# Patient Record
Sex: FEMALE | Race: BLACK OR AFRICAN AMERICAN | Employment: OTHER | URBAN - METROPOLITAN AREA
[De-identification: names, ages, dates, MRNs, and addresses within clinical notes are randomized per-mention and may not be internally consistent; named-entity substitution may affect disease eponyms.]

---

## 2018-09-28 ENCOUNTER — HOSPITAL ENCOUNTER (EMERGENCY)
Age: 63
Discharge: HOME | End: 2018-09-28
Payer: MEDICAID

## 2018-09-28 VITALS
WEIGHT: 130 LBS | RESPIRATION RATE: 17 BRPM | HEIGHT: 62 IN | DIASTOLIC BLOOD PRESSURE: 95 MMHG | SYSTOLIC BLOOD PRESSURE: 138 MMHG | HEART RATE: 85 BPM | OXYGEN SATURATION: 99 % | TEMPERATURE: 98.9 F | BODY MASS INDEX: 23.92 KG/M2

## 2018-09-28 DIAGNOSIS — Z76.0 ENCOUNTER FOR MEDICATION REFILL: ICD-10-CM

## 2018-09-28 DIAGNOSIS — G89.29 ACUTE EXACERBATION OF CHRONIC LOW BACK PAIN: Primary | ICD-10-CM

## 2018-09-28 DIAGNOSIS — M54.50 ACUTE EXACERBATION OF CHRONIC LOW BACK PAIN: Primary | ICD-10-CM

## 2018-09-28 PROCEDURE — 6360000002 HC RX W HCPCS: Performed by: NURSE PRACTITIONER

## 2018-09-28 PROCEDURE — 6370000000 HC RX 637 (ALT 250 FOR IP): Performed by: NURSE PRACTITIONER

## 2018-09-28 PROCEDURE — 99282 EMERGENCY DEPT VISIT SF MDM: CPT

## 2018-09-28 PROCEDURE — 96372 THER/PROPH/DIAG INJ SC/IM: CPT

## 2018-09-28 RX ORDER — CYCLOBENZAPRINE HCL 10 MG
10 TABLET ORAL ONCE
Status: COMPLETED | OUTPATIENT
Start: 2018-09-28 | End: 2018-09-28

## 2018-09-28 RX ORDER — KETOROLAC TROMETHAMINE 30 MG/ML
60 INJECTION, SOLUTION INTRAMUSCULAR; INTRAVENOUS ONCE
Status: COMPLETED | OUTPATIENT
Start: 2018-09-28 | End: 2018-09-28

## 2018-09-28 RX ORDER — PREDNISONE 20 MG/1
60 TABLET ORAL DAILY
Qty: 15 TABLET | Refills: 0 | Status: SHIPPED | OUTPATIENT
Start: 2018-09-28 | End: 2018-10-03

## 2018-09-28 RX ORDER — GABAPENTIN 100 MG/1
100 CAPSULE ORAL 3 TIMES DAILY
Qty: 62 CAPSULE | Refills: 0 | Status: SHIPPED | OUTPATIENT
Start: 2018-09-28 | End: 2018-10-03 | Stop reason: DRUGHIGH

## 2018-09-28 RX ORDER — OXYCODONE HYDROCHLORIDE AND ACETAMINOPHEN 5; 325 MG/1; MG/1
1 TABLET ORAL ONCE
Status: COMPLETED | OUTPATIENT
Start: 2018-09-28 | End: 2018-09-28

## 2018-09-28 RX ADMIN — KETOROLAC TROMETHAMINE 60 MG: 30 INJECTION, SOLUTION INTRAMUSCULAR at 09:53

## 2018-09-28 RX ADMIN — CYCLOBENZAPRINE HYDROCHLORIDE 10 MG: 10 TABLET, FILM COATED ORAL at 09:53

## 2018-09-28 RX ADMIN — OXYCODONE HYDROCHLORIDE AND ACETAMINOPHEN 1 TABLET: 5; 325 TABLET ORAL at 09:53

## 2018-09-28 ASSESSMENT — PAIN SCALES - GENERAL: PAINLEVEL_OUTOF10: 10

## 2018-10-03 ENCOUNTER — OFFICE VISIT (OUTPATIENT)
Dept: INTERNAL MEDICINE | Age: 63
End: 2018-10-03
Payer: MEDICAID

## 2018-10-03 VITALS
HEIGHT: 62 IN | SYSTOLIC BLOOD PRESSURE: 129 MMHG | BODY MASS INDEX: 23.48 KG/M2 | WEIGHT: 127.6 LBS | DIASTOLIC BLOOD PRESSURE: 87 MMHG | HEART RATE: 56 BPM | TEMPERATURE: 97.2 F | RESPIRATION RATE: 16 BRPM

## 2018-10-03 DIAGNOSIS — I10 HYPERTENSION, UNSPECIFIED TYPE: Primary | ICD-10-CM

## 2018-10-03 DIAGNOSIS — M06.9 RHEUMATOID ARTHRITIS INVOLVING MULTIPLE SITES, UNSPECIFIED RHEUMATOID FACTOR PRESENCE: ICD-10-CM

## 2018-10-03 DIAGNOSIS — M54.12 CERVICAL RADICULOPATHY: ICD-10-CM

## 2018-10-03 DIAGNOSIS — F32.A DEPRESSION, UNSPECIFIED DEPRESSION TYPE: ICD-10-CM

## 2018-10-03 PROCEDURE — 99204 OFFICE O/P NEW MOD 45 MIN: CPT | Performed by: INTERNAL MEDICINE

## 2018-10-03 PROCEDURE — G0444 DEPRESSION SCREEN ANNUAL: HCPCS | Performed by: INTERNAL MEDICINE

## 2018-10-03 PROCEDURE — 99213 OFFICE O/P EST LOW 20 MIN: CPT | Performed by: INTERNAL MEDICINE

## 2018-10-03 RX ORDER — AMLODIPINE BESYLATE 5 MG/1
5 TABLET ORAL DAILY
Qty: 30 TABLET | Refills: 2 | Status: SHIPPED | OUTPATIENT
Start: 2018-10-03 | End: 2019-01-24 | Stop reason: SDUPTHER

## 2018-10-03 RX ORDER — AMITRIPTYLINE HYDROCHLORIDE 50 MG/1
50 TABLET, FILM COATED ORAL NIGHTLY
Qty: 30 TABLET | Refills: 3 | Status: SHIPPED | OUTPATIENT
Start: 2018-10-03 | End: 2019-01-24 | Stop reason: SDUPTHER

## 2018-10-03 RX ORDER — GABAPENTIN 300 MG/1
300 CAPSULE ORAL 3 TIMES DAILY
Qty: 90 CAPSULE | Refills: 0 | Status: SHIPPED | OUTPATIENT
Start: 2018-10-03 | End: 2019-02-11 | Stop reason: SDUPTHER

## 2018-10-03 RX ORDER — AMITRIPTYLINE HYDROCHLORIDE 25 MG/1
25 TABLET, FILM COATED ORAL NIGHTLY
Qty: 30 TABLET | Refills: 2 | Status: SHIPPED | OUTPATIENT
Start: 2018-10-03 | End: 2018-10-03 | Stop reason: DRUGHIGH

## 2018-10-03 ASSESSMENT — ENCOUNTER SYMPTOMS
BLOOD IN STOOL: 0
BLURRED VISION: 0
ABDOMINAL PAIN: 0
NAUSEA: 0
CONSTIPATION: 0
DIARRHEA: 0
VOMITING: 0
HEARTBURN: 1
BACK PAIN: 1
SORE THROAT: 0
COUGH: 0
SHORTNESS OF BREATH: 0

## 2018-10-03 ASSESSMENT — PATIENT HEALTH QUESTIONNAIRE - PHQ9
6. FEELING BAD ABOUT YOURSELF - OR THAT YOU ARE A FAILURE OR HAVE LET YOURSELF OR YOUR FAMILY DOWN: 3
2. FEELING DOWN, DEPRESSED OR HOPELESS: 2
5. POOR APPETITE OR OVEREATING: 3
3. TROUBLE FALLING OR STAYING ASLEEP: 3
7. TROUBLE CONCENTRATING ON THINGS, SUCH AS READING THE NEWSPAPER OR WATCHING TELEVISION: 1
9. THOUGHTS THAT YOU WOULD BE BETTER OFF DEAD, OR OF HURTING YOURSELF: 1
SUM OF ALL RESPONSES TO PHQ QUESTIONS 1-9: 18
10. IF YOU CHECKED OFF ANY PROBLEMS, HOW DIFFICULT HAVE THESE PROBLEMS MADE IT FOR YOU TO DO YOUR WORK, TAKE CARE OF THINGS AT HOME, OR GET ALONG WITH OTHER PEOPLE: 3
SUM OF ALL RESPONSES TO PHQ9 QUESTIONS 1 & 2: 4
1. LITTLE INTEREST OR PLEASURE IN DOING THINGS: 2
4. FEELING TIRED OR HAVING LITTLE ENERGY: 2
SUM OF ALL RESPONSES TO PHQ QUESTIONS 1-9: 18
8. MOVING OR SPEAKING SO SLOWLY THAT OTHER PEOPLE COULD HAVE NOTICED. OR THE OPPOSITE, BEING SO FIGETY OR RESTLESS THAT YOU HAVE BEEN MOVING AROUND A LOT MORE THAN USUAL: 1

## 2018-10-03 NOTE — PATIENT INSTRUCTIONS
with depression may not get better unless they get treatment. Many people feel embarrassed or ashamed about having depression. But it isn't a sign of personal weakness. It's not a character flaw. A person who is depressed is not \"crazy. \" Depression is caused by changes in the brain. A serious symptom of depression is thinking about death or suicide. If you or someone you care about talks about this or about feeling hopeless, get help right away. It's important to know that depression can be treated. The first step toward feeling better is often just seeing that the problem exists. Where can you learn more? Go to https://[employer]. org and sign in to your [employer] account. Enter T185 in the Coast[employer] box to learn more about \"Learning About Depression Screening. \"     If you do not have an account, please click on the \"Sign Up Now\" link. Current as of: December 7, 2017  Content Version: 11.7  © 5693-6937 [employer]. Care instructions adapted under license by [facility] ([facility]). If you have questions about a medical condition or this instruction, always ask your healthcare professional. [place] [address] any warranty or liability for your use of this information. Patient Education        Preventing Falls: Care Instructions  Your Care Instructions    Getting around your home safely can be a challenge if you have injuries or health problems that make it easy for you to fall. Loose rugs and furniture in walkways are among the dangers for many older people who have problems walking or who have poor eyesight. People who have conditions such as arthritis, osteoporosis, or dementia also have to be careful not to fall. You can make your home safer with a few simple measures. Follow-up care is a key part of your treatment and safety. Be sure to make and go to all appointments, and call your doctor if you are having problems.  It's also a good idea to know your test Healthwise, Incorporated. Care instructions adapted under license by [facility] ([facility]). If you have questions about a medical condition or this instruction, always ask your healthcare professional. [address] any warranty or liability for your use of this information. Continue medications as prescribed  You have been referred to see the physical medicine and rehabilitation doctors: please follow up  Get lab work done before your next visit.

## 2018-10-30 ENCOUNTER — TELEPHONE (OUTPATIENT)
Dept: ADMINISTRATIVE | Age: 63
End: 2018-10-30

## 2018-12-12 ENCOUNTER — OFFICE VISIT (OUTPATIENT)
Dept: PHYSICAL MEDICINE AND REHAB | Age: 63
End: 2018-12-12
Payer: MEDICAID

## 2018-12-12 VITALS
WEIGHT: 136 LBS | HEIGHT: 62 IN | SYSTOLIC BLOOD PRESSURE: 152 MMHG | DIASTOLIC BLOOD PRESSURE: 91 MMHG | HEART RATE: 77 BPM | BODY MASS INDEX: 25.03 KG/M2

## 2018-12-12 DIAGNOSIS — M79.18 MYOFASCIAL PAIN: ICD-10-CM

## 2018-12-12 DIAGNOSIS — M79.10 TRIGGER POINT: ICD-10-CM

## 2018-12-12 DIAGNOSIS — M54.2 NECK PAIN: Primary | ICD-10-CM

## 2018-12-12 PROCEDURE — 99204 OFFICE O/P NEW MOD 45 MIN: CPT | Performed by: PHYSICAL MEDICINE & REHABILITATION

## 2018-12-12 RX ORDER — PANTOPRAZOLE SODIUM 20 MG/1
20 TABLET, DELAYED RELEASE ORAL DAILY
COMMUNITY
End: 2019-01-24 | Stop reason: SDUPTHER

## 2018-12-12 RX ORDER — ACYCLOVIR 400 MG/1
400 TABLET ORAL PRN
COMMUNITY
End: 2020-06-04

## 2018-12-12 RX ORDER — CYCLOBENZAPRINE HCL 5 MG
5-10 TABLET ORAL 3 TIMES DAILY PRN
Qty: 90 TABLET | Refills: 2 | Status: SHIPPED | OUTPATIENT
Start: 2018-12-12 | End: 2018-12-12 | Stop reason: SDUPTHER

## 2018-12-12 RX ORDER — CYCLOBENZAPRINE HCL 5 MG
5-10 TABLET ORAL 3 TIMES DAILY PRN
Qty: 90 TABLET | Refills: 1 | Status: SHIPPED | OUTPATIENT
Start: 2018-12-12 | End: 2019-01-22 | Stop reason: SDUPTHER

## 2018-12-12 RX ORDER — HYDROCHLOROTHIAZIDE 25 MG/1
25 TABLET ORAL DAILY
COMMUNITY
End: 2019-01-22 | Stop reason: SDUPTHER

## 2018-12-12 NOTE — PROGRESS NOTES
Mary Lou Mclean, [address] [facility] Physical [facility] Rehabilitation  [address] [facility] [place]  Phone: [phone]  Fax: [phone]    PCP: No primary care provider on file. Date of visit: 12/12/18    Chief Complaint   Patient presents with   [facility] Patient       Dear Dr. Merry Mercedes,     Thank you for referring your patient to be seen. As you know,  Frank Wang is a 59 y.o. female with past medical history as below who presents with neck pain for 17 years. There was a gradual onset of pain after no known injury. Now, the pain is constant and occurs daily. The pain is rated Pain Score:   7, is described as stiff, and is located in the neck with radiation to the arms. The symptoms have been worse since onset. The pain is better with MBB, elavil. The pain is worse with neck extension. There is no associated numbness/tingling in hands. There is weakness. There is no bowel/bladder changes. MBB cervical in April in [place]. The prior workup has included: Xray, MRI, EMG. The prior treatment has included:  PT: completed this year in May  Chiropractic: none    Modalities: none   OTC Tylenol: none   NSAIDS: upsets stomach  Opioids: oxycodone with relief   Membrane stabilizers: neurontin, elavil with relief    Muscle relaxers: flexeril with relief   Previous injections: MBB with relief.    Previous surgery at this site: none      Allergies   Allergen Reactions    Motrin [Ibuprofen]      aggravates my IBS    Penicillins        Current Outpatient Prescriptions   Medication Sig Dispense Refill    hydrochlorothiazide (HYDRODIURIL) 25 MG tablet Take 25 mg by mouth daily      pantoprazole (PROTONIX) 20 MG tablet Take 20 mg by mouth daily      acyclovir (ZOVIRAX) 400 MG tablet Take 400 mg by mouth as needed      Psyllium (METAMUCIL FIBER PO) Take by mouth      Multiple Vitamin (MULTIVITAMINS PO) Take by mouth      Tens Unit MISC by Does not apply route 4-lead 1 each 0    cyclobenzaprine (FLEXERIL) 5 MG tablet Take 1-2 tablets by mouth 3 times daily as needed for Muscle spasms 90 tablet 1    amLODIPine (NORVASC) 5 MG tablet Take 1 tablet by mouth daily 30 tablet 2    amitriptyline (ELAVIL) 50 MG tablet Take 1 tablet by mouth nightly 30 tablet 3    gabapentin (NEURONTIN) 300 MG capsule Take 1 capsule by mouth 3 times daily for 30 days. . (Patient taking differently: Take 300 mg by mouth 4 times daily. Ying Bar ) 90 capsule 0     No current facility-administered medications for this visit. Past Medical History:   Diagnosis Date    GERD (gastroesophageal reflux disease)     Hypertension        History reviewed. No pertinent surgical history. History reviewed. No pertinent family history. Social History     Social History    Marital status: Single     Spouse name: N/A    Number of children: N/A    Years of education: N/A     Occupational History    Not on file. Social History Main Topics    Smoking status: Never Smoker    Smokeless tobacco: Never Used    Alcohol use Not on file    Drug use: Unknown    Sexual activity: Not on file     Other Topics Concern    Not on file     Social History Narrative    No narrative on file       Functional Status: The patient is able to ambulate and perform activities of daily living without the use of an assistive device. Occupation: The patient is currently not working. ROS: For more complete ROS answered by the patient, please see .     Constitutional: Denies fevers, chills, night sweats, unintentional weight loss     Skin: Denies rash or skin changes     Eyes: Denies vision changes    Ears/Nose/Throat: Denies nasal congestion or sore throat     Respiratory: Denies SOB or cough     Cardiovascular: Denies CP, palpitations, edema      Gastrointestinal: Denies abdominal pain,  N/V, constipation, or diarrhea    Genitourinary: Denies urinary symptoms    Neurologic: See HPI.     MSK: See

## 2019-01-22 ENCOUNTER — TELEPHONE (OUTPATIENT)
Dept: PHYSICAL MEDICINE AND REHAB | Age: 64
End: 2019-01-22

## 2019-01-22 ENCOUNTER — HOSPITAL ENCOUNTER (EMERGENCY)
Age: 64
Discharge: HOME | End: 2019-01-22
Payer: MEDICAID

## 2019-01-22 ENCOUNTER — APPOINTMENT (OUTPATIENT)
Dept: GENERAL RADIOLOGY | Age: 64
End: 2019-01-22
Payer: MEDICAID

## 2019-01-22 VITALS
DIASTOLIC BLOOD PRESSURE: 80 MMHG | HEIGHT: 62 IN | RESPIRATION RATE: 16 BRPM | BODY MASS INDEX: 25.03 KG/M2 | OXYGEN SATURATION: 100 % | TEMPERATURE: 97.3 F | HEART RATE: 63 BPM | SYSTOLIC BLOOD PRESSURE: 130 MMHG | WEIGHT: 136 LBS

## 2019-01-22 DIAGNOSIS — R73.9 HYPERGLYCEMIA: ICD-10-CM

## 2019-01-22 DIAGNOSIS — I10 HYPERTENSION, UNSPECIFIED TYPE: Primary | ICD-10-CM

## 2019-01-22 LAB
ALBUMIN SERPL-MCNC: 4.7 G/DL (ref 3.5–5.2)
ALP BLD-CCNC: 82 U/L (ref 35–104)
ALT SERPL-CCNC: 11 U/L (ref 0–32)
ANION GAP SERPL CALCULATED.3IONS-SCNC: 16 MMOL/L (ref 7–16)
AST SERPL-CCNC: 15 U/L (ref 0–31)
BASOPHILS ABSOLUTE: 0.03 E9/L (ref 0–0.2)
BASOPHILS RELATIVE PERCENT: 0.5 % (ref 0–2)
BILIRUB SERPL-MCNC: 0.5 MG/DL (ref 0–1.2)
BILIRUBIN URINE: NEGATIVE
BLOOD, URINE: NEGATIVE
BUN BLDV-MCNC: 10 MG/DL (ref 8–23)
CALCIUM SERPL-MCNC: 9.8 MG/DL (ref 8.6–10.2)
CHLORIDE BLD-SCNC: 104 MMOL/L (ref 98–107)
CLARITY: CLEAR
CO2: 21 MMOL/L (ref 22–29)
COLOR: YELLOW
CREAT SERPL-MCNC: 0.7 MG/DL (ref 0.5–1)
EKG ATRIAL RATE: 62 BPM
EKG P AXIS: 59 DEGREES
EKG P-R INTERVAL: 164 MS
EKG Q-T INTERVAL: 444 MS
EKG QRS DURATION: 82 MS
EKG QTC CALCULATION (BAZETT): 450 MS
EKG R AXIS: 51 DEGREES
EKG T AXIS: 74 DEGREES
EKG VENTRICULAR RATE: 62 BPM
EOSINOPHILS ABSOLUTE: 0.14 E9/L (ref 0.05–0.5)
EOSINOPHILS RELATIVE PERCENT: 2.3 % (ref 0–6)
GFR AFRICAN AMERICAN: >60
GFR NON-AFRICAN AMERICAN: >60 ML/MIN/1.73
GLUCOSE BLD-MCNC: 189 MG/DL (ref 74–99)
GLUCOSE URINE: NEGATIVE MG/DL
HCT VFR BLD CALC: 43 % (ref 34–48)
HEMOGLOBIN: 14.6 G/DL (ref 11.5–15.5)
IMMATURE GRANULOCYTES #: 0.02 E9/L
IMMATURE GRANULOCYTES %: 0.3 % (ref 0–5)
KETONES, URINE: NEGATIVE MG/DL
LEUKOCYTE ESTERASE, URINE: NEGATIVE
LYMPHOCYTES ABSOLUTE: 2.67 E9/L (ref 1.5–4)
LYMPHOCYTES RELATIVE PERCENT: 43.8 % (ref 20–42)
MCH RBC QN AUTO: 30 PG (ref 26–35)
MCHC RBC AUTO-ENTMCNC: 34 % (ref 32–34.5)
MCV RBC AUTO: 88.3 FL (ref 80–99.9)
MONOCYTES ABSOLUTE: 0.43 E9/L (ref 0.1–0.95)
MONOCYTES RELATIVE PERCENT: 7 % (ref 2–12)
NEUTROPHILS ABSOLUTE: 2.81 E9/L (ref 1.8–7.3)
NEUTROPHILS RELATIVE PERCENT: 46.1 % (ref 43–80)
NITRITE, URINE: NEGATIVE
PDW BLD-RTO: 13.7 FL (ref 11.5–15)
PH UA: 5.5 (ref 5–9)
PLATELET # BLD: 239 E9/L (ref 130–450)
PMV BLD AUTO: 10.7 FL (ref 7–12)
POTASSIUM SERPL-SCNC: 3.9 MMOL/L (ref 3.5–5)
PROTEIN UA: NEGATIVE MG/DL
RBC # BLD: 4.87 E12/L (ref 3.5–5.5)
SODIUM BLD-SCNC: 141 MMOL/L (ref 132–146)
SPECIFIC GRAVITY UA: <=1.005 (ref 1–1.03)
TOTAL PROTEIN: 8.1 G/DL (ref 6.4–8.3)
TROPONIN: <0.01 NG/ML (ref 0–0.03)
UROBILINOGEN, URINE: 0.2 E.U./DL
WBC # BLD: 6.1 E9/L (ref 4.5–11.5)

## 2019-01-22 PROCEDURE — 6370000000 HC RX 637 (ALT 250 FOR IP): Performed by: PHYSICIAN ASSISTANT

## 2019-01-22 PROCEDURE — 84484 ASSAY OF TROPONIN QUANT: CPT

## 2019-01-22 PROCEDURE — 71046 X-RAY EXAM CHEST 2 VIEWS: CPT

## 2019-01-22 PROCEDURE — 99284 EMERGENCY DEPT VISIT MOD MDM: CPT

## 2019-01-22 PROCEDURE — 81003 URINALYSIS AUTO W/O SCOPE: CPT

## 2019-01-22 PROCEDURE — 85025 COMPLETE CBC W/AUTO DIFF WBC: CPT

## 2019-01-22 PROCEDURE — 36415 COLL VENOUS BLD VENIPUNCTURE: CPT

## 2019-01-22 PROCEDURE — 80053 COMPREHEN METABOLIC PANEL: CPT

## 2019-01-22 RX ORDER — HYDROCHLOROTHIAZIDE 25 MG/1
25 TABLET ORAL DAILY
Qty: 30 TABLET | Refills: 0 | Status: SHIPPED | OUTPATIENT
Start: 2019-01-22 | End: 2019-02-11 | Stop reason: SDUPTHER

## 2019-01-22 RX ORDER — CLONIDINE HYDROCHLORIDE 0.1 MG/1
0.1 TABLET ORAL ONCE
Status: COMPLETED | OUTPATIENT
Start: 2019-01-22 | End: 2019-01-22

## 2019-01-22 RX ORDER — CYCLOBENZAPRINE HCL 5 MG
5-10 TABLET ORAL 3 TIMES DAILY PRN
Qty: 90 TABLET | Refills: 1 | Status: SHIPPED | OUTPATIENT
Start: 2019-01-22 | End: 2019-02-01

## 2019-01-22 RX ADMIN — CLONIDINE HYDROCHLORIDE 0.1 MG: 0.1 TABLET ORAL at 21:11

## 2019-01-24 ENCOUNTER — OFFICE VISIT (OUTPATIENT)
Dept: INTERNAL MEDICINE | Age: 64
End: 2019-01-24
Payer: MEDICAID

## 2019-01-24 ENCOUNTER — HOSPITAL ENCOUNTER (OUTPATIENT)
Age: 64
Discharge: HOME | End: 2019-01-24
Payer: MEDICAID

## 2019-01-24 ENCOUNTER — HOSPITAL ENCOUNTER (OUTPATIENT)
Dept: GENERAL RADIOLOGY | Age: 64
Discharge: HOME | End: 2019-01-26
Payer: MEDICAID

## 2019-01-24 VITALS
TEMPERATURE: 97.2 F | WEIGHT: 127 LBS | BODY MASS INDEX: 23.37 KG/M2 | HEART RATE: 61 BPM | DIASTOLIC BLOOD PRESSURE: 85 MMHG | RESPIRATION RATE: 16 BRPM | HEIGHT: 62 IN | SYSTOLIC BLOOD PRESSURE: 136 MMHG

## 2019-01-24 DIAGNOSIS — M54.12 CERVICAL RADICULOPATHY: Primary | ICD-10-CM

## 2019-01-24 DIAGNOSIS — I10 ESSENTIAL HYPERTENSION: ICD-10-CM

## 2019-01-24 DIAGNOSIS — I10 HYPERTENSION, UNSPECIFIED TYPE: ICD-10-CM

## 2019-01-24 DIAGNOSIS — K21.9 GASTROESOPHAGEAL REFLUX DISEASE, ESOPHAGITIS PRESENCE NOT SPECIFIED: ICD-10-CM

## 2019-01-24 DIAGNOSIS — M06.9 RHEUMATOID ARTHRITIS INVOLVING MULTIPLE SITES, UNSPECIFIED RHEUMATOID FACTOR PRESENCE: ICD-10-CM

## 2019-01-24 DIAGNOSIS — F32.A DEPRESSION, UNSPECIFIED DEPRESSION TYPE: ICD-10-CM

## 2019-01-24 LAB
ALBUMIN SERPL-MCNC: 5.3 G/DL (ref 3.5–5.2)
ALP BLD-CCNC: 84 U/L (ref 35–104)
ALT SERPL-CCNC: 11 U/L (ref 0–32)
ANION GAP SERPL CALCULATED.3IONS-SCNC: 18 MMOL/L (ref 7–16)
AST SERPL-CCNC: 16 U/L (ref 0–31)
BASOPHILS ABSOLUTE: 0.04 E9/L (ref 0–0.2)
BASOPHILS RELATIVE PERCENT: 0.6 % (ref 0–2)
BILIRUB SERPL-MCNC: 0.6 MG/DL (ref 0–1.2)
BUN BLDV-MCNC: 12 MG/DL (ref 8–23)
CALCIUM SERPL-MCNC: 10.4 MG/DL (ref 8.6–10.2)
CHLORIDE BLD-SCNC: 97 MMOL/L (ref 98–107)
CHOLESTEROL, FASTING: 228 MG/DL (ref 0–199)
CO2: 25 MMOL/L (ref 22–29)
CREAT SERPL-MCNC: 0.8 MG/DL (ref 0.5–1)
EOSINOPHILS ABSOLUTE: 0.14 E9/L (ref 0.05–0.5)
EOSINOPHILS RELATIVE PERCENT: 2.2 % (ref 0–6)
GFR AFRICAN AMERICAN: >60
GFR NON-AFRICAN AMERICAN: >60 ML/MIN/1.73
GLUCOSE BLD-MCNC: 102 MG/DL (ref 74–99)
HBA1C MFR BLD: 5.9 % (ref 4–5.6)
HCT VFR BLD CALC: 46.7 % (ref 34–48)
HDLC SERPL-MCNC: 95 MG/DL
HEMOGLOBIN: 15.9 G/DL (ref 11.5–15.5)
IMMATURE GRANULOCYTES #: 0 E9/L
IMMATURE GRANULOCYTES %: 0 % (ref 0–5)
LDL CHOLESTEROL CALCULATED: 107 MG/DL (ref 0–99)
LYMPHOCYTES ABSOLUTE: 3.28 E9/L (ref 1.5–4)
LYMPHOCYTES RELATIVE PERCENT: 50.9 % (ref 20–42)
MCH RBC QN AUTO: 29.9 PG (ref 26–35)
MCHC RBC AUTO-ENTMCNC: 34 % (ref 32–34.5)
MCV RBC AUTO: 87.8 FL (ref 80–99.9)
MONOCYTES ABSOLUTE: 0.47 E9/L (ref 0.1–0.95)
MONOCYTES RELATIVE PERCENT: 7.3 % (ref 2–12)
NEUTROPHILS ABSOLUTE: 2.51 E9/L (ref 1.8–7.3)
NEUTROPHILS RELATIVE PERCENT: 39 % (ref 43–80)
PDW BLD-RTO: 13.5 FL (ref 11.5–15)
PLATELET # BLD: 265 E9/L (ref 130–450)
PMV BLD AUTO: 10.8 FL (ref 7–12)
POTASSIUM SERPL-SCNC: 4 MMOL/L (ref 3.5–5)
RBC # BLD: 5.32 E12/L (ref 3.5–5.5)
SODIUM BLD-SCNC: 140 MMOL/L (ref 132–146)
T4 FREE: 1.47 NG/DL (ref 0.93–1.7)
TOTAL PROTEIN: 8.5 G/DL (ref 6.4–8.3)
TRIGLYCERIDE, FASTING: 129 MG/DL (ref 0–149)
TSH SERPL DL<=0.05 MIU/L-ACNC: 3.54 UIU/ML (ref 0.27–4.2)
VLDLC SERPL CALC-MCNC: 26 MG/DL
WBC # BLD: 6.4 E9/L (ref 4.5–11.5)

## 2019-01-24 PROCEDURE — 80061 LIPID PANEL: CPT

## 2019-01-24 PROCEDURE — 85025 COMPLETE CBC W/AUTO DIFF WBC: CPT

## 2019-01-24 PROCEDURE — 73130 X-RAY EXAM OF HAND: CPT

## 2019-01-24 PROCEDURE — 99213 OFFICE O/P EST LOW 20 MIN: CPT | Performed by: INTERNAL MEDICINE

## 2019-01-24 PROCEDURE — 80053 COMPREHEN METABOLIC PANEL: CPT

## 2019-01-24 PROCEDURE — 86200 CCP ANTIBODY: CPT

## 2019-01-24 PROCEDURE — 90686 IIV4 VACC NO PRSV 0.5 ML IM: CPT

## 2019-01-24 PROCEDURE — 36415 COLL VENOUS BLD VENIPUNCTURE: CPT

## 2019-01-24 PROCEDURE — 83036 HEMOGLOBIN GLYCOSYLATED A1C: CPT

## 2019-01-24 PROCEDURE — 73120 X-RAY EXAM OF HAND: CPT

## 2019-01-24 PROCEDURE — G0008 ADMIN INFLUENZA VIRUS VAC: HCPCS

## 2019-01-24 PROCEDURE — 6360000002 HC RX W HCPCS

## 2019-01-24 PROCEDURE — 84443 ASSAY THYROID STIM HORMONE: CPT

## 2019-01-24 PROCEDURE — 84439 ASSAY OF FREE THYROXINE: CPT

## 2019-01-24 RX ORDER — PANTOPRAZOLE SODIUM 20 MG/1
20 TABLET, DELAYED RELEASE ORAL DAILY
Qty: 30 TABLET | Refills: 2 | Status: SHIPPED | OUTPATIENT
Start: 2019-01-24 | End: 2019-03-20 | Stop reason: SDUPTHER

## 2019-01-24 RX ORDER — AMITRIPTYLINE HYDROCHLORIDE 50 MG/1
50 TABLET, FILM COATED ORAL NIGHTLY
Qty: 30 TABLET | Refills: 2 | Status: SHIPPED | OUTPATIENT
Start: 2019-01-24 | End: 2019-03-20 | Stop reason: SDUPTHER

## 2019-01-24 RX ORDER — HYDROCHLOROTHIAZIDE 25 MG/1
25 TABLET ORAL DAILY
Qty: 30 TABLET | Status: CANCELLED | OUTPATIENT
Start: 2019-01-24

## 2019-01-24 RX ORDER — AMLODIPINE BESYLATE 5 MG/1
5 TABLET ORAL DAILY
Qty: 30 TABLET | Refills: 3 | Status: SHIPPED | OUTPATIENT
Start: 2019-01-24 | End: 2019-02-11 | Stop reason: SDUPTHER

## 2019-01-24 ASSESSMENT — ENCOUNTER SYMPTOMS
GASTROINTESTINAL NEGATIVE: 1
EYES NEGATIVE: 1
BACK PAIN: 1
RESPIRATORY NEGATIVE: 1

## 2019-01-27 LAB — CCP IGG ANTIBODIES: 14 UNITS (ref 0–19)

## 2019-01-28 ENCOUNTER — TELEPHONE (OUTPATIENT)
Dept: PHYSICAL MEDICINE AND REHAB | Age: 64
End: 2019-01-28

## 2019-01-29 ENCOUNTER — OFFICE VISIT (OUTPATIENT)
Dept: PHYSICAL MEDICINE AND REHAB | Age: 64
End: 2019-01-29
Payer: MEDICAID

## 2019-01-29 VITALS
DIASTOLIC BLOOD PRESSURE: 90 MMHG | HEART RATE: 70 BPM | BODY MASS INDEX: 24.11 KG/M2 | WEIGHT: 131 LBS | SYSTOLIC BLOOD PRESSURE: 116 MMHG | HEIGHT: 62 IN

## 2019-01-29 DIAGNOSIS — M54.12 CERVICAL RADICULOPATHY: ICD-10-CM

## 2019-01-29 DIAGNOSIS — G89.4 CHRONIC PAIN SYNDROME: Primary | ICD-10-CM

## 2019-01-29 DIAGNOSIS — M50.30 DDD (DEGENERATIVE DISC DISEASE), CERVICAL: ICD-10-CM

## 2019-01-29 PROCEDURE — 99212 OFFICE O/P EST SF 10 MIN: CPT | Performed by: PHYSICAL MEDICINE & REHABILITATION

## 2019-02-08 ENCOUNTER — TELEPHONE (OUTPATIENT)
Dept: ADMINISTRATIVE | Age: 64
End: 2019-02-08

## 2019-02-08 NOTE — TELEPHONE ENCOUNTER
Lower back pain leg and foot pain , blister on toe. foot black. Pt fefused  Walk in care, urgent care, emergency care. Scheduled per patient request pcp not available. Spoke with office juana.

## 2019-02-11 ENCOUNTER — OFFICE VISIT (OUTPATIENT)
Dept: INTERNAL MEDICINE | Age: 64
End: 2019-02-11
Payer: MEDICAID

## 2019-02-11 VITALS
BODY MASS INDEX: 25.56 KG/M2 | HEIGHT: 62 IN | TEMPERATURE: 64.4 F | SYSTOLIC BLOOD PRESSURE: 123 MMHG | RESPIRATION RATE: 18 BRPM | HEART RATE: 75 BPM | DIASTOLIC BLOOD PRESSURE: 84 MMHG | WEIGHT: 138.9 LBS | OXYGEN SATURATION: 99 %

## 2019-02-11 DIAGNOSIS — M54.12 CERVICAL RADICULOPATHY: Primary | ICD-10-CM

## 2019-02-11 DIAGNOSIS — G62.9 NEUROPATHY: ICD-10-CM

## 2019-02-11 DIAGNOSIS — I73.00 RAYNAUD'S PHENOMENON WITHOUT GANGRENE: ICD-10-CM

## 2019-02-11 DIAGNOSIS — J45.20 MILD INTERMITTENT ASTHMA WITHOUT COMPLICATION: ICD-10-CM

## 2019-02-11 DIAGNOSIS — G89.4 CHRONIC PAIN SYNDROME: ICD-10-CM

## 2019-02-11 DIAGNOSIS — E78.2 MIXED HYPERLIPIDEMIA: ICD-10-CM

## 2019-02-11 DIAGNOSIS — I10 ESSENTIAL HYPERTENSION: ICD-10-CM

## 2019-02-11 PROCEDURE — 99213 OFFICE O/P EST LOW 20 MIN: CPT | Performed by: INTERNAL MEDICINE

## 2019-02-11 RX ORDER — GABAPENTIN 300 MG/1
300 CAPSULE ORAL 3 TIMES DAILY
Qty: 90 CAPSULE | Refills: 0 | Status: SHIPPED | OUTPATIENT
Start: 2019-02-11 | End: 2019-03-20 | Stop reason: ALTCHOICE

## 2019-02-11 RX ORDER — ATORVASTATIN CALCIUM 40 MG/1
40 TABLET, FILM COATED ORAL DAILY
Qty: 30 TABLET | Refills: 3 | Status: SHIPPED | OUTPATIENT
Start: 2019-02-11 | End: 2019-03-20 | Stop reason: SDUPTHER

## 2019-02-11 RX ORDER — IPRATROPIUM BROMIDE AND ALBUTEROL SULFATE 2.5; .5 MG/3ML; MG/3ML
1 SOLUTION RESPIRATORY (INHALATION) EVERY 4 HOURS
Qty: 360 ML | Refills: 3 | Status: SHIPPED | OUTPATIENT
Start: 2019-02-11 | End: 2019-05-30 | Stop reason: ALTCHOICE

## 2019-02-11 RX ORDER — HYDROCHLOROTHIAZIDE 25 MG/1
12.5 TABLET ORAL DAILY
Qty: 30 TABLET | Refills: 0 | Status: SHIPPED | OUTPATIENT
Start: 2019-02-11 | End: 2019-02-27

## 2019-02-11 RX ORDER — AMLODIPINE BESYLATE 5 MG/1
10 TABLET ORAL DAILY
Qty: 30 TABLET | Refills: 3 | Status: SHIPPED | OUTPATIENT
Start: 2019-02-11 | End: 2019-03-20 | Stop reason: SDUPTHER

## 2019-02-20 ENCOUNTER — HOSPITAL ENCOUNTER (OUTPATIENT)
Dept: PHYSICAL THERAPY | Age: 64
Setting detail: THERAPIES SERIES
Discharge: HOME | End: 2019-02-20
Payer: MEDICAID

## 2019-02-20 PROCEDURE — 97162 PT EVAL MOD COMPLEX 30 MIN: CPT

## 2019-02-27 ENCOUNTER — OFFICE VISIT (OUTPATIENT)
Dept: PAIN MANAGEMENT | Age: 64
End: 2019-02-27
Payer: MEDICAID

## 2019-02-27 ENCOUNTER — PREP FOR PROCEDURE (OUTPATIENT)
Dept: PAIN MANAGEMENT | Age: 64
End: 2019-02-27

## 2019-02-27 ENCOUNTER — HOSPITAL ENCOUNTER (OUTPATIENT)
Dept: PHYSICAL THERAPY | Age: 64
Setting detail: THERAPIES SERIES
End: 2019-02-27
Payer: MEDICAID

## 2019-02-27 VITALS
BODY MASS INDEX: 24.84 KG/M2 | RESPIRATION RATE: 16 BRPM | HEART RATE: 88 BPM | DIASTOLIC BLOOD PRESSURE: 64 MMHG | HEIGHT: 62 IN | WEIGHT: 135 LBS | OXYGEN SATURATION: 98 % | SYSTOLIC BLOOD PRESSURE: 122 MMHG | TEMPERATURE: 98.7 F

## 2019-02-27 DIAGNOSIS — M54.16 LUMBAR RADICULOPATHY: ICD-10-CM

## 2019-02-27 DIAGNOSIS — G89.4 CHRONIC PAIN SYNDROME: Primary | ICD-10-CM

## 2019-02-27 DIAGNOSIS — M51.9 LUMBAR DISC DISORDER: ICD-10-CM

## 2019-02-27 DIAGNOSIS — G89.29 CHRONIC BILATERAL LOW BACK PAIN WITH BILATERAL SCIATICA: ICD-10-CM

## 2019-02-27 DIAGNOSIS — M54.41 CHRONIC BILATERAL LOW BACK PAIN WITH BILATERAL SCIATICA: ICD-10-CM

## 2019-02-27 DIAGNOSIS — M54.42 CHRONIC BILATERAL LOW BACK PAIN WITH BILATERAL SCIATICA: ICD-10-CM

## 2019-02-27 DIAGNOSIS — M54.16 LUMBAR RADICULOPATHY: Primary | ICD-10-CM

## 2019-02-27 PROCEDURE — 99204 OFFICE O/P NEW MOD 45 MIN: CPT | Performed by: PAIN MEDICINE

## 2019-02-27 PROCEDURE — 99213 OFFICE O/P EST LOW 20 MIN: CPT

## 2019-02-27 RX ORDER — BIOTIN 1000 MCG
TABLET,CHEWABLE ORAL DAILY
COMMUNITY
End: 2019-10-25 | Stop reason: ALTCHOICE

## 2019-02-27 RX ORDER — MULTIVIT WITH MINERALS/LUTEIN
1000 TABLET ORAL DAILY
COMMUNITY
End: 2019-03-20

## 2019-03-01 ENCOUNTER — APPOINTMENT (OUTPATIENT)
Dept: GENERAL RADIOLOGY | Age: 64
End: 2019-03-01
Payer: MEDICAID

## 2019-03-01 ENCOUNTER — HOSPITAL ENCOUNTER (EMERGENCY)
Age: 64
Discharge: HOME | End: 2019-03-01
Payer: MEDICAID

## 2019-03-01 VITALS
TEMPERATURE: 98.6 F | OXYGEN SATURATION: 96 % | RESPIRATION RATE: 18 BRPM | WEIGHT: 135 LBS | DIASTOLIC BLOOD PRESSURE: 95 MMHG | BODY MASS INDEX: 24.84 KG/M2 | SYSTOLIC BLOOD PRESSURE: 155 MMHG | HEART RATE: 98 BPM | HEIGHT: 62 IN

## 2019-03-01 DIAGNOSIS — J10.1 INFLUENZA A: Primary | ICD-10-CM

## 2019-03-01 LAB
ANION GAP SERPL CALCULATED.3IONS-SCNC: 11 MMOL/L (ref 7–16)
BASOPHILS ABSOLUTE: 0 E9/L (ref 0–0.2)
BASOPHILS RELATIVE PERCENT: 0.3 % (ref 0–2)
BUN BLDV-MCNC: 8 MG/DL (ref 8–23)
CALCIUM SERPL-MCNC: 8.9 MG/DL (ref 8.6–10.2)
CHLORIDE BLD-SCNC: 102 MMOL/L (ref 98–107)
CO2: 28 MMOL/L (ref 22–29)
CREAT SERPL-MCNC: 0.7 MG/DL (ref 0.5–1)
EKG ATRIAL RATE: 97 BPM
EKG P AXIS: 81 DEGREES
EKG P-R INTERVAL: 162 MS
EKG Q-T INTERVAL: 366 MS
EKG QRS DURATION: 86 MS
EKG QTC CALCULATION (BAZETT): 464 MS
EKG R AXIS: 58 DEGREES
EKG T AXIS: 75 DEGREES
EKG VENTRICULAR RATE: 97 BPM
EOSINOPHILS ABSOLUTE: 0 E9/L (ref 0.05–0.5)
EOSINOPHILS RELATIVE PERCENT: 0.6 % (ref 0–6)
GFR AFRICAN AMERICAN: >60
GFR NON-AFRICAN AMERICAN: >60 ML/MIN/1.73
GLUCOSE BLD-MCNC: 139 MG/DL (ref 74–99)
HCT VFR BLD CALC: 37.1 % (ref 34–48)
HEMOGLOBIN: 12.5 G/DL (ref 11.5–15.5)
INFLUENZA A BY PCR: DETECTED
INFLUENZA B BY PCR: NOT DETECTED
LYMPHOCYTES ABSOLUTE: 2.07 E9/L (ref 1.5–4)
LYMPHOCYTES RELATIVE PERCENT: 29.6 % (ref 20–42)
MCH RBC QN AUTO: 30 PG (ref 26–35)
MCHC RBC AUTO-ENTMCNC: 33.7 % (ref 32–34.5)
MCV RBC AUTO: 89 FL (ref 80–99.9)
MONOCYTES ABSOLUTE: 0.21 E9/L (ref 0.1–0.95)
MONOCYTES RELATIVE PERCENT: 2.6 % (ref 2–12)
NEUTROPHILS ABSOLUTE: 4.69 E9/L (ref 1.8–7.3)
NEUTROPHILS RELATIVE PERCENT: 67.8 % (ref 43–80)
PDW BLD-RTO: 13.2 FL (ref 11.5–15)
PLATELET # BLD: 153 E9/L (ref 130–450)
PMV BLD AUTO: 10.3 FL (ref 7–12)
POTASSIUM SERPL-SCNC: 3.8 MMOL/L (ref 3.5–5)
RBC # BLD: 4.17 E12/L (ref 3.5–5.5)
RBC # BLD: NORMAL 10*6/UL
SODIUM BLD-SCNC: 141 MMOL/L (ref 132–146)
WBC # BLD: 6.9 E9/L (ref 4.5–11.5)

## 2019-03-01 PROCEDURE — 36415 COLL VENOUS BLD VENIPUNCTURE: CPT

## 2019-03-01 PROCEDURE — 85025 COMPLETE CBC W/AUTO DIFF WBC: CPT

## 2019-03-01 PROCEDURE — 71046 X-RAY EXAM CHEST 2 VIEWS: CPT

## 2019-03-01 PROCEDURE — 93005 ELECTROCARDIOGRAM TRACING: CPT | Performed by: NURSE PRACTITIONER

## 2019-03-01 PROCEDURE — 80048 BASIC METABOLIC PNL TOTAL CA: CPT

## 2019-03-01 PROCEDURE — 99283 EMERGENCY DEPT VISIT LOW MDM: CPT

## 2019-03-01 PROCEDURE — 87502 INFLUENZA DNA AMP PROBE: CPT

## 2019-03-01 RX ORDER — LORATADINE AND PSEUDOEPHEDRINE 10; 240 MG/1; MG/1
1 TABLET, EXTENDED RELEASE ORAL DAILY
Qty: 12 TABLET | Refills: 0 | Status: SHIPPED | OUTPATIENT
Start: 2019-03-01 | End: 2019-03-20

## 2019-03-01 RX ORDER — ACETAMINOPHEN 500 MG
500 TABLET ORAL EVERY 6 HOURS PRN
Qty: 120 TABLET | Refills: 0 | Status: SHIPPED | OUTPATIENT
Start: 2019-03-01 | End: 2019-04-25 | Stop reason: SDUPTHER

## 2019-03-01 RX ORDER — DEXTROMETHORPHAN HYDROBROMIDE AND PROMETHAZINE HYDROCHLORIDE 15; 6.25 MG/5ML; MG/5ML
5 SYRUP ORAL 4 TIMES DAILY PRN
Qty: 240 ML | Refills: 1 | Status: SHIPPED | OUTPATIENT
Start: 2019-03-01 | End: 2019-03-08

## 2019-03-01 RX ORDER — METHYLPREDNISOLONE 4 MG/1
TABLET ORAL
Qty: 1 KIT | Refills: 0 | Status: SHIPPED | OUTPATIENT
Start: 2019-03-01 | End: 2019-03-07

## 2019-03-01 ASSESSMENT — PAIN SCALES - GENERAL: PAINLEVEL_OUTOF10: 5

## 2019-03-01 ASSESSMENT — PAIN DESCRIPTION - PROGRESSION: CLINICAL_PROGRESSION: GRADUALLY WORSENING

## 2019-03-01 ASSESSMENT — PAIN DESCRIPTION - ONSET: ONSET: PROGRESSIVE

## 2019-03-01 ASSESSMENT — PAIN DESCRIPTION - DESCRIPTORS: DESCRIPTORS: ACHING;SORE

## 2019-03-01 ASSESSMENT — PAIN DESCRIPTION - PAIN TYPE: TYPE: ACUTE PAIN

## 2019-03-01 ASSESSMENT — PAIN DESCRIPTION - FREQUENCY: FREQUENCY: INTERMITTENT

## 2019-03-01 ASSESSMENT — PAIN DESCRIPTION - LOCATION: LOCATION: CHEST;GENERALIZED

## 2019-03-01 NOTE — ED PROVIDER NOTES
NAD  Eyes: EOMI, non-injected conjunctiva   Ears:  External Ears: Bilateral normal.              TM's & External Canals: mild redness bilaterally, no effusions   Throat: moderate erythema, uvula midline, Airway patent     Neck/Lymphatic: Supple. There is Bilateral cervical node tenderness. No meningeal signs   Respiratory:  Clear to auscultation and breath sounds equal, good air flow. No respiratory distress  CV: Regular rate and rhythm  Integument:  No rashes or erythema present. Neurological:  Motor functions intact.     Lab / Imaging Results   (All laboratory and radiology results have been personally reviewed by myself)  Labs:  Results for orders placed or performed during the hospital encounter of 03/01/19   Rapid influenza A/B antigens   Result Value Ref Range    Influenza A by PCR DETECTED (A) Not Detected    Influenza B by PCR Not Detected Not Detected   CBC auto differential   Result Value Ref Range    WBC 6.9 4.5 - 11.5 E9/L    RBC 4.17 3.50 - 5.50 E12/L    Hemoglobin 12.5 11.5 - 15.5 g/dL    Hematocrit 37.1 34.0 - 48.0 %    MCV 89.0 80.0 - 99.9 fL    MCH 30.0 26.0 - 35.0 pg    MCHC 33.7 32.0 - 34.5 %    RDW 13.2 11.5 - 15.0 fL    Platelets 442 330 - 273 E9/L    MPV 10.3 7.0 - 12.0 fL    Neutrophils % 67.8 43.0 - 80.0 %    Lymphocytes % 29.6 20.0 - 42.0 %    Monocytes % 2.6 2.0 - 12.0 %    Eosinophils % 0.6 0.0 - 6.0 %    Basophils % 0.3 0.0 - 2.0 %    Neutrophils # 4.69 1.80 - 7.30 E9/L    Lymphocytes # 2.07 1.50 - 4.00 E9/L    Monocytes # 0.21 0.10 - 0.95 E9/L    Eosinophils # 0.00 (L) 0.05 - 0.50 E9/L    Basophils # 0.00 0.00 - 0.20 E9/L    RBC Morphology Normal    Basic Metabolic Panel   Result Value Ref Range    Sodium 141 132 - 146 mmol/L    Potassium 3.8 3.5 - 5.0 mmol/L    Chloride 102 98 - 107 mmol/L    CO2 28 22 - 29 mmol/L    Anion Gap 11 7 - 16 mmol/L    Glucose 139 (H) 74 - 99 mg/dL    BUN 8 8 - 23 mg/dL    CREATININE 0.7 0.5 - 1.0 mg/dL    GFR Non-African American >60 >=60 mL/min/1.73    GFR African American >60     Calcium 8.9 8.6 - 10.2 mg/dL     Imaging: All Radiology results interpreted by Radiologist unless otherwise noted. XR CHEST STANDARD (2 VW)   Final Result   No radiographic evidence of acute cardiopulmonary disease. ED Course / Medical Decision Making   ED Medications:   Medications - No data to display    Consults:  None    Procedures:  none    Medical Decision Making:   Patient is well appearing, non toxic and appropriate for outpatient management. Plan is for symptom management and PCP follow up. Counseling: The emergency provider has spoken with the patient and/or caregiver and discussed todays results, in addition to providing specific details for the plan of care and counseling regarding the diagnosis and prognosis. Questions are answered at this time and they are agreeable with the plan. All results reviewed with pt and all questions answered. Patient understands that they must follow-up with PCP. Patient was advised to return to ED if symptoms worsen or new symptoms, such as chest pain, SOB, N/V, fever, or chills develop. Pt remained nontoxic, non-hypoxic, and A&O x4 during this ED visit. They agreed with plan of care, discharge, and importance of follow-up. Pt was in no distress at discharge. Vitals stable. Patient educated on newly prescribed medication. Assessment     1.  Influenza A      Plan   Discharge to home  Patient condition is good    New Medications     Discharge Medication List as of 3/1/2019 11:24 AM      START taking these medications    Details   methylPREDNISolone (MEDROL, NEW,) 4 MG tablet Take by mouth., Disp-1 kit, R-0Print      promethazine-dextromethorphan (PROMETHAZINE-DM) 6.25-15 MG/5ML syrup Take 5 mLs by mouth 4 times daily as needed for Cough, Disp-240 mL, R-1Print      acetaminophen (APAP EXTRA STRENGTH) 500 MG tablet Take 1 tablet by mouth every 6 hours as needed for Pain, Disp-120 tablet, R-0Print      loratadine-pseudoephedrine

## 2019-03-04 ENCOUNTER — TELEPHONE (OUTPATIENT)
Dept: PAIN MANAGEMENT | Age: 64
End: 2019-03-04

## 2019-03-05 ENCOUNTER — HOSPITAL ENCOUNTER (OUTPATIENT)
Dept: PHYSICAL THERAPY | Age: 64
Setting detail: THERAPIES SERIES
End: 2019-03-05
Payer: MEDICAID

## 2019-03-08 ENCOUNTER — HOSPITAL ENCOUNTER (OUTPATIENT)
Dept: PHYSICAL THERAPY | Age: 64
Setting detail: THERAPIES SERIES
Discharge: HOME | End: 2019-03-08
Payer: MEDICAID

## 2019-03-08 PROCEDURE — 97530 THERAPEUTIC ACTIVITIES: CPT

## 2019-03-15 ENCOUNTER — HOSPITAL ENCOUNTER (OUTPATIENT)
Age: 64
Discharge: HOME | End: 2019-03-17
Payer: MEDICAID

## 2019-03-15 ENCOUNTER — OFFICE VISIT (OUTPATIENT)
Dept: OBGYN | Age: 64
End: 2019-03-15
Payer: MEDICAID

## 2019-03-15 VITALS
WEIGHT: 136 LBS | SYSTOLIC BLOOD PRESSURE: 111 MMHG | DIASTOLIC BLOOD PRESSURE: 62 MMHG | BODY MASS INDEX: 25.03 KG/M2 | HEIGHT: 62 IN | HEART RATE: 99 BPM | RESPIRATION RATE: 18 BRPM

## 2019-03-15 DIAGNOSIS — Z01.419 WOMEN'S ANNUAL ROUTINE GYNECOLOGICAL EXAMINATION: Primary | ICD-10-CM

## 2019-03-15 DIAGNOSIS — L68.0 HIRSUTISM: ICD-10-CM

## 2019-03-15 LAB
BASOPHILS ABSOLUTE: 0.03 E9/L (ref 0–0.2)
BASOPHILS RELATIVE PERCENT: 0.5 % (ref 0–2)
EOSINOPHILS ABSOLUTE: 0.1 E9/L (ref 0.05–0.5)
EOSINOPHILS RELATIVE PERCENT: 1.7 % (ref 0–6)
FOLLICLE STIMULATING HORMONE: 71.3 MIU/ML
HCT VFR BLD CALC: 38.7 % (ref 34–48)
HEMOGLOBIN: 13.1 G/DL (ref 11.5–15.5)
IMMATURE GRANULOCYTES #: 0.01 E9/L
IMMATURE GRANULOCYTES %: 0.2 % (ref 0–5)
LYMPHOCYTES ABSOLUTE: 2.47 E9/L (ref 1.5–4)
LYMPHOCYTES RELATIVE PERCENT: 43.2 % (ref 20–42)
MCH RBC QN AUTO: 30.2 PG (ref 26–35)
MCHC RBC AUTO-ENTMCNC: 33.9 % (ref 32–34.5)
MCV RBC AUTO: 89.2 FL (ref 80–99.9)
MONOCYTES ABSOLUTE: 0.4 E9/L (ref 0.1–0.95)
MONOCYTES RELATIVE PERCENT: 7 % (ref 2–12)
NEUTROPHILS ABSOLUTE: 2.71 E9/L (ref 1.8–7.3)
NEUTROPHILS RELATIVE PERCENT: 47.4 % (ref 43–80)
PDW BLD-RTO: 13.5 FL (ref 11.5–15)
PLATELET # BLD: 226 E9/L (ref 130–450)
PMV BLD AUTO: 11.2 FL (ref 7–12)
PROLACTIN: 3.83 NG/ML
RBC # BLD: 4.34 E12/L (ref 3.5–5.5)
WBC # BLD: 5.7 E9/L (ref 4.5–11.5)

## 2019-03-15 PROCEDURE — 99386 PREV VISIT NEW AGE 40-64: CPT | Performed by: OBSTETRICS & GYNECOLOGY

## 2019-03-15 PROCEDURE — 84270 ASSAY OF SEX HORMONE GLOBUL: CPT

## 2019-03-15 PROCEDURE — 84146 ASSAY OF PROLACTIN: CPT

## 2019-03-15 PROCEDURE — 36415 COLL VENOUS BLD VENIPUNCTURE: CPT

## 2019-03-15 PROCEDURE — 36415 COLL VENOUS BLD VENIPUNCTURE: CPT | Performed by: OBSTETRICS & GYNECOLOGY

## 2019-03-15 PROCEDURE — 85025 COMPLETE CBC W/AUTO DIFF WBC: CPT

## 2019-03-15 PROCEDURE — 84403 ASSAY OF TOTAL TESTOSTERONE: CPT

## 2019-03-15 PROCEDURE — G0123 SCREEN CERV/VAG THIN LAYER: HCPCS

## 2019-03-15 PROCEDURE — 83498 ASY HYDROXYPROGESTERONE 17-D: CPT

## 2019-03-15 PROCEDURE — 83001 ASSAY OF GONADOTROPIN (FSH): CPT

## 2019-03-15 PROCEDURE — 82627 DEHYDROEPIANDROSTERONE: CPT

## 2019-03-15 PROCEDURE — 99203 OFFICE O/P NEW LOW 30 MIN: CPT | Performed by: OBSTETRICS & GYNECOLOGY

## 2019-03-15 RX ORDER — CYCLOBENZAPRINE HCL 10 MG
5 TABLET ORAL 3 TIMES DAILY
COMMUNITY
End: 2020-01-16 | Stop reason: SDUPTHER

## 2019-03-15 ASSESSMENT — ENCOUNTER SYMPTOMS
VOMITING: 0
NAUSEA: 0
SHORTNESS OF BREATH: 0
CONSTIPATION: 0
DIARRHEA: 0
ABDOMINAL PAIN: 0

## 2019-03-17 LAB — DHEAS (DHEA SULFATE): 5 UG/DL (ref 13–130)

## 2019-03-18 LAB — 17-OH PROGESTERONE LCMS: 12.81 NG/DL

## 2019-03-19 ENCOUNTER — PREP FOR PROCEDURE (OUTPATIENT)
Dept: PAIN MANAGEMENT | Age: 64
End: 2019-03-19

## 2019-03-19 LAB
SEX HORMONE BINDING GLOBULIN: 59 NMOL/L (ref 30–135)
TESTOSTERONE FREE-NONMALE: ABNORMAL PG/ML (ref 0.6–3.8)
TESTOSTERONE TOTAL: <3 NG/DL (ref 20–70)

## 2019-03-20 ENCOUNTER — OFFICE VISIT (OUTPATIENT)
Dept: INTERNAL MEDICINE | Age: 64
End: 2019-03-20
Payer: MEDICAID

## 2019-03-20 VITALS
HEIGHT: 62 IN | DIASTOLIC BLOOD PRESSURE: 72 MMHG | HEART RATE: 81 BPM | WEIGHT: 138 LBS | RESPIRATION RATE: 16 BRPM | SYSTOLIC BLOOD PRESSURE: 110 MMHG | BODY MASS INDEX: 25.4 KG/M2 | TEMPERATURE: 97.7 F

## 2019-03-20 DIAGNOSIS — F41.1 GAD (GENERALIZED ANXIETY DISORDER): ICD-10-CM

## 2019-03-20 DIAGNOSIS — J31.0 CHRONIC RHINITIS: Primary | ICD-10-CM

## 2019-03-20 DIAGNOSIS — G62.9 NEUROPATHY: ICD-10-CM

## 2019-03-20 DIAGNOSIS — Z72.0 TOBACCO ABUSE: ICD-10-CM

## 2019-03-20 DIAGNOSIS — K21.9 GASTROESOPHAGEAL REFLUX DISEASE WITHOUT ESOPHAGITIS: ICD-10-CM

## 2019-03-20 DIAGNOSIS — I10 ESSENTIAL HYPERTENSION: ICD-10-CM

## 2019-03-20 DIAGNOSIS — E78.2 MIXED HYPERLIPIDEMIA: ICD-10-CM

## 2019-03-20 PROCEDURE — 99214 OFFICE O/P EST MOD 30 MIN: CPT | Performed by: INTERNAL MEDICINE

## 2019-03-20 RX ORDER — ECHINACEA PURPUREA EXTRACT 125 MG
1 TABLET ORAL PRN
Qty: 1 BOTTLE | Refills: 3 | Status: SHIPPED
Start: 2019-03-20 | End: 2022-05-17

## 2019-03-20 RX ORDER — PANTOPRAZOLE SODIUM 20 MG/1
20 TABLET, DELAYED RELEASE ORAL DAILY
Qty: 30 TABLET | Refills: 2 | Status: SHIPPED | OUTPATIENT
Start: 2019-03-20 | End: 2019-04-25 | Stop reason: SDUPTHER

## 2019-03-20 RX ORDER — AMLODIPINE BESYLATE 5 MG/1
10 TABLET ORAL DAILY
Qty: 30 TABLET | Refills: 2 | Status: SHIPPED | OUTPATIENT
Start: 2019-03-20 | End: 2019-04-25

## 2019-03-20 RX ORDER — AMITRIPTYLINE HYDROCHLORIDE 50 MG/1
50 TABLET, FILM COATED ORAL NIGHTLY
Qty: 30 TABLET | Refills: 2 | Status: SHIPPED | OUTPATIENT
Start: 2019-03-20 | End: 2019-04-25 | Stop reason: SDUPTHER

## 2019-03-20 RX ORDER — BUPROPION HYDROCHLORIDE 75 MG/1
75 TABLET ORAL 2 TIMES DAILY
Qty: 60 TABLET | Refills: 2 | Status: SHIPPED | OUTPATIENT
Start: 2019-03-20 | End: 2019-05-10

## 2019-03-20 RX ORDER — ATORVASTATIN CALCIUM 40 MG/1
40 TABLET, FILM COATED ORAL DAILY
Qty: 30 TABLET | Refills: 2 | Status: SHIPPED | OUTPATIENT
Start: 2019-03-20 | End: 2019-04-25 | Stop reason: SDUPTHER

## 2019-03-20 RX ORDER — FLUTICASONE PROPIONATE 50 MCG
2 SPRAY, SUSPENSION (ML) NASAL DAILY
Qty: 3 BOTTLE | Refills: 1 | Status: SHIPPED | OUTPATIENT
Start: 2019-03-20 | End: 2019-04-25 | Stop reason: SDUPTHER

## 2019-03-20 RX ORDER — GABAPENTIN 300 MG/1
300 CAPSULE ORAL 3 TIMES DAILY
COMMUNITY
End: 2019-11-11 | Stop reason: SDUPTHER

## 2019-03-20 ASSESSMENT — ENCOUNTER SYMPTOMS
ABDOMINAL PAIN: 0
WHEEZING: 0
COUGH: 0
NAUSEA: 0
VOMITING: 0
EYES NEGATIVE: 1
CONSTIPATION: 0
DIARRHEA: 0
BACK PAIN: 0

## 2019-03-25 ENCOUNTER — ANESTHESIA EVENT (OUTPATIENT)
Dept: OPERATING ROOM | Age: 64
End: 2019-03-25
Payer: MEDICAID

## 2019-03-26 ENCOUNTER — HOSPITAL ENCOUNTER (OUTPATIENT)
Age: 64
Setting detail: OUTPATIENT SURGERY
Discharge: HOME | End: 2019-03-26
Attending: PAIN MEDICINE | Admitting: PAIN MEDICINE
Payer: MEDICAID

## 2019-03-26 ENCOUNTER — HOSPITAL ENCOUNTER (OUTPATIENT)
Dept: OPERATING ROOM | Age: 64
Discharge: HOME | End: 2019-03-26
Payer: MEDICAID

## 2019-03-26 ENCOUNTER — ANESTHESIA (OUTPATIENT)
Dept: OPERATING ROOM | Age: 64
End: 2019-03-26
Payer: MEDICAID

## 2019-03-26 VITALS
RESPIRATION RATE: 12 BRPM | SYSTOLIC BLOOD PRESSURE: 103 MMHG | OXYGEN SATURATION: 100 % | TEMPERATURE: 98.6 F | DIASTOLIC BLOOD PRESSURE: 77 MMHG

## 2019-03-26 VITALS
WEIGHT: 135 LBS | SYSTOLIC BLOOD PRESSURE: 119 MMHG | DIASTOLIC BLOOD PRESSURE: 84 MMHG | BODY MASS INDEX: 24.84 KG/M2 | OXYGEN SATURATION: 98 % | RESPIRATION RATE: 16 BRPM | HEART RATE: 72 BPM | HEIGHT: 62 IN

## 2019-03-26 DIAGNOSIS — M54.16 LUMBAR RADICULOPATHY: ICD-10-CM

## 2019-03-26 PROCEDURE — 6360000002 HC RX W HCPCS: Performed by: PAIN MEDICINE

## 2019-03-26 PROCEDURE — 7100000010 HC PHASE II RECOVERY - FIRST 15 MIN: Performed by: PAIN MEDICINE

## 2019-03-26 PROCEDURE — 3700000000 HC ANESTHESIA ATTENDED CARE: Performed by: PAIN MEDICINE

## 2019-03-26 PROCEDURE — 2500000003 HC RX 250 WO HCPCS: Performed by: PAIN MEDICINE

## 2019-03-26 PROCEDURE — 6360000002 HC RX W HCPCS: Performed by: NURSE ANESTHETIST, CERTIFIED REGISTERED

## 2019-03-26 PROCEDURE — 2500000003 HC RX 250 WO HCPCS: Performed by: NURSE ANESTHETIST, CERTIFIED REGISTERED

## 2019-03-26 PROCEDURE — 3600000005 HC SURGERY LEVEL 5 BASE: Performed by: PAIN MEDICINE

## 2019-03-26 PROCEDURE — 2709999900 HC NON-CHARGEABLE SUPPLY: Performed by: PAIN MEDICINE

## 2019-03-26 PROCEDURE — 2580000003 HC RX 258: Performed by: ANESTHESIOLOGY

## 2019-03-26 PROCEDURE — 3209999900 FLUORO FOR SURGICAL PROCEDURES

## 2019-03-26 PROCEDURE — 6360000004 HC RX CONTRAST MEDICATION: Performed by: PAIN MEDICINE

## 2019-03-26 PROCEDURE — 7100000011 HC PHASE II RECOVERY - ADDTL 15 MIN: Performed by: PAIN MEDICINE

## 2019-03-26 PROCEDURE — 62323 NJX INTERLAMINAR LMBR/SAC: CPT | Performed by: PAIN MEDICINE

## 2019-03-26 RX ORDER — MIDAZOLAM HYDROCHLORIDE 1 MG/ML
INJECTION INTRAMUSCULAR; INTRAVENOUS PRN
Status: DISCONTINUED | OUTPATIENT
Start: 2019-03-26 | End: 2019-03-26 | Stop reason: SDUPTHER

## 2019-03-26 RX ORDER — LIDOCAINE HYDROCHLORIDE 5 MG/ML
INJECTION, SOLUTION INFILTRATION; INTRAVENOUS PRN
Status: DISCONTINUED | OUTPATIENT
Start: 2019-03-26 | End: 2019-03-26 | Stop reason: ALTCHOICE

## 2019-03-26 RX ORDER — LIDOCAINE HYDROCHLORIDE 20 MG/ML
INJECTION, SOLUTION INFILTRATION; PERINEURAL PRN
Status: DISCONTINUED | OUTPATIENT
Start: 2019-03-26 | End: 2019-03-26 | Stop reason: SDUPTHER

## 2019-03-26 RX ORDER — SODIUM CHLORIDE, SODIUM LACTATE, POTASSIUM CHLORIDE, CALCIUM CHLORIDE 600; 310; 30; 20 MG/100ML; MG/100ML; MG/100ML; MG/100ML
INJECTION, SOLUTION INTRAVENOUS CONTINUOUS
Status: DISCONTINUED | OUTPATIENT
Start: 2019-03-26 | End: 2019-03-26 | Stop reason: HOSPADM

## 2019-03-26 RX ORDER — PROPOFOL 10 MG/ML
INJECTION, EMULSION INTRAVENOUS PRN
Status: DISCONTINUED | OUTPATIENT
Start: 2019-03-26 | End: 2019-03-26 | Stop reason: SDUPTHER

## 2019-03-26 RX ORDER — ALFENTANIL HYDROCHLORIDE 500 UG/ML
INJECTION INTRAVENOUS PRN
Status: DISCONTINUED | OUTPATIENT
Start: 2019-03-26 | End: 2019-03-26 | Stop reason: SDUPTHER

## 2019-03-26 RX ADMIN — MIDAZOLAM 2 MG: 1 INJECTION INTRAMUSCULAR; INTRAVENOUS at 13:25

## 2019-03-26 RX ADMIN — ALFENTANIL HYDROCHLORIDE 250 MCG: 500 INJECTION INTRAVENOUS at 13:27

## 2019-03-26 RX ADMIN — ALFENTANIL HYDROCHLORIDE 250 MCG: 500 INJECTION INTRAVENOUS at 13:31

## 2019-03-26 RX ADMIN — PROPOFOL 30 MG: 10 INJECTION, EMULSION INTRAVENOUS at 13:27

## 2019-03-26 RX ADMIN — ALFENTANIL HYDROCHLORIDE 250 MCG: 500 INJECTION INTRAVENOUS at 13:26

## 2019-03-26 RX ADMIN — SODIUM CHLORIDE, POTASSIUM CHLORIDE, SODIUM LACTATE AND CALCIUM CHLORIDE: 600; 310; 30; 20 INJECTION, SOLUTION INTRAVENOUS at 12:51

## 2019-03-26 RX ADMIN — ALFENTANIL HYDROCHLORIDE 250 MCG: 500 INJECTION INTRAVENOUS at 13:30

## 2019-03-26 RX ADMIN — LIDOCAINE HYDROCHLORIDE 10 MG: 20 INJECTION, SOLUTION INFILTRATION; PERINEURAL at 13:27

## 2019-03-26 ASSESSMENT — PAIN SCALES - GENERAL
PAINLEVEL_OUTOF10: 0

## 2019-03-26 ASSESSMENT — PULMONARY FUNCTION TESTS
PIF_VALUE: 0

## 2019-03-26 ASSESSMENT — PAIN - FUNCTIONAL ASSESSMENT: PAIN_FUNCTIONAL_ASSESSMENT: 0-10

## 2019-03-26 ASSESSMENT — LIFESTYLE VARIABLES: SMOKING_STATUS: 1

## 2019-03-26 ASSESSMENT — PAIN DESCRIPTION - DESCRIPTORS: DESCRIPTORS: DISCOMFORT

## 2019-04-04 NOTE — PROGRESS NOTES
(ELAVIL) 50 MG tablet Take 1 tablet by mouth nightly 3/20/19  Yes Aquiles Hart MD   atorvastatin (LIPITOR) 40 MG tablet Take 1 tablet by mouth daily 3/20/19  Yes Aquiles Hart MD   fluticasone (FLONASE) 50 MCG/ACT nasal spray 2 sprays by Each Nare route daily 3/20/19  Yes Aquiles Hart MD   sodium chloride (ALTAMIST SPRAY) 0.65 % nasal spray 1 spray by Nasal route as needed for Congestion 3/20/19  Yes Aquiles Hart MD   buPROPion (WELLBUTRIN) 75 MG tablet Take 1 tablet by mouth 2 times daily 3/20/19  Yes Aquiles Hart MD   gabapentin (NEURONTIN) 300 MG capsule Take 300 mg by mouth 3 times daily.    Yes Historical Provider, MD   cyclobenzaprine (FLEXERIL) 10 MG tablet Take 5 mg by mouth 3 times daily    Yes Historical Provider, MD   acetaminophen (APAP EXTRA STRENGTH) 500 MG tablet Take 1 tablet by mouth every 6 hours as needed for Pain 3/1/19  Yes Lupriya Medicine, PA-C   Biotin 1000 MCG CHEW Take by mouth daily   Yes Historical Provider, MD   ipratropium-albuterol (DUONEB) 0.5-2.5 (3) MG/3ML SOLN nebulizer solution Inhale 3 mLs into the lungs every 4 hours 2/11/19  Yes Don Bain MD   acyclovir (ZOVIRAX) 400 MG tablet Take 400 mg by mouth as needed   Yes Historical Provider, MD   Psyllium (METAMUCIL FIBER PO) Take by mouth   Yes Historical Provider, MD   Tens Unit [address] [facility] by Does not apply route 4-lead 12/12/18  Yes Edward Gonzalez, DO       Allergies   Allergen Reactions    Adhesive Tape     Motrin [Ibuprofen]      aggravates my IBS    Seasonal     Penicillins Rash       Social History     Socioeconomic History    Marital status: Single     Spouse name: Not on file    Number of children: Not on file    Years of education: Not on file    Highest education level: Not on file   Occupational History    Not on file   Social Needs    Financial resource strain: Not on file    Food insecurity:     Worry: Not on file     Inability: Not on file    Transportation needs:     Medical: Not on file     Non-medical: Not on file   Tobacco Use    Smoking status: Current Every Day Smoker     Packs/day: 0.25     Years: 45.00     Pack years: 11.25     Types: Cigarettes    Smokeless tobacco: Never Used   Substance and Sexual Activity    Alcohol use: Yes     Comment: socially    Drug use: No     Comment: tried cannabis did not work for her     Sexual activity: Not on file   Lifestyle    Physical activity:     Days per week: Not on file     Minutes per session: Not on file    Stress: Not on file   Relationships    Social connections:     Talks on phone: Not on file     Gets together: Not on file     Attends [employer] service: Not on file     Active member of club or organization: Not on file     Attends meetings of clubs or organizations: Not on file     Relationship status: Not on file    Intimate partner violence:     Fear of current or ex partner: Not on file     Emotionally abused: Not on file     Physically abused: Not on file     Forced sexual activity: Not on file   Other Topics Concern    Not on file   Social History Narrative    Not on file       Family History   Problem Relation Age of Onset    Diabetes Mother     Cancer Mother     Alcohol Abuse Father        REVIEW OF SYSTEMS:     Ange Signs denies fever/chills, chest pain, shortness of breath, new bowel or bladder complaints. All other review of systems was negative. PHYSICAL EXAMINATION:      /80   Pulse 64   Temp 97.6 °F (36.4 °C)   Resp 16     General:      General appearance:pleasant and well-hydrated, in no distress and A & O x3  Build:Normal Weight  Function:Rises from a seated position easily.     HEENT:    Head:normocephalic, atraumatic  Pupils:regular, round, equal  Sclera: icterus absent    Lungs:    Breathing:normal breathing pattern    Abdomen:    Shape:non-distended and normal  Tenderness:none  Guarding:none    Lumbar spine:    Spine inspection:normal   CVA tenderness:No   Palpation:tenderness paravertebral muscles, left, right and positive - improved. Range of motion:abnormal mildly in lateral bending, flexion, extension rotation bilateral and is painful. Musculoskeletal:    Trigger points in Paraveteral:absent bilaterally  SI joint tenderness:negative right, negative left              CARLA test:negative right, negative             left  Piriformis tenderness:negative right, negative left  Trochanteric bursa tenderness:negative right, negative left  SLR:negative right, negative left, sitting     Extremities:    Tremors:None bilaterally upper and lower  Intact:Yes, + tinel's b/l wrists  Varicose veins:absent   Pulses:present Lt radial  Cyanosis:none  Edema:none x all 4 extremities    Neurological:    Sensory:normal to light touch BLE    Motor:                Right Quadriceps5/5          Left Quadriceps5/5           Right Gastrocnemius5/5    Left Gastrocnemius5/5  Right Ant Tibialis5/5  Left Ant Tibialis5/5    Reflexes:    Right Quadriceps reflex2+  Left Quadriceps reflex2+  Right Achilles reflex2+  Left Achilles reflex2+    Gait:normal    Assessment/Plan:     LBP and R>LLE pain  2016 Lumbar MRI shows mild degenerative changes with moderate L4-5 right foraminal stenosis and mild-moderate foraminal stenosis on the left    Pt requests referral to ortho UE specialist for further treatment of her BUE symptoms, pt states she's had prior cervical MRI and BUE EMG which demonstrated problems with ulnar and median nerves ([place]). She does not have the records w/ her today. I will refer her to Dr. Latha Mcgarry.     L4-5 RICARDO #1 with 75% relief, repeat requested by patient  She continues PT on [facility]   Patient encouraged to stay active  Treatment plan discussed with the patient including potential procedure side effects     Cc:  Referring physician    KALYANI Porter.

## 2019-04-09 ENCOUNTER — PREP FOR PROCEDURE (OUTPATIENT)
Dept: PAIN MANAGEMENT | Age: 64
End: 2019-04-09

## 2019-04-09 ENCOUNTER — OFFICE VISIT (OUTPATIENT)
Dept: PAIN MANAGEMENT | Age: 64
End: 2019-04-09
Payer: MEDICAID

## 2019-04-09 VITALS
DIASTOLIC BLOOD PRESSURE: 80 MMHG | HEART RATE: 64 BPM | RESPIRATION RATE: 16 BRPM | TEMPERATURE: 97.6 F | SYSTOLIC BLOOD PRESSURE: 114 MMHG

## 2019-04-09 DIAGNOSIS — G56.03 BILATERAL CARPAL TUNNEL SYNDROME: ICD-10-CM

## 2019-04-09 DIAGNOSIS — M51.9 LUMBAR DISC DISORDER: ICD-10-CM

## 2019-04-09 DIAGNOSIS — G89.4 CHRONIC PAIN SYNDROME: Primary | ICD-10-CM

## 2019-04-09 DIAGNOSIS — G89.29 CHRONIC BILATERAL LOW BACK PAIN WITH BILATERAL SCIATICA: ICD-10-CM

## 2019-04-09 DIAGNOSIS — M54.16 LUMBAR RADICULOPATHY: ICD-10-CM

## 2019-04-09 DIAGNOSIS — M54.16 LUMBAR RADICULOPATHY: Primary | ICD-10-CM

## 2019-04-09 DIAGNOSIS — M54.42 CHRONIC BILATERAL LOW BACK PAIN WITH BILATERAL SCIATICA: ICD-10-CM

## 2019-04-09 DIAGNOSIS — M54.41 CHRONIC BILATERAL LOW BACK PAIN WITH BILATERAL SCIATICA: ICD-10-CM

## 2019-04-09 PROCEDURE — 99213 OFFICE O/P EST LOW 20 MIN: CPT | Performed by: PAIN MEDICINE

## 2019-04-09 NOTE — PROGRESS NOTES
Tia Lucero presents to the [facility] on 4/9/2019. Jennifer Charles is complaining of pain upper, thoracic, and low back pain that radiates down right leg to right toes. The pain is constant. The pain is described as aching and throbbing. Pain is rated on her best day at a 4, on her worst day at a 9, and on average at a 7 on the VAS scale. She took her last dose of last dose of oxycodone was in June of 2018. .        Any procedures since your last visit: Yes, with 50 % relief. She has not been on anticoagulation medications to include none . Pacemaker or defibrilator: No .  /80   Pulse 64   Temp 97.6 °F (36.4 °C)   Resp 16

## 2019-04-10 ENCOUNTER — TELEPHONE (OUTPATIENT)
Dept: INTERNAL MEDICINE | Age: 64
End: 2019-04-10

## 2019-04-10 NOTE — TELEPHONE ENCOUNTER
Jc Westbrook, nurse  from [facility] called into [place] 's  [facility], states that Pt is in need of script to be sent to a DME for a shower chair. Informed Jc Westbrook that message will be forwarded to clinical staff. Jc Westbrook asked for a call back or call to pt to let them know which DME script will be sent to. Jc Westbrook - [phone]. Wyatt Hernandez Electronically signed by Daisy Yung on 4/10/19 at 12:06 PM

## 2019-04-11 ENCOUNTER — TELEPHONE (OUTPATIENT)
Dept: PAIN MANAGEMENT | Age: 64
End: 2019-04-11

## 2019-04-11 NOTE — TELEPHONE ENCOUNTER
Called patient to put on the procedure schedule unable to reach left message for patient to call us back

## 2019-04-11 NOTE — PROGRESS NOTES
Received message from aamir at [facility][ [phone]] that patient needs shower chair    Shower chair approved per 
- - -

## 2019-04-12 ENCOUNTER — TELEPHONE (OUTPATIENT)
Dept: INTERNAL MEDICINE | Age: 64
End: 2019-04-12

## 2019-04-12 ENCOUNTER — TELEPHONE (OUTPATIENT)
Dept: PAIN MANAGEMENT | Age: 64
End: 2019-04-12

## 2019-04-12 DIAGNOSIS — G89.29 CHRONIC BILATERAL LOW BACK PAIN WITH BILATERAL SCIATICA: ICD-10-CM

## 2019-04-12 DIAGNOSIS — M51.9 LUMBAR DISC DISORDER: ICD-10-CM

## 2019-04-12 DIAGNOSIS — G89.4 CHRONIC PAIN SYNDROME: ICD-10-CM

## 2019-04-12 DIAGNOSIS — M54.41 CHRONIC BILATERAL LOW BACK PAIN WITH BILATERAL SCIATICA: ICD-10-CM

## 2019-04-12 DIAGNOSIS — M54.16 LUMBAR RADICULOPATHY: Primary | ICD-10-CM

## 2019-04-12 DIAGNOSIS — M54.42 CHRONIC BILATERAL LOW BACK PAIN WITH BILATERAL SCIATICA: ICD-10-CM

## 2019-04-12 NOTE — TELEPHONE ENCOUNTER
Patient called regarding her procedure she is schedule for May 16. ...  She would like to get some physical therapy before if you can put in a rx for deisy   Thank you

## 2019-04-15 ENCOUNTER — HOSPITAL ENCOUNTER (OUTPATIENT)
Dept: GENERAL RADIOLOGY | Age: 64
Discharge: HOME | End: 2019-04-17
Payer: MEDICAID

## 2019-04-15 DIAGNOSIS — Z01.419 WOMEN'S ANNUAL ROUTINE GYNECOLOGICAL EXAMINATION: ICD-10-CM

## 2019-04-15 PROCEDURE — 77063 BREAST TOMOSYNTHESIS BI: CPT

## 2019-04-16 ENCOUNTER — OFFICE VISIT (OUTPATIENT)
Dept: PHYSICAL MEDICINE AND REHAB | Age: 64
End: 2019-04-16
Payer: MEDICAID

## 2019-04-16 VITALS
HEART RATE: 84 BPM | BODY MASS INDEX: 25.03 KG/M2 | WEIGHT: 136 LBS | SYSTOLIC BLOOD PRESSURE: 116 MMHG | HEIGHT: 62 IN | DIASTOLIC BLOOD PRESSURE: 62 MMHG

## 2019-04-16 DIAGNOSIS — M79.18 MYOFASCIAL PAIN: ICD-10-CM

## 2019-04-16 DIAGNOSIS — M54.2 NECK PAIN: ICD-10-CM

## 2019-04-16 DIAGNOSIS — M54.12 CERVICAL RADICULOPATHY: ICD-10-CM

## 2019-04-16 DIAGNOSIS — G89.4 CHRONIC PAIN SYNDROME: Primary | ICD-10-CM

## 2019-04-16 DIAGNOSIS — M50.30 DDD (DEGENERATIVE DISC DISEASE), CERVICAL: ICD-10-CM

## 2019-04-16 PROCEDURE — 99212 OFFICE O/P EST SF 10 MIN: CPT | Performed by: PHYSICAL MEDICINE & REHABILITATION

## 2019-04-16 NOTE — PROGRESS NOTES
Eliz Santos, [address] [facility] Physical Medicine and Rehabilitation  [address] [facility] [place]  Phone: [phone]  Fax: [phone]    PCP: Jordan Cruz MD  Date of visit: 4/16/19    Chief Complaint   Patient presents with    Neck Pain     Follow up, TENS unit beneficial    Back Pain       Interval:   Patient presents today for TENS unit documentation. She received the TENS unit and it is helping with her pain. She wears it average 3-4 days per week. She uses it on multiple areas of her body. Pain level prior to use 8/10, during and after 4-5/10. She is following with the pain clinic for pain issues. Allergies   Allergen Reactions    Adhesive Tape     Motrin [Ibuprofen]      aggravates my IBS    Seasonal     Penicillins Rash       Current Outpatient Medications   Medication Sig Dispense Refill    Misc. Devices [address] [facility] Shower chair  diagnosis G62.9 G89.4 M50.30 M54.12 M54.16 1 Device 0    amLODIPine (NORVASC) 5 MG tablet Take 2 tablets by mouth daily 30 tablet 2    pantoprazole (PROTONIX) 20 MG tablet Take 1 tablet by mouth daily 30 tablet 2    amitriptyline (ELAVIL) 50 MG tablet Take 1 tablet by mouth nightly 30 tablet 2    atorvastatin (LIPITOR) 40 MG tablet Take 1 tablet by mouth daily 30 tablet 2    fluticasone (FLONASE) 50 MCG/ACT nasal spray 2 sprays by Each Nare route daily 3 Bottle 1    sodium chloride (ALTAMIST SPRAY) 0.65 % nasal spray 1 spray by Nasal route as needed for Congestion 1 Bottle 3    buPROPion (WELLBUTRIN) 75 MG tablet Take 1 tablet by mouth 2 times daily 60 tablet 2    gabapentin (NEURONTIN) 300 MG capsule Take 300 mg by mouth 3 times daily.       cyclobenzaprine (FLEXERIL) 10 MG tablet Take 5 mg by mouth 3 times daily       acetaminophen (APAP EXTRA STRENGTH) 500 MG tablet Take 1 tablet by mouth every 6 hours as needed for Pain 120 tablet 0    Biotin 1000 MCG CHEW Take by mouth daily      ipratropium-albuterol (DUONEB) 0.5-2.5 (3) abdominal pain,  N/V, constipation, or diarrhea    Genitourinary: Denies urinary symptoms    Neurologic: See HPI.     MSK: See HPI. Psychiatric: Denies sleep disturbance, anxiety, depression    Hematologic/Lymphatic/Immunologic: Denies bruising       Physical Exam:   Blood pressure 116/62, pulse 84, height 5' 2\" (1.575 m), weight 136 lb (61.7 kg). General: well developed and well nourished in no acute distress. Body habitus is thin  HEENT: No rhinorrhea, sneezing, yawning, or lacrimation. No scleral icterus or conjunctival injection. Resp: symmetrical chest expansion, unlabored breathing, respirations unlabored. CV: Heart rate is regular. Peripheral pulses are palpable  Lymph: No visible regional lymphadenopathy. Skin: No rashes or ecchymosis. Normal turgor. Psych: Mood is calm. Affect is normal.   Ext: No edema noted   MSK:   Cervical Exam:   Inspection: Shoulder girdles were asymmetric. The cervical lordotic curvature was decreased. Palpatory exam: revealed no tenderness along bilateral upper, middle and lower cervical paraspinals , no tenderness along upper, middle and lower spinous processes, tenderness of bilateral upper and middle trapezius muscle. There was spasm of the upper trap. There were trigger points. Impression:   Sj Devi is a 59 y.o. female     1. Chronic pain syndrome    2. DDD (degenerative disc disease), cervical    3. Cervical radiculopathy    4. Neck pain    5.  Myofascial pain        Plan:   Continue TENS which is working   Follow up with pain clinic       Carmen Wakefield DO [facility]   Board Certified Physical Medicine and Rehabilitation

## 2019-04-17 ENCOUNTER — TELEPHONE (OUTPATIENT)
Dept: PAIN MANAGEMENT | Age: 64
End: 2019-04-17

## 2019-04-17 DIAGNOSIS — G56.03 BILATERAL CARPAL TUNNEL SYNDROME: ICD-10-CM

## 2019-04-17 DIAGNOSIS — G62.9 NEUROPATHY: Primary | ICD-10-CM

## 2019-04-17 NOTE — TELEPHONE ENCOUNTER
Luis Aviles called inquiring if she could pursue the possibility of medical marijuana. I advised her our policy was not to prescribe any pain medications if on medical marijuana. She stated she was okay with that and just wanted treatment such as injections from our office.

## 2019-04-24 ENCOUNTER — HOSPITAL ENCOUNTER (OUTPATIENT)
Dept: PHYSICAL THERAPY | Age: 64
Setting detail: THERAPIES SERIES
Discharge: HOME | End: 2019-04-24
Payer: MEDICAID

## 2019-04-24 PROCEDURE — 97110 THERAPEUTIC EXERCISES: CPT

## 2019-04-24 PROCEDURE — 97530 THERAPEUTIC ACTIVITIES: CPT

## 2019-04-24 NOTE — PROGRESS NOTES
[facility]                Phone: [phone]   Fax: [phone]    Physical Therapy Daily Treatment Note  Date:  2019    Patient Name:  Sj Devi    :  1955  MRN: [id number]    Referring Physician:  Armando Gallardo MD   [place]       Evaluation date:  19   Diagnosis:  Cervical radiculopathy, chronic pain syndrome   Evaluating Physical Therapist:  Hernan Frankel DPT  Plan of care signed (Y/N):    Visit# / total visits: 3 /4-8       Subjective:   Pt comes in today reporting that her pain has returned in her low back, RLE, and cervical spine. She reports 5/10 low back and BLE pain with RLE worse than L. She states that she has pain that radiates down into both feet at times. Pt also states that she is having 5/10 pain in the R side of her cervical spine and scapula today. She reports that this pain radiates down into her hands at times as well. She states that she is scheduled for an epidural injection in her low back 19 and is also awaiting an EMG to be scheduled. Exercises: NA   Exercise/Equipment Reps  During/ after  Other comments    UBE  8 min  Level #1 Alternate direction every 2 min     Ball roll outs Forward 3x10  Side ways 3x10 ea  Orange ball  Orange ball   Seated   Seated             Posture corrections  3x10      Chin tucks  3x10                               Home Exercise Program:       Comments:    Low back  Pt reports no change in low back pain with repeated standing lumbar flexion, side bending. She does report slight increase in low back pain and BLE \"burning\" with standing lumbar extensions.     Lumbar Spine AROM is WFL in all directions       Cervical Spine  Cervical AROM is WFL in all directions except min movement loss in retraction and mod loss with extension   BUE strength is 5/5 throughout and AROM BUE is WFL with no restrictions     Pt reports no pain with both above lumbar and cervical exercises today. Worked on posture correction and strengthening today. Pt denies and increase in radicular symptoms both on low back and neck.         Treatment/Activity Tolerance:  [x] Patient tolerated treatment well [] Patient limited by fatique  [] Patient limited by pain  [] Patient limited by other medical complications  [] Other:     Prognosis: [] Good [x] Fair  [] Poor    Patient Requires Follow-up: [x] Yes  [] No    Plan:   [x] Continue per plan of care [] Alter current plan (see comments)  [] Plan of care initiated [] Hold (see above) [] Discharge    Plan for Next Session:        See Weekly Progress Note: []  Yes  [x]  No  Next due:        Time In: 1300  Time Out: 6682    Electronically signed by:    Kimberly Wakefield DPT  [id number]

## 2019-04-25 ENCOUNTER — HOSPITAL ENCOUNTER (OUTPATIENT)
Age: 64
Discharge: HOME | End: 2019-04-25
Payer: MEDICAID

## 2019-04-25 ENCOUNTER — OFFICE VISIT (OUTPATIENT)
Dept: INTERNAL MEDICINE | Age: 64
End: 2019-04-25
Payer: MEDICAID

## 2019-04-25 VITALS
TEMPERATURE: 98.6 F | DIASTOLIC BLOOD PRESSURE: 79 MMHG | WEIGHT: 133 LBS | HEIGHT: 62 IN | SYSTOLIC BLOOD PRESSURE: 134 MMHG | BODY MASS INDEX: 24.48 KG/M2 | HEART RATE: 83 BPM | RESPIRATION RATE: 16 BRPM

## 2019-04-25 DIAGNOSIS — Z00.00 HEALTHCARE MAINTENANCE: ICD-10-CM

## 2019-04-25 DIAGNOSIS — K02.9 DENTAL CARIES: Primary | ICD-10-CM

## 2019-04-25 DIAGNOSIS — Z12.11 SCREEN FOR COLON CANCER: ICD-10-CM

## 2019-04-25 DIAGNOSIS — I10 ESSENTIAL HYPERTENSION: ICD-10-CM

## 2019-04-25 DIAGNOSIS — J30.81 ALLERGIC RHINITIS DUE TO ANIMAL HAIR AND DANDER: ICD-10-CM

## 2019-04-25 DIAGNOSIS — K21.9 GASTROESOPHAGEAL REFLUX DISEASE WITHOUT ESOPHAGITIS: ICD-10-CM

## 2019-04-25 DIAGNOSIS — Z23 ENCOUNTER FOR VACCINATION: ICD-10-CM

## 2019-04-25 DIAGNOSIS — G62.9 NEUROPATHY: ICD-10-CM

## 2019-04-25 DIAGNOSIS — J31.0 CHRONIC RHINITIS: ICD-10-CM

## 2019-04-25 DIAGNOSIS — T78.40XD ALLERGIC STATE, SUBSEQUENT ENCOUNTER: ICD-10-CM

## 2019-04-25 DIAGNOSIS — E78.2 MIXED HYPERLIPIDEMIA: ICD-10-CM

## 2019-04-25 PROBLEM — T78.40XA ALLERGY: Status: ACTIVE | Noted: 2019-04-25

## 2019-04-25 PROCEDURE — 86803 HEPATITIS C AB TEST: CPT

## 2019-04-25 PROCEDURE — 90471 IMMUNIZATION ADMIN: CPT

## 2019-04-25 PROCEDURE — 99213 OFFICE O/P EST LOW 20 MIN: CPT | Performed by: INTERNAL MEDICINE

## 2019-04-25 PROCEDURE — 90732 PPSV23 VACC 2 YRS+ SUBQ/IM: CPT

## 2019-04-25 PROCEDURE — 86703 HIV-1/HIV-2 1 RESULT ANTBDY: CPT

## 2019-04-25 PROCEDURE — 36415 COLL VENOUS BLD VENIPUNCTURE: CPT

## 2019-04-25 PROCEDURE — 6360000002 HC RX W HCPCS

## 2019-04-25 PROCEDURE — G0009 ADMIN PNEUMOCOCCAL VACCINE: HCPCS

## 2019-04-25 PROCEDURE — 90715 TDAP VACCINE 7 YRS/> IM: CPT

## 2019-04-25 RX ORDER — AMLODIPINE BESYLATE 10 MG/1
10 TABLET ORAL DAILY
Qty: 30 TABLET | Refills: 2 | Status: SHIPPED | OUTPATIENT
Start: 2019-04-25 | End: 2019-05-24 | Stop reason: SDUPTHER

## 2019-04-25 RX ORDER — ACETAMINOPHEN 500 MG
500 TABLET ORAL EVERY 8 HOURS PRN
Qty: 90 TABLET | Refills: 0 | Status: SHIPPED | OUTPATIENT
Start: 2019-04-25 | End: 2019-05-30

## 2019-04-25 RX ORDER — AMLODIPINE BESYLATE 10 MG/1
10 TABLET ORAL DAILY
COMMUNITY
End: 2019-04-25 | Stop reason: SDUPTHER

## 2019-04-25 RX ORDER — ATORVASTATIN CALCIUM 40 MG/1
40 TABLET, FILM COATED ORAL DAILY
Qty: 30 TABLET | Refills: 2 | Status: SHIPPED | OUTPATIENT
Start: 2019-04-25 | End: 2019-12-18 | Stop reason: SDUPTHER

## 2019-04-25 RX ORDER — FLUTICASONE PROPIONATE 50 MCG
2 SPRAY, SUSPENSION (ML) NASAL DAILY
Qty: 3 BOTTLE | Refills: 2 | Status: SHIPPED | OUTPATIENT
Start: 2019-04-25 | End: 2019-05-10

## 2019-04-25 RX ORDER — EPINEPHRINE 0.3 MG/.3ML
INJECTION SUBCUTANEOUS
Qty: 2 EACH | Refills: 0 | Status: SHIPPED | OUTPATIENT
Start: 2019-04-25

## 2019-04-25 RX ORDER — FEXOFENADINE HCL 180 MG/1
180 TABLET ORAL DAILY
Qty: 30 TABLET | Refills: 0 | Status: SHIPPED | OUTPATIENT
Start: 2019-04-25 | End: 2019-05-10

## 2019-04-25 RX ORDER — PANTOPRAZOLE SODIUM 20 MG/1
20 TABLET, DELAYED RELEASE ORAL DAILY
Qty: 30 TABLET | Refills: 2 | Status: SHIPPED | OUTPATIENT
Start: 2019-04-25 | End: 2019-05-26 | Stop reason: SDUPTHER

## 2019-04-25 RX ORDER — AMITRIPTYLINE HYDROCHLORIDE 50 MG/1
50 TABLET, FILM COATED ORAL NIGHTLY
Qty: 30 TABLET | Refills: 2 | Status: SHIPPED
Start: 2019-04-25 | End: 2020-02-17 | Stop reason: SDUPTHER

## 2019-04-25 ASSESSMENT — ENCOUNTER SYMPTOMS
EYES NEGATIVE: 1
VOMITING: 0
NAUSEA: 0
COUGH: 1
CONSTIPATION: 0
DIARRHEA: 0
RHINORRHEA: 1
ABDOMINAL PAIN: 0
WHEEZING: 0
BACK PAIN: 1

## 2019-04-25 NOTE — PATIENT INSTRUCTIONS
the past.  When should you call for help? Give an epinephrine shot if:    · You think you are having a severe allergic reaction.    After giving an epinephrine shot call 911, even if you feel better.   Call 911 if:    · You have symptoms of a severe allergic reaction. These may include:  ? Sudden raised, red areas (hives) all over your body. ? Swelling of the throat, mouth, lips, or tongue. ? Trouble breathing. ? Passing out (losing consciousness). Or you may feel very lightheaded or suddenly feel weak, confused, or restless.     · You have been given an epinephrine shot, even if you feel better.    Call your doctor now or seek immediate medical care if:    · You have symptoms of an allergic reaction, such as:  ? A rash or hives (raised, red areas on the skin). ? Itching. ? Swelling. ? Belly pain, nausea, or vomiting.    Watch closely for changes in your health, and be sure to contact your doctor if:    · You do not get better as expected. Where can you learn more? Go to https://Presto Services.Dashi Intelligence. org and sign in to your IntelliGeneScan account. Enter E186 in the LeadCloud box to learn more about \"Anaphylactic Reaction: Care Instructions. \"     If you do not have an account, please click on the \"Sign Up Now\" link. Current as of: June 27, 2018  Content Version: 11.9  © 9490-1691 [employer]. Care instructions adapted under license by [facility] [address] If you have questions about a medical condition or this instruction, always ask your healthcare professional. [place] [address] any warranty or liability for your use of this information. DASH Diet: Care Instructions  Your Care Instructions    The DASH diet is an eating plan that can help lower your blood pressure. DASH stands for Dietary Approaches to Stop Hypertension. Hypertension is high blood pressure. The DASH diet focuses on eating foods that are high in calcium, potassium, and magnesium.  These nutrients can lower blood pressure. The foods that are highest in these nutrients are fruits, vegetables, low-fat dairy products, nuts, seeds, and legumes. But taking calcium, potassium, and magnesium supplements instead of eating foods that are high in those nutrients does not have the same effect. The DASH diet also includes whole grains, fish, and poultry. The DASH diet is one of several lifestyle changes your doctor may recommend to lower your high blood pressure. Your doctor may also want you to decrease the amount of sodium in your diet. Lowering sodium while following the DASH diet can lower blood pressure even further than just the DASH diet alone. Follow-up care is a key part of your treatment and safety. Be sure to make and go to all appointments, and call your doctor if you are having problems. It's also a good idea to know your test results and keep a list of the medicines you take. How can you care for yourself at home? Following the DASH diet  · Eat 4 to 5 servings of fruit each day. A serving is 1 medium-sized piece of fruit, ½ cup chopped or canned fruit, 1/4 cup dried fruit, or 4 ounces (½ cup) of fruit juice. Choose fruit more often than fruit juice. · Eat 4 to 5 servings of vegetables each day. A serving is 1 cup of lettuce or raw leafy vegetables, ½ cup of chopped or cooked vegetables, or 4 ounces (½ cup) of vegetable juice. Choose vegetables more often than vegetable juice. · Get 2 to 3 servings of low-fat and fat-free dairy each day. A serving is 8 ounces of milk, 1 cup of yogurt, or 1 ½ ounces of cheese. · Eat 6 to 8 servings of grains each day. A serving is 1 slice of bread, 1 ounce of dry cereal, or ½ cup of cooked rice, pasta, or cooked cereal. Try to choose whole-grain products as much as possible. · Limit lean meat, poultry, and fish to 2 servings each day. A serving is 3 ounces, about the size of a deck of cards.   · Eat 4 to 5 servings of nuts, seeds, and legumes (cooked dried beans, lentils, and split peas) each week. A serving is 1/3 cup of nuts, 2 tablespoons of seeds, or ½ cup of cooked beans or peas. · Limit fats and oils to 2 to 3 servings each day. A serving is 1 teaspoon of vegetable oil or 2 tablespoons of salad dressing. · Limit sweets and added sugars to 5 servings or less a week. A serving is 1 tablespoon jelly or jam, ½ cup sorbet, or 1 cup of lemonade. · Eat less than 2,300 milligrams (mg) of sodium a day. If you limit your sodium to 1,500 mg a day, you can lower your blood pressure even more. Tips for success  · Start small. Do not try to make dramatic changes to your diet all at once. You might feel that you are missing out on your favorite foods and then be more likely to not follow the plan. Make small changes, and stick with them. Once those changes become habit, add a few more changes. · Try some of the following:  ? Make it a goal to eat a fruit or vegetable at every meal and at snacks. This will make it easy to get the recommended amount of fruits and vegetables each day. ? Try yogurt topped with fruit and nuts for a snack or healthy dessert. ? Add lettuce, tomato, cucumber, and onion to sandwiches. ? Combine a ready-made pizza crust with low-fat mozzarella cheese and lots of vegetable toppings. Try using tomatoes, squash, spinach, broccoli, carrots, cauliflower, and onions. ? Have a variety of cut-up vegetables with a low-fat dip as an appetizer instead of chips and dip. ? Sprinkle sunflower seeds or chopped almonds over salads. Or try adding chopped walnuts or almonds to cooked vegetables. ? Try some vegetarian meals using beans and peas. Add garbanzo or kidney beans to salads. Make burritos and tacos with mashed interiano beans or black beans. Where can you learn more? Go to https://carlos.healthMazree. org and sign in to your Zentact account. Enter S044 in the [place] box to learn more about \"DASH Diet: Care Instructions. \"     If you do not have an account, please click on the \"Sign Up Now\" link. Current as of: July 22, 2018  Content Version: 11.9  © 2006-2018 [employer]. Care instructions adapted under license by [facility] ([facility]). If you have questions about a medical condition or this instruction, always ask your healthcare professional. [address] any warranty or liability for your use of this information. Patient Education        epinephrine injection  Pronunciation:  [place]ST[place] lewis  Brand:  Adrenalin, Auvi-Q, EpiPen Auto-Injector, EpiPen JR 2-Robb, EPIsnap  What is the most important information I should know about epinephrine injection? Seek emergency medical attention after any use of epinephrine to treat a severe allergic reaction. After the injection you will need to receive further treatment and observation. What is epinephrine injection? Epinephrine is a chemical that narrows blood vessels and opens airways in the lungs. These effects can reverse severe low blood pressure, wheezing, severe skin itching, hives, and other symptoms of an allergic reaction. Epinephrine injection is used to treat severe allergic reactions (anaphylaxis) to insect stings or bites, foods, drugs, and other allergens. Epinephrine is also used to treat exercise-induced anaphylaxis. Epinephrine auto-injectors may be kept on hand for self-injection by a person with a history of an severe allergic reaction. This medicine is for use in adults and children who weigh at least 16.5 pounds (7.5 kilograms). Epinephrine injection may also be used for purposes not listed in this medication guide. What should I discuss with my healthcare provider before using epinephrine injection? Before using epinephrine, tell your doctor if any past use of this medicine caused an allergic reaction to get worse.   Tell your doctor if you have ever had:  · heart disease or high blood pressure;  · asthma;  · Parkinson's disease;  · depression or mental illness;  · a thyroid disorder; or  · diabetes. Having an allergic reaction while pregnant or nursing could harm both mother and baby. You may need to use epinephrine during pregnancy or while you are breast-feeding. Seek emergency medical attention right away after using the injection. In an emergency, you may not be able to tell caregivers if you are pregnant or breast feeding. Make sure any doctor caring for your pregnancy or your baby knows you received this medicine. How should I use epinephrine injection? The auto-injector device is a disposable single-use system. Follow all directions on your prescription label and read all medication guides or instruction sheets. Use the medicine exactly as directed. Do not give this medicine to a child without medical advice. Epinephrine is injected into the skin or muscle of your outer thigh. In an emergency, this injection can be given through your clothing. Read and carefully follow any Instructions for Use provided with your medicine. Ask your doctor or pharmacist if you do not understand these instructions. Do not remove the safety cap until you are ready to use the auto-injector. Never put your fingers over the injector tip after the safety cap has been removed. To use an epinephrine auto-injector:  · Form a fist around the auto-injector with the tip pointing down. Pull off the safety cap. · Place the tip against the fleshy portion of the outer thigh. You may give the injection directly through clothing. Hold the leg firmly when giving this injection to a child or infant. · Push the auto-injector firmly against the thigh to release the needle that injects the dose of epinephrine. Hold the auto-injector in place for 10 seconds after activation. · Remove the auto-injector from the thigh and massage the area gently. Carefully re-insert the used device needle-first into the carrying tube.  Re-cap the tube and take it with you to the emergency room so that anyone who treats you will know how much epinephrine you have received. · Use an auto-injector only one time. Do not try to reinsert an auto-injector a second time if the needle has come out of your skin before the full 10 seconds. If the needle is bent from the first use, it may cause serious injury to your skin. Seek emergency medical attention after any use of epinephrine. The effects of epinephrine may wear off after 10 or 20 minutes. You will need to receive further treatment and observation. Do not use epinephrine injection if it has changed colors or has particles in it, or if the expiration date on the label has passed. Call your pharmacist for a new prescription. Your medicine may also come with a \" pen. \" The  pen contains no medicine and no needle. It is only for non-emergency use to practice giving yourself an epinephrine injection. Store at room temperature away from moisture, heat, and light. Do not refrigerate or freeze this medication, and do not store it in a car. What happens if I miss a dose? Since epinephrine is used when needed, it does not have a daily dosing schedule. What happens if I overdose? Seek emergency medical attention or call the Poison Help line at 1-664.906.3221. Symptoms of an epinephrine overdose may include numbness or weakness, severe headache, blurred vision, pounding in your neck or ears, sweating, chills, chest pain, fast or slow heartbeats, severe shortness of breath, or cough with foamy mucus. What should I avoid while using epinephrine injection? Do not inject epinephrine into a vein or into the muscles of your buttocks, or it may not work as well. Inject it only into the fleshy outer portion of the thigh. Accidentally injecting epinephrine into your hands or feet may result in a loss of blood flow to those areas, and resulting numbness. What are the possible side effects of epinephrine injection?   Before using to serve consumers viewing this service as a supplement to, and not a substitute for, the expertise, skill, knowledge and judgment of healthcare practitioners. The absence of a warning for a given drug or drug combination in no way should be construed to indicate that the drug or drug combination is safe, effective or appropriate for any given patient. [facility] does not assume any responsibility for any aspect of healthcare administered with the aid of information [facility] provides. The information contained herein is not intended to cover all possible uses, directions, precautions, warnings, drug interactions, allergic reactions, or adverse effects. If you have questions about the drugs you are taking, check with your doctor, nurse or pharmacist.  Copyright 4050-8237 [address]. Version: 11.01. Revision date: 8/30/2018. Care instructions adapted under license by [facility] ([facility]). If you have questions about a medical condition or this instruction, always ask your healthcare professional. [place] [address] any warranty or liability for your use of this information.

## 2019-04-25 NOTE — PROGRESS NOTES
Instructions given by Dr Aaron Dubose. Referrals pending. Tolerated Tdap and pneumovax 23 well.  Instructed to stop at the  to schedule follow up and receive AVS.

## 2019-04-25 NOTE — PROGRESS NOTES
SUBJECTIVE:    Chief Complaint   Patient presents with    Nasal Congestion     x 1 1/2 months    Chest Congestion     x 1 1/2 months    Medication Refill    Referral - General     requesting referral to see an allergist       HPI:Maru Ferrer presented to the [facility] for regular check-up. Past Medical History:   Diagnosis Date    Abnormal Pap smear of cervix     Asthma     Depression     Fibromyalgia     GERD (gastroesophageal reflux disease)     Hepatitis C     treated in [place] 2016    Hyperlipidemia     Hypertension     Menopausal symptoms     Overactive bladder      Came in for 1 month follow up regarding URI symptoms. Doing better with flonase and saline rinse. However, symptoms still persists. Says that she has extensive allergic history and has a long-term history of following a immunologist in [place]. She used to have been given an epi pen. Had a list of allergy things, but forgot to bring. Allergic to pollens, danders, hairs. Hershell Broach Ashtyn Carmona Wants to be referred to Immunologist here. Be compliant with meds. Denies severe symptoms of severe bronchospasm, wheezing, SOB, CP, angina. Doing well with PT, following Dr. Eleno Langley for left cervical radiculopaty and Dr. Ilene Faust for lumbar spine OA with steroid injection. Saying it helps. Review Of Systems:  Review of Systems   Constitutional: Negative for appetite change, chills, fatigue and fever. HENT: Positive for rhinorrhea and sneezing. Eyes: Negative. Respiratory: Positive for cough. Negative for wheezing. Cardiovascular: Negative for chest pain, palpitations and leg swelling. Gastrointestinal: Negative for abdominal pain, constipation, diarrhea, nausea and vomiting. Endocrine: Negative for polyuria. Genitourinary: Negative for difficulty urinating and dysuria. Musculoskeletal: Positive for arthralgias and back pain. Skin: Negative. Neurological: Negative for syncope, weakness, light-headedness and headaches. Psychiatric/Behavioral: Negative. Current Outpatient Medications:     amLODIPine (NORVASC) 10 MG tablet, Take 1 tablet by mouth daily, Disp: 30 tablet, Rfl: 2    pantoprazole (PROTONIX) 20 MG tablet, Take 1 tablet by mouth daily, Disp: 30 tablet, Rfl: 2    amitriptyline (ELAVIL) 50 MG tablet, Take 1 tablet by mouth nightly, Disp: 30 tablet, Rfl: 2    atorvastatin (LIPITOR) 40 MG tablet, Take 1 tablet by mouth daily, Disp: 30 tablet, Rfl: 2    fluticasone (FLONASE) 50 MCG/ACT nasal spray, 2 sprays by Each Nare route daily, Disp: 3 Bottle, Rfl: 2    acetaminophen (APAP EXTRA STRENGTH) 500 MG tablet, Take 1 tablet by mouth every 8 hours as needed for Pain, Disp: 90 tablet, Rfl: 0    fexofenadine (ALLEGRA) 180 MG tablet, Take 1 tablet by mouth daily, Disp: 30 tablet, Rfl: 0    EPINEPHrine (EPIPEN) 0.3 MG/0.3ML SOAJ injection, Use as directed for allergic reaction, Disp: 2 each, Rfl: 0    zoster recombinant adjuvanted vaccine (SHINGRIX) 50 MCG/0.5ML SUSR injection, Inject 0.5 mLs into the muscle See Admin Instructions 1 dose now and repeat in 2-6 months, Disp: 0.5 mL, Rfl: 1    sodium chloride (ALTAMIST SPRAY) 0.65 % nasal spray, 1 spray by Nasal route as needed for Congestion, Disp: 1 Bottle, Rfl: 3    cyclobenzaprine (FLEXERIL) 10 MG tablet, Take 5 mg by mouth 3 times daily , Disp: , Rfl:     Biotin 1000 MCG CHEW, Take by mouth daily, Disp: , Rfl:     ipratropium-albuterol (DUONEB) 0.5-2.5 (3) MG/3ML SOLN nebulizer solution, Inhale 3 mLs into the lungs every 4 hours, Disp: 360 mL, Rfl: 3    acyclovir (ZOVIRAX) 400 MG tablet, Take 400 mg by mouth as needed, Disp: , Rfl:     Tens Unit MISC, by Does not apply route 4-lead, Disp: 1 each, Rfl: 0    Misc.  Devices MISC, Shower chair  diagnosis G62.9 G89.4 M50.30 M54.12 M54.16, Disp: 1 Device, Rfl: 0    buPROPion (WELLBUTRIN) 75 MG tablet, Take 1 tablet by mouth 2 times daily, Disp: 60 tablet, Rfl: 2    gabapentin (NEURONTIN) 300 MG capsule, Take 300 mg by mouth 3 times daily. , Disp: , Rfl:     Psyllium (METAMUCIL FIBER PO), Take by mouth, Disp: , Rfl:     OBJECTIVE:    VS: /79 (Site: Right Upper Arm, Position: Sitting, Cuff Size: Large Adult)   Pulse 83   Temp 98.6 °F (37 °C) (Oral)   Resp 16   Ht 5' 2\" (1.575 m)   Wt 133 lb (60.3 kg)   BMI 24.33 kg/m²   Physical Exam   Constitutional: She is oriented to person, place, and time. No distress. HENT:   Head: Normocephalic and atraumatic. Eyes: Pupils are equal, round, and reactive to light. Conjunctivae and EOM are normal.   Neck: Normal range of motion. Neck supple. No JVD present. No thyromegaly present. Cardiovascular: Normal rate, regular rhythm and normal heart sounds. Exam reveals no gallop and no friction rub. No murmur heard. Pulmonary/Chest: Effort normal and breath sounds normal. No respiratory distress. She has no wheezes. She has no rales. Abdominal: Soft. Bowel sounds are normal. She exhibits no distension and no mass. There is no tenderness. Musculoskeletal: Normal range of motion. She exhibits no edema or deformity. Neurological: She is alert and oriented to person, place, and time. She has normal reflexes. No cranial nerve deficit. ASSESSMENT/PLAN:    Patient Active Problem List    Diagnosis Date Noted    Allergy 04/25/2019    Allergic rhinitis due to animal hair and dander 04/25/2019    Dental caries 04/25/2019    PRATIK (generalized anxiety disorder) 03/20/2019    Lumbar radiculopathy 02/27/2019    Lumbar disc disorder 02/27/2019    Chronic bilateral low back pain with bilateral sciatica 02/27/2019    Essential hypertension 02/11/2019    Mixed hyperlipidemia 02/11/2019    Neuropathy 02/11/2019    DDD (degenerative disc disease), cervical 01/29/2019    Cervical radiculopathy 01/29/2019    Chronic pain syndrome 01/29/2019     PLAN:     1. Will cont flonase and saline rinse  2.  Will order epi pen concerning for anaphylactic reactions with extensive allergy history  3. Will refer to Dr. Yvonne Quevedo  4. Cont BP meds  5. Agrees with current [facility] plan for lumbar spine and cervical radiculopathy  6. Will refer to dentistry for dental caries  7.  HM with all vaccinations / colonoscopy / Hep C and HIV     RTC: see in 3 months     I have reviewed my findings and recommendations with Ashley Cunningham MD PGY-2    4/25/2019 1:51 PM

## 2019-04-25 NOTE — PROGRESS NOTES
[facility]    Attending Physician Statement:    I have discussed the case, including pertinent history and exam findings with the resident. I agree with the assessment, plan and orders as documented by the resident. Patient here for routine follow up of medical problems. Allergic rhinitis - allegra and Flonase have helped sx - pt reports wanting an EPI pen and had one in the past but no hx of anaphylaxis - safety and education given on epi pen    Chronic back pain - improved with injections. Remainder of medical problems as per resident note.   Laurel Ibrahim D.O.  4/25/2019 2:05 PM

## 2019-04-26 ENCOUNTER — TELEPHONE (OUTPATIENT)
Dept: ORTHOPEDIC SURGERY | Age: 64
End: 2019-04-26

## 2019-04-26 DIAGNOSIS — M25.532 BILATERAL WRIST PAIN: Primary | ICD-10-CM

## 2019-04-26 DIAGNOSIS — M25.531 BILATERAL WRIST PAIN: Primary | ICD-10-CM

## 2019-04-26 LAB
HEPATITIS C ANTIBODY INTERPRETATION: REACTIVE
HIV-1 AND HIV-2 ANTIBODIES: NORMAL

## 2019-04-29 ENCOUNTER — OFFICE VISIT (OUTPATIENT)
Dept: ORTHOPEDIC SURGERY | Age: 64
End: 2019-04-29
Payer: MEDICAID

## 2019-04-29 VITALS
DIASTOLIC BLOOD PRESSURE: 92 MMHG | HEIGHT: 62 IN | WEIGHT: 134 LBS | BODY MASS INDEX: 24.66 KG/M2 | SYSTOLIC BLOOD PRESSURE: 128 MMHG | RESPIRATION RATE: 18 BRPM | HEART RATE: 80 BPM

## 2019-04-29 DIAGNOSIS — G56.22 ULNAR NEUROPATHY AT ELBOW OF LEFT UPPER EXTREMITY: Primary | ICD-10-CM

## 2019-04-29 DIAGNOSIS — G56.03 BILATERAL CARPAL TUNNEL SYNDROME: ICD-10-CM

## 2019-04-29 DIAGNOSIS — M05.741 RHEUMATOID ARTHRITIS INVOLVING BOTH HANDS WITH POSITIVE RHEUMATOID FACTOR (HCC): ICD-10-CM

## 2019-04-29 DIAGNOSIS — M05.742 RHEUMATOID ARTHRITIS INVOLVING BOTH HANDS WITH POSITIVE RHEUMATOID FACTOR (HCC): ICD-10-CM

## 2019-04-29 DIAGNOSIS — G56.21 ULNAR NEUROPATHY AT ELBOW OF RIGHT UPPER EXTREMITY: ICD-10-CM

## 2019-04-29 PROCEDURE — 99204 OFFICE O/P NEW MOD 45 MIN: CPT | Performed by: ORTHOPAEDIC SURGERY

## 2019-04-29 NOTE — PROGRESS NOTES
Department of Orthopedic Surgery  History and Physical      CHIEF COMPLAINT:  Bilateral arm and hand pain    HISTORY OF PRESENT ILLNESS:                The patient is a RHD 59 y.o. female who presents with radiating bilateral arm and hand pain. Patient states she has had bilateral arm and hand pain for the last 7 years. She states her fingers cramp up. She moved from [place] and was set up for an ulnar nerve decompression and carpal tunnel release prior to moving. Since she has moved here she has been in pain management for her neck. She has not seen anyone for her arm pain yet. She did bring blood work with her from 2016. This showed a RF factor of 30.3 along with a CRP of 37.2 and a positive a ZOYA. She is not currently seeing a rheumatologist. She also reports pain to her right thumb. Patient reports braining her right thumb when she was a child. No recent injury. She had an EMG and nerve conduction study test on October 5, 2016. Left ulnar nerve motor velocity was 34 and right was 45. Left median nerve sensory latency was 4.8 and right was 4.7. On the EMG portion of the exam there are changes to the left abductor pollicis brevis along with left 1st dorsal interosseous and right abductor pollicis brevis and right 1st dorsal interosseous. She also had changes to her right pronator teres and triceps. There was increased insertional activity to bilateral cervical paraspinal muscles. Impression is consistent with a right C7 cervical radiculopathy and bilateral carpal tunnel syndrome and bilateral ulnar neuropathy.     Past Medical History:        Diagnosis Date    Abnormal Pap smear of cervix     Asthma     Depression     Fibromyalgia     GERD (gastroesophageal reflux disease)     Hepatitis C     treated in [place] 2016    Hyperlipidemia     Hypertension     Menopausal symptoms     Overactive bladder      Past Surgical History:        Procedure Laterality Date    ANESTHESIA NERVE BLOCK N/A 3/26/2019 L4-5 EPIDURAL STEROID INJECTION performed by Kristian Duenas DO at [address]  03/26/2019    with sedation    OVARY REMOVAL      1981    SHOULDER ARTHROSCOPY Right      Current Medications:   No current facility-administered medications for this visit. Allergies:  Adhesive tape; Motrin [ibuprofen]; Seasonal; and Penicillins    Social History:   TOBACCO:   reports that she has been smoking cigarettes. She has a 11.25 pack-year smoking history. She has never used smokeless tobacco.  ETOH:   reports that she drinks alcohol. DRUGS:   reports that she does not use drugs. ACTIVITIES OF DAILY LIVING:    OCCUPATION:    Family History:       Problem Relation Age of Onset    Diabetes Mother     Cancer Mother     Alcohol Abuse Father        REVIEW OF SYSTEMS:  CONSTITUTIONAL:  negative  EYES:  negative  HEENT:  negative  RESPIRATORY:  negative  CARDIOVASCULAR:  HTN, hyperlipidemia  GASTROINTESTINAL:  negative  GENITOURINARY:  negative  INTEGUMENT/BREAST:  negative  HEMATOLOGIC/LYMPHATIC: hepatitis C  ALLERGIC/IMMUNOLOGIC:  negative  ENDOCRINE:  negative  MUSCULOSKELETAL:  pain  NEUROLOGICAL:  N/T  BEHAVIOR/PSYCH:  negative    PHYSICAL EXAM:    VITALS:  BP (!) 128/92 (Site: Left Upper Arm, Position: Sitting) Comment: has not had bp meds today  Pulse 80   Resp 18   Ht 5' 2\" (1.575 m)   Wt 134 lb (60.8 kg)   BMI 24.51 kg/m²   CONSTITUTIONAL:  awake, alert, cooperative, no apparent distress, and appears stated age  EYES:  Lids and lashes normal, pupils equal, round and reactive to light, extra ocular muscles intact, sclera clear, conjunctiva normal  ENT:  Normocephalic, without obvious abnormality, atraumatic, sinuses nontender on palpation, external ears without lesions, oral pharynx with moist mucus membranes, tonsils without erythema or exudates, gums normal and good dentition.   NECK:  Supple, symmetrical, trachea midline, no adenopathy, thyroid symmetric, not enlarged and no tenderness, skin normal  LUNGS:  CTA  CARDIOVASCULAR:  2+ radial pulses, extremities warm and well perfused  ABDOMEN:  NTTP  CHEST:  Atraumatic   GENITAL/URINARY:  deferred  NEUROLOGIC:  Awake, alert, oriented to name, place and time. Cranial nerves II-XII are grossly intact. Motor is 5 out of 5 bilaterally. Sensory is intact.  gait is normal.  MUSCULOSKELETAL:    Right upper extremity: Nontender shoulder with good range of motion. Positive Tinel's of the cubital tunnel, negative tenderness over the medial and lateral condyle. Positive Tinel's of the are pulled tunnel, positive Kaykay's, positive instability to the thumb MCP joint with pain. Negative Finkelstein's, negative CMC grind, negative tenderness over the A1 pulleys with no active triggering. Full flexion extension of the fingers. Negative Wartenberg's and cross finger testing. APB strength 5 out of 5. Negative intrinsic weakness. Median, ulnar, radial nerves intact light touch. Brisk capillary refill. Gross motor 5/5. Left upper extremity: Nontender shoulder with good range of motion. Positive Tinel's of the cubital tunnel, negative tenderness over the medial and lateral condyle. Positive Tinel's of the are pulled tunnel, positive Kaykay's. Negative Finkelstein's, negative CMC grind, negative tenderness over the A1 pulleys with no active triggering. Full flexion extension of the fingers. Negative Wartenberg's and cross finger testing. APB strength 5 out of 5. Negative intrinsic weakness. Median, ulnar, radial nerves intact light touch. Brisk capillary refill. Gross motor 5/5.     DATA:    CBC:   Lab Results   Component Value Date    WBC 5.7 03/15/2019    RBC 4.34 03/15/2019    HGB 13.1 03/15/2019    HCT 38.7 03/15/2019    MCV 89.2 03/15/2019    MCH 30.2 03/15/2019    MCHC 33.9 03/15/2019    RDW 13.5 03/15/2019     03/15/2019    MPV 11.2 03/15/2019     PT/INR:  No results found for: PROTIME, INR    Radiology Review: X-rays of bilateral wrists were obtained today in the office and reviewed with patient. 3 views: AP, lateral, oblique demonstrate no acute fractures or dislocation. Right thumb MCP joint with arthritic changes present. Impression: Right thumb MCP joint arthritis    X-rays of bilateral elbows were obtained today in the afternoon. Patient. 2 views: AP, lateral demonstrate no acute fractures or dislocations of bilateral elbows. 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 In regards to her rheumatoid arthritis and a referral was placed for a rheumatologist. Reviewed the patient EMG and nerve conduction study test with the patient. Discussed surgical management with the patient. Recommended a right ulnar nerve decompression at the elbow with possible transposition and right endoscopic carpal tunnel release. If she does well on the right she may consider surgery on her left at a later date. Her right thumb is not bothersome enough to proceed with surgery at this time. Did discussed on the right she could consider a MCP joint fusion of the right thumb. Patient is uninterested in this. Postop course explained to the patient. Patient like to proceed. All questions answered. I explained the risks, benefits, alternatives and complications of surgery with the patient including but not limited to the risks of infection, possible damage to nerves, vessels, or tendons, stiffness, loss of range of motion, scar sensitivity, wound healing complications, worsening symptoms, possible need for therapy, as well as the possible need further surgery and unanticipated complications. The patient voiced understanding and all questions were answered.  The patient elected to     I have seen and evaluated the patient and agree with the above assessment and plan on today's visit. I have performed the key components of the history and physical examination with significant findings of bilateral ulnar neuropathy at elbow was and bilateral carpal tunnel syndrome. Plan for right endoscopic carpal tunnel release with ulnar nerve decompression at elbow with possible transposition. If she does well the right plan percent of surgery on the left. She does have gross instability about the right thumb MCP joint with underlying arthritis from childhood injury. . I concur with the findings and plan as documented.     Joie Licona MD  4/29/2019

## 2019-04-30 ENCOUNTER — PREP FOR PROCEDURE (OUTPATIENT)
Dept: ORTHOPEDIC SURGERY | Age: 64
End: 2019-04-30

## 2019-04-30 ENCOUNTER — HOSPITAL ENCOUNTER (OUTPATIENT)
Dept: PHYSICAL THERAPY | Age: 64
Setting detail: THERAPIES SERIES
Discharge: HOME | End: 2019-04-30
Payer: MEDICAID

## 2019-04-30 PROCEDURE — 97110 THERAPEUTIC EXERCISES: CPT

## 2019-04-30 RX ORDER — SODIUM CHLORIDE 0.9 % (FLUSH) 0.9 %
10 SYRINGE (ML) INJECTION PRN
Status: CANCELLED | OUTPATIENT
Start: 2019-04-30

## 2019-04-30 RX ORDER — SODIUM CHLORIDE 0.9 % (FLUSH) 0.9 %
10 SYRINGE (ML) INJECTION EVERY 12 HOURS SCHEDULED
Status: CANCELLED | OUTPATIENT
Start: 2019-04-30

## 2019-04-30 RX ORDER — SODIUM CHLORIDE 9 MG/ML
INJECTION, SOLUTION INTRAVENOUS CONTINUOUS
Status: CANCELLED | OUTPATIENT
Start: 2019-04-30

## 2019-04-30 NOTE — PROGRESS NOTES
[facility]                Phone: [phone]   Fax: [phone]    Physical Therapy Daily Treatment Note  Date:  2019    Patient Name:  Olivier Garg    :  1955  MRN: [id number]    Referring Physician:  Satish Garces MD   Insurance Information:  [facility] [address]       Evaluation date:  19   Diagnosis:  Cervical radiculopathy, chronic pain syndrome   Evaluating Physical Therapist:  Surekha Johnston DPT  Plan of care signed (Y/N):    Visit# / total visits: -       Subjective:   Pt reports that she is hurting all over today. She states that she had a consult with Dr. Ruben Marmolejo in regards to her BUE symptoms and she is scheduled for surgery on 19. Pt reports that her neck, BUE, and low back are all hurting today. She reports generalized pain as 6/10 prior to session. Exercises: NA   Exercise/Equipment Reps  During/ after  Other comments    UBE  8 min  Level #1 Alternate direction every 2 min, using BUE and BLE today     Ball roll outs Forward 3x12  Side ways 3x12 ea  Orange ball  Orange ball   Seated   Seated             Posture corrections  3x15      Chin tucks  3x15   Increased tingling to LUE today                             Home Exercise Program:       Comments:  Despite reports of 6/10 pain prior to session today, pt does not appear to be in physical distress. Is able to hold conversation with no grimacing, moaning, facial expressions that would signify significant pain. She is smiling throughout conversation and is very pleasant. She does report that her pain is slightly better since refilling her medication prescription last week. Good lumbar AROM with ball roll outs today and no c/o increased low back pain. Pt performed posture correction exercise with good technique and no increase in cervical or BUE pain.       Pt reports slight increase in LUE tingling with cervical retractions that returned to baseline following exercise. Pt reports that her pain decreased to 4/10 following therapy today and states that she \"feels better\". Treatment/Activity Tolerance:  [x] Patient tolerated treatment well [] Patient limited by fatique  [] Patient limited by pain  [] Patient limited by other medical complications  [] Other:     Prognosis: [] Good [x] Fair  [] Poor    Patient Requires Follow-up: [x] Yes  [] No    Plan:   [x] Continue per plan of care [] Alter current plan (see comments)  [] Plan of care initiated [] Hold (see above) [] Discharge    Plan for Next Session:    Continue pain free postural correction and strengthening exercises as tolerated. Transition to HEP soon.     See Weekly Progress Note: []  Yes  [x]  No  Next due:        Time In: 6481  Time Out: [id number] 192    Electronically signed by:    Suzanne Dang DPT  [id number]

## 2019-05-02 NOTE — PROGRESS NOTES
[address]  [address] [facility], [address] [place] [address] [place]  [phone]    Follow up Note      Toshia Page     Date of Visit:  05/10/19     CC:  Patient presents for follow up   Chief Complaint   Patient presents with    Follow-up     post procedure     HPI:    Pain is unchanged. Change in quality of symptoms:no. Medication side effects:not applicable . Recent diagnostic testing:none. Recent interventional procedures:none    She has not been on anticoagulation medications to include ASA, NSAIDS, Plavix, heparin, LMW heparin and warfarin and has not been on herbal supplements. She is not diabetic.     Imaging:   Lumbar MRI 9/2016 -   Mild degenerative changes with moderate foraminal stenosis at L4-5 on the right and mild to moderate foraminal stenosis on the left       Potential Aberrant Drug-Related Behavior: no      Urine Drug Screening:    OARRS report:    Past Medical History:   Diagnosis Date    Abnormal Pap smear of cervix     Asthma     Depression     Fibromyalgia     GERD (gastroesophageal reflux disease)     Hepatitis C 2016    treated     Hyperlipidemia     Hypertension     Overactive bladder        Past Surgical History:   Procedure Laterality Date    ANESTHESIA NERVE BLOCK N/A 3/26/2019    L4-5 EPIDURAL STEROID INJECTION performed by Spenser Rodríguez DO at [facility]  03/26/2019    with sedation    OVARY REMOVAL      1981    SHOULDER ARTHROSCOPY Right        Prior to Admission medications    Medication Sig Start Date End Date Taking?  Authorizing Provider   Magic Mouthwash (MIRACLE MOUTHWASH) Swish and spit 5 mLs 4 times daily as needed for Irritation 5/9/19  Yes Maru Arguello MD   amLODIPine (NORVASC) 10 MG tablet Take 1 tablet by mouth daily 4/25/19  Yes Milena Ivey MD   pantoprazole (PROTONIX) 20 MG tablet Take 1 tablet by mouth daily 4/25/19  Yes Milena Ivey MD   amitriptyline (ELAVIL) 50 MG tablet Take 1 tablet by mouth nightly 4/25/19  Yes Charo Dias MD   atorvastatin (LIPITOR) 40 MG tablet Take 1 tablet by mouth daily 4/25/19  Yes Charo Dias MD   acetaminophen (APAP EXTRA STRENGTH) 500 MG tablet Take 1 tablet by mouth every 8 hours as needed for Pain 4/25/19  Yes Charo Dias MD   EPINEPHrine (EPIPEN) 0.3 MG/0.3ML SOAJ injection Use as directed for allergic reaction 4/25/19  Yes Charo Dias MD   sodium chloride (ALTAMIST SPRAY) 0.65 % nasal spray 1 spray by Nasal route as needed for Congestion 3/20/19  Yes Cahro Dias MD   gabapentin (NEURONTIN) 300 MG capsule Take 300 mg by mouth 3 times daily.    Yes Historical Provider, MD   cyclobenzaprine (FLEXERIL) 10 MG tablet Take 5 mg by mouth 3 times daily    Yes Historical Provider, MD   Biotin 1000 MCG CHEW Take by mouth daily   Yes Historical Provider, MD   ipratropium-albuterol (DUONEB) 0.5-2.5 (3) MG/3ML SOLN nebulizer solution Inhale 3 mLs into the lungs every 4 hours 2/11/19  Yes Concepcion Sagastume MD   acyclovir (ZOVIRAX) 400 MG tablet Take 400 mg by mouth as needed   Yes Historical Provider, MD       Allergies   Allergen Reactions    Adhesive Tape Rash     bruising    Nsaids      Irritates IBS    Penicillins Hives       Social History     Socioeconomic History    Marital status: Single     Spouse name: Not on file    Number of children: Not on file    Years of education: Not on file    Highest education level: Not on file   Occupational History    Not on file   Social Needs    Financial resource strain: Not on file    Food insecurity:     Worry: Not on file     Inability: Not on file    Transportation needs:     Medical: Not on file     Non-medical: Not on file   Tobacco Use    Smoking status: Current Every Day Smoker     Packs/day: 0.25     Years: 45.00     Pack years: 11.25     Types: Cigarettes    Smokeless tobacco: Never Used   Substance and Sexual Activity    Alcohol use: Yes     Comment: socially    Drug use: No    Sexual activity: Not on file   Lifestyle    Physical activity:     Days per week: Not on file     Minutes per session: Not on file    Stress: Not on file   Relationships    Social connections:     Talks on phone: Not on file     Gets together: Not on file     Attends [employer] service: Not on file     Active member of club or organization: Not on file     Attends meetings of clubs or organizations: Not on file     Relationship status: Not on file    Intimate partner violence:     Fear of current or ex partner: Not on file     Emotionally abused: Not on file     Physically abused: Not on file     Forced sexual activity: Not on file   Other Topics Concern    Not on file   Social History Narrative    Not on file       Family History   Problem Relation Age of Onset    Diabetes Mother     Cancer Mother     Alcohol Abuse Father        REVIEW OF SYSTEMS:     Ky Cotto denies fever/chills, chest pain, shortness of breath, new bowel or bladder complaints. All other review of systems was negative. PHYSICAL EXAMINATION:      /82   Pulse 102   Resp 16   Ht 5' 2\" (1.575 m)   Wt 134 lb (60.8 kg)   SpO2 98%   BMI 24.51 kg/m²     General:      General appearance:pleasant and well-hydrated, in no distress and A & O x3  Build:Normal Weight  Function:Rises from a seated position easily. HEENT:    Head:normocephalic, atraumatic  Pupils:regular, round, equal  Sclera: icterus absent    Lungs:    Breathing:normal breathing pattern    Abdomen:    Shape:non-distended and normal  Tenderness:none  Guarding:none    Lumbar spine:    Spine inspection:normal   CVA tenderness:No   Palpation:tenderness paravertebral muscles, left, right and positive. Range of motion:abnormal mildly in lateral bending, flexion, extension rotation bilateral and is painful.     Musculoskeletal:    Trigger points in Paraveteral:absent bilaterally  SI joint tenderness:negative right, negative left              CARLA test:negative right, negative left  Piriformis tenderness:negative right, negative left  Trochanteric bursa tenderness:negative right, negative left  SLR:negative right, negative left, sitting     Extremities:    Tremors:None bilaterally upper and lower  Intact:Yes  Varicose veins:absent   Pulses:present Lt radial  Cyanosis:none  Edema:none x all 4 extremities    Neurological:    Sensory:normal to light touch BLE    Motor:                Right Quadriceps5/5          Left Quadriceps5/5           Right Gastrocnemius5/5    Left Gastrocnemius5/5  Right Ant Tibialis5/5  Left Ant Tibialis5/5    Reflexes:    Right Quadriceps reflex2+  Left Quadriceps reflex2+  Right Achilles reflex2+  Left Achilles reflex2+    Gait:normal    Assessment/Plan:     LBP and R>LLE pain  2016 Lumbar MRI shows mild degenerative changes with moderate L4-5 right foraminal stenosis and mild-moderate foraminal stenosis on the left    Saw Dr. Becca Hairston and is having surgery in the RUE with him      L4-5 RICARDO #1 with 75% relief, 50% relief has continued, scheduled for repeat this month  She continues PT on [facility], states she will be considered complete soon  Patient encouraged to stay active  Treatment plan discussed with the patient including potential procedure side effects     Cc:  Referring physician    KALYANI Steen.

## 2019-05-09 ENCOUNTER — OFFICE VISIT (OUTPATIENT)
Dept: INTERNAL MEDICINE | Age: 64
End: 2019-05-09
Payer: MEDICAID

## 2019-05-09 ENCOUNTER — HOSPITAL ENCOUNTER (OUTPATIENT)
Dept: PHYSICAL THERAPY | Age: 64
Setting detail: THERAPIES SERIES
Discharge: HOME | End: 2019-05-09
Payer: MEDICAID

## 2019-05-09 VITALS
HEART RATE: 85 BPM | SYSTOLIC BLOOD PRESSURE: 110 MMHG | RESPIRATION RATE: 16 BRPM | WEIGHT: 132.9 LBS | TEMPERATURE: 97.8 F | HEIGHT: 62 IN | DIASTOLIC BLOOD PRESSURE: 78 MMHG | BODY MASS INDEX: 24.46 KG/M2

## 2019-05-09 DIAGNOSIS — R30.0 DYSURIA: ICD-10-CM

## 2019-05-09 DIAGNOSIS — K14.6 PAINFUL TONGUE: Primary | ICD-10-CM

## 2019-05-09 DIAGNOSIS — B19.20 HEPATITIS C VIRUS INFECTION WITHOUT HEPATIC COMA, UNSPECIFIED CHRONICITY: ICD-10-CM

## 2019-05-09 PROCEDURE — 99213 OFFICE O/P EST LOW 20 MIN: CPT | Performed by: INTERNAL MEDICINE

## 2019-05-09 PROCEDURE — 99212 OFFICE O/P EST SF 10 MIN: CPT | Performed by: INTERNAL MEDICINE

## 2019-05-09 PROCEDURE — 97530 THERAPEUTIC ACTIVITIES: CPT

## 2019-05-09 PROCEDURE — 97110 THERAPEUTIC EXERCISES: CPT

## 2019-05-09 ASSESSMENT — ENCOUNTER SYMPTOMS
SORE THROAT: 0
VOICE CHANGE: 0
EYE DISCHARGE: 0
EYE ITCHING: 0
TROUBLE SWALLOWING: 0
EYE REDNESS: 0
SINUS PRESSURE: 0
FACIAL SWELLING: 0
SINUS PAIN: 0

## 2019-05-09 NOTE — PROGRESS NOTES
Instructions given by Dr Hari Treadwell. Instructed to stop at the  to schedule follow up and receive Akhil Nassar Dr # [phone] Dr Dave Norwood office regarding colonoscopy report.  stated that she would be faxing op report.
Patient picked up F&V packet. Denied questions.
[facility]    Attending Physician Statement:  Zeke Tracy. Felisa Faye M.D., F.A.C.P. I have discussed the case, including pertinent history and exam findings with the resident. I have seen and examined the patient and the key elements of the encounter have been performed by me. I agree with the assessment, plan and orders as documented by the resident. Patient here for follow-up    Complaining of recent vaginal pruritus and urinary frequency as well as a sore tongue    States noted white areas on tongue and tongue was swollen   Also start monistat 7- currently on day 4   States feeling improved with both tongue and vaginal symptoms   No noted vaginal discharge   No significant dysuria, CVA tenderness or additional findings    ON EXAM:  Vitals:    05/09/19 1014   BP: 110/78   Site: Right Upper Arm   Position: Sitting   Cuff Size: Medium Adult   Pulse: 85   Resp: 16   Temp: 97.8 °F (36.6 °C)   TempSrc: Oral   Weight: 132 lb 14.4 oz (60.3 kg)   Height: 5' 2\" (1.575 m)    HEENT: No noted tongue swelling on exam; no noted oral lesions; uvula midline; no obvious thrush- patient stated she is brushing it off; full ROM of tongue; no noted lesions on buccal mucosa     ? Thrush- no obvious findings   No noted dysphagia   Diet stable   Will trial short course of magic wash- only rinse and spit as discussed   Prior HIV testing negative   A1c less than 6%   Hep C ab positive- check viral load   Follow in 1 week    ? Candida vaginitis   Improving with monistat 7   No noted vaginal discharge, bleeding, abdominal pain   Check U/A    Complete monistat course        Remainder of medical problems as per resident note.
swelling. No posterior oropharyngeal edema or posterior oropharyngeal erythema. Eyes: Pupils are equal, round, and reactive to light. Conjunctivae and EOM are normal.   Neck: Normal range of motion. Neck supple. Cardiovascular: Normal rate and regular rhythm. Pulmonary/Chest: Effort normal and breath sounds normal.   Abdominal: Soft. Bowel sounds are normal. She exhibits no distension. There is no tenderness. Genitourinary:   Genitourinary Comments: declined   Musculoskeletal: Normal range of motion. Neurological: She is alert and oriented to person, place, and time. Skin: Skin is warm. Capillary refill takes less than 2 seconds. Psychiatric: She has a normal mood and affect. ASSESSMENT/PLAN:  Yue Boss was seen today for mouth lesions, vaginitis, medication refill, hypertension, other and gastroesophageal reflux. Hepatitis C virus infection without hepatic coma, unspecified chronicity  -     HEP C VIRAL LOAD; Future    Dysuria/Vaginitis  -Urinalysis with Microscopic;  Future  -Complete course of miconazole  -If urinalysis is positive: will treat with antibiotics  -If urinalysis is negative and symptoms persist, consider treating for vaginitis: ? Oral fluconazole versus treatment for BV     Oral thrush/ irritation  -magic mouthwash today  -If no improvement in symptoms: can try nystatin swish     RTC: Follow up in 2 weeks   I have reviewed my findings and recommendations with Ana Anderson and Dr Ursula Fenton MD PGY-2  5/9/2019 1:15 PM

## 2019-05-09 NOTE — PATIENT INSTRUCTIONS
Patient Education        Preventing Falls: Care Instructions  Your Care Instructions    Getting around your home safely can be a challenge if you have injuries or health problems that make it easy for you to fall. Loose rugs and furniture in walkways are among the dangers for many older people who have problems walking or who have poor eyesight. People who have conditions such as arthritis, osteoporosis, or dementia also have to be careful not to fall. You can make your home safer with a few simple measures. Follow-up care is a key part of your treatment and safety. Be sure to make and go to all appointments, and call your doctor if you are having problems. It's also a good idea to know your test results and keep a list of the medicines you take. How can you care for yourself at home? Taking care of yourself  · You may get dizzy if you do not drink enough water. To prevent dehydration, drink plenty of fluids, enough so that your urine is light yellow or clear like water. Choose water and other caffeine-free clear liquids. If you have kidney, heart, or liver disease and have to limit fluids, talk with your doctor before you increase the amount of fluids you drink. · Exercise regularly to improve your strength, muscle tone, and balance. Walk if you can. Swimming may be a good choice if you cannot walk easily. · Have your vision and hearing checked each year or any time you notice a change. If you have trouble seeing and hearing, you might not be able to avoid objects and could lose your balance. · Know the side effects of the medicines you take. Ask your doctor or pharmacist whether the medicines you take can affect your balance. Sleeping pills or sedatives can affect your balance. · Limit the amount of alcohol you drink. Alcohol can impair your balance and other senses. · Ask your doctor whether calluses or corns on your feet need to be removed.  If you wear loose-fitting shoes because of calluses or corns, you can lose your balance and fall. · Talk to your doctor if you have numbness in your feet. Preventing falls at home  · Remove raised doorway thresholds, throw rugs, and clutter. Repair loose carpet or raised areas in the floor. · Move furniture and electrical cords to keep them out of walking paths. · Use nonskid floor wax, and wipe up spills right away, especially on ceramic tile floors. · If you use a walker or cane, put rubber tips on it. If you use crutches, clean the bottoms of them regularly with an abrasive pad, such as steel wool. · Keep your house well lit, especially [facility], and outside walkways. Use night-lights in areas such as hallways and bathrooms. Add extra light switches or use remote switches (such as switches that go on or off when you clap your hands) to make it easier to turn lights on if you have to get up during the night. · Install sturdy handrails on stairways. · Move items in your cabinets so that the things you use a lot are on the lower shelves (about waist level). · Keep a cordless phone and a flashlight with new batteries by your bed. If possible, put a phone in each of the main rooms of your house, or carry a cell phone in case you fall and cannot reach a phone. Or, you can wear a device around your neck or wrist. You push a button that sends a signal for help. · Wear low-heeled shoes that fit well and give your feet good support. Use footwear with nonskid soles. Check the heels and soles of your shoes for wear. Repair or replace worn heels or soles. · Do not wear socks without shoes on wood floors. · Walk on the grass when the sidewalks are slippery. If you live in an area that gets snow and ice in the winter, sprinkle salt on slippery steps and sidewalks. Preventing falls in the bath  · Install grab bars and nonskid mats inside and outside your shower or tub and near the toilet and sinks. · Use shower chairs and bath benches.   · Use a hand-held shower head that will allow you to sit while showering. · Get into a tub or shower by putting the weaker leg in first. Get out of a tub or shower with your strong side first.  · Repair loose toilet seats and consider installing a raised toilet seat to make getting on and off the toilet easier. · Keep your bathroom door unlocked while you are in the shower. Where can you learn more? Go to https://[place].Splice. org and sign in to your [employer] account. Enter [phone] in the My Pick Box box to learn more about \"Preventing Falls: Care Instructions. \"     If you do not have an account, please click on the \"Sign Up Now\" link. Current as of: November 7, 2018  Content Version: 12.0  © 3567-0443 Healthwise, Incorporated. Care instructions adapted under license by [facility] ([facility]). If you have questions about a medical condition or this instruction, always ask your healthcare professional. [address] any warranty or liability for your use of this information.          Continue using the monistat  Use the Magic mouthwash: remember to rinse and spit: do not swallow  Follow up as recommended

## 2019-05-09 NOTE — PROGRESS NOTES
[address] [facility]                Phone: [phone]   Fax: [phone]    Physical Therapy Daily Treatment Note  Date:  2019    Patient Name:  Armando Klinefelter    :  1955  MRN: [id number]    Referring Physician:  Geraldo Milligan MD   Insurance Information:  [facility] [place]       Evaluation date:  19   Diagnosis:  Cervical radiculopathy, chronic pain syndrome   Evaluating Physical Therapist:  Chris El DPT  Plan of care signed (Y/N):    Visit# / total visits: -       Subjective:   Pt reports that she is feeling better since last week. She does admit to continued intermittent low back pain that radiates down BLE. She states that she is scheduled for another epidural with Dr. Marci Thomas on 5/15/19 which is next week. She is also scheduled for surgery with Dr. Reta Cruz for her R elbow and a carpal tunnel release on 19. Pt rates low back pain as 4/10 prior to session today. Exercises: NA   Exercise/Equipment Reps  During/ after  Other comments    UBE  10 min  Level #1 Alternate direction every 2 min, using BUE and BLE today     Ball roll outs Forward 3x12  Side ways 3x12 ea  Orange ball  Orange ball   Seated   Seated             Posture corrections  3x20      Chin tucks  3x15                               Home Exercise Program:       Comments:  Pt tolerated session well overall today and states that she feels \"better\" after session completed today. Ball rollouts to the right slightly increased pain in R low back and were discontinued after 2x12 when pt reported pain. Pain returned to her baseline shortly after stopping exercise. No increase in low back pain with forward/ L roll outs. No complaints of increased pain with posture corrections in sitting or with gentle cervical retractions.           Treatment/Activity Tolerance:  [x] Patient tolerated treatment well [] Patient limited by fatique  [] Patient limited by pain  [] Patient limited by other medical complications  [] Other:     Prognosis: [] Good [x] Fair  [] Poor    Patient Requires Follow-up: [x] Yes  [] No    Plan:   [] Continue per plan of care [] Alter current plan (see comments)  [] Plan of care initiated [x] Hold PT:  Discussed with pt placing her on hold at this time due to having epidural next week to see if the epidural gives her relief from her low back and radicular pain since she has had somewhat limited lasting relief from PT up to this time. Pt in agreement with this POC. [] Discharge    Plan for Next Session:    Hold till pt calls PT clinic back next week after she has her epidural injection.     See Weekly Progress Note: []  Yes  [x]  No  Next due:        Time In: 1301  Time Out: 6121    Electronically signed by:    Juan Miguel Llanos DPT  [id number]

## 2019-05-10 ENCOUNTER — OFFICE VISIT (OUTPATIENT)
Dept: PAIN MANAGEMENT | Age: 64
End: 2019-05-10
Payer: MEDICAID

## 2019-05-10 VITALS
HEIGHT: 62 IN | DIASTOLIC BLOOD PRESSURE: 82 MMHG | OXYGEN SATURATION: 98 % | RESPIRATION RATE: 16 BRPM | HEART RATE: 102 BPM | WEIGHT: 134 LBS | SYSTOLIC BLOOD PRESSURE: 122 MMHG | BODY MASS INDEX: 24.66 KG/M2

## 2019-05-10 DIAGNOSIS — M51.9 LUMBAR DISC DISORDER: ICD-10-CM

## 2019-05-10 DIAGNOSIS — G89.29 CHRONIC BILATERAL LOW BACK PAIN WITH BILATERAL SCIATICA: ICD-10-CM

## 2019-05-10 DIAGNOSIS — M54.16 LUMBAR RADICULOPATHY: ICD-10-CM

## 2019-05-10 DIAGNOSIS — G89.4 CHRONIC PAIN SYNDROME: Primary | ICD-10-CM

## 2019-05-10 DIAGNOSIS — M54.41 CHRONIC BILATERAL LOW BACK PAIN WITH BILATERAL SCIATICA: ICD-10-CM

## 2019-05-10 DIAGNOSIS — M54.42 CHRONIC BILATERAL LOW BACK PAIN WITH BILATERAL SCIATICA: ICD-10-CM

## 2019-05-10 PROCEDURE — 99213 OFFICE O/P EST LOW 20 MIN: CPT | Performed by: PAIN MEDICINE

## 2019-05-10 NOTE — PROGRESS NOTES
Veronika Wilson presents to the [facility] on 5/10/2019. [facility] [place] is complaining of pain rt. Lower back. The pain is constant. The pain is described as aching, throbbing, shooting and stabbing. Pain is rated on her best day at a 4, on her worst day at a 9, and on average at a 4 on the VAS scale. She took her last dose of     Any procedures since your last visit    Pacemaker or defibrilator: No managed by none  She has not been on anticoagulation medications to include none and is managed by none.       /82   Pulse 102   Resp 16   Ht 5' 2\" (1.575 m)   Wt 134 lb (60.8 kg)   SpO2 98%   BMI 24.51 kg/m²

## 2019-05-10 NOTE — PROGRESS NOTES
[facility] PRE-ADMISSION TESTING INSTRUCTIONS    The Preadmission Testing patient is instructed accordingly using the following criteria (check applicable):    ARRIVAL INSTRUCTIONS:  [x] Parking the day of Surgery is located in the Main Entrance lot. Upon entering the door, make an immediate right to the surgery reception desk    [x] Complimentary [address] Parking is available Monday through Friday 6 am to 6 pm    [x] Bring photo ID and insurance card    [] Bring in a copy of Living will or Durable Power of  papers. [] Please be sure to arrange for responsible adult to provide transportation to and from the hospital    [x] Please arrange for responsible adult to be with you for the 24 hour period post procedure due to having anesthesia      GENERAL INSTRUCTIONS:    [x] Nothing by mouth after midnight, including gum, candy, mints or water    [x] You may brush your teeth, but do not swallow any water    [x] Take medications as instructed with 1-2 oz of water    [x] Stop herbal supplements and vitamins 5 days prior to procedure    [] Follow preop dosing of blood thinners per physician instructions    [] Take 1/2 dose of evening insulin, but no insulin after midnight    [] No oral diabetic medications after midnight    [] If diabetic and have low blood sugar or feel symptomatic, take 1-2oz apple juice only    [] Bring inhalers day of surgery    [] Bring C-PAP/ Bi-Pap day of surgery    [] Bring urine specimen day of surgery    [x] Shower or bath with soap, lather and rinse well, AM of Surgery, no lotion, powders or creams to surgical site    [] Follow bowel prep as instructed per surgeon    [x] No tobacco products within 24 hours of surgery     [x] No alcohol or illegal drug use within 24 hours of surgery.     [x] Jewelry, body piercing's, eyeglasses, contact lenses and dentures are not permitted into surgery (bring cases)      [x] Please do not wear any nail polish, make up or hair products on the day of surgery    [] If not already done, you can expect a call from registration    [x] You can expect a call the business day prior to procedure to notify you if your arrival time changes    [x] If you receive a survey after surgery we would greatly appreciate your comments    [] Parent/guardian of a minor must accompany their child and remain on the premises  the entire time they are under our care     [] Pediatric patients may bring favorite toy, blanket or comfort item with them    [] A caregiver or family member must remain with the patient during their stay if they are mentally handicapped, have dementia, disoriented or unable to use a call light or would be a safety concern if left unattended    [x] Please notify surgeon if you develop any illness between now and time of surgery (cold, cough, sore throat, fever, nausea, vomiting) or any signs of infections  including skin, wounds, and dental.    []  The Outpatient Pharmacy is available to fill your prescription here on your day of surgery, ask your preop nurse for details    [] Other instructions  EDUCATIONAL MATERIALS PROVIDED:    [] PAT Preoperative Education Packet/Booklet     [] Medication List    [] Fluoroscopy Information Pamphlet    [] Transfusion bracelet applied with instructions    [] Joint replacement video reviewed    [] Shower with soap, lather and rinse well, and use CHG wipes provided the evening before surgery as instructed

## 2019-05-13 ENCOUNTER — TELEPHONE (OUTPATIENT)
Dept: FAMILY MEDICINE CLINIC | Age: 64
End: 2019-05-13

## 2019-05-16 ENCOUNTER — ANESTHESIA EVENT (OUTPATIENT)
Dept: OPERATING ROOM | Age: 64
End: 2019-05-16
Payer: MEDICAID

## 2019-05-16 ENCOUNTER — ANESTHESIA (OUTPATIENT)
Dept: OPERATING ROOM | Age: 64
End: 2019-05-16
Payer: MEDICAID

## 2019-05-16 ENCOUNTER — HOSPITAL ENCOUNTER (OUTPATIENT)
Age: 64
Setting detail: OUTPATIENT SURGERY
Discharge: HOME | End: 2019-05-16
Attending: PAIN MEDICINE | Admitting: PAIN MEDICINE
Payer: MEDICAID

## 2019-05-16 ENCOUNTER — HOSPITAL ENCOUNTER (OUTPATIENT)
Dept: GENERAL RADIOLOGY | Age: 64
Setting detail: OUTPATIENT SURGERY
Discharge: HOME | End: 2019-05-18
Attending: PAIN MEDICINE
Payer: MEDICAID

## 2019-05-16 VITALS
WEIGHT: 134 LBS | OXYGEN SATURATION: 100 % | HEIGHT: 62 IN | SYSTOLIC BLOOD PRESSURE: 128 MMHG | RESPIRATION RATE: 18 BRPM | TEMPERATURE: 97.8 F | DIASTOLIC BLOOD PRESSURE: 88 MMHG | BODY MASS INDEX: 24.66 KG/M2 | HEART RATE: 73 BPM

## 2019-05-16 VITALS — OXYGEN SATURATION: 100 % | SYSTOLIC BLOOD PRESSURE: 87 MMHG | DIASTOLIC BLOOD PRESSURE: 63 MMHG

## 2019-05-16 DIAGNOSIS — R52 PAIN MANAGEMENT: ICD-10-CM

## 2019-05-16 PROCEDURE — 3209999900 FLUORO FOR SURGICAL PROCEDURES

## 2019-05-16 PROCEDURE — 7100000011 HC PHASE II RECOVERY - ADDTL 15 MIN: Performed by: PAIN MEDICINE

## 2019-05-16 PROCEDURE — 3700000001 HC ADD 15 MINUTES (ANESTHESIA): Performed by: PAIN MEDICINE

## 2019-05-16 PROCEDURE — 3600000002 HC SURGERY LEVEL 2 BASE: Performed by: PAIN MEDICINE

## 2019-05-16 PROCEDURE — 6360000002 HC RX W HCPCS: Performed by: NURSE ANESTHETIST, CERTIFIED REGISTERED

## 2019-05-16 PROCEDURE — 62323 NJX INTERLAMINAR LMBR/SAC: CPT | Performed by: PAIN MEDICINE

## 2019-05-16 PROCEDURE — 2500000003 HC RX 250 WO HCPCS: Performed by: PAIN MEDICINE

## 2019-05-16 PROCEDURE — 6360000002 HC RX W HCPCS: Performed by: PAIN MEDICINE

## 2019-05-16 PROCEDURE — 3600000012 HC SURGERY LEVEL 2 ADDTL 15MIN: Performed by: PAIN MEDICINE

## 2019-05-16 PROCEDURE — 7100000010 HC PHASE II RECOVERY - FIRST 15 MIN: Performed by: PAIN MEDICINE

## 2019-05-16 PROCEDURE — 2709999900 HC NON-CHARGEABLE SUPPLY: Performed by: PAIN MEDICINE

## 2019-05-16 PROCEDURE — 3700000000 HC ANESTHESIA ATTENDED CARE: Performed by: PAIN MEDICINE

## 2019-05-16 PROCEDURE — 2580000003 HC RX 258: Performed by: ANESTHESIOLOGY

## 2019-05-16 PROCEDURE — 6360000004 HC RX CONTRAST MEDICATION: Performed by: PAIN MEDICINE

## 2019-05-16 PROCEDURE — 2500000003 HC RX 250 WO HCPCS: Performed by: NURSE ANESTHETIST, CERTIFIED REGISTERED

## 2019-05-16 RX ORDER — METHYLPREDNISOLONE ACETATE 40 MG/ML
INJECTION, SUSPENSION INTRA-ARTICULAR; INTRALESIONAL; INTRAMUSCULAR; SOFT TISSUE PRN
Status: DISCONTINUED | OUTPATIENT
Start: 2019-05-16 | End: 2019-05-16 | Stop reason: ALTCHOICE

## 2019-05-16 RX ORDER — LIDOCAINE HYDROCHLORIDE 20 MG/ML
INJECTION, SOLUTION EPIDURAL; INFILTRATION; INTRACAUDAL; PERINEURAL PRN
Status: DISCONTINUED | OUTPATIENT
Start: 2019-05-16 | End: 2019-05-16 | Stop reason: SDUPTHER

## 2019-05-16 RX ORDER — FENTANYL CITRATE 50 UG/ML
INJECTION, SOLUTION INTRAMUSCULAR; INTRAVENOUS PRN
Status: DISCONTINUED | OUTPATIENT
Start: 2019-05-16 | End: 2019-05-16 | Stop reason: SDUPTHER

## 2019-05-16 RX ORDER — PROPOFOL 10 MG/ML
INJECTION, EMULSION INTRAVENOUS PRN
Status: DISCONTINUED | OUTPATIENT
Start: 2019-05-16 | End: 2019-05-16 | Stop reason: SDUPTHER

## 2019-05-16 RX ORDER — MIDAZOLAM HYDROCHLORIDE 1 MG/ML
INJECTION INTRAMUSCULAR; INTRAVENOUS PRN
Status: DISCONTINUED | OUTPATIENT
Start: 2019-05-16 | End: 2019-05-16 | Stop reason: SDUPTHER

## 2019-05-16 RX ORDER — SODIUM CHLORIDE 9 MG/ML
INJECTION, SOLUTION INTRAVENOUS CONTINUOUS
Status: DISCONTINUED | OUTPATIENT
Start: 2019-05-16 | End: 2019-05-16 | Stop reason: HOSPADM

## 2019-05-16 RX ORDER — LIDOCAINE HYDROCHLORIDE 5 MG/ML
INJECTION, SOLUTION INFILTRATION; INTRAVENOUS PRN
Status: DISCONTINUED | OUTPATIENT
Start: 2019-05-16 | End: 2019-05-16 | Stop reason: ALTCHOICE

## 2019-05-16 RX ADMIN — FENTANYL CITRATE 50 MCG: 50 INJECTION, SOLUTION INTRAMUSCULAR; INTRAVENOUS at 10:02

## 2019-05-16 RX ADMIN — MIDAZOLAM HYDROCHLORIDE 1 MG: 1 INJECTION, SOLUTION INTRAMUSCULAR; INTRAVENOUS at 10:02

## 2019-05-16 RX ADMIN — LIDOCAINE HYDROCHLORIDE 60 MG: 20 INJECTION, SOLUTION EPIDURAL; INFILTRATION; INTRACAUDAL; PERINEURAL at 10:02

## 2019-05-16 RX ADMIN — PROPOFOL 30 MG: 10 INJECTION, EMULSION INTRAVENOUS at 10:12

## 2019-05-16 RX ADMIN — LIDOCAINE HYDROCHLORIDE 40 MG: 20 INJECTION, SOLUTION EPIDURAL; INFILTRATION; INTRACAUDAL; PERINEURAL at 10:08

## 2019-05-16 RX ADMIN — PROPOFOL 30 MG: 10 INJECTION, EMULSION INTRAVENOUS at 10:08

## 2019-05-16 RX ADMIN — MIDAZOLAM HYDROCHLORIDE 1 MG: 1 INJECTION, SOLUTION INTRAMUSCULAR; INTRAVENOUS at 10:06

## 2019-05-16 RX ADMIN — SODIUM CHLORIDE: 9 INJECTION, SOLUTION INTRAVENOUS at 08:50

## 2019-05-16 RX ADMIN — FENTANYL CITRATE 50 MCG: 50 INJECTION, SOLUTION INTRAMUSCULAR; INTRAVENOUS at 10:06

## 2019-05-16 ASSESSMENT — PULMONARY FUNCTION TESTS
PIF_VALUE: 0
PIF_VALUE: 1
PIF_VALUE: 1
PIF_VALUE: 0
PIF_VALUE: 1
PIF_VALUE: 0
PIF_VALUE: 0
PIF_VALUE: 1
PIF_VALUE: 0

## 2019-05-16 ASSESSMENT — PAIN DESCRIPTION - PAIN TYPE
TYPE: SURGICAL PAIN
TYPE: SURGICAL PAIN

## 2019-05-16 ASSESSMENT — PAIN SCALES - GENERAL
PAINLEVEL_OUTOF10: 0
PAINLEVEL_OUTOF10: 0

## 2019-05-16 ASSESSMENT — PAIN - FUNCTIONAL ASSESSMENT: PAIN_FUNCTIONAL_ASSESSMENT: 0-10

## 2019-05-16 ASSESSMENT — LIFESTYLE VARIABLES: SMOKING_STATUS: 1

## 2019-05-16 ASSESSMENT — PAIN DESCRIPTION - DESCRIPTORS: DESCRIPTORS: SORE

## 2019-05-16 NOTE — ANESTHESIA PRE PROCEDURE
Provider Last Rate Last Dose    0.9 % sodium chloride infusion   Intravenous Continuous Violetta Schaefer DO 75 mL/hr at 05/16/19 0850         Allergies:     Allergies   Allergen Reactions    Adhesive Tape Rash     bruising    Nsaids      Irritates IBS    Penicillins Hives       Problem List:    Patient Active Problem List   Diagnosis Code    DDD (degenerative disc disease), cervical M50.30    Cervical radiculopathy M54.12    Chronic pain syndrome G89.4    Essential hypertension I10    Mixed hyperlipidemia E78.2    Neuropathy G62.9    Lumbar radiculopathy M54.16    Lumbar disc disorder M51.9    Chronic bilateral low back pain with bilateral sciatica M54.42, M54.41, G89.29    PRATIK (generalized anxiety disorder) F41.1    Allergy T78.40XA    Allergic rhinitis due to animal hair and dander J30.81    Dental caries K02.9       Past Medical History:        Diagnosis Date    Abnormal Pap smear of cervix     Asthma     Depression     Fibromyalgia     GERD (gastroesophageal reflux disease)     Hepatitis C 2016    treated     Hyperlipidemia     Hypertension     Overactive bladder        Past Surgical History:        Procedure Laterality Date    ANESTHESIA NERVE BLOCK N/A 3/26/2019    L4-5 EPIDURAL STEROID INJECTION performed by Selvin Hamlin DO at [address]   03/26/2019    with sedation    OVARY REMOVAL      1981    SHOULDER ARTHROSCOPY Right        Social History:    Social History     Tobacco Use    Smoking status: Current Every Day Smoker     Packs/day: 0.25     Years: 45.00     Pack years: 11.25     Types: Cigarettes    Smokeless tobacco: Never Used   Substance Use Topics    Alcohol use: Yes     Comment: socially                                Ready to quit: Not Answered  Counseling given: Not Answered      Vital Signs (Current):   Vitals:    05/10/19 1108 05/16/19 0830   BP:  132/87   Pulse:  59   Resp:  16   Temp:  97.7 °F (36.5 °C)   TempSrc:  Temporal   SpO2:  99%   Weight: 134 lb (60.8 kg) 134 lb (60.8 kg)   Height: 5' 2\" (1.575 m) 5' 2\" (1.575 m)                                              BP Readings from Last 3 Encounters:   05/16/19 132/87   05/10/19 122/82   05/09/19 110/78       NPO Status: Time of last liquid consumption: 2000                        Time of last solid consumption: 2000                        Date of last liquid consumption: 05/15/19                        Date of last solid food consumption: 05/15/19    BMI:   Wt Readings from Last 3 Encounters:   05/16/19 134 lb (60.8 kg)   05/10/19 134 lb (60.8 kg)   05/09/19 132 lb 14.4 oz (60.3 kg)     Body mass index is 24.51 kg/m². CBC:   Lab Results   Component Value Date    WBC 5.7 03/15/2019    RBC 4.34 03/15/2019    HGB 13.1 03/15/2019    HCT 38.7 03/15/2019    MCV 89.2 03/15/2019    RDW 13.5 03/15/2019     03/15/2019       CMP:   Lab Results   Component Value Date     03/01/2019    K 3.8 03/01/2019     03/01/2019    CO2 28 03/01/2019    BUN 8 03/01/2019    CREATININE 0.7 03/01/2019    GFRAA >60 03/01/2019    LABGLOM >60 03/01/2019    GLUCOSE 139 03/01/2019    PROT 8.5 01/24/2019    CALCIUM 8.9 03/01/2019    BILITOT 0.6 01/24/2019    ALKPHOS 84 01/24/2019    AST 16 01/24/2019    ALT 11 01/24/2019       POC Tests: No results for input(s): POCGLU, POCNA, POCK, POCCL, POCBUN, POCHEMO, POCHCT in the last 72 hours.     Coags: No results found for: PROTIME, INR, APTT    HCG (If Applicable): No results found for: PREGTESTUR, PREGSERUM, HCG, HCGQUANT     ABGs: No results found for: PHART, PO2ART, HOR7ZNH, KRN2QZA, BEART, H7KNAAED     Type & Screen (If Applicable):  No results found for: LABABO, [address]    Anesthesia Evaluation  Patient summary reviewed  Airway: Mallampati: III  TM distance: >3 FB   Neck ROM: full  Mouth opening: > = 3 FB Dental: normal exam         Pulmonary: breath sounds clear to auscultation  (+) asthma: current smoker (11.25 pk yrs)                           Cardiovascular:    (+) hypertension:, hyperlipidemia        Rhythm: regular             Beta Blocker:  Not on Beta Blocker      ROS comment: EKG=Normal sinus rhythm  Normal ECG  When compared with ECG of 22-JAN-2019 18:43,  Significant changes have occurred  Confirmed by Julienne James ([id number]) on 3/1/2019 6:34:57 PM     Neuro/Psych:   (+) neuromuscular disease:, psychiatric history:depression/anxiety             GI/Hepatic/Renal:   (+) GERD:, hepatitis: C, liver disease:,          ROS comment: IBS. Endo/Other: Negative Endo/Other ROS                    Abdominal:           Vascular: negative vascular ROS. Anesthesia Plan      MAC     ASA 3       Induction: intravenous. Anesthetic plan and risks discussed with patient. Plan discussed with CRNA.           [address]ges DO   May 16, 2019  9:00 AM

## 2019-05-16 NOTE — H&P
Not on file     Forced sexual activity: Not on file   Other Topics Concern    Not on file   Social History Narrative    Not on file       Family History   Problem Relation Age of Onset    Diabetes Mother     Cancer Mother     Alcohol Abuse Father          REVIEW OF SYSTEMS:    CONSTITUTIONAL:  negative for  fevers, chills, sweats and fatigue    RESPIRATORY:  negative for  dry cough, cough with sputum, dyspnea, wheezing and chest pain    CARDIOVASCULAR:  negative for chest pain, dyspnea, palpitations, syncope    GASTROINTESTINAL:  negative for nausea, vomiting, change in bowel habits, diarrhea, constipation and abdominal pain    MUSCULOSKELETAL: negative for muscle weakness    SKIN: negative for itching or rashes. BEHAVIOR/PSYCH:  negative for poor appetite, increased appetite, decreased sleep and poor concentration    All other systems negative      PHYSICAL EXAM:    VITALS:  Ht 5' 2\" (1.575 m)   Wt 134 lb (60.8 kg)   BMI 24.51 kg/m²     CONSTITUTIONAL:  awake, alert, cooperative, no apparent distress, and appears stated age    EYES: PERRLA, EOMI    LUNGS:  No increased work of breathing, no audible wheezing    CARDIOVASCULAR:  regular rate and rhythm    ABDOMEN:  Soft non tender non distended     EXTREMITIES: no signs of clubbing or cyanosis. MUSCULOSKELETAL: negative for flaccid muscle tone or spastic movements. SKIN: gross examination reveals no signs of rashes, or diaphoresis. NEURO: Cranial nerves II-XII grossly intact. No signs of agitated mood.        Assessment/Plan:    LBP and BLE pain for L4-5 RICARDO #2

## 2019-05-16 NOTE — OP NOTE
2019    Patient: Luis Aviles  :  1955  Age:  59 y.o. Sex:  female     PRE-OPERATIVE DIAGNOSIS: Lumbar disc displacement, lumbar radiculopathy. POST-OPERATIVE DIAGNOSIS: Same. PROCEDURE: Fluoroscopic guided therapeutic lumbar epidural steroid injection at the L4-5 level (# 2). SURGEON: KALYANI Lyons. ANESTHESIA: MAC    ESTIMATED BLOOD LOSS: None.  __________________________________________________    BRIEF HISTORY:  Luis Aviles comes in today for second lumbar epidural injection at L4-5 level. The potential complications of this procedure were discussed with her again today. She has elected to undergo the aforementioned procedure. Vimal complete History & Physical examination were reviewed in depth, a copy of which is in the chart. DESCRIPTION OF PROCEDURE:    After confirming written and informed consent, a time-out was performed and Vimal name and date of birth, the procedure to be performed as well as the plan for the location of the needle insertion were confirmed. The patient was brought into the procedure room and placed in the prone position on the fluoroscopy table. A pillow was placed under the patient's lower abdomen/upper pelvis to increase lumbar interlaminar space. Standard monitors were placed, and vital signs were observed throughout the procedure. The area of the lumbar spine was prepped with chloraprep and draped in a sterile manner. The L4-5 interspace was identified and marked under AP fluoroscopy. The skin and subcutaneous tissues at the above level were anesthestized with 0.5% lidocaine. With intermittent fluoroscopy, an # 18 gauge 3 1/2 tuohy epidural needle was inserted and directed toward the interlaminar space. The needle was slowly advanced using loss of resistance technique and 5 cc glass syringe until the tip of the epidural needle has passed through the ligamentum flavum and entered the epidural space.  AP and lateral fluoroscopic imaging was performed to verify that the epidural needle was properly placed. Negative aspiration of blood and CSF was confirmed. Two ml of Isovue-M 200 was used for confirmation of even epidural spread under both live lateral and live AP fluoroscopy. After negative aspiration, a solution of 0.5 % Lidocaine 3 ml and 40 mg DepoMedrol was easily injected. The needle was gently removed intact . The patient's back was cleaned and a Band-Aid was placed over the needle insertion point. Disposition the patient tolerated the procedure well and there were no complications . Vital signs remained stable throughout the procedure. The patient was escorted to the recovery area where they remained until discharge and written discharge instructions for the procedure were given. Plan: Elizabeth Gutierrez will return to our pain management center as scheduled.      Darshana Velasquez, DO

## 2019-05-22 ENCOUNTER — OFFICE VISIT (OUTPATIENT)
Dept: FAMILY MEDICINE CLINIC | Age: 64
End: 2019-05-22
Payer: MEDICAID

## 2019-05-22 VITALS
HEIGHT: 62 IN | TEMPERATURE: 98.4 F | OXYGEN SATURATION: 100 % | DIASTOLIC BLOOD PRESSURE: 90 MMHG | BODY MASS INDEX: 23.26 KG/M2 | RESPIRATION RATE: 16 BRPM | HEART RATE: 117 BPM | WEIGHT: 126.4 LBS | SYSTOLIC BLOOD PRESSURE: 146 MMHG

## 2019-05-22 DIAGNOSIS — R11.0 NAUSEA: Primary | ICD-10-CM

## 2019-05-22 PROCEDURE — 99213 OFFICE O/P EST LOW 20 MIN: CPT | Performed by: NURSE PRACTITIONER

## 2019-05-22 RX ORDER — ONDANSETRON 4 MG/1
4 TABLET, FILM COATED ORAL ONCE
Status: COMPLETED | OUTPATIENT
Start: 2019-05-22 | End: 2019-05-22

## 2019-05-22 RX ORDER — ONDANSETRON 4 MG/1
4 TABLET, ORALLY DISINTEGRATING ORAL EVERY 8 HOURS PRN
Qty: 15 TABLET | Refills: 0 | Status: SHIPPED | OUTPATIENT
Start: 2019-05-22 | End: 2019-05-26 | Stop reason: SDUPTHER

## 2019-05-22 RX ADMIN — ONDANSETRON 4 MG: 4 TABLET, FILM COATED ORAL at 12:32

## 2019-05-22 ASSESSMENT — ENCOUNTER SYMPTOMS
VOMITING: 1
PHOTOPHOBIA: 0
EYE DISCHARGE: 0
BLOOD IN STOOL: 0
NAUSEA: 1
DIARRHEA: 0
ANAL BLEEDING: 0
ABDOMINAL PAIN: 1
EYE REDNESS: 0
WHEEZING: 0
SHORTNESS OF BREATH: 0
ABDOMINAL DISTENTION: 0
EYE ITCHING: 0
CONSTIPATION: 0
COLOR CHANGE: 0
EYE PAIN: 0
STRIDOR: 0
COUGH: 0

## 2019-05-22 NOTE — PROGRESS NOTES
Tia Lucero is a 59 y.o. female who presents today for   Chief Complaint   Patient presents with    Nausea         HPI    Pt presents today with c/o nausea that started last week, pt did have a few episodes of vomiting however this has resolved, pt does have a h/o GERD and IBS, pt is currently on Protonix daily and is compliant with medication, pt does c/o intermittent cramping sensation to RUQ w/o radiation. Pt also had an episode of diarrhea last week but this also has resolved. Pt is tolerating PO well, she does try to follow GERD/IBS diet. Pt denies any urinary issues, she is afebrile      PMFSH:  Patient's past medical, surgical, social and/or family history reviewed, updated in chart, and are non-contributory (unless otherwise stated). Medications and allergies also reviewed and updated in chart. Review of Systems  Review of Systems   Constitutional: Positive for appetite change. Negative for activity change, chills, diaphoresis and fever. Eyes: Negative for photophobia, pain, discharge, redness, itching and visual disturbance. Respiratory: Negative for cough, shortness of breath, wheezing and stridor. Cardiovascular: Negative for chest pain, palpitations and leg swelling. Gastrointestinal: Positive for abdominal pain (intermittent cramping), nausea and vomiting. Negative for abdominal distention, anal bleeding, blood in stool, constipation and diarrhea. Genitourinary: Negative for decreased urine volume, difficulty urinating, dysuria, enuresis, flank pain, frequency, hematuria, urgency, vaginal bleeding and vaginal discharge. Skin: Negative for color change, pallor and rash.        Physical Exam:    VS:  BP (!) 146/90   Pulse 117   Temp 98.4 °F (36.9 °C) (Oral)   Resp 16   Ht 5' 2\" (1.575 m)   Wt 126 lb 6.4 oz (57.3 kg)   SpO2 100%   BMI 23.12 kg/m²   LAST WEIGHT:  Wt Readings from Last 3 Encounters:   05/22/19 126 lb 6.4 oz (57.3 kg)   05/16/19 134 lb (60.8 kg)   05/10/19 134 lb (60.8 kg)     Physical Exam   Constitutional: She appears well-developed and well-nourished. No distress. Eyes: Pupils are equal, round, and reactive to light. Conjunctivae and EOM are normal. Right eye exhibits no discharge. Left eye exhibits no discharge. Neck: Normal range of motion. Neck supple. No JVD present. Cardiovascular: Normal rate, regular rhythm, normal heart sounds and intact distal pulses. No peripheral edema   Pulmonary/Chest: Effort normal and breath sounds normal. No stridor. No respiratory distress. She has no wheezes. She has no rales. She exhibits no tenderness. Abdominal:   abd soft and non distended, BS x 4. No masses or organomegaly, mild tenderness present to RUQ, no rebound tenderness or guarding, no suprapubic or CVA tenderness   Lymphadenopathy:     She has no cervical adenopathy. Skin: Skin is warm and dry. No rash noted. She is not diaphoretic. No erythema. No pallor. Nursing note and vitals reviewed. Assessment / Plan: [facility] [place] [facility] today for nausea. Diagnoses and all orders for this visit:    Nausea  Advised on bland diet for 24-48 hrs  Advised on possible gallbladder issues  Continue Protonix  Keep scheduled appointment with PCP next week for further evaluation  -     ondansetron (ZOFRAN) tablet 4 mg  -     ondansetron (ZOFRAN ODT) 4 MG disintegrating tablet; Take 1 tablet by mouth every 8 hours as needed for Nausea or Vomiting         Call or go to ED immediately if symptoms worsen or persist.    Return if symptoms worsen or fail to improve. , or sooner if necessary. Educational materials and/or home exercises printed for patient's review and were included in patient instructions on his/her After Visit Summary and given to patient at the end of visit. Counseled regarding above diagnosis, including possible risks and complications,  especially if left uncontrolled.     Counseled regarding the possible side effects, risks, benefits and alternatives to treatment; patient and/or guardian verbalizes understanding, agrees, feels comfortable with and wishes to proceed with above treatment plan. Advised patient to call with any new medication issues, and read all Rx info from pharmacy to assure aware of all possible risks and side effects of medication before taking. Reviewed age and gender appropriate health screening exams and vaccinations. Advised patient regarding importance of keeping up with recommended health maintenance and to schedule as soon as possible if overdue, as this is important in assessing for undiagnosed pathology, especially cancer, as well as protecting against potentially harmful/life threatening disease. Patient and/or guardian verbalizes understanding and agrees with above counseling, assessment and plan. All questions answered.     Alon Stout, APRN - CNP

## 2019-05-24 DIAGNOSIS — I10 ESSENTIAL HYPERTENSION: ICD-10-CM

## 2019-05-24 RX ORDER — AMLODIPINE BESYLATE 10 MG/1
10 TABLET ORAL DAILY
Qty: 30 TABLET | Refills: 3 | Status: SHIPPED | OUTPATIENT
Start: 2019-05-24 | End: 2019-12-22 | Stop reason: SDUPTHER

## 2019-05-26 ENCOUNTER — HOSPITAL ENCOUNTER (EMERGENCY)
Age: 64
Discharge: HOME | End: 2019-05-26
Attending: EMERGENCY MEDICINE
Payer: MEDICAID

## 2019-05-26 ENCOUNTER — APPOINTMENT (OUTPATIENT)
Dept: CT IMAGING | Age: 64
End: 2019-05-26
Payer: MEDICAID

## 2019-05-26 VITALS
RESPIRATION RATE: 18 BRPM | HEIGHT: 62 IN | DIASTOLIC BLOOD PRESSURE: 85 MMHG | TEMPERATURE: 98.2 F | OXYGEN SATURATION: 99 % | SYSTOLIC BLOOD PRESSURE: 127 MMHG | WEIGHT: 126 LBS | BODY MASS INDEX: 23.19 KG/M2 | HEART RATE: 63 BPM

## 2019-05-26 DIAGNOSIS — K21.9 GASTROESOPHAGEAL REFLUX DISEASE WITHOUT ESOPHAGITIS: ICD-10-CM

## 2019-05-26 DIAGNOSIS — R11.0 NAUSEA: ICD-10-CM

## 2019-05-26 DIAGNOSIS — R10.10 PAIN OF UPPER ABDOMEN: Primary | ICD-10-CM

## 2019-05-26 LAB
ALBUMIN SERPL-MCNC: 4.3 G/DL (ref 3.5–5.2)
ALP BLD-CCNC: 94 U/L (ref 35–104)
ALT SERPL-CCNC: 15 U/L (ref 0–32)
ANION GAP SERPL CALCULATED.3IONS-SCNC: 13 MMOL/L (ref 7–16)
AST SERPL-CCNC: 22 U/L (ref 0–31)
BACTERIA: NORMAL /HPF
BILIRUB SERPL-MCNC: 0.5 MG/DL (ref 0–1.2)
BILIRUBIN URINE: NEGATIVE
BLOOD, URINE: NEGATIVE
BUN BLDV-MCNC: 11 MG/DL (ref 8–23)
CALCIUM SERPL-MCNC: 9.7 MG/DL (ref 8.6–10.2)
CHLORIDE BLD-SCNC: 97 MMOL/L (ref 98–107)
CLARITY: CLEAR
CO2: 26 MMOL/L (ref 22–29)
COLOR: YELLOW
CREAT SERPL-MCNC: 0.6 MG/DL (ref 0.5–1)
EKG ATRIAL RATE: 64 BPM
EKG P AXIS: 75 DEGREES
EKG P-R INTERVAL: 186 MS
EKG Q-T INTERVAL: 434 MS
EKG QRS DURATION: 86 MS
EKG QTC CALCULATION (BAZETT): 447 MS
EKG R AXIS: 28 DEGREES
EKG T AXIS: 58 DEGREES
EKG VENTRICULAR RATE: 64 BPM
GFR AFRICAN AMERICAN: >60
GFR NON-AFRICAN AMERICAN: >60 ML/MIN/1.73
GLUCOSE BLD-MCNC: 338 MG/DL (ref 74–99)
GLUCOSE URINE: >=1000 MG/DL
HCT VFR BLD CALC: 40.7 % (ref 34–48)
HEMOGLOBIN: 14 G/DL (ref 11.5–15.5)
KETONES, URINE: NEGATIVE MG/DL
LACTIC ACID: 1.3 MMOL/L (ref 0.5–2.2)
LEUKOCYTE ESTERASE, URINE: ABNORMAL
LIPASE: 72 U/L (ref 13–60)
MCH RBC QN AUTO: 30.2 PG (ref 26–35)
MCHC RBC AUTO-ENTMCNC: 34.4 % (ref 32–34.5)
MCV RBC AUTO: 87.7 FL (ref 80–99.9)
NITRITE, URINE: NEGATIVE
PDW BLD-RTO: 13.7 FL (ref 11.5–15)
PH UA: 5 (ref 5–9)
PLATELET # BLD: 201 E9/L (ref 130–450)
PMV BLD AUTO: 11.3 FL (ref 7–12)
POTASSIUM SERPL-SCNC: 4.2 MMOL/L (ref 3.5–5)
PROTEIN UA: NEGATIVE MG/DL
RBC # BLD: 4.64 E12/L (ref 3.5–5.5)
RBC UA: NORMAL /HPF (ref 0–2)
RENAL EPITHELIAL, UA: NORMAL /HPF
SODIUM BLD-SCNC: 136 MMOL/L (ref 132–146)
SPECIFIC GRAVITY UA: <=1.005 (ref 1–1.03)
TOTAL PROTEIN: 7.3 G/DL (ref 6.4–8.3)
TROPONIN: <0.01 NG/ML (ref 0–0.03)
UROBILINOGEN, URINE: 0.2 E.U./DL
WBC # BLD: 5.7 E9/L (ref 4.5–11.5)
WBC UA: NORMAL /HPF (ref 0–5)

## 2019-05-26 PROCEDURE — 93005 ELECTROCARDIOGRAM TRACING: CPT | Performed by: EMERGENCY MEDICINE

## 2019-05-26 PROCEDURE — 36415 COLL VENOUS BLD VENIPUNCTURE: CPT

## 2019-05-26 PROCEDURE — 99284 EMERGENCY DEPT VISIT MOD MDM: CPT

## 2019-05-26 PROCEDURE — 96374 THER/PROPH/DIAG INJ IV PUSH: CPT

## 2019-05-26 PROCEDURE — C9113 INJ PANTOPRAZOLE SODIUM, VIA: HCPCS | Performed by: EMERGENCY MEDICINE

## 2019-05-26 PROCEDURE — 81001 URINALYSIS AUTO W/SCOPE: CPT

## 2019-05-26 PROCEDURE — 6360000002 HC RX W HCPCS: Performed by: EMERGENCY MEDICINE

## 2019-05-26 PROCEDURE — 6370000000 HC RX 637 (ALT 250 FOR IP): Performed by: EMERGENCY MEDICINE

## 2019-05-26 PROCEDURE — 2580000003 HC RX 258: Performed by: EMERGENCY MEDICINE

## 2019-05-26 PROCEDURE — 83690 ASSAY OF LIPASE: CPT

## 2019-05-26 PROCEDURE — 84484 ASSAY OF TROPONIN QUANT: CPT

## 2019-05-26 PROCEDURE — 96375 TX/PRO/DX INJ NEW DRUG ADDON: CPT

## 2019-05-26 PROCEDURE — 80053 COMPREHEN METABOLIC PANEL: CPT

## 2019-05-26 PROCEDURE — 74177 CT ABD & PELVIS W/CONTRAST: CPT

## 2019-05-26 PROCEDURE — 6360000004 HC RX CONTRAST MEDICATION: Performed by: RADIOLOGY

## 2019-05-26 PROCEDURE — 93010 ELECTROCARDIOGRAM REPORT: CPT | Performed by: INTERNAL MEDICINE

## 2019-05-26 PROCEDURE — 85027 COMPLETE CBC AUTOMATED: CPT

## 2019-05-26 PROCEDURE — 83605 ASSAY OF LACTIC ACID: CPT

## 2019-05-26 RX ORDER — ONDANSETRON 2 MG/ML
4 INJECTION INTRAMUSCULAR; INTRAVENOUS ONCE
Status: COMPLETED | OUTPATIENT
Start: 2019-05-26 | End: 2019-05-26

## 2019-05-26 RX ORDER — 0.9 % SODIUM CHLORIDE 0.9 %
1000 INTRAVENOUS SOLUTION INTRAVENOUS ONCE
Status: COMPLETED | OUTPATIENT
Start: 2019-05-26 | End: 2019-05-26

## 2019-05-26 RX ORDER — ONDANSETRON 4 MG/1
4 TABLET, ORALLY DISINTEGRATING ORAL EVERY 8 HOURS PRN
Qty: 15 TABLET | Refills: 0 | Status: SHIPPED | OUTPATIENT
Start: 2019-05-26 | End: 2019-10-25 | Stop reason: ALTCHOICE

## 2019-05-26 RX ORDER — PANTOPRAZOLE SODIUM 40 MG/10ML
40 INJECTION, POWDER, LYOPHILIZED, FOR SOLUTION INTRAVENOUS ONCE
Status: COMPLETED | OUTPATIENT
Start: 2019-05-26 | End: 2019-05-26

## 2019-05-26 RX ORDER — PANTOPRAZOLE SODIUM 20 MG/1
20 TABLET, DELAYED RELEASE ORAL DAILY
Qty: 30 TABLET | Refills: 2 | Status: SHIPPED | OUTPATIENT
Start: 2019-05-26 | End: 2022-03-22

## 2019-05-26 RX ADMIN — ONDANSETRON HYDROCHLORIDE 4 MG: 2 SOLUTION INTRAMUSCULAR; INTRAVENOUS at 02:34

## 2019-05-26 RX ADMIN — LIDOCAINE HYDROCHLORIDE: 20 SOLUTION ORAL; TOPICAL at 07:41

## 2019-05-26 RX ADMIN — SODIUM CHLORIDE 1000 ML: 9 INJECTION, SOLUTION INTRAVENOUS at 03:52

## 2019-05-26 RX ADMIN — PANTOPRAZOLE SODIUM 40 MG: 40 INJECTION, POWDER, FOR SOLUTION INTRAVENOUS at 02:34

## 2019-05-26 RX ADMIN — IOPAMIDOL 110 ML: 755 INJECTION, SOLUTION INTRAVENOUS at 05:18

## 2019-05-26 ASSESSMENT — PAIN DESCRIPTION - PAIN TYPE: TYPE: ACUTE PAIN

## 2019-05-26 ASSESSMENT — PAIN DESCRIPTION - LOCATION: LOCATION: ABDOMEN

## 2019-05-26 ASSESSMENT — PAIN SCALES - GENERAL: PAINLEVEL_OUTOF10: 8

## 2019-05-26 ASSESSMENT — PAIN DESCRIPTION - ORIENTATION: ORIENTATION: RIGHT

## 2019-05-26 ASSESSMENT — PAIN DESCRIPTION - DESCRIPTORS: DESCRIPTORS: ACHING

## 2019-05-26 NOTE — ED PROVIDER NOTES
[facility]      ---------------------------------------------------PHYSICAL EXAM--------------------------------------    Constitutional/General: Alert and oriented x3, Mild distress  Head: Normocephalic and atraumatic  Eyes: PERRL, EOMI, conjunctiva normal, sclera non icteric  Mouth: Oropharynx clear  Neck: Supple  Respiratory: Lungs clear to auscultation bilaterally, no wheezes, rales, or rhonchi. Not in respiratory distress  Cardiovascular:  Regular rate. Regular rhythm. No murmurs, gallops, or rubs. 2+ distal pulses  Chest: No chest wall tenderness  GI:  Abdomen Soft, Non distended. Tender to epigastric region and RUQ. +BS. No rebound, guarding, or rigidity. No pulsatile masses. Musculoskeletal: Moves all extremities x 4. Warm and well perfused, no clubbing, cyanosis, or edema. Integument: skin warm and dry. No rashes. Neurologic: GCS 15, no focal deficits, symmetric strength 5/5 in the upper and lower extremities bilaterally  Psychiatric: Normal Affect    -------------------------------------------------- RESULTS -------------------------------------------------  I have personally reviewed all laboratory and imaging results for this patient. Results are listed below.      LABS:  Results for orders placed or performed during the hospital encounter of 05/26/19   CBC   Result Value Ref Range    WBC 5.7 4.5 - 11.5 E9/L    RBC 4.64 3.50 - 5.50 E12/L    Hemoglobin 14.0 11.5 - 15.5 g/dL    Hematocrit 40.7 34.0 - 48.0 %    MCV 87.7 80.0 - 99.9 fL    MCH 30.2 26.0 - 35.0 pg    MCHC 34.4 32.0 - 34.5 %    RDW 13.7 11.5 - 15.0 fL    Platelets 493 972 - 850 E9/L    MPV 11.3 7.0 - 12.0 fL   Comprehensive Metabolic Panel   Result Value Ref Range    Sodium 136 132 - 146 mmol/L    Potassium 4.2 3.5 - 5.0 mmol/L    Chloride 97 (L) 98 - 107 mmol/L    CO2 26 22 - 29 mmol/L    Anion Gap 13 7 - 16 mmol/L    Glucose 338 (H) 74 - 99 mg/dL    BUN 11 8 - 23 mg/dL    CREATININE 0.6 0.5 - 1.0 mg/dL    GFR Non-African American >60 >=60 mL/min/1.73    GFR African American >60     Calcium 9.7 8.6 - 10.2 mg/dL    Total Protein 7.3 6.4 - 8.3 g/dL    Alb 4.3 3.5 - 5.2 g/dL    Total Bilirubin 0.5 0.0 - 1.2 mg/dL    Alkaline Phosphatase 94 35 - 104 U/L    ALT 15 0 - 32 U/L    AST 22 0 - 31 U/L   Lipase   Result Value Ref Range    Lipase 72 (H) 13 - 60 U/L   Troponin   Result Value Ref Range    Troponin <0.01 0.00 - 0.03 ng/mL   Lactic Acid, Plasma   Result Value Ref Range    Lactic Acid 1.3 0.5 - 2.2 mmol/L   EKG 12 Lead   Result Value Ref Range    Ventricular Rate 64 BPM    Atrial Rate 64 BPM    P-R Interval 186 ms    QRS Duration 86 ms    Q-T Interval 434 ms    QTc Calculation (Bazett) 447 ms    P Axis 75 degrees    R Axis 28 degrees    T Axis 58 degrees       RADIOLOGY:  Interpreted by Radiologist.  CT ABDOMEN PELVIS W IV CONTRAST Additional Contrast? None   Final Result   1. No acute pathologic abnormality. 2. Remainder of findings described as above. COMMENT:     Consistent with the [employer] of Radiology's Incidental Findings    Committee Report (J Am Arnol Radiol 2010): Unless the patient's specific    circumstances suggest otherwise, any liver lesion 0.5 cm or less, any cystic    kidney lesion less than 1.0 cm, and/or any adrenal lesion 1.0 cm or less not    otherwise characterized in this report as possessing suspicious or    indeterminate imaging features is/are highly likely to be benign and do not    require follow-up imaging or biopsy. The      This report has been electronically signed by Jocelyn Reyes MD.          EKG: This EKG is signed and interpreted by me. Rate: 64  Rhythm: Sinus  Interpretation: no acute changes  Comparison: no previous EKG available        ------------------------- NURSING NOTES AND VITALS REVIEWED ---------------------------   The nursing notes within the ED encounter and vital signs as below have been reviewed by myself.   BP (!) 158/101   Pulse 65   Temp 98.2 °F (36.8 °C) (Oral)   Resp 16   Ht 5' 2\" (1.575 m)   Wt 126 lb (57.2 kg)   SpO2 98%   BMI 23.05 kg/m²   Oxygen Saturation Interpretation: Normal    The patients available past medical records and past encounters were reviewed. ------------------------------ ED COURSE/MEDICAL DECISION MAKING----------------------  Medications   aluminum & magnesium hydroxide-simethicone (MAALOX) 30 mL, lidocaine viscous hcl (XYLOCAINE) 5 mL (GI COCKTAIL) (has no administration in time range)   0.9 % sodium chloride bolus (1,000 mLs Intravenous New Bag 5/26/19 0352)   pantoprazole (PROTONIX) injection 40 mg (40 mg Intravenous Given 5/26/19 0234)   ondansetron (ZOFRAN) injection 4 mg (4 mg Intravenous Given 5/26/19 0234)   iopamidol (ISOVUE-370) 76 % injection 110 mL (110 mLs Intravenous Given 5/26/19 0518)       Medical Decision Making: This patient's ED course included: a personal history and physicial examination and re-evaluation prior to disposition    This patient has been closely monitored during their ED course. Re-Evaluations:           Patient rechecked and made aware of findings. Patient still reports having these pains in the past   ordered GI cocktail and plan will be to discharge home  Consultations:                 Counseling: The emergency provider has spoken with the patient and discussed todays results, in addition to providing specific details for the plan of care and counseling regarding the diagnosis and prognosis. Questions are answered at this time and they are agreeable with the plan.     --------------------------------- IMPRESSION AND DISPOSITION ---------------------------------    IMPRESSION  1. Pain of upper abdomen    2. Gastroesophageal reflux disease without esophagitis    3.  Nausea        DISPOSITION  Disposition: Discharge to home  Patient condition is stable    5/26/19, 1:24 AM.    This note is prepared by Gissell Ordoñez, acting as Scribe for Joel Manriquez MD.    Joel Manriquez MD:  The scribe's documentation has been prepared under my direction and personally reviewed by me in its entirety. I confirm that the note above accurately reflects all work, treatment, procedures, and medical decision making performed by me.           Octavio Sagastume MD  05/26/19 [address] Efrain Washington MD  05/26/19 5717

## 2019-05-28 NOTE — PROGRESS NOTES
[facility]  [address] [facility], [address] [place] [address] Roman  [phone]    Follow up Note      Toshia Page     Date of Visit:  05/29/19     CC:  Patient presents for follow up   Chief Complaint   Patient presents with    Pain     stomach  rt leg     HPI:    Pain is better. Change in quality of symptoms:no. Medication side effects:not applicable . Recent diagnostic testing:none. Recent interventional procedures:L4-5 RICARDO #2 with 90% relief    She has not been on anticoagulation medications to include ASA, NSAIDS, Plavix, heparin, LMW heparin and warfarin and has not been on herbal supplements. She is not diabetic.     Imaging:   Lumbar MRI 9/2016 -   Mild degenerative changes with moderate foraminal stenosis at L4-5 on the right and mild to moderate foraminal stenosis on the left       Potential Aberrant Drug-Related Behavior: no      Urine Drug Screening:    OARRS report:    Past Medical History:   Diagnosis Date    Abnormal Pap smear of cervix     Asthma     Depression     Fibromyalgia     GERD (gastroesophageal reflux disease)     Hepatitis C 2016    treated     Hyperlipidemia     Hypertension     Overactive bladder        Past Surgical History:   Procedure Laterality Date    ANESTHESIA NERVE BLOCK N/A 3/26/2019    L4-5 EPIDURAL STEROID INJECTION performed by Spenser Rodríguez DO at [address] N/A 5/16/2019    L4-5 EPIDURAL STEROID INJECTION performed by Spenser Rodríguez DO at [address] [facility]  03/26/2019    with sedation    OVARY REMOVAL      1981    SHOULDER ARTHROSCOPY Right        Prior to Admission medications    Medication Sig Start Date End Date Taking?  Authorizing Provider   pantoprazole (PROTONIX) 20 MG tablet Take 1 tablet by mouth daily 5/26/19  Yes Isaak Salcido MD   ondansetron (ZOFRAN ODT) 4 MG disintegrating tablet Take 1 tablet by mouth every 8 hours as needed for Nausea or Vomiting 5/26/19  Yes Abdulkadir Lizarraga MD   amLODIPine (NORVASC) 10 MG tablet Take 1 tablet by mouth daily 5/24/19  Yes Constantine Jernigan MD   Magic Mouthwash (MIRACLE MOUTHWASH) Swish and spit 5 mLs 4 times daily as needed for Irritation 5/9/19  Yes Adela Will MD   amitriptyline (ELAVIL) 50 MG tablet Take 1 tablet by mouth nightly 4/25/19  Yes Blanca Davidson MD   atorvastatin (LIPITOR) 40 MG tablet Take 1 tablet by mouth daily 4/25/19  Yes Blanca Davidson MD   acetaminophen (APAP EXTRA STRENGTH) 500 MG tablet Take 1 tablet by mouth every 8 hours as needed for Pain 4/25/19  Yes Blanca Davidson MD   EPINEPHrine (EPIPEN) 0.3 MG/0.3ML SOAJ injection Use as directed for allergic reaction 4/25/19  Yes Blanca Davidson MD   sodium chloride (ALTAMIST SPRAY) 0.65 % nasal spray 1 spray by Nasal route as needed for Congestion 3/20/19  Yes Blanca Davidson MD   gabapentin (NEURONTIN) 300 MG capsule Take 300 mg by mouth 3 times daily.    Yes Historical Provider, MD   cyclobenzaprine (FLEXERIL) 10 MG tablet Take 5 mg by mouth 3 times daily    Yes Historical Provider, MD   Biotin 1000 MCG CHEW Take by mouth daily   Yes Historical Provider, MD   ipratropium-albuterol (DUONEB) 0.5-2.5 (3) MG/3ML SOLN nebulizer solution Inhale 3 mLs into the lungs every 4 hours 2/11/19  Yes Samm Pritchard MD   acyclovir (ZOVIRAX) 400 MG tablet Take 400 mg by mouth as needed   Yes Historical Provider, MD       Allergies   Allergen Reactions    Adhesive Tape Rash     bruising    Nsaids      Irritates IBS    Penicillins Hives       Social History     Socioeconomic History    Marital status: Single     Spouse name: Not on file    Number of children: Not on file    Years of education: Not on file    Highest education level: Not on file   Occupational History    Not on file   Social Needs    Financial resource strain: Not on file    Food insecurity:     Worry: Not on file     Inability: Not on file    Transportation needs:     Medical: Not on file Non-medical: Not on file   Tobacco Use    Smoking status: Current Every Day Smoker     Packs/day: 0.25     Years: 45.00     Pack years: 11.25     Types: Cigarettes    Smokeless tobacco: Never Used   Substance and Sexual Activity    Alcohol use: Yes     Comment: socially    Drug use: No    Sexual activity: Not on file   Lifestyle    Physical activity:     Days per week: Not on file     Minutes per session: Not on file    Stress: Not on file   Relationships    Social connections:     Talks on phone: Not on file     Gets together: Not on file     Attends [employer] service: Not on file     Active member of club or organization: Not on file     Attends meetings of clubs or organizations: Not on file     Relationship status: Not on file    Intimate partner violence:     Fear of current or ex partner: Not on file     Emotionally abused: Not on file     Physically abused: Not on file     Forced sexual activity: Not on file   Other Topics Concern    Not on file   Social History Narrative    Not on file       Family History   Problem Relation Age of Onset    Diabetes Mother     Cancer Mother     Alcohol Abuse Father        REVIEW OF SYSTEMS:     Neville Salon denies fever/chills, chest pain, shortness of breath, new bowel or bladder complaints. All other review of systems was negative. PHYSICAL EXAMINATION:      /60   Pulse 88   Temp 98.5 °F (36.9 °C)   Resp 18   Ht 5' 2\" (1.575 m)   Wt 126 lb (57.2 kg)   SpO2 98%   BMI 23.05 kg/m²     General:      General appearance:pleasant and well-hydrated, in no distress and A & O x3  Build:Normal Weight  Function:Rises from a seated position easily.     HEENT:    Head:normocephalic, atraumatic  Pupils:regular, round, equal  Sclera: icterus absent    Lungs:    Breathing:normal breathing pattern    Abdomen:    Shape:non-distended and normal  Tenderness:none  Guarding:none    Lumbar spine:    Spine inspection:normal   CVA tenderness:No   Palpation:tenderness paravertebral muscles, left, right negative. Range of motion:normal in lateral bending, flexion, extension rotation bilateral and is not painful. Musculoskeletal:    Trigger points in Paraveteral:absent bilaterally  SI joint tenderness:negative right, negative left              CARLA test:negative right, negative             left  Piriformis tenderness:negative right, negative left  Trochanteric bursa tenderness:negative right, negative left  SLR:negative right, negative left, sitting     Extremities:    Tremors:None bilaterally upper and lower  Intact:Yes  Varicose veins:absent   Pulses:present Lt radial  Cyanosis:none  Edema:none x all 4 extremities    Neurological:    Sensory:normal to light touch BLE    Motor:                Right Quadriceps5/5          Left Quadriceps5/5           Right Gastrocnemius5/5    Left Gastrocnemius5/5  Right Ant Tibialis5/5  Left Ant Tibialis5/5    Reflexes:    Right Quadriceps reflex2+  Left Quadriceps reflex2+  Right Achilles reflex2+  Left Achilles reflex2+    Gait:normal    Assessment/Plan:     LBP and R>LLE pain  2016 Lumbar MRI shows mild degenerative changes with moderate L4-5 right foraminal stenosis and mild-moderate foraminal stenosis on the left    Saw Dr. Juanito Quevedo and is having surgery in the [facility] with him      L4-5 RICARDO #1 with 75% relief, 50% relief has continued, #2 gave 90% relief, repeat prn (prep for case placed)  PT 5/2019, last visit [facility] - she'd like to continue in [facility]  Patient encouraged to stay active  Treatment plan discussed with the patient including potential procedure side effects     Cc:  Referring physician    KALYANI Hodge.

## 2019-05-29 ENCOUNTER — PREP FOR PROCEDURE (OUTPATIENT)
Dept: PAIN MANAGEMENT | Age: 64
End: 2019-05-29

## 2019-05-29 ENCOUNTER — OFFICE VISIT (OUTPATIENT)
Dept: PAIN MANAGEMENT | Age: 64
End: 2019-05-29
Payer: MEDICAID

## 2019-05-29 VITALS
HEART RATE: 88 BPM | WEIGHT: 126 LBS | SYSTOLIC BLOOD PRESSURE: 102 MMHG | DIASTOLIC BLOOD PRESSURE: 60 MMHG | HEIGHT: 62 IN | RESPIRATION RATE: 18 BRPM | OXYGEN SATURATION: 98 % | BODY MASS INDEX: 23.19 KG/M2 | TEMPERATURE: 98.5 F

## 2019-05-29 DIAGNOSIS — G89.29 CHRONIC BILATERAL LOW BACK PAIN WITH BILATERAL SCIATICA: ICD-10-CM

## 2019-05-29 DIAGNOSIS — M54.41 CHRONIC BILATERAL LOW BACK PAIN WITH BILATERAL SCIATICA: ICD-10-CM

## 2019-05-29 DIAGNOSIS — M54.42 CHRONIC BILATERAL LOW BACK PAIN WITH BILATERAL SCIATICA: ICD-10-CM

## 2019-05-29 DIAGNOSIS — M54.16 LUMBAR RADICULOPATHY: ICD-10-CM

## 2019-05-29 DIAGNOSIS — G89.4 CHRONIC PAIN SYNDROME: Primary | ICD-10-CM

## 2019-05-29 DIAGNOSIS — M51.9 LUMBAR DISC DISORDER: ICD-10-CM

## 2019-05-29 DIAGNOSIS — M54.16 LUMBAR RADICULOPATHY: Primary | ICD-10-CM

## 2019-05-29 PROCEDURE — 99213 OFFICE O/P EST LOW 20 MIN: CPT | Performed by: PAIN MEDICINE

## 2019-05-29 NOTE — PROGRESS NOTES
Sj Devi presents to the [facility] on 5/29/2019. Enis Lundborg is complaining of pain in the leg and now in the stomach . The pain is intermittent. The pain is described as aching. Pain is rated on her best day at a 2, on her worst day at a 2, and on average at a 2 on the VAS scale. She took her last dose of Neurontin     Any procedures since your last visit: Yes, with 90 % relief. Pacemaker or defibrilator: No managed by none  .     She has not been on anticoagulation medication      /60   Pulse 88   Temp 98.5 °F (36.9 °C)   Resp 18   Ht 5' 2\" (1.575 m)   Wt 126 lb (57.2 kg)   SpO2 98%   BMI 23.05 kg/m²

## 2019-05-30 ENCOUNTER — OFFICE VISIT (OUTPATIENT)
Dept: FAMILY MEDICINE CLINIC | Age: 64
End: 2019-05-30
Payer: MEDICAID

## 2019-05-30 ENCOUNTER — HOSPITAL ENCOUNTER (OUTPATIENT)
Age: 64
Discharge: HOME | End: 2019-06-01
Payer: MEDICAID

## 2019-05-30 ENCOUNTER — OFFICE VISIT (OUTPATIENT)
Dept: OBGYN | Age: 64
End: 2019-05-30
Payer: MEDICAID

## 2019-05-30 VITALS
HEIGHT: 62 IN | BODY MASS INDEX: 23.19 KG/M2 | SYSTOLIC BLOOD PRESSURE: 110 MMHG | RESPIRATION RATE: 12 BRPM | WEIGHT: 126 LBS | DIASTOLIC BLOOD PRESSURE: 70 MMHG | HEART RATE: 90 BPM

## 2019-05-30 VITALS
BODY MASS INDEX: 23.6 KG/M2 | HEIGHT: 62 IN | SYSTOLIC BLOOD PRESSURE: 116 MMHG | OXYGEN SATURATION: 99 % | DIASTOLIC BLOOD PRESSURE: 80 MMHG | HEART RATE: 70 BPM | WEIGHT: 128.25 LBS | TEMPERATURE: 98.1 F | RESPIRATION RATE: 16 BRPM

## 2019-05-30 DIAGNOSIS — J30.81 ALLERGIC RHINITIS DUE TO ANIMAL HAIR AND DANDER: Chronic | ICD-10-CM

## 2019-05-30 DIAGNOSIS — R10.2 PELVIC PAIN: ICD-10-CM

## 2019-05-30 DIAGNOSIS — N94.9 VAGINAL BURNING: ICD-10-CM

## 2019-05-30 DIAGNOSIS — K21.00 GASTROESOPHAGEAL REFLUX DISEASE WITH ESOPHAGITIS: Primary | ICD-10-CM

## 2019-05-30 DIAGNOSIS — N89.8 VAGINAL ITCHING: Primary | ICD-10-CM

## 2019-05-30 DIAGNOSIS — I10 ESSENTIAL HYPERTENSION: Chronic | ICD-10-CM

## 2019-05-30 DIAGNOSIS — R11.0 NAUSEA: ICD-10-CM

## 2019-05-30 LAB
CLUE CELLS: NORMAL
SOURCE WET PREP: NORMAL
TRICHOMONAS PREP: NORMAL
YEAST WET PREP: NORMAL

## 2019-05-30 PROCEDURE — 99213 OFFICE O/P EST LOW 20 MIN: CPT | Performed by: NURSE PRACTITIONER

## 2019-05-30 PROCEDURE — 87591 N.GONORRHOEAE DNA AMP PROB: CPT

## 2019-05-30 PROCEDURE — 87210 SMEAR WET MOUNT SALINE/INK: CPT

## 2019-05-30 PROCEDURE — 87491 CHLMYD TRACH DNA AMP PROBE: CPT

## 2019-05-30 PROCEDURE — 99214 OFFICE O/P EST MOD 30 MIN: CPT | Performed by: NURSE PRACTITIONER

## 2019-05-30 RX ORDER — FLUCONAZOLE 150 MG/1
150 TABLET ORAL DAILY
Qty: 1 TABLET | Refills: 0 | Status: SHIPPED | OUTPATIENT
Start: 2019-05-30 | End: 2019-05-31

## 2019-05-30 ASSESSMENT — ENCOUNTER SYMPTOMS
COLOR CHANGE: 0
SINUS PRESSURE: 1
SHORTNESS OF BREATH: 0
VOMITING: 1
EYES NEGATIVE: 1
COUGH: 1
EYE ITCHING: 1
ABDOMINAL PAIN: 1
CHEST TIGHTNESS: 0
VOMITING: 0
EYE DISCHARGE: 1
SINUS PAIN: 1
WHEEZING: 0
NAUSEA: 1
CONSTIPATION: 1
EYE PAIN: 0
DIARRHEA: 1

## 2019-05-30 NOTE — PROGRESS NOTES
Subjective:      Patient ID: Niranjan Collier is a 59 y.o. female. HPI:   Vaginal itching and discharge. White discharge, also noticed to have pinkish and brownish discharge since last night. Menopause 10 years ago in 2018. No post menopausal bleeding. Last pap smear 3/15/19 with no significant findings. Took antibiotics for flu in march. Last sexually active 6 months ago. Uses barrier contraception, with female, no anal encounter but only quit few oral practise. Last march was last pap smear. Has history of IBD. Review of Systems   Constitutional: Negative. HENT: Negative. Eyes: Negative. Gastrointestinal: Positive for abdominal pain. Negative for vomiting. Genitourinary: Positive for pelvic pain and vaginal pain. Objective:   Physical Exam   Constitutional: She is oriented to person, place, and time. She appears well-developed and well-nourished. HENT:   Head: Normocephalic and atraumatic. Cardiovascular: Normal rate and regular rhythm. Pulmonary/Chest: Effort normal and breath sounds normal.   Abdominal: Soft. There is tenderness (epigastric). There is no rebound. Genitourinary: Vaginal discharge (scanty discharge adherant to the cervix) found. Genitourinary Comments: On speculum with the cervix has scanty discharge on the left lateral aspect, appeared to be whitish, non fowl smelling. There is no redness of the vagina or cervix. Wet prep, GC and chlamydia swabs sent. Neurological: She is alert and oriented to person, place, and time. Nursing note and vitals reviewed. Assessment:        Diagnosis Orders   1. Vaginal itching  Urinalysis    Urine Culture    WET PREP, GENITAL   2.  Vaginal burning  Urinalysis    Urine Culture    WET PREP, GENITAL   3. Pelvic pain  US NON OB TRANSVAGINAL    US Pelvis Complete         Plan:     I spent 30 minutes and 16 minutes of direct contact time with the patient of which greater than 50% of the time was to discuss complications and problems related   Medical/ surgical history and medications reviewed  The patient was informed about the importance of regular gynecological examination and pap smear. She was also informed of the need for yearly mammogram after the age of 36. After age 48, a colonoscopy should be scheduled through her primary care physician (PCP). This will help decrease the risk of colon cancer. Patient would be called with the any positive results. Pending results of labs including Wet prep, GC and Chlamydia, Urinalysis and culture. Also pending US Pelvis and OB transvaginal to follow up. Patient was  educated to wipe from front to back instead of back to front to void contaminating the vagina with bacterial from the rectum. Also, patient was educated to keep her vulva clean and dry. In addition, patient was instructed to refrain from using agents that are irritating in her vagina, such as strong soaps, feminine hygiene sprays, or douching. Furthermore, patient will be educated to abstain from tight jeans, panty hose with no cotton crotch, or clothing that trap moisture. Further recommendations based up on the above test results. To call office if there is no improvement or worsening of the symptoms including redness, burning or itching of the vaginal area. All questions and concerns addressed. Patient voices understanding of plan of care.           KAIT Estes - CNP

## 2019-05-30 NOTE — PROGRESS NOTES
HPI:  The patient is a 59 y.o. female who presents today to establish care, medication refill and referral for GI. The patient complains of shingles, she takes the zovirax for shingles when needed. Elavil for spinal bone disease, neuropathy, depression and anxiety medication. She has a history of Hep C. She checks BP daily averages 128/77. She carries EpiPen for environmental allergies. She got thrush from recent antibiotics, got diflucan today from her gynecology. She is taking zofran, she is nauseas, had BM today after MOM. She has been having diarrhea and constipation. She is not taking the Welbutin for smoking cessation but is only smoking 7 a day.      Past Medical History:   Diagnosis Date    Abnormal Pap smear of cervix     Asthma     Depression     Fibromyalgia     GERD (gastroesophageal reflux disease)     Hepatitis C 2016    treated     Hyperlipidemia     Hypertension     Overactive bladder       Past Surgical History:   Procedure Laterality Date    ANESTHESIA NERVE BLOCK N/A 3/26/2019    L4-5 EPIDURAL STEROID INJECTION performed by Malgorzata Jett DO at [address] [facility] [place] INJECTION N/A 5/16/2019    L4-5 EPIDURAL STEROID INJECTION performed by Malgorzata Jett DO at [address]  03/26/2019    with sedation    OVARY REMOVAL      1981    SHOULDER ARTHROSCOPY Right       Family History   Problem Relation Age of Onset    Diabetes Mother     Cancer Mother     Alcohol Abuse Father      Social History     Socioeconomic History    Marital status: Single     Spouse name: Not on file    Number of children: Not on file    Years of education: Not on file    Highest education level: Not on file   Occupational History    Not on file   Social Needs    Financial resource strain: Not on file    Food insecurity:     Worry: Not on file     Inability: Not on file    Transportation needs:     Medical: Not on file Non-medical: Not on file   Tobacco Use    Smoking status: Current Every Day Smoker     Packs/day: 0.25     Years: 45.00     Pack years: 11.25     Types: Cigarettes    Smokeless tobacco: Never Used   Substance and Sexual Activity    Alcohol use: Yes     Comment: socially    Drug use: No    Sexual activity: Not on file   Lifestyle    Physical activity:     Days per week: Not on file     Minutes per session: Not on file    Stress: Not on file   Relationships    Social connections:     Talks on phone: Not on file     Gets together: Not on file     Attends Judaism service: Not on file     Active member of club or organization: Not on file     Attends meetings of clubs or organizations: Not on file     Relationship status: Not on file    Intimate partner violence:     Fear of current or ex partner: Not on file     Emotionally abused: Not on file     Physically abused: Not on file     Forced sexual activity: Not on file   Other Topics Concern    Not on file   Social History Narrative    Not on file      Allergies   Allergen Reactions    Adhesive Tape Rash     bruising    Nsaids      Irritates IBS    Penicillins Hives      Prior to Visit Medications    Medication Sig Taking?  Authorizing Provider   fluconazole (DIFLUCAN) 150 MG tablet Take 1 tablet by mouth daily for 1 day Yes KAIT Andujar CNP   pantoprazole (PROTONIX) 20 MG tablet Take 1 tablet by mouth daily Yes Jesi Lunsford MD   ondansetron (ZOFRAN ODT) 4 MG disintegrating tablet Take 1 tablet by mouth every 8 hours as needed for Nausea or Vomiting Yes Jesi Lunsford MD   amLODIPine (NORVASC) 10 MG tablet Take 1 tablet by mouth daily Yes Elder Lloyd MD   amitriptyline (ELAVIL) 50 MG tablet Take 1 tablet by mouth nightly Yes Kelsea Cheng MD   atorvastatin (LIPITOR) 40 MG tablet Take 1 tablet by mouth daily Yes Kelsea Cheng MD   EPINEPHrine (EPIPEN) 0.3 MG/0.3ML SOAJ injection Use as directed for allergic reaction Yes Kelsea Cheng MD   sodium chloride (ALTAMIST SPRAY) 0.65 % nasal spray 1 spray by Nasal route as needed for Congestion Yes Milena Ivey MD   gabapentin (NEURONTIN) 300 MG capsule Take 300 mg by mouth 3 times daily. Yes Historical Provider, MD   cyclobenzaprine (FLEXERIL) 10 MG tablet Take 5 mg by mouth 3 times daily  Yes Historical Provider, MD   Biotin 1000 MCG CHEW Take by mouth daily Yes Historical Provider, MD   acyclovir (ZOVIRAX) 400 MG tablet Take 400 mg by mouth as needed Yes Historical Provider, MD     Review of Systems:    Review of Systems   Constitutional: Positive for appetite change. Negative for activity change, chills and fever. HENT: Positive for sinus pressure and sinus pain. Negative for congestion, ear pain and sneezing. Eyes: Positive for discharge and itching. Negative for pain and visual disturbance. Respiratory: Positive for cough. Negative for chest tightness, shortness of breath and wheezing. Cardiovascular: Negative for chest pain, palpitations and leg swelling. Gastrointestinal: Positive for constipation, diarrhea, nausea and vomiting. Endocrine: Positive for heat intolerance, polydipsia and polyuria. Negative for cold intolerance. Genitourinary: Positive for difficulty urinating (yeast infection, saw dr today.) and menstrual problem (menopause 10 years. ). Musculoskeletal: Negative for arthralgias and myalgias. Skin: Negative for color change, rash and wound. Allergic/Immunologic: Positive for environmental allergies. Neurological: Negative for dizziness, light-headedness and headaches. Hematological: Negative for adenopathy. Does not bruise/bleed easily. Psychiatric/Behavioral: Negative for agitation, decreased concentration, sleep disturbance and suicidal ideas. The patient is not nervous/anxious.        BP Readings from Last 1 Encounters:   05/30/19 116/80    Recheck if >140/90  Hemoglobin A1C (%)   Date Value   01/24/2019 5.9 (H)     No results found for: LABMICR    Have you had your Pneumonia Vaccine yes    Have you had a Colorectal Screening  yes    Have you had a Screening Mammogram  yes    Have you seen any other physician or provider since your last visit no    Have you had any other diagnostic tests since your last visit? no    Physical Exam:    Vitals:    05/30/19 1250   BP: 116/80   Site: Left Upper Arm   Position: Sitting   Cuff Size: Medium Adult   Pulse: 70   Resp: 16   Temp: 98.1 °F (36.7 °C)   TempSrc: Oral   SpO2: 99%   Weight: 128 lb 4 oz (58.2 kg)   Height: 5' 2\" (1.575 m)     Physical Exam   Constitutional: She is oriented to person, place, and time. She appears well-developed and well-nourished. HENT:   Head: Normocephalic and atraumatic. Nasal turbinates are erythematous and boggy. Pharynx shows erythema and PND. Dry cerumen noted along left TM, unable to visualize. Eyes: Pupils are equal, round, and reactive to light. EOM are normal. Right eye exhibits no discharge. Left eye exhibits no discharge. Neck: Normal range of motion. Neck supple. No thyromegaly present. Cardiovascular: Normal rate, regular rhythm, normal heart sounds and intact distal pulses. Pulmonary/Chest: Effort normal and breath sounds normal. No respiratory distress. She has no wheezes. Abdominal: Soft. Bowel sounds are normal. She exhibits distension. She exhibits no mass. There is tenderness (Epigastric w/palpation). There is no guarding. Genitourinary:   Genitourinary Comments: Deferred. Musculoskeletal: Normal range of motion. Lymphadenopathy:     She has no cervical adenopathy. Neurological: She is alert and oriented to person, place, and time. She displays normal reflexes. No cranial nerve deficit. She exhibits normal muscle tone. Skin: Skin is warm and dry. Capillary refill takes less than 2 seconds. No rash noted. Psychiatric: She has a normal mood and affect.  Her behavior is normal. Judgment and thought content normal.   Nursing note and vitals reviewed. Assessment/Plan: [facility] was seen today for establish care, gastroesophageal reflux and referral - general.    Diagnoses and all orders for this visit:    Gastroesophageal reflux disease with esophagitis  -     Hannah Wall MD, Maegan Chatman ([place])    Nausea  -     Hannah Wall MD, Maegan Chatman ([place])    Essential hypertension   - The current medical regimen is effective;  continue present plan and medications. Allergic rhinitis due to animal hair and dander   - The current medical regimen is effective;  continue present plan and medications. Chelsey Lugo has blood work to obtain for her ortho doctor, we will not duplicate at this time, will review at next appointment. Return in about 3 months (around 8/30/2019). , or sooner if necessary.

## 2019-06-03 LAB
CHLAMYDIA TRACHOMATIS AMPLIFIED DET: NORMAL
N GONORRHOEAE AMPLIFIED DET: NORMAL

## 2019-06-03 NOTE — PROGRESS NOTES
[facility] PRE-ADMISSION TESTING INSTRUCTIONS    The Preadmission Testing patient is instructed accordingly using the following criteria (check applicable):    ARRIVAL INSTRUCTIONS:  [x] Parking the day of Surgery is located in the Main Entrance lot. Upon entering the door, make an immediate right to the surgery reception desk    [] [id number] is available Monday through Friday 6 am to 6 pm    [x] Bring photo ID and insurance card    [] Bring in a copy of Living will or Durable Power of  papers. [x] Please be sure to arrange for responsible adult to provide transportation to and from the hospital    [x] Please arrange for responsible adult to be with you for the 24 hour period post procedure due to having anesthesia      GENERAL INSTRUCTIONS:    [x] Nothing by mouth after midnight, including gum, candy, mints or water    [x] You may brush your teeth, but do not swallow any water    [x] Take medications as instructed with 1-2 oz of water    [x] Stop herbal supplements and vitamins 5 days prior to procedure    [] Follow preop dosing of blood thinners per physician instructions    [] Take 1/2 dose of evening insulin, but no insulin after midnight    [] No oral diabetic medications after midnight    [] If diabetic and have low blood sugar or feel symptomatic, take 1-2oz apple juice only    [] Bring inhalers day of surgery    [] Bring C-PAP/ Bi-Pap day of surgery    [] Bring urine specimen day of surgery    [x] Shower or bath with soap, lather and rinse well, AM of Surgery, no lotion, powders or creams to surgical site    [] Follow bowel prep as instructed per surgeon    [x] No tobacco products within 24 hours of surgery     [x] No alcohol or illegal drug use within 24 hours of surgery.     [x] Jewelry, body piercing's, eyeglasses, contact lenses and dentures are not permitted into surgery (bring cases)      [x] Please do not wear any nail polish, make up or hair products on the day of surgery    [x] If not already done, you can expect a call from registration    [x] You can expect a call the business day prior to procedure to notify you if your arrival time changes    [x] If you receive a survey after surgery we would greatly appreciate your comments    [] Parent/guardian of a minor must accompany their child and remain on the premises  the entire time they are under our care     [] Pediatric patients may bring favorite toy, blanket or comfort item with them    [] A caregiver or family member must remain with the patient during their stay if they are mentally handicapped, have dementia, disoriented or unable to use a call light or would be a safety concern if left unattended    [x] Please notify surgeon if you develop any illness between now and time of surgery (cold, cough, sore throat, fever, nausea, vomiting) or any signs of infections  including skin, wounds, and dental.    [x]  The Outpatient Pharmacy is available to fill your prescription here on your day of surgery, ask your preop nurse for details    [] Other instructions  EDUCATIONAL MATERIALS PROVIDED:    [] PAT Preoperative Education Packet/Booklet     [] Medication List    [] Fluoroscopy Information Pamphlet    [] Transfusion bracelet applied with instructions    [] Joint replacement video reviewed    [] Shower with soap, lather and rinse well, and use CHG wipes provided the evening before surgery as instructed

## 2019-06-04 ENCOUNTER — TELEPHONE (OUTPATIENT)
Dept: FAMILY MEDICINE CLINIC | Age: 64
End: 2019-06-04

## 2019-06-04 DIAGNOSIS — G56.00 CARPAL TUNNEL SYNDROME, UNSPECIFIED LATERALITY: Primary | ICD-10-CM

## 2019-06-05 ENCOUNTER — ANESTHESIA EVENT (OUTPATIENT)
Dept: OPERATING ROOM | Age: 64
End: 2019-06-05
Payer: MEDICAID

## 2019-06-05 ENCOUNTER — HOSPITAL ENCOUNTER (OUTPATIENT)
Age: 64
Setting detail: OUTPATIENT SURGERY
Discharge: HOME | End: 2019-06-05
Attending: ORTHOPAEDIC SURGERY | Admitting: ORTHOPAEDIC SURGERY
Payer: MEDICAID

## 2019-06-05 ENCOUNTER — ANESTHESIA (OUTPATIENT)
Dept: OPERATING ROOM | Age: 64
End: 2019-06-05
Payer: MEDICAID

## 2019-06-05 VITALS — DIASTOLIC BLOOD PRESSURE: 52 MMHG | SYSTOLIC BLOOD PRESSURE: 77 MMHG | OXYGEN SATURATION: 100 %

## 2019-06-05 VITALS
OXYGEN SATURATION: 99 % | SYSTOLIC BLOOD PRESSURE: 132 MMHG | DIASTOLIC BLOOD PRESSURE: 74 MMHG | BODY MASS INDEX: 23.92 KG/M2 | WEIGHT: 130 LBS | TEMPERATURE: 98.2 F | RESPIRATION RATE: 16 BRPM | HEART RATE: 62 BPM | HEIGHT: 62 IN

## 2019-06-05 DIAGNOSIS — G56.00 CARPAL TUNNEL SYNDROME, UNSPECIFIED LATERALITY: Primary | ICD-10-CM

## 2019-06-05 PROCEDURE — 3600000012 HC SURGERY LEVEL 2 ADDTL 15MIN: Performed by: ORTHOPAEDIC SURGERY

## 2019-06-05 PROCEDURE — 3700000000 HC ANESTHESIA ATTENDED CARE: Performed by: ORTHOPAEDIC SURGERY

## 2019-06-05 PROCEDURE — 6370000000 HC RX 637 (ALT 250 FOR IP): Performed by: ANESTHESIOLOGY

## 2019-06-05 PROCEDURE — 64721 CARPAL TUNNEL SURGERY: CPT | Performed by: ORTHOPAEDIC SURGERY

## 2019-06-05 PROCEDURE — 2500000003 HC RX 250 WO HCPCS: Performed by: NURSE ANESTHETIST, CERTIFIED REGISTERED

## 2019-06-05 PROCEDURE — 2580000003 HC RX 258: Performed by: PHYSICIAN ASSISTANT

## 2019-06-05 PROCEDURE — 2500000003 HC RX 250 WO HCPCS: Performed by: ORTHOPAEDIC SURGERY

## 2019-06-05 PROCEDURE — 7100000010 HC PHASE II RECOVERY - FIRST 15 MIN: Performed by: ORTHOPAEDIC SURGERY

## 2019-06-05 PROCEDURE — 7100000011 HC PHASE II RECOVERY - ADDTL 15 MIN: Performed by: ORTHOPAEDIC SURGERY

## 2019-06-05 PROCEDURE — 7100000001 HC PACU RECOVERY - ADDTL 15 MIN: Performed by: ORTHOPAEDIC SURGERY

## 2019-06-05 PROCEDURE — 7100000000 HC PACU RECOVERY - FIRST 15 MIN: Performed by: ORTHOPAEDIC SURGERY

## 2019-06-05 PROCEDURE — 3700000001 HC ADD 15 MINUTES (ANESTHESIA): Performed by: ORTHOPAEDIC SURGERY

## 2019-06-05 PROCEDURE — 2500000003 HC RX 250 WO HCPCS: Performed by: PHYSICIAN ASSISTANT

## 2019-06-05 PROCEDURE — 6360000002 HC RX W HCPCS: Performed by: NURSE ANESTHETIST, CERTIFIED REGISTERED

## 2019-06-05 PROCEDURE — 3600000002 HC SURGERY LEVEL 2 BASE: Performed by: ORTHOPAEDIC SURGERY

## 2019-06-05 PROCEDURE — 2580000003 HC RX 258: Performed by: NURSE ANESTHETIST, CERTIFIED REGISTERED

## 2019-06-05 PROCEDURE — 2709999900 HC NON-CHARGEABLE SUPPLY: Performed by: ORTHOPAEDIC SURGERY

## 2019-06-05 PROCEDURE — 2720000010 HC SURG SUPPLY STERILE: Performed by: ORTHOPAEDIC SURGERY

## 2019-06-05 RX ORDER — SODIUM CHLORIDE 0.9 % (FLUSH) 0.9 %
10 SYRINGE (ML) INJECTION EVERY 12 HOURS SCHEDULED
Status: DISCONTINUED | OUTPATIENT
Start: 2019-06-05 | End: 2019-06-05 | Stop reason: HOSPADM

## 2019-06-05 RX ORDER — OXYCODONE HYDROCHLORIDE AND ACETAMINOPHEN 5; 325 MG/1; MG/1
1 TABLET ORAL EVERY 6 HOURS PRN
Qty: 28 TABLET | Refills: 0 | Status: SHIPPED | OUTPATIENT
Start: 2019-06-05 | End: 2019-06-12

## 2019-06-05 RX ORDER — SODIUM CHLORIDE 9 MG/ML
INJECTION, SOLUTION INTRAVENOUS CONTINUOUS
Status: DISCONTINUED | OUTPATIENT
Start: 2019-06-05 | End: 2019-06-05 | Stop reason: HOSPADM

## 2019-06-05 RX ORDER — PROPOFOL 10 MG/ML
INJECTION, EMULSION INTRAVENOUS PRN
Status: DISCONTINUED | OUTPATIENT
Start: 2019-06-05 | End: 2019-06-05 | Stop reason: SDUPTHER

## 2019-06-05 RX ORDER — SODIUM CHLORIDE, SODIUM LACTATE, POTASSIUM CHLORIDE, CALCIUM CHLORIDE 600; 310; 30; 20 MG/100ML; MG/100ML; MG/100ML; MG/100ML
INJECTION, SOLUTION INTRAVENOUS CONTINUOUS PRN
Status: DISCONTINUED | OUTPATIENT
Start: 2019-06-05 | End: 2019-06-05 | Stop reason: SDUPTHER

## 2019-06-05 RX ORDER — CLINDAMYCIN PHOSPHATE 900 MG/50ML
900 INJECTION INTRAVENOUS
Status: COMPLETED | OUTPATIENT
Start: 2019-06-05 | End: 2019-06-05

## 2019-06-05 RX ORDER — DEXAMETHASONE SODIUM PHOSPHATE 4 MG/ML
INJECTION, SOLUTION INTRA-ARTICULAR; INTRALESIONAL; INTRAMUSCULAR; INTRAVENOUS; SOFT TISSUE PRN
Status: DISCONTINUED | OUTPATIENT
Start: 2019-06-05 | End: 2019-06-05 | Stop reason: SDUPTHER

## 2019-06-05 RX ORDER — BUPIVACAINE HYDROCHLORIDE AND EPINEPHRINE 5; 5 MG/ML; UG/ML
INJECTION, SOLUTION EPIDURAL; INTRACAUDAL; PERINEURAL PRN
Status: DISCONTINUED | OUTPATIENT
Start: 2019-06-05 | End: 2019-06-05 | Stop reason: ALTCHOICE

## 2019-06-05 RX ORDER — ONDANSETRON 2 MG/ML
INJECTION INTRAMUSCULAR; INTRAVENOUS PRN
Status: DISCONTINUED | OUTPATIENT
Start: 2019-06-05 | End: 2019-06-05 | Stop reason: SDUPTHER

## 2019-06-05 RX ORDER — MIDAZOLAM HYDROCHLORIDE 1 MG/ML
INJECTION INTRAMUSCULAR; INTRAVENOUS PRN
Status: DISCONTINUED | OUTPATIENT
Start: 2019-06-05 | End: 2019-06-05 | Stop reason: SDUPTHER

## 2019-06-05 RX ORDER — LIDOCAINE HYDROCHLORIDE 20 MG/ML
INJECTION, SOLUTION EPIDURAL; INFILTRATION; INTRACAUDAL; PERINEURAL PRN
Status: DISCONTINUED | OUTPATIENT
Start: 2019-06-05 | End: 2019-06-05 | Stop reason: SDUPTHER

## 2019-06-05 RX ORDER — OXYCODONE HYDROCHLORIDE AND ACETAMINOPHEN 5; 325 MG/1; MG/1
1 TABLET ORAL
Status: COMPLETED | OUTPATIENT
Start: 2019-06-05 | End: 2019-06-05

## 2019-06-05 RX ORDER — FENTANYL CITRATE 50 UG/ML
25 INJECTION, SOLUTION INTRAMUSCULAR; INTRAVENOUS EVERY 5 MIN PRN
Status: DISCONTINUED | OUTPATIENT
Start: 2019-06-05 | End: 2019-06-05 | Stop reason: HOSPADM

## 2019-06-05 RX ORDER — FENTANYL CITRATE 50 UG/ML
INJECTION, SOLUTION INTRAMUSCULAR; INTRAVENOUS PRN
Status: DISCONTINUED | OUTPATIENT
Start: 2019-06-05 | End: 2019-06-05 | Stop reason: SDUPTHER

## 2019-06-05 RX ORDER — SODIUM CHLORIDE 0.9 % (FLUSH) 0.9 %
10 SYRINGE (ML) INJECTION PRN
Status: DISCONTINUED | OUTPATIENT
Start: 2019-06-05 | End: 2019-06-05 | Stop reason: HOSPADM

## 2019-06-05 RX ADMIN — ONDANSETRON HYDROCHLORIDE 4 MG: 2 INJECTION, SOLUTION INTRAMUSCULAR; INTRAVENOUS at 12:16

## 2019-06-05 RX ADMIN — PROPOFOL 150 MG: 10 INJECTION, EMULSION INTRAVENOUS at 12:16

## 2019-06-05 RX ADMIN — SODIUM CHLORIDE, POTASSIUM CHLORIDE, SODIUM LACTATE AND CALCIUM CHLORIDE: 600; 310; 30; 20 INJECTION, SOLUTION INTRAVENOUS at 12:10

## 2019-06-05 RX ADMIN — SODIUM CHLORIDE: 9 INJECTION, SOLUTION INTRAVENOUS at 10:31

## 2019-06-05 RX ADMIN — CLINDAMYCIN IN 5 PERCENT DEXTROSE 900 MG: 18 INJECTION, SOLUTION INTRAVENOUS at 12:10

## 2019-06-05 RX ADMIN — DEXAMETHASONE SODIUM PHOSPHATE 8 MG: 4 INJECTION, SOLUTION INTRAMUSCULAR; INTRAVENOUS at 12:16

## 2019-06-05 RX ADMIN — FENTANYL CITRATE 50 MCG: 50 INJECTION, SOLUTION INTRAMUSCULAR; INTRAVENOUS at 12:55

## 2019-06-05 RX ADMIN — MIDAZOLAM HYDROCHLORIDE 2 MG: 1 INJECTION, SOLUTION INTRAMUSCULAR; INTRAVENOUS at 12:10

## 2019-06-05 RX ADMIN — OXYCODONE HYDROCHLORIDE AND ACETAMINOPHEN 1 TABLET: 5; 325 TABLET ORAL at 14:52

## 2019-06-05 RX ADMIN — FENTANYL CITRATE 100 MCG: 50 INJECTION, SOLUTION INTRAMUSCULAR; INTRAVENOUS at 12:16

## 2019-06-05 RX ADMIN — LIDOCAINE HYDROCHLORIDE 100 MG: 20 INJECTION, SOLUTION EPIDURAL; INFILTRATION; INTRACAUDAL; PERINEURAL at 12:16

## 2019-06-05 RX ADMIN — FENTANYL CITRATE 50 MCG: 50 INJECTION, SOLUTION INTRAMUSCULAR; INTRAVENOUS at 12:32

## 2019-06-05 ASSESSMENT — PULMONARY FUNCTION TESTS
PIF_VALUE: 10
PIF_VALUE: 1
PIF_VALUE: 18
PIF_VALUE: 1
PIF_VALUE: 12
PIF_VALUE: 0
PIF_VALUE: 10
PIF_VALUE: 0
PIF_VALUE: 1
PIF_VALUE: 21
PIF_VALUE: 1
PIF_VALUE: 1
PIF_VALUE: 12
PIF_VALUE: 0
PIF_VALUE: 10
PIF_VALUE: 10
PIF_VALUE: 1
PIF_VALUE: 2
PIF_VALUE: 15
PIF_VALUE: 1
PIF_VALUE: 3
PIF_VALUE: 0
PIF_VALUE: 10
PIF_VALUE: 18
PIF_VALUE: 0
PIF_VALUE: 0
PIF_VALUE: 10
PIF_VALUE: 0
PIF_VALUE: 1
PIF_VALUE: 10
PIF_VALUE: 29
PIF_VALUE: 8
PIF_VALUE: 10
PIF_VALUE: 3
PIF_VALUE: 24
PIF_VALUE: 19
PIF_VALUE: 1
PIF_VALUE: 18
PIF_VALUE: 2
PIF_VALUE: 21
PIF_VALUE: 10
PIF_VALUE: 10

## 2019-06-05 ASSESSMENT — PAIN DESCRIPTION - PAIN TYPE
TYPE: SURGICAL PAIN

## 2019-06-05 ASSESSMENT — PAIN DESCRIPTION - LOCATION
LOCATION: ELBOW;WRIST
LOCATION: ELBOW;WRIST
LOCATION: WRIST;ELBOW
LOCATION: ELBOW;WRIST

## 2019-06-05 ASSESSMENT — PAIN SCALES - GENERAL
PAINLEVEL_OUTOF10: 0
PAINLEVEL_OUTOF10: 5
PAINLEVEL_OUTOF10: 0

## 2019-06-05 ASSESSMENT — PAIN DESCRIPTION - ORIENTATION
ORIENTATION: RIGHT

## 2019-06-05 ASSESSMENT — PAIN - FUNCTIONAL ASSESSMENT: PAIN_FUNCTIONAL_ASSESSMENT: 0-10

## 2019-06-05 ASSESSMENT — PAIN DESCRIPTION - DESCRIPTORS: DESCRIPTORS: DULL

## 2019-06-05 ASSESSMENT — LIFESTYLE VARIABLES: SMOKING_STATUS: 1

## 2019-06-05 NOTE — PROGRESS NOTES
CLINICAL PHARMACY NOTE: MEDS TO [address] [place] [address] Drive Select Patient?: No  Total # of Prescriptions Filled: 1   The following medications were delivered to the patient:  · Oxycodone 5-325 mg  Total # of Interventions Completed: 6  Time Spent (min): 45    Additional Documentation:

## 2019-06-05 NOTE — H&P
Department of Orthopedic Surgery  History and Physical        CHIEF COMPLAINT:  Bilateral arm and hand pain     HISTORY OF PRESENT ILLNESS:                 The patient is a RHD 59 y.o. female who presents with radiating bilateral arm and hand pain. Patient states she has had bilateral arm and hand pain for the last 7 years. She states her fingers cramp up. She moved from [place] and was set up for an ulnar nerve decompression and carpal tunnel release prior to moving. Since she has moved here she has been in pain management for her neck. She has not seen anyone for her arm pain yet. She did bring blood work with her from 2016. This showed a RF factor of 30.3 along with a CRP of 37.2 and a positive a ZOYA. She is not currently seeing a rheumatologist. She also reports pain to her right thumb. Patient reports braining her right thumb when she was a child. No recent injury.     She had an EMG and nerve conduction study test on October 5, 2016. Left ulnar nerve motor velocity was 34 and right was 45. Left median nerve sensory latency was 4.8 and right was 4.7. On the EMG portion of the exam there are changes to the left abductor pollicis brevis along with left 1st dorsal interosseous and right abductor pollicis brevis and right 1st dorsal interosseous. She also had changes to her right pronator teres and triceps. There was increased insertional activity to bilateral cervical paraspinal muscles.  Impression is consistent with a right C7 cervical radiculopathy and bilateral carpal tunnel syndrome and bilateral ulnar neuropathy.     Past Medical History:    Past Medical History             Diagnosis Date    Abnormal Pap smear of cervix      Asthma      Depression      Fibromyalgia      GERD (gastroesophageal reflux disease)      Hepatitis C       treated in [place] 2016    Hyperlipidemia      Hypertension      Menopausal symptoms      Overactive bladder           Past Surgical History:    Past Surgical History Procedure Laterality Date    ANESTHESIA NERVE BLOCK N/A 3/26/2019     L4-5 EPIDURAL STEROID INJECTION performed by Edward Mayers DO at [address] [place] [address]        NERVE BLOCK   03/26/2019     with sedation    OVARY REMOVAL         1981    SHOULDER ARTHROSCOPY Right           Current Medications:   Current Hospital Medications   No current facility-administered medications for this visit. Allergies:  Adhesive tape; Motrin [ibuprofen]; Seasonal; and Penicillins     Social History:   TOBACCO:   reports that she has been smoking cigarettes. She has a 11.25 pack-year smoking history. She has never used smokeless tobacco.  ETOH:   reports that she drinks alcohol. DRUGS:   reports that she does not use drugs.   ACTIVITIES OF DAILY LIVING:    OCCUPATION:    Family History:   Family History             Problem Relation Age of Onset    Diabetes Mother      Cancer Mother      Alcohol Abuse Father              REVIEW OF SYSTEMS:  CONSTITUTIONAL:  negative  EYES:  negative  HEENT:  negative  RESPIRATORY:  negative  CARDIOVASCULAR:  HTN, hyperlipidemia  GASTROINTESTINAL:  negative  GENITOURINARY:  negative  INTEGUMENT/BREAST:  negative  HEMATOLOGIC/LYMPHATIC: hepatitis C  ALLERGIC/IMMUNOLOGIC:  negative  ENDOCRINE:  negative  MUSCULOSKELETAL:  pain  NEUROLOGICAL:  N/T  BEHAVIOR/PSYCH:  negative     PHYSICAL EXAM:    VITALS:  BP (!) 128/92 (Site: Left Upper Arm, Position: Sitting) Comment: has not had bp meds today  Pulse 80   Resp 18   Ht 5' 2\" (1.575 m)   Wt 134 lb (60.8 kg)   BMI 24.51 kg/m²   CONSTITUTIONAL:  awake, alert, cooperative, no apparent distress, and appears stated age  EYES:  Lids and lashes normal, pupils equal, round and reactive to light, extra ocular muscles intact, sclera clear, conjunctiva normal  ENT:  Normocephalic, without obvious abnormality, atraumatic, sinuses nontender on palpation, external ears without lesions, oral pharynx with moist mucus membranes, tonsils without erythema or exudates, gums normal and good dentition. NECK:  Supple, symmetrical, trachea midline, no adenopathy, thyroid symmetric, not enlarged and no tenderness, skin normal  LUNGS:  CTA  CARDIOVASCULAR:  2+ radial pulses, extremities warm and well perfused  ABDOMEN:  NTTP  CHEST:  Atraumatic   GENITAL/URINARY:  deferred  NEUROLOGIC:  Awake, alert, oriented to name, place and time. Cranial nerves II-XII are grossly intact. Motor is 5 out of 5 bilaterally. Sensory is intact.  gait is normal.  MUSCULOSKELETAL:     Right upper extremity: Nontender shoulder with good range of motion. Positive Tinel's of the cubital tunnel, negative tenderness over the medial and lateral condyle. Positive Tinel's of the are pulled tunnel, positive Kaykay's, positive instability to the thumb MCP joint with pain. Negative Finkelstein's, negative CMC grind, negative tenderness over the A1 pulleys with no active triggering. Full flexion extension of the fingers. Negative Wartenberg's and cross finger testing. APB strength 5 out of 5. Negative intrinsic weakness. Median, ulnar, radial nerves intact light touch. Brisk capillary refill. Gross motor 5/5.     Left upper extremity: Nontender shoulder with good range of motion. Positive Tinel's of the cubital tunnel, negative tenderness over the medial and lateral condyle. Positive Tinel's of the are pulled tunnel, positive Kaykay's. Negative Finkelstein's, negative CMC grind, negative tenderness over the A1 pulleys with no active triggering. Full flexion extension of the fingers. Negative Wartenberg's and cross finger testing. APB strength 5 out of 5. Negative intrinsic weakness. Median, ulnar, radial nerves intact light touch. Brisk capillary refill.  Gross motor 5/5.     DATA:    CBC:         Lab Results   Component Value Date     WBC 5.7 03/15/2019     RBC 4.34 03/15/2019     HGB 13.1 03/15/2019     HCT 38.7 03/15/2019     MCV 89.2 03/15/2019     MCH 30.2 03/15/2019     [facility] 33.9 03/15/2019     RDW 13.5 03/15/2019      03/15/2019     MPV 11.2 03/15/2019      PT/INR:  No results found for: PROTIME, INR     Radiology Review:  X-rays of bilateral wrists were obtained today in the office and reviewed with patient. 3 views: AP, lateral, oblique demonstrate no acute fractures or dislocation. Right thumb MCP joint with arthritic changes present. Impression: Right thumb MCP joint arthritis     X-rays of bilateral elbows were obtained today in the afternoon. Patient. 2 views: AP, lateral demonstrate no acute fractures or dislocations of bilateral elbows. 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 In regards to her rheumatoid arthritis and a referral was placed for a rheumatologist. Reviewed the patient EMG and nerve conduction study test with the patient. Discussed surgical management with the patient. Recommended a right ulnar nerve decompression at the elbow with possible transposition and right endoscopic carpal tunnel release. If she does well on the right she may consider surgery on her left at a later date. Her right thumb is not bothersome enough to proceed with surgery at this time. Did discussed on the right she could consider a MCP joint fusion of the right thumb. Patient is uninterested in this. Postop course explained to the patient. Patient like to proceed.  All questions answered.     I explained the risks, benefits, alternatives and complications of surgery with the patient including but not limited to the risks of infection, possible damage to nerves, vessels, or tendons, stiffness, loss of range of motion, scar sensitivity, wound healing complications, worsening symptoms, possible need for therapy, as well as the possible need further surgery and unanticipated complications. The patient voiced understanding and all questions were answered. The patient elected to          History and Physical Update     Patient was seen and examined. Patient's history and physical was reviewed with the patient. There has been no significant interval changes.       Electronically signed by Evert Umanzor MD on 6/5/2019 at 11:17 AM

## 2019-06-05 NOTE — ANESTHESIA PRE PROCEDURE
mL/hr at 06/05/19 1031      clindamycin (CLEOCIN) 900 mg in dextrose 5 % 50 mL IVPB  900 mg Intravenous On Call to [address]        sodium chloride flush 0.9 % injection 10 mL  10 mL Intravenous 2 times per day KENTRELL Naik        sodium chloride flush 0.9 % injection 10 mL  10 mL Intravenous PRN KENTRELL Naik           Allergies:     Allergies   Allergen Reactions    Adhesive Tape Rash     bruising    Nsaids      Irritates IBS    Penicillins Hives       Problem List:    Patient Active Problem List   Diagnosis Code    DDD (degenerative disc disease), cervical M50.30    Cervical radiculopathy M54.12    Chronic pain syndrome G89.4    Essential hypertension I10    Mixed hyperlipidemia E78.2    Neuropathy G62.9    Lumbar radiculopathy M54.16    Lumbar disc disorder M51.9    Chronic bilateral low back pain with bilateral sciatica M54.42, M54.41, G89.29    PRATIK (generalized anxiety disorder) F41.1    Allergy T78.40XA    Allergic rhinitis due to animal hair and dander J30.81    Dental caries K02.9       Past Medical History:        Diagnosis Date    Arthritis     Asthma     Carpal tunnel syndrome on right     Depression     Fibromyalgia     GERD (gastroesophageal reflux disease)     Hepatitis C 2016    treated     Hyperlipidemia     Hypertension     Overactive bladder        Past Surgical History:        Procedure Laterality Date    ANESTHESIA NERVE BLOCK N/A 3/26/2019    L4-5 EPIDURAL STEROID INJECTION performed by Mica Iniguez DO at [address]      ENDOSCOPY, COLON, DIAGNOSTIC      EPIDURAL STEROID INJECTION N/A 5/16/2019    L4-5 EPIDURAL STEROID INJECTION performed by Mica Iniguez DO at [facility]  03/26/2019    with sedation    OVARY REMOVAL      1981    SHOULDER ARTHROSCOPY Right        Social History:    Social History     Tobacco Use    Smoking status: Current Every Day Smoker     Packs/day: 0.50     Years: 45.00     Pack years: 22.50     Types: Cigarettes    Smokeless tobacco: Never Used   Substance Use Topics    Alcohol use: Yes     Comment: socially                                Ready to quit: Not Answered  Counseling given: Not Answered      Vital Signs (Current):   Vitals:    06/03/19 1401 06/05/19 1021   BP:  119/77   Pulse:  59   Resp:  16   Temp:  97.6 °F (36.4 °C)   TempSrc:  Temporal   SpO2:  97%   Weight: 130 lb (59 kg) 130 lb (59 kg)   Height: 5' 2\" (1.575 m) 5' 2\" (1.575 m)                                              BP Readings from Last 3 Encounters:   06/05/19 119/77   05/30/19 116/80   05/30/19 110/70       NPO Status: Time of last liquid consumption: 2300                        Time of last solid consumption: 2300                        Date of last liquid consumption: 06/04/19                        Date of last solid food consumption: 06/04/19    BMI:   Wt Readings from Last 3 Encounters:   06/05/19 130 lb (59 kg)   05/30/19 128 lb 4 oz (58.2 kg)   05/30/19 126 lb (57.2 kg)     Body mass index is 23.78 kg/m². CBC:   Lab Results   Component Value Date    WBC 5.7 05/26/2019    RBC 4.64 05/26/2019    HGB 14.0 05/26/2019    HCT 40.7 05/26/2019    MCV 87.7 05/26/2019    RDW 13.7 05/26/2019     05/26/2019       CMP:   Lab Results   Component Value Date     05/26/2019    K 4.2 05/26/2019    CL 97 05/26/2019    CO2 26 05/26/2019    BUN 11 05/26/2019    CREATININE 0.6 05/26/2019    GFRAA >60 05/26/2019    LABGLOM >60 05/26/2019    GLUCOSE 338 05/26/2019    PROT 7.3 05/26/2019    CALCIUM 9.7 05/26/2019    BILITOT 0.5 05/26/2019    ALKPHOS 94 05/26/2019    AST 22 05/26/2019    ALT 15 05/26/2019       POC Tests: No results for input(s): POCGLU, POCNA, POCK, POCCL, POCBUN, POCHEMO, POCHCT in the last 72 hours.     Coags: No results found for: PROTIME, INR, APTT    HCG (If Applicable): No results found for: PREGTESTUR, PREGSERUM, HCG, HCGQUANT     ABGs: No results found for: PHART, PO2ART, XQQ7TGF, HER1MHV, BEART, Z8RFMAJG     Type & Screen (If Applicable):  No results found for: LABABO, [address]    Anesthesia Evaluation  Patient summary reviewed  Airway: Mallampati: III  TM distance: >3 FB   Neck ROM: full  Mouth opening: > = 3 FB Dental: normal exam         Pulmonary: breath sounds clear to auscultation  (+) asthma: current smoker (11.25 pk yrs)                           Cardiovascular:    (+) hypertension:, hyperlipidemia        Rhythm: regular             Beta Blocker:  Not on Beta Blocker      ROS comment: EKG=Normal sinus rhythm  Normal ECG  When compared with ECG of 22-JAN-2019 18:43,  Significant changes have occurred  Confirmed by Julienne Mendiola ([id number]) on 3/1/2019 6:34:57 PM     Neuro/Psych:   (+) neuromuscular disease:, psychiatric history:depression/anxiety             GI/Hepatic/Renal:   (+) GERD:, hepatitis: C, liver disease:,          ROS comment: IBS. Endo/Other: Negative Endo/Other ROS                    Abdominal:           Vascular: negative vascular ROS. Anesthesia Plan      general     ASA 3       Induction: intravenous. MIPS: Postoperative opioids intended and Prophylactic antiemetics administered. Anesthetic plan and risks discussed with patient. Plan discussed with CRNA.         [address] Bridges DO   June 5, 2019  11:06 AM

## 2019-06-05 NOTE — OP NOTE
OPERATIVE REPORT       DATE OF PROCEDURE:   6/5/2019       PREOPERATIVE DIAGNOSES:  1.  right carpal tunnel syndrome. 2.  right ulnar neuropathy at the elbow.     POSTOPERATIVE DIAGNOSES:  1.  right carpal tunnel syndrome. 2.  right ulnar neuropathy at the elbow.     PROCEDURES PERFORMED:  1.  right endoscopic carpal tunnel release. 2.  right ulnar nerve in situ decompression.     SURGEON:  Jenna Alegria M.D.     ANESTHESIA:  1. General.  2.  Local anesthetic by surgeon consisting of approximately 20 mL of 0.25%  Marcaine with epinephrine.     ASSISTANT:none     TOTAL TOURNIQUET TIME:  Approximately 9 min at 250 mmHg of brachial  tourniquet.     FINDINGS:  (1) Evidence of median nerve compression beneath the transverse  carpal ligament. It was adequately decompressed with release of the  transverse carpal ligament. (2) Direct visualization of the median nerve  confirmed decompression through the carpal tunnel as well as distal forearm. (3)  Evidence of ulnar nerve entrapment at the cubital tunnel. Status post  cubital tunnel decompression, the nerve was fully decompressed without any  significant instability.     COMPLICATIONS:  None.     DISPOSITION:  The patient remained stable throughout the procedure.     OPERATIVE INDICATIONS:  The patient presents with persistent  recalcitrant right upper extremity complaints. After failing conservative  management, she wished to proceed with surgical intervention. The risks,  benefits, alternatives, and complications were fully outlined and explained  to her including, but not limited to the risks of infection; damage to  nerve, vessels, or tendons; failure to improve her symptoms; worsening  symptoms; pillar pain; thenar pain; hypothenar pain, scar sensitivity; and  possible need for further surgery and/or therapy. She understood and  wished to proceed.     SURGERY IN DETAIL:  The patient was identified in the holding area.   Her  right upper extremity was identified as the surgical site. She was then  seen by anesthesia, taken to the operating room, placed supine on the  operating table, and underwent general anesthesia per anesthesia  department. All bony prominences were well padded. A well-padded arm  tourniquet was placed. The extremity was prepped and draped in  the standard sterile fashion. The arm was exsanguinated. The tourniquet  was inflated to 250 mmHg. Total tourniquet time was approximately one-half  hour. A transverse incision over the proximal volar wrist flexion crease was then  created from midline ulnarly for approximately 1 to 2 cm. This was followed  by blunt dissection and identification of the distal forearm fascia. This  was incised longitudinally. The underlying median nerve was then identified  and traced distally to the leading edge of the transverse carpal ligament. The median nerve was then protected and a synovial elevator was placed on the  undersurface of the transverse carpal ligament, between the undersurface of  the ligament and the median nerve and advanced from proximal to distal,  palpating the undersurface of the transverse carpal ligament, the distal  margin of the transverse carpal ligament as well as the hook of the hamate. This was then followed by sequential dilation and trial broach. With a clear  path identified, the MicroAire endoscopic SlimLine device was then advanced  in a similar fashion beneath the undersurface of the transverse carpal  ligament between the ligament and the median nerve, palpating the hook of the  hamate and clearly identifying the transverse fibers on the video monitor  from proximal to distally. The distal fat pad was identified and the blade  was deployed releasing the distal margin of the transverse carpal ligament.   The blade was retracted, confirming complete division of the ligament distally  followed by redeployment of the blade which was then brought out proximally to fully release the carpal tunnel with release of the transverse carpal  ligament. There was radial and ulnar retraction of the leaflets. A Ragnell  elevator was inserted distally, confirming complete division of the ligament  as well as decompression of the nerve throughout the visualized course. The  remaining portion of the distal forearm fascia was released under direct  visualization more proximally, fully decompressing the nerve. The wound was copiously irrigated out. The skin was closed with Nylon suture. Attention was directed towards the ulnar nerve at the elbow. A curvilinear  incision just posterior to the medial epicondyle was then created, followed  by blunt dissection, identification, and preservation of medial  antebrachial cutaneous nerve branch, which was reflected anteriorly. Proximal dissection was used to identify the ulnar nerve just posterior to  the medial intermuscular septum. The nerve was decompressed approximately  12 cm above the medial epicondyle. The nerve was then traced through the  cubital tunnel. There was a significant entrapment of the nerve through  this level, which was meticulously dissected out. The decompression was  continued distally to include the superficial and deep fascia of the FCU  tendon. The nerve was fully mobilized and decompressed. The elbow was  then flexed and extended. No significant instability was encountered. The  wound was copiously irrigated out. The tourniquet was deflated. Hemostasis achieved with bipolar cautery. Local anesthetic was infiltrated  into the soft tissues. Skin closed with 2-0 Vicryl and 3-0 Monocryl.    Mastisol, Steri-Strips, a bulky sterile dressing, and sling was applied.           Mai Buckley MD

## 2019-06-05 NOTE — ANESTHESIA POSTPROCEDURE EVALUATION
Department of Anesthesiology  Postprocedure Note    Patient: Lj Lu  MRN: [id number]  YOB: 1955  Date of evaluation: 6/5/2019  Time:  3:52 PM     Procedure Summary     Date:  06/05/19 Room / Location:  [facility] OR 02 / [facility] OR    Anesthesia Start:  1210 Anesthesia Stop:  1256    Procedures:       RIGHT ENDOSCOPIC CARPAL TUNNEL RELEASE (Right )      RIGHT ULNAR NERVE DECOMPRESSION AT ELBOW WITH POSSIBLE NERVE TRANSPOSITION (Right ) Diagnosis:  (RIGHT CARPAL TUNNEL SYNDROME ULNAR NEUROPATHY AT ELBOW OF RIGHT UPPER EXTREMITY)    Surgeon:  Nicole Pulido MD Responsible Provider:  Lizzy Miner DO    Anesthesia Type:  general ASA Status:  3          Anesthesia Type: general    Aniyah Phase I: Aniyah Score: 10    Aniyah Phase II: Aniyah Score: 10    Last vitals: Reviewed and per EMR flowsheets.        Anesthesia Post Evaluation    Patient location during evaluation: PACU  Patient participation: complete - patient participated  Level of consciousness: awake and alert  Airway patency: patent  Nausea & Vomiting: no nausea and no vomiting  Complications: no  Cardiovascular status: hemodynamically stable  Respiratory status: acceptable  Hydration status: euvolemic

## 2019-06-10 ENCOUNTER — HOSPITAL ENCOUNTER (OUTPATIENT)
Dept: PHYSICAL THERAPY | Age: 64
Setting detail: THERAPIES SERIES
Discharge: HOME | End: 2019-06-10
Payer: MEDICAID

## 2019-06-10 NOTE — PROGRESS NOTES
[facility]                Phone: [phone]   Fax: [phone]      Physical Therapy  Non compliance/Attendance Issue    DATE:   6/10/2019            MRN: [id number]     PATIENT NAME:  Luis Aviles              Diagnosis:  Cervical radiculopathy, chronic pain syndrome         Total Visits to Date: 5  Cancels/No shows to Date: 2/0    Dear Dr. Steffen Boyer MD         This is to notify you that per our physical therapy department policy your patient, noted above, is being discharged from Physical Therapy due to the following reason(s):     · If a Physical Therapy out patient is absent from three consecutive scheduled appointments without notification    · If a Physical Therapy out patient is absent for 50% of the scheduled visits     · Pt's last contact with PT department was on 5/9/19. Has since had surgery with Dr. Richard Lubin and has not had any further contact with PT department since this time. If you have any questions, please feel free to contact me at [phone].     Thank You,    Electronically Signed by:   El Montiel DPT        [id number]  6/10/2019

## 2019-06-14 ENCOUNTER — OFFICE VISIT (OUTPATIENT)
Dept: ORTHOPEDIC SURGERY | Age: 64
End: 2019-06-14

## 2019-06-14 VITALS — DIASTOLIC BLOOD PRESSURE: 95 MMHG | SYSTOLIC BLOOD PRESSURE: 110 MMHG | HEART RATE: 72 BPM | TEMPERATURE: 97 F

## 2019-06-14 DIAGNOSIS — G56.21 ULNAR NEUROPATHY AT ELBOW OF RIGHT UPPER EXTREMITY: Primary | ICD-10-CM

## 2019-06-14 DIAGNOSIS — G56.03 BILATERAL CARPAL TUNNEL SYNDROME: ICD-10-CM

## 2019-06-14 PROCEDURE — 99024 POSTOP FOLLOW-UP VISIT: CPT | Performed by: PHYSICIAN ASSISTANT

## 2019-06-18 DIAGNOSIS — R09.81 SINUS CONGESTION: ICD-10-CM

## 2019-06-18 DIAGNOSIS — J30.81 ALLERGIC RHINITIS DUE TO ANIMAL HAIR AND DANDER: Primary | Chronic | ICD-10-CM

## 2019-06-18 NOTE — PROGRESS NOTES
Referral for evaluation with  for allergic rhinitis  Sinus xray and resp allergens region 5 v.o. /Roma VALLADARES

## 2019-06-19 ENCOUNTER — HOSPITAL ENCOUNTER (OUTPATIENT)
Age: 64
Discharge: HOME | End: 2019-06-19
Payer: MEDICAID

## 2019-06-19 DIAGNOSIS — B19.20 HEPATITIS C VIRUS INFECTION WITHOUT HEPATIC COMA, UNSPECIFIED CHRONICITY: ICD-10-CM

## 2019-06-19 DIAGNOSIS — R30.0 DYSURIA: ICD-10-CM

## 2019-06-19 LAB
BACTERIA: ABNORMAL /HPF
BILIRUBIN URINE: NEGATIVE
BLOOD, URINE: NEGATIVE
CLARITY: CLEAR
COLOR: YELLOW
GLUCOSE URINE: >=1000 MG/DL
KETONES, URINE: ABNORMAL MG/DL
LEUKOCYTE ESTERASE, URINE: NEGATIVE
NITRITE, URINE: NEGATIVE
PH UA: 5.5 (ref 5–9)
PROTEIN UA: NEGATIVE MG/DL
RBC UA: ABNORMAL /HPF (ref 0–2)
SPECIFIC GRAVITY UA: 1.01 (ref 1–1.03)
UROBILINOGEN, URINE: 0.2 E.U./DL
WBC UA: ABNORMAL /HPF (ref 0–5)

## 2019-06-19 PROCEDURE — 81001 URINALYSIS AUTO W/SCOPE: CPT

## 2019-06-19 PROCEDURE — 87522 HEPATITIS C REVRS TRNSCRPJ: CPT

## 2019-06-20 ENCOUNTER — HOSPITAL ENCOUNTER (OUTPATIENT)
Age: 64
Discharge: HOME | End: 2019-06-20
Payer: MEDICAID

## 2019-06-20 ENCOUNTER — HOSPITAL ENCOUNTER (OUTPATIENT)
Dept: ULTRASOUND IMAGING | Age: 64
Discharge: HOME | End: 2019-06-22
Payer: MEDICAID

## 2019-06-20 DIAGNOSIS — R10.2 PELVIC PAIN: ICD-10-CM

## 2019-06-20 DIAGNOSIS — J30.81 ALLERGIC RHINITIS DUE TO ANIMAL HAIR AND DANDER: Chronic | ICD-10-CM

## 2019-06-20 PROCEDURE — 76830 TRANSVAGINAL US NON-OB: CPT

## 2019-06-20 PROCEDURE — 82785 ASSAY OF IGE: CPT

## 2019-06-20 PROCEDURE — 86003 ALLG SPEC IGE CRUDE XTRC EA: CPT

## 2019-06-20 PROCEDURE — 76856 US EXAM PELVIC COMPLETE: CPT

## 2019-06-20 NOTE — PROGRESS NOTES
CLINICAL PHARMACY NOTE: MEDS TO [address] Select Patient?: Yes  Total # of Prescriptions Filled: 1   The following medications were delivered to the patient:  · Pantoprazole sodium 20mg tbec  Total # of Interventions Completed: 3  Time Spent (min): 15    Additional Documentation:

## 2019-06-20 NOTE — PROGRESS NOTES
CLINICAL PHARMACY NOTE: MEDS TO [address] Select Patient?: Yes  Total # of Prescriptions Filled: 1   The following medications were delivered to the patient:  · Pantoprazole 20  Total # of Interventions Completed: 3  Time Spent (min): 30    Additional Documentation:

## 2019-06-22 LAB
HCV QNT BY NAAT IU/ML: NOT DETECTED IU/ML
HCV QNT BY NAAT LOG IU/ML: NOT DETECTED LOG IU/ML
INTERPRETATION: NOT DETECTED

## 2019-06-25 LAB
Lab: NORMAL
REPORT: NORMAL
THIS TEST SENT TO: NORMAL

## 2019-07-08 ENCOUNTER — HOSPITAL ENCOUNTER (OUTPATIENT)
Dept: GENERAL RADIOLOGY | Age: 64
Discharge: HOME | End: 2019-07-10
Payer: MEDICAID

## 2019-07-08 ENCOUNTER — HOSPITAL ENCOUNTER (OUTPATIENT)
Age: 64
Discharge: HOME | End: 2019-07-10
Payer: MEDICAID

## 2019-07-08 DIAGNOSIS — J30.81 ALLERGIC RHINITIS DUE TO ANIMAL HAIR AND DANDER: Chronic | ICD-10-CM

## 2019-07-08 DIAGNOSIS — R09.81 SINUS CONGESTION: ICD-10-CM

## 2019-07-08 PROCEDURE — 70210 X-RAY EXAM OF SINUSES: CPT

## 2019-07-09 ENCOUNTER — TELEPHONE (OUTPATIENT)
Dept: INTERNAL MEDICINE | Age: 64
End: 2019-07-09

## 2019-07-10 ENCOUNTER — TELEPHONE (OUTPATIENT)
Dept: INTERNAL MEDICINE | Age: 64
End: 2019-07-10

## 2019-07-10 NOTE — TELEPHONE ENCOUNTER
PT returned call to Isabell Morgan RN (Dr. Phani Noguera) to make appt as new pt for Allergy Clinic. Pt states that she is not feeling well in her lungs, states that [facility] Nurse examined her this week and say that she may have pneumonia. Pt asked that Isabell Morgan, returns call to discuss. Pt asked to be called on mobile number.      .Electronically signed by Tano Bragg on 7/10/19 at 12:21 PM

## 2019-07-11 ENCOUNTER — OFFICE VISIT (OUTPATIENT)
Dept: ALLERGY | Age: 64
End: 2019-07-11
Payer: MEDICAID

## 2019-07-11 VITALS — DIASTOLIC BLOOD PRESSURE: 80 MMHG | SYSTOLIC BLOOD PRESSURE: 120 MMHG | HEART RATE: 80 BPM | TEMPERATURE: 98.4 F

## 2019-07-11 DIAGNOSIS — J45.41 MODERATE PERSISTENT ASTHMA WITH ACUTE EXACERBATION: ICD-10-CM

## 2019-07-11 DIAGNOSIS — J30.89 PERENNIAL ALLERGIC RHINITIS: Primary | ICD-10-CM

## 2019-07-11 PROCEDURE — 99212 OFFICE O/P EST SF 10 MIN: CPT | Performed by: ALLERGY & IMMUNOLOGY

## 2019-07-11 PROCEDURE — 99214 OFFICE O/P EST MOD 30 MIN: CPT | Performed by: ALLERGY & IMMUNOLOGY

## 2019-07-11 RX ORDER — FLUTICASONE PROPIONATE 50 MCG
2 SPRAY, SUSPENSION (ML) NASAL DAILY
Qty: 2 BOTTLE | Refills: 2 | Status: SHIPPED | OUTPATIENT
Start: 2019-07-11 | End: 2019-08-30 | Stop reason: CLARIF

## 2019-07-11 RX ORDER — AZITHROMYCIN 250 MG/1
250 TABLET, FILM COATED ORAL SEE ADMIN INSTRUCTIONS
Qty: 6 TABLET | Refills: 1 | Status: SHIPPED | OUTPATIENT
Start: 2019-07-11 | End: 2019-07-16

## 2019-07-11 RX ORDER — AZELASTINE 1 MG/ML
2 SPRAY, METERED NASAL NIGHTLY
Qty: 1 BOTTLE | Refills: 2 | Status: SHIPPED
Start: 2019-07-11 | End: 2021-04-07

## 2019-07-11 RX ORDER — ALBUTEROL SULFATE 90 UG/1
2 AEROSOL, METERED RESPIRATORY (INHALATION) EVERY 6 HOURS PRN
Qty: 1 INHALER | Refills: 3 | Status: SHIPPED
Start: 2019-07-11 | End: 2021-02-08 | Stop reason: SDUPTHER

## 2019-07-11 ASSESSMENT — ENCOUNTER SYMPTOMS
SINUS PAIN: 1
COUGH: 1
EYES NEGATIVE: 1
SHORTNESS OF BREATH: 1
SINUS PRESSURE: 1
WHEEZING: 1

## 2019-07-11 NOTE — PROGRESS NOTES
Subjective:      Patient ID: Anay Henderson is a 59 y.o. female. 59year old women referred from medical clinic for evaluation. Patient moved from [place] last year,has history of allergies and treatment there. patient also has hx of asthma ,using duoneb with nebulizer at home when needed. . Patient treated in past for allergic rhinitis with Flonase and allegra,states symptoms not controlled. Patient also has hx of htn, gerd,back pain and anxiety and on multiple medications. Patient has epi pen on medication list,but states never had anaphylactic reaction in past.  Patient states for past 5 days has had chest congestion,wheezing,fever and SOB. Patient is a cigarette smoker 22 pack year. Discussed with patient at length the need to stop and that will not continue allergy treatment if she continues to smoke. Patient has allergy to penicillin, states hives. Patient has GI disturbance with aspirin and Nsaids. Reviewed with patient the results of allergy testing,positive reaction to dust, but negative to all other testing. Patient would benefit with allergy injections, will initiate  trial program.  Discussed with patient results of chest  xray and abnormal sinus xray indicating paranasal sinus mucosal thickening. Will treat patient for bronchitis first,then start treatment. Will get PFT once bronchitis improves. Review of Systems   Constitutional: Positive for appetite change, fatigue and fever. HENT: Positive for congestion, ear discharge, postnasal drip, sinus pressure, sinus pain and sneezing. Eyes: Negative. Respiratory: Positive for cough, shortness of breath and wheezing. Cardiovascular: Negative. Skin: Negative. Objective:   Physical Exam   Constitutional: She is oriented to person, place, and time. She appears well-developed and well-nourished. HENT:   Head: Normocephalic.    Right Ear: External ear normal.   Left ear small amount of cerumen  Patient using debrox  Boggy turbinates     Eyes: Pupils are equal, round, and reactive to light. Conjunctivae and EOM are normal. Right eye exhibits no discharge. Left eye exhibits no discharge. Neck: Normal range of motion. Neck supple. Cardiovascular: Normal rate, regular rhythm and normal heart sounds. Pulmonary/Chest: She has wheezes. Generalized rhonchi expiration and inspiration  inspiratory wheeze   Neurological: She is alert and oriented to person, place, and time. Skin: Skin is warm and dry.        Assessment:       asthmatic bronchitis  Perennial allergic rhinitis        Plan:      Increase liquids  zpack now and repeat x1  Albuterol inhaler 2 puffs every 6 as if needed rescue inhaler, if use record and bring record with you on next visit  Azelastine 2 whiffs at bedtime  Fluticasone 2 whiffs in am and for next 5 days 2 whiffs afternoon  Saline nasal spray three times daily  STOP SMOKING  See PCP next week for F/U bronchitis  Next visit 1 month to start allergy injections  PFT in future        Weston Richard MD

## 2019-07-16 ENCOUNTER — OFFICE VISIT (OUTPATIENT)
Dept: FAMILY MEDICINE CLINIC | Age: 64
End: 2019-07-16
Payer: MEDICAID

## 2019-07-16 VITALS
SYSTOLIC BLOOD PRESSURE: 112 MMHG | OXYGEN SATURATION: 99 % | HEART RATE: 90 BPM | DIASTOLIC BLOOD PRESSURE: 84 MMHG | BODY MASS INDEX: 21.17 KG/M2 | TEMPERATURE: 97.8 F | HEIGHT: 64 IN | WEIGHT: 124 LBS | RESPIRATION RATE: 16 BRPM

## 2019-07-16 DIAGNOSIS — J30.81 ALLERGIC RHINITIS DUE TO ANIMAL HAIR AND DANDER: ICD-10-CM

## 2019-07-16 DIAGNOSIS — I10 ESSENTIAL HYPERTENSION: ICD-10-CM

## 2019-07-16 DIAGNOSIS — R73.02 IMPAIRED GLUCOSE TOLERANCE: Primary | ICD-10-CM

## 2019-07-16 DIAGNOSIS — G56.00 CARPAL TUNNEL SYNDROME, UNSPECIFIED LATERALITY: ICD-10-CM

## 2019-07-16 DIAGNOSIS — H61.22 IMPACTED CERUMEN OF LEFT EAR: ICD-10-CM

## 2019-07-16 LAB
CREATININE URINE POCT: NORMAL
HBA1C MFR BLD: 12.5 %
MICROALBUMIN/CREAT 24H UR: NORMAL MG/G{CREAT}
MICROALBUMIN/CREAT UR-RTO: NORMAL

## 2019-07-16 PROCEDURE — 69209 REMOVE IMPACTED EAR WAX UNI: CPT | Performed by: NURSE PRACTITIONER

## 2019-07-16 PROCEDURE — 82044 UR ALBUMIN SEMIQUANTITATIVE: CPT | Performed by: NURSE PRACTITIONER

## 2019-07-16 PROCEDURE — 83036 HEMOGLOBIN GLYCOSYLATED A1C: CPT | Performed by: NURSE PRACTITIONER

## 2019-07-16 PROCEDURE — 99214 OFFICE O/P EST MOD 30 MIN: CPT | Performed by: NURSE PRACTITIONER

## 2019-07-16 ASSESSMENT — ENCOUNTER SYMPTOMS
WHEEZING: 0
NAUSEA: 0
COUGH: 0
SINUS PAIN: 0
COLOR CHANGE: 0
EYE PAIN: 0
DIARRHEA: 0
CONSTIPATION: 0
CHEST TIGHTNESS: 0
VOMITING: 0
SHORTNESS OF BREATH: 0

## 2019-07-16 NOTE — PROGRESS NOTES
HPI:  Patient comes in today for   Chief Complaint   Patient presents with    Sinusitis     ongoing since March 2019. Navarro Reynoso has been feeling better since her surgery, though her sugar is up, she has been trying to watch her diet & exercise more in the evenings when the air is good. She has seen the allergist.    Prior to Visit Medications    Medication Sig Taking? Authorizing Provider   azithromycin (ZITHROMAX) 250 MG tablet Take 1 tablet by mouth See Admin Instructions for 5 days 500mg on day 1 followed by 250mg on days 2 - 5 Yes Silvia Robles MD   azelastine (ASTELIN) 0.1 % nasal spray 2 sprays by Nasal route nightly Use in each nostril as directed Yes Silvia Robles MD   fluticasone (FLONASE) 50 MCG/ACT nasal spray 2 sprays by Each Nostril route daily Yes Silvia Robles MD   albuterol sulfate  (90 Base) MCG/ACT inhaler Inhale 2 puffs into the lungs every 6 hours as needed for Wheezing Rescue inhaler Yes Silvia Robles MD   pantoprazole (PROTONIX) 20 MG tablet Take 1 tablet by mouth daily Yes Chan Guillaume MD   ondansetron (ZOFRAN ODT) 4 MG disintegrating tablet Take 1 tablet by mouth every 8 hours as needed for Nausea or Vomiting Yes Chan Guillaume MD   amLODIPine (NORVASC) 10 MG tablet Take 1 tablet by mouth daily Yes Master Gabriel MD   amitriptyline (ELAVIL) 50 MG tablet Take 1 tablet by mouth nightly Yes Jayesh Walters MD   atorvastatin (LIPITOR) 40 MG tablet Take 1 tablet by mouth daily Yes Jayesh Walters MD   EPINEPHrine (EPIPEN) 0.3 MG/0.3ML SOAJ injection Use as directed for allergic reaction Yes Jayesh Walters MD   sodium chloride (ALTAMIST SPRAY) 0.65 % nasal spray 1 spray by Nasal route as needed for Congestion Yes Jayesh Walters MD   gabapentin (NEURONTIN) 300 MG capsule Take 300 mg by mouth 3 times daily.  Instructed to take am of procedure Yes Historical Provider, MD   cyclobenzaprine (FLEXERIL) 10 MG tablet Take 5 mg by mouth 3 times daily  Yes Historical Provider, MD   Biotin 1000 MCG

## 2019-07-18 ENCOUNTER — OFFICE VISIT (OUTPATIENT)
Dept: ORTHOPEDIC SURGERY | Age: 64
End: 2019-07-18

## 2019-07-18 VITALS
SYSTOLIC BLOOD PRESSURE: 142 MMHG | TEMPERATURE: 97.8 F | WEIGHT: 124 LBS | BODY MASS INDEX: 22.82 KG/M2 | DIASTOLIC BLOOD PRESSURE: 96 MMHG | RESPIRATION RATE: 20 BRPM | HEART RATE: 90 BPM | HEIGHT: 62 IN

## 2019-07-18 DIAGNOSIS — G56.21 ULNAR NEUROPATHY AT ELBOW OF RIGHT UPPER EXTREMITY: Primary | ICD-10-CM

## 2019-07-18 DIAGNOSIS — G56.03 BILATERAL CARPAL TUNNEL SYNDROME: ICD-10-CM

## 2019-07-18 PROCEDURE — 99024 POSTOP FOLLOW-UP VISIT: CPT | Performed by: ORTHOPAEDIC SURGERY

## 2019-07-26 ENCOUNTER — TELEPHONE (OUTPATIENT)
Dept: FAMILY MEDICINE CLINIC | Age: 64
End: 2019-07-26

## 2019-07-26 DIAGNOSIS — T36.95XA ANTIBIOTIC-INDUCED YEAST INFECTION: Primary | ICD-10-CM

## 2019-07-26 DIAGNOSIS — B37.9 ANTIBIOTIC-INDUCED YEAST INFECTION: Primary | ICD-10-CM

## 2019-07-26 RX ORDER — FLUCONAZOLE 150 MG/1
150 TABLET ORAL ONCE
Qty: 1 TABLET | Refills: 0 | Status: SHIPPED | OUTPATIENT
Start: 2019-07-26 | End: 2019-07-26

## 2019-07-26 NOTE — TELEPHONE ENCOUNTER
Jung Files called in and is asking if you could please call her in something for a yeast infection, she states that she was on Cipro and it has now given her one.   Thank you

## 2019-07-30 ENCOUNTER — TELEPHONE (OUTPATIENT)
Dept: FAMILY MEDICINE CLINIC | Age: 64
End: 2019-07-30

## 2019-08-02 ENCOUNTER — HOSPITAL ENCOUNTER (OUTPATIENT)
Dept: OCCUPATIONAL THERAPY | Age: 64
Setting detail: THERAPIES SERIES
Discharge: HOME | End: 2019-08-02
Payer: MEDICAID

## 2019-08-08 ENCOUNTER — OFFICE VISIT (OUTPATIENT)
Dept: ALLERGY | Age: 64
End: 2019-08-08
Payer: MEDICAID

## 2019-08-08 ENCOUNTER — TELEPHONE (OUTPATIENT)
Dept: ALLERGY | Age: 64
End: 2019-08-08

## 2019-08-08 ENCOUNTER — HOSPITAL ENCOUNTER (OUTPATIENT)
Age: 64
Discharge: HOME | End: 2019-08-08
Payer: MEDICAID

## 2019-08-08 VITALS — TEMPERATURE: 98 F

## 2019-08-08 DIAGNOSIS — J30.89 PERENNIAL ALLERGIC RHINITIS: ICD-10-CM

## 2019-08-08 DIAGNOSIS — Z72.0 TOBACCO ABUSE: ICD-10-CM

## 2019-08-08 DIAGNOSIS — J45.41 MODERATE PERSISTENT ASTHMA WITH ACUTE EXACERBATION: Primary | ICD-10-CM

## 2019-08-08 LAB
ALBUMIN SERPL-MCNC: 4.5 G/DL (ref 3.5–5.2)
ALP BLD-CCNC: 85 U/L (ref 35–104)
ALT SERPL-CCNC: 25 U/L (ref 0–32)
ANION GAP SERPL CALCULATED.3IONS-SCNC: 15 MMOL/L (ref 7–16)
AST SERPL-CCNC: 25 U/L (ref 0–31)
BASOPHILS ABSOLUTE: 0.02 E9/L (ref 0–0.2)
BASOPHILS RELATIVE PERCENT: 0.3 % (ref 0–2)
BILIRUB SERPL-MCNC: 0.6 MG/DL (ref 0–1.2)
BUN BLDV-MCNC: 8 MG/DL (ref 8–23)
CALCIUM SERPL-MCNC: 9.9 MG/DL (ref 8.6–10.2)
CHLORIDE BLD-SCNC: 104 MMOL/L (ref 98–107)
CO2: 24 MMOL/L (ref 22–29)
CREAT SERPL-MCNC: 0.5 MG/DL (ref 0.5–1)
EOSINOPHILS ABSOLUTE: 0.09 E9/L (ref 0.05–0.5)
EOSINOPHILS RELATIVE PERCENT: 1.6 % (ref 0–6)
GFR AFRICAN AMERICAN: >60
GFR NON-AFRICAN AMERICAN: >60 ML/MIN/1.73
GLUCOSE BLD-MCNC: 170 MG/DL (ref 74–99)
HCT VFR BLD CALC: 44.1 % (ref 34–48)
HEMOGLOBIN: 14.6 G/DL (ref 11.5–15.5)
IMMATURE GRANULOCYTES #: 0.01 E9/L
IMMATURE GRANULOCYTES %: 0.2 % (ref 0–5)
LYMPHOCYTES ABSOLUTE: 1.95 E9/L (ref 1.5–4)
LYMPHOCYTES RELATIVE PERCENT: 33.6 % (ref 20–42)
MCH RBC QN AUTO: 29.9 PG (ref 26–35)
MCHC RBC AUTO-ENTMCNC: 33.1 % (ref 32–34.5)
MCV RBC AUTO: 90.2 FL (ref 80–99.9)
MONOCYTES ABSOLUTE: 0.37 E9/L (ref 0.1–0.95)
MONOCYTES RELATIVE PERCENT: 6.4 % (ref 2–12)
NEUTROPHILS ABSOLUTE: 3.36 E9/L (ref 1.8–7.3)
NEUTROPHILS RELATIVE PERCENT: 57.9 % (ref 43–80)
PDW BLD-RTO: 14.3 FL (ref 11.5–15)
PLATELET # BLD: 166 E9/L (ref 130–450)
PMV BLD AUTO: 11 FL (ref 7–12)
POTASSIUM SERPL-SCNC: 4 MMOL/L (ref 3.5–5)
RBC # BLD: 4.89 E12/L (ref 3.5–5.5)
SODIUM BLD-SCNC: 143 MMOL/L (ref 132–146)
T3 TOTAL: 118.6 NG/DL (ref 80–200)
T4 FREE: 1.24 NG/DL (ref 0.93–1.7)
TOTAL PROTEIN: 8 G/DL (ref 6.4–8.3)
TSH SERPL DL<=0.05 MIU/L-ACNC: 1.56 UIU/ML (ref 0.27–4.2)
WBC # BLD: 5.8 E9/L (ref 4.5–11.5)

## 2019-08-08 PROCEDURE — 36415 COLL VENOUS BLD VENIPUNCTURE: CPT

## 2019-08-08 PROCEDURE — 99213 OFFICE O/P EST LOW 20 MIN: CPT | Performed by: ALLERGY & IMMUNOLOGY

## 2019-08-08 PROCEDURE — 84480 ASSAY TRIIODOTHYRONINE (T3): CPT

## 2019-08-08 PROCEDURE — 84443 ASSAY THYROID STIM HORMONE: CPT

## 2019-08-08 PROCEDURE — 84439 ASSAY OF FREE THYROXINE: CPT

## 2019-08-08 PROCEDURE — 85025 COMPLETE CBC W/AUTO DIFF WBC: CPT

## 2019-08-08 PROCEDURE — 80053 COMPREHEN METABOLIC PANEL: CPT

## 2019-08-08 PROCEDURE — 95117 IMMUNOTHERAPY INJECTIONS: CPT | Performed by: ALLERGY & IMMUNOLOGY

## 2019-08-08 ASSESSMENT — ENCOUNTER SYMPTOMS
RESPIRATORY NEGATIVE: 1
EYES NEGATIVE: 1

## 2019-08-08 NOTE — PATIENT INSTRUCTIONS
Please wait 30 minutes after allergy injection. Please call if any questions or concerns. Continue medications as directed.

## 2019-08-08 NOTE — TELEPHONE ENCOUNTER
Spoke with Westley Moss she understands and will try and talk to her back doctor about getting one, then will talk to you further at her next appointment.

## 2019-08-09 ENCOUNTER — HOSPITAL ENCOUNTER (OUTPATIENT)
Dept: OCCUPATIONAL THERAPY | Age: 64
Setting detail: THERAPIES SERIES
Discharge: HOME | End: 2019-08-09
Payer: MEDICAID

## 2019-08-09 PROCEDURE — 97165 OT EVAL LOW COMPLEX 30 MIN: CPT | Performed by: OCCUPATIONAL THERAPIST

## 2019-08-09 NOTE — PROGRESS NOTES
[x] Strength [x]  Cognition []  Functional transfers  [] IADLs [] Safety Awareness []  Endurance [x]  Fine Motor Coordination [x] Balance [] Vision/perception [] Sensation []   Gross Motor Coordination [] ROM []     Eval Complexity: LOW level eval charged  Profile and History- History from pt and physician notes  Assessment of Occupational Performance and Identification of Deficits- Pt has decrease functional use of her R UE 2* to ^'ed pain and edema and decreased strength. Clinical Decision Making- no adaptations needed. Rehab Potential:                                 [x] Good  [] Fair  [] Poor        Suggested Professional Referral:       [x] No  [] Yes:  Barriers to Goal Achievement[de-identified]          [x] No  [] Yes:  Domestic Concerns:                           [x] No  [] Yes:     Goal Formulation: Patient  Time In: 11:10            Time Out: 11:55                      Timed Code Treatment Minutes: 45 minutes        PLAN      Treatment to include:   [x] Instruction in HEP                   Modalities:  [x] Therapeutic Exercise [x] Ultrasound      [x] Electrical Stimulation/Attended  [x] PROM/Stretching                   [x] Fluidotherapy          [x]  Paraffin                   []AAROM  [x] AROM             [] Iontophoresis: 4 mg/mL;  Dexamethasone Sodium           [] Desensitization                               [] Neuromuscular Re-education    [] Splinting       [x] Therapeutic Activity            [x] Pain Management with/without modalities PRN                 [x] Manual Therapy/Fascial release        [] ADL/IADL re-training        [x] Tendon Glides                   []Joint Protection/Training  []Ergonomics                             [x] Joint Mobilization        []Adaptive Equipment Assessment/Training                             [x] Manual Edema Mobilization    [] Energy Conservation/Work Simplification  [x] GM/FM Coordination  []  Safety retraining/education per  individual diagnosis/goals  Scar

## 2019-08-12 ENCOUNTER — HOSPITAL ENCOUNTER (OUTPATIENT)
Dept: PULMONOLOGY | Age: 64
Discharge: HOME | End: 2019-08-12
Payer: MEDICAID

## 2019-08-12 DIAGNOSIS — J45.41 MODERATE PERSISTENT ASTHMA WITH ACUTE EXACERBATION: ICD-10-CM

## 2019-08-12 PROCEDURE — 94060 EVALUATION OF WHEEZING: CPT | Performed by: INTERNAL MEDICINE

## 2019-08-12 PROCEDURE — 94726 PLETHYSMOGRAPHY LUNG VOLUMES: CPT | Performed by: INTERNAL MEDICINE

## 2019-08-12 PROCEDURE — 94729 DIFFUSING CAPACITY: CPT

## 2019-08-12 PROCEDURE — 94729 DIFFUSING CAPACITY: CPT | Performed by: INTERNAL MEDICINE

## 2019-08-12 PROCEDURE — 94060 EVALUATION OF WHEEZING: CPT

## 2019-08-12 PROCEDURE — 94726 PLETHYSMOGRAPHY LUNG VOLUMES: CPT

## 2019-08-14 ENCOUNTER — HOSPITAL ENCOUNTER (OUTPATIENT)
Dept: OCCUPATIONAL THERAPY | Age: 64
Setting detail: THERAPIES SERIES
Discharge: HOME | End: 2019-08-14
Payer: MEDICAID

## 2019-08-14 PROCEDURE — 97022 WHIRLPOOL THERAPY: CPT | Performed by: OCCUPATIONAL THERAPIST

## 2019-08-14 PROCEDURE — 97140 MANUAL THERAPY 1/> REGIONS: CPT | Performed by: OCCUPATIONAL THERAPIST

## 2019-08-14 PROCEDURE — 97035 APP MDLTY 1+ULTRASOUND EA 15: CPT | Performed by: OCCUPATIONAL THERAPIST

## 2019-08-14 PROCEDURE — 97110 THERAPEUTIC EXERCISES: CPT | Performed by: OCCUPATIONAL THERAPIST

## 2019-08-15 ENCOUNTER — HOSPITAL ENCOUNTER (OUTPATIENT)
Dept: OCCUPATIONAL THERAPY | Age: 64
Setting detail: THERAPIES SERIES
Discharge: HOME | End: 2019-08-15
Payer: MEDICAID

## 2019-08-15 PROCEDURE — 97140 MANUAL THERAPY 1/> REGIONS: CPT

## 2019-08-15 PROCEDURE — 97022 WHIRLPOOL THERAPY: CPT

## 2019-08-15 PROCEDURE — 97035 APP MDLTY 1+ULTRASOUND EA 15: CPT

## 2019-08-15 PROCEDURE — 97110 THERAPEUTIC EXERCISES: CPT

## 2019-08-19 ENCOUNTER — HOSPITAL ENCOUNTER (OUTPATIENT)
Dept: OCCUPATIONAL THERAPY | Age: 64
Setting detail: THERAPIES SERIES
Discharge: HOME | End: 2019-08-19
Payer: MEDICAID

## 2019-08-19 PROCEDURE — 97140 MANUAL THERAPY 1/> REGIONS: CPT | Performed by: OCCUPATIONAL THERAPIST

## 2019-08-19 PROCEDURE — 97110 THERAPEUTIC EXERCISES: CPT | Performed by: OCCUPATIONAL THERAPIST

## 2019-08-19 PROCEDURE — 97022 WHIRLPOOL THERAPY: CPT | Performed by: OCCUPATIONAL THERAPIST

## 2019-08-20 ENCOUNTER — APPOINTMENT (OUTPATIENT)
Dept: OCCUPATIONAL THERAPY | Age: 64
End: 2019-08-20
Payer: MEDICAID

## 2019-08-21 ENCOUNTER — HOSPITAL ENCOUNTER (OUTPATIENT)
Dept: NEUROLOGY | Age: 64
Discharge: HOME | End: 2019-08-21
Payer: MEDICAID

## 2019-08-21 VITALS — HEIGHT: 62 IN | BODY MASS INDEX: 22.63 KG/M2 | WEIGHT: 123 LBS

## 2019-08-21 DIAGNOSIS — G62.9 NEUROPATHY: ICD-10-CM

## 2019-08-21 DIAGNOSIS — G56.03 BILATERAL CARPAL TUNNEL SYNDROME: ICD-10-CM

## 2019-08-21 PROCEDURE — 95886 MUSC TEST DONE W/N TEST COMP: CPT | Performed by: PHYSICAL MEDICINE & REHABILITATION

## 2019-08-21 PROCEDURE — 95912 NRV CNDJ TEST 11-12 STUDIES: CPT | Performed by: PHYSICAL MEDICINE & REHABILITATION

## 2019-08-21 PROCEDURE — 95913 NRV CNDJ TEST 13/> STUDIES: CPT

## 2019-08-21 PROCEDURE — 95886 MUSC TEST DONE W/N TEST COMP: CPT

## 2019-08-22 ENCOUNTER — OFFICE VISIT (OUTPATIENT)
Dept: ALLERGY | Age: 64
End: 2019-08-22
Payer: MEDICAID

## 2019-08-22 DIAGNOSIS — J30.89 PERENNIAL ALLERGIC RHINITIS: ICD-10-CM

## 2019-08-22 PROCEDURE — 99211 OFF/OP EST MAY X REQ PHY/QHP: CPT | Performed by: ALLERGY & IMMUNOLOGY

## 2019-08-22 PROCEDURE — 95117 IMMUNOTHERAPY INJECTIONS: CPT | Performed by: ALLERGY & IMMUNOLOGY

## 2019-08-22 PROCEDURE — 99213 OFFICE O/P EST LOW 20 MIN: CPT | Performed by: ALLERGY & IMMUNOLOGY

## 2019-08-22 ASSESSMENT — ENCOUNTER SYMPTOMS
EYE DISCHARGE: 0
EYE REDNESS: 0
EYE ITCHING: 0
SHORTNESS OF BREATH: 0
SINUS PAIN: 0
WHEEZING: 0
SINUS PRESSURE: 0
COUGH: 0

## 2019-08-22 NOTE — PROGRESS NOTES
Subjective:      Patient ID: Anay Henderson is a 59 y.o. female. Here for follow up sinusitis and allergy injection. Patient finished course of azithromycin and symptoms have improved. will continue with allergy injections as discussed. reviewed patient medical record results from [address] [place] [address] when living in [place]. Patient instructed to continue azelastine as instructed at bedtime and flonase in am.patint also instructed to continue saline nasal spray. Spoke again at length with patient regarding smoking. patient agrees to go to smoking cessation program.      Review of Systems   HENT: Negative for congestion, sinus pressure, sinus pain and sneezing. Eyes: Negative for discharge, redness and itching. Respiratory: Negative for cough, shortness of breath and wheezing. Objective:   Physical Exam   Constitutional: She appears well-developed. Eyes: Pupils are equal, round, and reactive to light. EOM are normal.   Neck: Normal range of motion. Neck supple. Cardiovascular: Normal rate. Pulmonary/Chest: Effort normal and breath sounds normal.   Skin: Skin is dry. Psychiatric: She has a normal mood and affect.        Assessment:        Perennial allergic rhinitis    Resolved sinusitis      Plan:       continue current treatment plan   continue allergy injections as ordered   stop smoking   return 2 weeks        Gilbert Jessica MD

## 2019-08-23 ENCOUNTER — HOSPITAL ENCOUNTER (OUTPATIENT)
Dept: OCCUPATIONAL THERAPY | Age: 64
Setting detail: THERAPIES SERIES
Discharge: HOME | End: 2019-08-23
Payer: MEDICAID

## 2019-08-23 PROCEDURE — 97035 APP MDLTY 1+ULTRASOUND EA 15: CPT | Performed by: OCCUPATIONAL THERAPIST

## 2019-08-23 PROCEDURE — 97140 MANUAL THERAPY 1/> REGIONS: CPT | Performed by: OCCUPATIONAL THERAPIST

## 2019-08-23 PROCEDURE — 97110 THERAPEUTIC EXERCISES: CPT | Performed by: OCCUPATIONAL THERAPIST

## 2019-08-29 ENCOUNTER — TELEPHONE (OUTPATIENT)
Dept: OBGYN | Age: 64
End: 2019-08-29

## 2019-08-30 ENCOUNTER — OFFICE VISIT (OUTPATIENT)
Dept: FAMILY MEDICINE CLINIC | Age: 64
End: 2019-08-30
Payer: MEDICAID

## 2019-08-30 VITALS
OXYGEN SATURATION: 98 % | WEIGHT: 123 LBS | RESPIRATION RATE: 12 BRPM | HEIGHT: 62 IN | BODY MASS INDEX: 22.63 KG/M2 | DIASTOLIC BLOOD PRESSURE: 80 MMHG | TEMPERATURE: 98 F | HEART RATE: 60 BPM | SYSTOLIC BLOOD PRESSURE: 120 MMHG

## 2019-08-30 DIAGNOSIS — H61.22 IMPACTED CERUMEN OF LEFT EAR: Primary | ICD-10-CM

## 2019-08-30 DIAGNOSIS — E11.65 TYPE 2 DIABETES MELLITUS WITH HYPERGLYCEMIA, WITHOUT LONG-TERM CURRENT USE OF INSULIN (HCC): ICD-10-CM

## 2019-08-30 DIAGNOSIS — I10 ESSENTIAL HYPERTENSION: ICD-10-CM

## 2019-08-30 DIAGNOSIS — R73.02 IMPAIRED GLUCOSE TOLERANCE: ICD-10-CM

## 2019-08-30 PROCEDURE — 99213 OFFICE O/P EST LOW 20 MIN: CPT | Performed by: NURSE PRACTITIONER

## 2019-08-30 RX ORDER — IPRATROPIUM BROMIDE AND ALBUTEROL SULFATE 2.5; .5 MG/3ML; MG/3ML
1 SOLUTION RESPIRATORY (INHALATION) EVERY 4 HOURS
COMMUNITY
End: 2021-10-18 | Stop reason: SDUPTHER

## 2019-08-30 RX ORDER — SENNA AND DOCUSATE SODIUM 50; 8.6 MG/1; MG/1
1 TABLET, FILM COATED ORAL DAILY
COMMUNITY
End: 2019-10-25 | Stop reason: ALTCHOICE

## 2019-08-30 ASSESSMENT — ENCOUNTER SYMPTOMS
NAUSEA: 0
SINUS PAIN: 0
SHORTNESS OF BREATH: 0
EYE PAIN: 0
COLOR CHANGE: 0
DIARRHEA: 0
VOMITING: 0
COUGH: 0
WHEEZING: 0
CONSTIPATION: 0
CHEST TIGHTNESS: 0

## 2019-08-30 NOTE — PROGRESS NOTES
HPI:  Patient comes in today for   Chief Complaint   Patient presents with    1 Month Follow-Up   . She has not quit smoking, but is trying. Her allergies have been improving since using her Flonase, Azelastine at night, and taking all her medications. She is working on the weekends. She takes her inhaler for walks in the park or the heat. She is taking her metformin 1x, which she says does not upset her stomach as much. She has followed up with GI & is recovered from her carpal tunnel surgery, still having issues with shoulders & arm numbness is following up with Dr. Adair Lubin for that in early September. She is worried about water shut off, she worries about her bills because her medicare does not pay very much. However, she is feeling more healthy these days! Prior to Visit Medications    Medication Sig Taking?  Authorizing Provider   sennosides-docusate sodium (SENOKOT-S) 8.6-50 MG tablet Take 1 tablet by mouth daily Yes Historical Provider, MD   ipratropium-albuterol (DUONEB) 0.5-2.5 (3) MG/3ML SOLN nebulizer solution Inhale 1 vial into the lungs every 4 hours Yes Historical Provider, MD   metFORMIN (GLUCOPHAGE) 500 MG tablet Take 1 tablet by mouth 2 times daily (with meals) Yes KAIT Lim CNP   azelastine (ASTELIN) 0.1 % nasal spray 2 sprays by Nasal route nightly Use in each nostril as directed Yes Jessica Blount MD   albuterol sulfate  (90 Base) MCG/ACT inhaler Inhale 2 puffs into the lungs every 6 hours as needed for Wheezing Rescue inhaler Yes Jessica Blount MD   pantoprazole (PROTONIX) 20 MG tablet Take 1 tablet by mouth daily  Patient taking differently: Take 40 mg by mouth daily  Yes Armando Colon MD   ondansetron (ZOFRAN ODT) 4 MG disintegrating tablet Take 1 tablet by mouth every 8 hours as needed for Nausea or Vomiting Yes Armando Colon MD   amLODIPine (NORVASC) 10 MG tablet Take 1 tablet by mouth daily Yes Suzi Saldana MD   amitriptyline (ELAVIL) 50 MG headaches. ). Negative for dizziness and light-headedness. Hematological: Negative for adenopathy. Does not bruise/bleed easily. Psychiatric/Behavioral: Negative for agitation, decreased concentration, sleep disturbance and suicidal ideas. The patient is not nervous/anxious. VS:  /80 (Site: Left Upper Arm, Position: Sitting, Cuff Size: Medium Adult)   Pulse 60   Temp 98 °F (36.7 °C)   Resp 12   Ht 5' 2\" (1.575 m)   Wt 123 lb (55.8 kg)   SpO2 98%   BMI 22.50 kg/m²     Physical Exam    Physical Exam   Constitutional: She is oriented to person, place, and time. She appears well-developed and well-nourished. No distress. HENT:   Head: Normocephalic and atraumatic. Eyes: Pupils are equal, round, and reactive to light. EOM are normal.   Neck: Normal range of motion. Neck supple. No thyromegaly present. Cardiovascular: Normal rate, regular rhythm, normal heart sounds and intact distal pulses. No murmur heard. Pulmonary/Chest: Effort normal and breath sounds normal. No respiratory distress. She has no wheezes. Abdominal: Soft. Bowel sounds are normal. She exhibits no distension. There is no tenderness. Genitourinary:   Genitourinary Comments: Deferred. Musculoskeletal: Normal range of motion. She exhibits tenderness (Right wrist). She exhibits no edema. Lymphadenopathy:     She has no cervical adenopathy. Neurological: She is alert and oriented to person, place, and time. No sensory deficit. She exhibits normal muscle tone. Coordination normal.   Skin: Skin is warm and dry. Capillary refill takes less than 2 seconds. She is not diaphoretic. Psychiatric: She has a normal mood and affect. Her behavior is normal. Judgment and thought content normal.   Nursing note and vitals reviewed. Health Maintenance    BP Readings from Last 1 Encounters:   08/30/19 120/80    Recheck if >140/90  Hemoglobin A1C (%)   Date Value   07/16/2019 12.5     No results found for: LABMICR    Plan:     [facility] was seen today for 1 month follow-up. Diagnoses and all orders for this visit:    Impacted cerumen of left ear  - Jung Scott has ear wax removal drops at home, she will try to use them and RTO PRN for irrigation if necessary, she is advised to continue to refrain from using q-tips    Impaired glucose tolerance  -     metFORMIN (GLUCOPHAGE) 500 MG tablet; Take 1 tablet by mouth 2 times daily (with meals)  - Jung Scott will take her Metformin 2x a day instead of the 1x a day, as well as continue to watch her diet & exercise. Essential hypertension  - The current medical regimen is effective;  continue present plan and medications. Type 2 diabetes mellitus with hyperglycemia, without long-term current use of insulin ([facility] [address]  - Jung Scott will RTO in 3 months for an A1C check & we will discuss a more aggressive treatment regimen for her diabetes.   - In the meantime, Jung Scott has begun to eat better, is more physically active & trying to watch her carbohydrate intake    - Jung Scott does not need any refills at today's appointment, she will notify the office when she does, we will use 90 day supplies for her medications    Greater than 20 Minutes was spent with patient and more than 50% of the time was spent face to facecounseling and educating regarding diagnoses

## 2019-09-09 ENCOUNTER — HOSPITAL ENCOUNTER (OUTPATIENT)
Dept: OCCUPATIONAL THERAPY | Age: 64
Setting detail: THERAPIES SERIES
Discharge: HOME | End: 2019-09-09
Payer: MEDICAID

## 2019-09-10 NOTE — PROCEDURES
[address]                  [address] [facility],  [facility]                               PULMONARY FUNCTION    PATIENT NAME: Ene Ronquillo                     :        1955  MED REC NO:   [id number]                            ROOM:  ACCOUNT NO:   [de-identified]                           ADMIT DATE: 2019  PROVIDER:     Evert Avila MD    DATE OF PROCEDURE:  2019    FULL PFT    AGE:  63.    HEIGHT:  62.    WEIGHT:  130. YEARS OF SMOKIN.    SPIROMETRY:  1.  FVC 2.62 which is 106% of predicted. 2.  FEV1 2.14 which is 109% of predicted. 3.  There is no bronchodilator response. 4.  FEV1/FVC 82.  5.  MVV 51 which is 58% of predicted. LUNG VOLUMES:  1. Slow vital capacity 2.73 which is 97% of predicted. 2.  ERV 1.03 which is 141% of predicted. 3.  RV 1.44 which is 74%. 4.  TLC 4.17 which is 87% of predicted. 5.  RV/TLC 35 which is 84%. 6.  Diffusion capacity 23.83 which is 110% and corrects for alveolar  volume. 7.  Slow volume loop, no evidence of obstruction. There is slight  decrease in the inspiratory phase of the loop suggestive of  extrathoracic obstruction, but in general, this is normal spirometry,  normal lung volume, normal diffusion capacity. Clinical and  radiological correlation is indicated.         Devi West MD    D: 2019 13:22:08       T: 2019 14:22:21     [email or web address]  Job#: [id number]     Doc#: [id number]    CC:

## 2019-09-12 ENCOUNTER — HOSPITAL ENCOUNTER (OUTPATIENT)
Dept: OCCUPATIONAL THERAPY | Age: 64
Setting detail: THERAPIES SERIES
Discharge: HOME | End: 2019-09-12
Payer: MEDICAID

## 2019-09-12 PROCEDURE — 97140 MANUAL THERAPY 1/> REGIONS: CPT

## 2019-09-12 PROCEDURE — 97110 THERAPEUTIC EXERCISES: CPT

## 2019-09-12 PROCEDURE — 97035 APP MDLTY 1+ULTRASOUND EA 15: CPT

## 2019-09-12 PROCEDURE — 97022 WHIRLPOOL THERAPY: CPT

## 2019-09-17 ENCOUNTER — HOSPITAL ENCOUNTER (OUTPATIENT)
Dept: OCCUPATIONAL THERAPY | Age: 64
Setting detail: THERAPIES SERIES
Discharge: HOME | End: 2019-09-17
Payer: MEDICAID

## 2019-09-17 PROCEDURE — 97110 THERAPEUTIC EXERCISES: CPT | Performed by: OCCUPATIONAL THERAPIST

## 2019-09-17 PROCEDURE — 97140 MANUAL THERAPY 1/> REGIONS: CPT | Performed by: OCCUPATIONAL THERAPIST

## 2019-09-20 ENCOUNTER — HOSPITAL ENCOUNTER (OUTPATIENT)
Dept: OCCUPATIONAL THERAPY | Age: 64
Setting detail: THERAPIES SERIES
Discharge: HOME | End: 2019-09-20
Payer: MEDICAID

## 2019-09-20 PROCEDURE — 97035 APP MDLTY 1+ULTRASOUND EA 15: CPT | Performed by: OCCUPATIONAL THERAPIST

## 2019-09-20 PROCEDURE — 97022 WHIRLPOOL THERAPY: CPT | Performed by: OCCUPATIONAL THERAPIST

## 2019-09-20 PROCEDURE — 97110 THERAPEUTIC EXERCISES: CPT | Performed by: OCCUPATIONAL THERAPIST

## 2019-09-20 NOTE — PROGRESS NOTES
OCCUPATIONAL THERAPY PROGRESS     Date:  2019   Initial Evaluation Date: 2019     Patient Name:  Ariel Sue                    :  1955     Restrictions/Precautions:  None noted, low fall risk  Diagnosis:  R CTR arthroscopic, R Ulnar transposition                                                         Insurance/Certification information:  Medicaid  Referring Practitioner:  Dr. Kurt Ramirez  Date of Surgery/Injury: sx 2019   Plan of care signed (Y/N):  N  Visit# / total visits:  -      Pain Level: 2/10 aching- L elbow pain    Subjective:  Pt states,   \"I am feeling much better than I was last week, I slept in the recliner all week\". Objective:  Updated POC completed on 19  INTERVENTION: COMPLETED: SPECIFICS/COMMENTS:   Modality:     fluido x 15 min   Us 3.3/.8 x 5 min- 3.3 mhz, 100% . 8 to thenar and hypothenar areas   AROM:          Tendon gliding x 3 sets x 10 reps   Nerve gliding for ulnar/med nerve     AAROM:               PROM/Stretching:     Elbow,wrist & digits all planes x    Scar massage with thumb, wrist ext stretch     Scar Mass/Edema Control:     Soft tissue mobilization & elbow / wrist joint mobs  R elbow/wrist hands on. Therapist utilized massager for assistance with edema and scar management   Desensitization  Rolling & pushing four square ball on tabletop. .. 3 min ea scar area. Strengthenin# dowel vish x Elbow ext- wrist flex, wrist ext then back into elbow flex. 5 sec hold ea position   Putty soft  x x10 gross grasp,pinches, pulls, roll    Yellow power web ex's added  Grasps, grasps & pulls, wrist /digit flexor stretches   UBE x 5 mins forward, no resistance. Red therabar ex's x Rolling and ringing, sup/pronation (2 sets x10)   Other:      Kinesio tape   2 web strips placed nga crossed over volar wrist at scar site for lymphatic & circulatory correction technique. Pt educated on wear time and how to remove if skin irritation occurs. Assessment/Comments: Pt is making Good progress toward stated plan of care. Added UBE and sup/pron with res the-bar this date with good response. Pt reports no pain at end of session.     -Rehab Potential: Good  -Requires OT Follow Up: Yes  Time In:  8:05 am       Time Out:  9:05 am            Treatment Charges: Mins Units   Modalities/fluido/ US 20 1   Ther Exercise 40 3   Manual Therapy     Thera Activities     ADL/Home Mgt      Neuro Re-education     Gait Training     Group Therapy     Non-Billable Service Time     Other     Total Time/Units 60 4        Plan:   [x]  Continues Plan of care: Treatment covered based on POC and graduated to patient's progress. Pt education continues at each visit to obtain maximum benefits from skilled OT intervention.   []  Alter Plan of care:   []  Discharge:    Estephania Wagner Leeanne Harlan 88, OTR/L #[id number]

## 2019-09-23 ENCOUNTER — OFFICE VISIT (OUTPATIENT)
Dept: ORTHOPEDIC SURGERY | Age: 64
End: 2019-09-23
Payer: MEDICAID

## 2019-09-23 VITALS
DIASTOLIC BLOOD PRESSURE: 88 MMHG | HEART RATE: 71 BPM | RESPIRATION RATE: 18 BRPM | TEMPERATURE: 97.8 F | SYSTOLIC BLOOD PRESSURE: 132 MMHG

## 2019-09-23 DIAGNOSIS — G56.21 ULNAR NEUROPATHY AT ELBOW OF RIGHT UPPER EXTREMITY: Primary | ICD-10-CM

## 2019-09-23 DIAGNOSIS — G56.22 ULNAR NEUROPATHY AT ELBOW OF LEFT UPPER EXTREMITY: ICD-10-CM

## 2019-09-23 DIAGNOSIS — G56.03 BILATERAL CARPAL TUNNEL SYNDROME: ICD-10-CM

## 2019-09-23 PROCEDURE — 99213 OFFICE O/P EST LOW 20 MIN: CPT | Performed by: ORTHOPAEDIC SURGERY

## 2019-09-23 NOTE — PROGRESS NOTES
Other Topics Concern    Not on file   Social History Narrative    Not on file     Family History   Problem Relation Age of Onset    Diabetes Mother     Cancer Mother     Alcohol Abuse Father        Skin: (-) rash,(-) psoriasis,(-) eczema, (-)skin cancer. Musculoskeletal: (-) fractures,  (-) dislocations,(-) collagen vascular disease, (-) fibromyalgia, (-) multiple sclerosis, (-) muscular dystrophy, (-) RSD,(-) joint pain (-)swelling, (-) joint pain,swelling. Neurologic: (-) epilepsy, (-)seizures,(-) brain tumor,(-) TIA, (-)stroke, (-)headaches, (-)Parkinson disease,(-) memory loss, (-) LOC. Cardiovascular: (-) Chest pain, (-) swelling in legs/feet, (-) SOB, (-) cramping in legs/feet with walking. SUBJECTIVE:      Constitutional:    The patient is alert and oriented x 3, appears to be stated age and in no distress. Right upper extremity: Nontender of the elbow and wrist.  Scar mature. Neurovascular intact. Left upper extremity: Full elbow range of motion. Hypersensitivity over the ulnar nerve through the cubital tunnel. Hypersensitive Tinel's over the carpal tunnel. Positive Phalen's maneuver. Radial, ulnar, median nerves are grossly intact. No atrophy of the hand. Intrinsic strength 5/5. Negative Wartenberg's cross finger testing. APB strength 5/5. Negative Finkelstein's and CMC grind test.  Full digital flexion extension without triggering. 2+ radial pulse. Otherwise neurovascular intact. Impression: Doing well status post right endoscopic carpal tunnel release and in situ ulnar nerve decompression. Persistent clinical findings of ulnar neuritis/cubital tunnel syndrome and carpal tunnel syndrome on the left. Plan: Today's findings were explained the patient. Given her persistent ongoing symptoms involving her left upper extremity and the good results on the right discussed possible surgery on the left depending on her symptomatology.   She like to try a course of therapy. Is certainly reasonable. Recommended repeat EMG nerve conduction studies on a yearly basis involving her left upper extremity. May follow-up as needed.

## 2019-09-24 ENCOUNTER — OFFICE VISIT (OUTPATIENT)
Dept: PAIN MANAGEMENT | Age: 64
End: 2019-09-24
Payer: MEDICAID

## 2019-09-24 VITALS
SYSTOLIC BLOOD PRESSURE: 138 MMHG | DIASTOLIC BLOOD PRESSURE: 78 MMHG | TEMPERATURE: 97.6 F | HEIGHT: 62 IN | OXYGEN SATURATION: 99 % | HEART RATE: 86 BPM | WEIGHT: 123 LBS | BODY MASS INDEX: 22.63 KG/M2 | RESPIRATION RATE: 16 BRPM

## 2019-09-24 DIAGNOSIS — Z01.818 PREOPERATIVE TESTING: Primary | ICD-10-CM

## 2019-09-24 PROCEDURE — 99213 OFFICE O/P EST LOW 20 MIN: CPT | Performed by: PHYSICIAN ASSISTANT

## 2019-09-24 NOTE — PROGRESS NOTES
EPIDURAL STEROID INJECTION performed by Saud Payne DO at [address] [facility]  03/26/2019    with sedation   [address] PERIPHERAL NERVE Right 6/5/2019    RIGHT ULNAR NERVE DECOMPRESSION AT ELBOW WITH POSSIBLE NERVE TRANSPOSITION performed by Dago Watson MD at [address] [place] [address] ARTHROSCOPY Right        Prior to Admission medications    Medication Sig Start Date End Date Taking?  Authorizing Provider   sennosides-docusate sodium (SENOKOT-S) 8.6-50 MG tablet Take 1 tablet by mouth daily    Historical Provider, MD   ipratropium-albuterol (DUONEB) 0.5-2.5 (3) MG/3ML SOLN nebulizer solution Inhale 1 vial into the lungs every 4 hours    Historical Provider, MD   metFORMIN (GLUCOPHAGE) 500 MG tablet Take 1 tablet by mouth 2 times daily (with meals) 8/30/19   KAIT Lassiter CNP   Ertugliflozin L-PyroglutamicAc (STEGLATRO) 5 MG TABS Take 5 mg by mouth daily 7/16/19   KAIT Lassiter CNP   azelastine (ASTELIN) 0.1 % nasal spray 2 sprays by Nasal route nightly Use in each nostril as directed 7/11/19   Silvia Robles MD   albuterol sulfate  (90 Base) MCG/ACT inhaler Inhale 2 puffs into the lungs every 6 hours as needed for Wheezing Rescue inhaler 7/11/19   Silvia Robles MD   pantoprazole (PROTONIX) 20 MG tablet Take 1 tablet by mouth daily  Patient taking differently: Take 40 mg by mouth daily  5/26/19   Chan Guillaume MD   ondansetron (ZOFRAN ODT) 4 MG disintegrating tablet Take 1 tablet by mouth every 8 hours as needed for Nausea or Vomiting 5/26/19   Chan Guillaume MD   amLODIPine (NORVASC) 10 MG tablet Take 1 tablet by mouth daily 5/24/19   Master Gabriel MD   amitriptyline (ELAVIL) 50 MG tablet Take 1 tablet by mouth nightly 4/25/19   Jayesh Walters MD   atorvastatin (LIPITOR) 40 MG tablet Take 1 tablet by mouth daily 4/25/19   Jayesh Walters MD   EPINEPHrine (EPIPEN) 0.3 MG/0.3ML SOAJ injection Use as directed for allergic

## 2019-09-27 ENCOUNTER — TELEPHONE (OUTPATIENT)
Dept: FAMILY MEDICINE CLINIC | Age: 64
End: 2019-09-27

## 2019-09-27 DIAGNOSIS — E11.65 TYPE 2 DIABETES MELLITUS WITH HYPERGLYCEMIA, WITHOUT LONG-TERM CURRENT USE OF INSULIN (HCC): Primary | ICD-10-CM

## 2019-09-27 DIAGNOSIS — R73.02 IMPAIRED GLUCOSE TOLERANCE: ICD-10-CM

## 2019-10-03 ENCOUNTER — HOSPITAL ENCOUNTER (OUTPATIENT)
Age: 64
Discharge: HOME | End: 2019-10-03
Payer: MEDICAID

## 2019-10-03 ENCOUNTER — OFFICE VISIT (OUTPATIENT)
Dept: ALLERGY | Age: 64
End: 2019-10-03
Payer: MEDICAID

## 2019-10-03 DIAGNOSIS — Z01.818 PREOPERATIVE TESTING: ICD-10-CM

## 2019-10-03 DIAGNOSIS — J30.89 PERENNIAL ALLERGIC RHINITIS: ICD-10-CM

## 2019-10-03 LAB
ALBUMIN SERPL-MCNC: 4.4 G/DL (ref 3.5–5.2)
ALP BLD-CCNC: 87 U/L (ref 35–104)
ALT SERPL-CCNC: 15 U/L (ref 0–32)
AMYLASE: 56 U/L (ref 20–100)
AST SERPL-CCNC: 17 U/L (ref 0–31)
BASOPHILS ABSOLUTE: 0.03 E9/L (ref 0–0.2)
BASOPHILS RELATIVE PERCENT: 0.5 % (ref 0–2)
BILIRUB SERPL-MCNC: 0.7 MG/DL (ref 0–1.2)
BILIRUBIN DIRECT: <0.2 MG/DL (ref 0–0.3)
BILIRUBIN, INDIRECT: NORMAL MG/DL (ref 0–1)
C-REACTIVE PROTEIN: 1.4 MG/DL (ref 0–0.4)
EOSINOPHILS ABSOLUTE: 0.09 E9/L (ref 0.05–0.5)
EOSINOPHILS RELATIVE PERCENT: 1.4 % (ref 0–6)
HBA1C MFR BLD: 7.5 % (ref 4–5.6)
HCT VFR BLD CALC: 42.8 % (ref 34–48)
HEMOGLOBIN: 14.3 G/DL (ref 11.5–15.5)
IMMATURE GRANULOCYTES #: 0.02 E9/L
IMMATURE GRANULOCYTES %: 0.3 % (ref 0–5)
LACTIC ACID: 1.4 MMOL/L (ref 0.5–2.2)
LIPASE: 41 U/L (ref 13–60)
LYMPHOCYTES ABSOLUTE: 2.28 E9/L (ref 1.5–4)
LYMPHOCYTES RELATIVE PERCENT: 36 % (ref 20–42)
MCH RBC QN AUTO: 29.6 PG (ref 26–35)
MCHC RBC AUTO-ENTMCNC: 33.4 % (ref 32–34.5)
MCV RBC AUTO: 88.6 FL (ref 80–99.9)
MONOCYTES ABSOLUTE: 0.44 E9/L (ref 0.1–0.95)
MONOCYTES RELATIVE PERCENT: 7 % (ref 2–12)
NEUTROPHILS ABSOLUTE: 3.47 E9/L (ref 1.8–7.3)
NEUTROPHILS RELATIVE PERCENT: 54.8 % (ref 43–80)
PDW BLD-RTO: 14.2 FL (ref 11.5–15)
PLATELET # BLD: 252 E9/L (ref 130–450)
PMV BLD AUTO: 10.4 FL (ref 7–12)
RBC # BLD: 4.83 E12/L (ref 3.5–5.5)
TOTAL PROTEIN: 7.9 G/DL (ref 6.4–8.3)
WBC # BLD: 6.3 E9/L (ref 4.5–11.5)

## 2019-10-03 PROCEDURE — 83605 ASSAY OF LACTIC ACID: CPT

## 2019-10-03 PROCEDURE — 95117 IMMUNOTHERAPY INJECTIONS: CPT | Performed by: ALLERGY & IMMUNOLOGY

## 2019-10-03 PROCEDURE — 36415 COLL VENOUS BLD VENIPUNCTURE: CPT

## 2019-10-03 PROCEDURE — 85025 COMPLETE CBC W/AUTO DIFF WBC: CPT

## 2019-10-03 PROCEDURE — 86140 C-REACTIVE PROTEIN: CPT

## 2019-10-03 PROCEDURE — 82150 ASSAY OF AMYLASE: CPT

## 2019-10-03 PROCEDURE — 83036 HEMOGLOBIN GLYCOSYLATED A1C: CPT

## 2019-10-03 PROCEDURE — 80076 HEPATIC FUNCTION PANEL: CPT

## 2019-10-03 PROCEDURE — 83690 ASSAY OF LIPASE: CPT

## 2019-10-03 RX ORDER — GABAPENTIN 300 MG/1
300 CAPSULE ORAL 3 TIMES DAILY
Qty: 90 CAPSULE | Refills: 0 | Status: CANCELLED | OUTPATIENT
Start: 2019-10-03 | End: 2019-11-02

## 2019-10-14 ENCOUNTER — PREP FOR PROCEDURE (OUTPATIENT)
Dept: PAIN MANAGEMENT | Age: 64
End: 2019-10-14

## 2019-10-14 ENCOUNTER — TELEPHONE (OUTPATIENT)
Dept: PAIN MANAGEMENT | Age: 64
End: 2019-10-14

## 2019-10-14 DIAGNOSIS — Z01.818 PREOPERATIVE TESTING: Primary | ICD-10-CM

## 2019-10-15 ENCOUNTER — TELEPHONE (OUTPATIENT)
Dept: FAMILY MEDICINE CLINIC | Age: 64
End: 2019-10-15

## 2019-10-15 DIAGNOSIS — E11.65 TYPE 2 DIABETES MELLITUS WITH HYPERGLYCEMIA, WITHOUT LONG-TERM CURRENT USE OF INSULIN (HCC): Primary | ICD-10-CM

## 2019-10-17 RX ORDER — SITAGLIPTIN 50 MG/1
50 TABLET, FILM COATED ORAL DAILY
Qty: 30 TABLET | Refills: 0 | Status: SHIPPED | OUTPATIENT
Start: 2019-10-17 | End: 2019-11-26 | Stop reason: SDUPTHER

## 2019-10-23 ENCOUNTER — TELEPHONE (OUTPATIENT)
Dept: FAMILY MEDICINE CLINIC | Age: 64
End: 2019-10-23

## 2019-10-24 RX ORDER — FLUCONAZOLE 100 MG/1
100 TABLET ORAL DAILY
Qty: 3 TABLET | Refills: 0 | Status: SHIPPED | OUTPATIENT
Start: 2019-10-24 | End: 2019-10-27

## 2019-11-03 ENCOUNTER — APPOINTMENT (OUTPATIENT)
Dept: GENERAL RADIOLOGY | Age: 64
End: 2019-11-03
Payer: MEDICAID

## 2019-11-03 ENCOUNTER — HOSPITAL ENCOUNTER (OUTPATIENT)
Age: 64
Setting detail: OBSERVATION
Discharge: HOME | End: 2019-11-06
Attending: EMERGENCY MEDICINE | Admitting: INTERNAL MEDICINE
Payer: MEDICAID

## 2019-11-03 ENCOUNTER — APPOINTMENT (OUTPATIENT)
Dept: CT IMAGING | Age: 64
End: 2019-11-03
Payer: MEDICAID

## 2019-11-03 DIAGNOSIS — D73.5 SPLENIC INFARCT: ICD-10-CM

## 2019-11-03 DIAGNOSIS — R10.10 PAIN OF UPPER ABDOMEN: Primary | ICD-10-CM

## 2019-11-03 LAB
ALBUMIN SERPL-MCNC: 4.7 G/DL (ref 3.5–5.2)
ALP BLD-CCNC: 80 U/L (ref 35–104)
ALT SERPL-CCNC: 13 U/L (ref 0–32)
ANION GAP SERPL CALCULATED.3IONS-SCNC: 12 MMOL/L (ref 7–16)
AST SERPL-CCNC: 19 U/L (ref 0–31)
BACTERIA: NORMAL /HPF
BASOPHILS ABSOLUTE: 0.03 E9/L (ref 0–0.2)
BASOPHILS RELATIVE PERCENT: 0.2 % (ref 0–2)
BILIRUB SERPL-MCNC: 0.9 MG/DL (ref 0–1.2)
BILIRUBIN URINE: ABNORMAL
BLOOD, URINE: ABNORMAL
BUN BLDV-MCNC: 12 MG/DL (ref 8–23)
CALCIUM SERPL-MCNC: 10.1 MG/DL (ref 8.6–10.2)
CHLORIDE BLD-SCNC: 103 MMOL/L (ref 98–107)
CHP ED QC CHECK: YES
CLARITY: CLEAR
CO2: 25 MMOL/L (ref 22–29)
COLOR: YELLOW
CREAT SERPL-MCNC: 0.6 MG/DL (ref 0.5–1)
EKG ATRIAL RATE: 55 BPM
EKG P-R INTERVAL: 180 MS
EKG Q-T INTERVAL: 480 MS
EKG QRS DURATION: 78 MS
EKG QTC CALCULATION (BAZETT): 459 MS
EKG R AXIS: 40 DEGREES
EKG T AXIS: 162 DEGREES
EKG VENTRICULAR RATE: 55 BPM
EOSINOPHILS ABSOLUTE: 0.01 E9/L (ref 0.05–0.5)
EOSINOPHILS RELATIVE PERCENT: 0.1 % (ref 0–6)
GFR AFRICAN AMERICAN: >60
GFR NON-AFRICAN AMERICAN: >60 ML/MIN/1.73
GLUCOSE BLD-MCNC: 102 MG/DL
GLUCOSE BLD-MCNC: 115 MG/DL (ref 74–99)
GLUCOSE URINE: 500 MG/DL
HCT VFR BLD CALC: 43.6 % (ref 34–48)
HEMOGLOBIN: 14.8 G/DL (ref 11.5–15.5)
IMMATURE GRANULOCYTES #: 0.05 E9/L
IMMATURE GRANULOCYTES %: 0.4 % (ref 0–5)
KETONES, URINE: >=80 MG/DL
LACTIC ACID: 1.3 MMOL/L (ref 0.5–2.2)
LEUKOCYTE ESTERASE, URINE: NEGATIVE
LIPASE: 14 U/L (ref 13–60)
LYMPHOCYTES ABSOLUTE: 1.47 E9/L (ref 1.5–4)
LYMPHOCYTES RELATIVE PERCENT: 12 % (ref 20–42)
MCH RBC QN AUTO: 30 PG (ref 26–35)
MCHC RBC AUTO-ENTMCNC: 33.9 % (ref 32–34.5)
MCV RBC AUTO: 88.4 FL (ref 80–99.9)
METER GLUCOSE: 102 MG/DL (ref 74–99)
METER GLUCOSE: 112 MG/DL (ref 74–99)
METER GLUCOSE: 72 MG/DL (ref 74–99)
METER GLUCOSE: 75 MG/DL (ref 74–99)
MONOCYTES ABSOLUTE: 0.54 E9/L (ref 0.1–0.95)
MONOCYTES RELATIVE PERCENT: 4.4 % (ref 2–12)
NEUTROPHILS ABSOLUTE: 10.11 E9/L (ref 1.8–7.3)
NEUTROPHILS RELATIVE PERCENT: 82.9 % (ref 43–80)
NITRITE, URINE: NEGATIVE
PDW BLD-RTO: 14.5 FL (ref 11.5–15)
PH UA: 5.5 (ref 5–9)
PLATELET # BLD: 243 E9/L (ref 130–450)
PMV BLD AUTO: 11 FL (ref 7–12)
POTASSIUM REFLEX MAGNESIUM: 3.9 MMOL/L (ref 3.5–5)
PROTEIN UA: ABNORMAL MG/DL
RBC # BLD: 4.93 E12/L (ref 3.5–5.5)
RBC UA: NORMAL /HPF (ref 0–2)
RENAL EPITHELIAL, UA: NORMAL /HPF
SODIUM BLD-SCNC: 140 MMOL/L (ref 132–146)
SPECIFIC GRAVITY UA: >=1.03 (ref 1–1.03)
TOTAL PROTEIN: 8.1 G/DL (ref 6.4–8.3)
TROPONIN: <0.01 NG/ML (ref 0–0.03)
UROBILINOGEN, URINE: 0.2 E.U./DL
WBC # BLD: 12.2 E9/L (ref 4.5–11.5)
WBC UA: NORMAL /HPF (ref 0–5)

## 2019-11-03 PROCEDURE — 84484 ASSAY OF TROPONIN QUANT: CPT

## 2019-11-03 PROCEDURE — 85025 COMPLETE CBC W/AUTO DIFF WBC: CPT

## 2019-11-03 PROCEDURE — 74177 CT ABD & PELVIS W/CONTRAST: CPT

## 2019-11-03 PROCEDURE — 83690 ASSAY OF LIPASE: CPT

## 2019-11-03 PROCEDURE — 93005 ELECTROCARDIOGRAM TRACING: CPT | Performed by: STUDENT IN AN ORGANIZED HEALTH CARE EDUCATION/TRAINING PROGRAM

## 2019-11-03 PROCEDURE — 2500000003 HC RX 250 WO HCPCS: Performed by: STUDENT IN AN ORGANIZED HEALTH CARE EDUCATION/TRAINING PROGRAM

## 2019-11-03 PROCEDURE — 82962 GLUCOSE BLOOD TEST: CPT

## 2019-11-03 PROCEDURE — 71045 X-RAY EXAM CHEST 1 VIEW: CPT

## 2019-11-03 PROCEDURE — G0378 HOSPITAL OBSERVATION PER HR: HCPCS

## 2019-11-03 PROCEDURE — 96375 TX/PRO/DX INJ NEW DRUG ADDON: CPT

## 2019-11-03 PROCEDURE — 6360000002 HC RX W HCPCS: Performed by: INTERNAL MEDICINE

## 2019-11-03 PROCEDURE — 96376 TX/PRO/DX INJ SAME DRUG ADON: CPT

## 2019-11-03 PROCEDURE — 6370000000 HC RX 637 (ALT 250 FOR IP): Performed by: NURSE PRACTITIONER

## 2019-11-03 PROCEDURE — 6370000000 HC RX 637 (ALT 250 FOR IP): Performed by: INTERNAL MEDICINE

## 2019-11-03 PROCEDURE — 80053 COMPREHEN METABOLIC PANEL: CPT

## 2019-11-03 PROCEDURE — 99285 EMERGENCY DEPT VISIT HI MDM: CPT

## 2019-11-03 PROCEDURE — 2580000003 HC RX 258: Performed by: STUDENT IN AN ORGANIZED HEALTH CARE EDUCATION/TRAINING PROGRAM

## 2019-11-03 PROCEDURE — 2580000003 HC RX 258: Performed by: RADIOLOGY

## 2019-11-03 PROCEDURE — 36415 COLL VENOUS BLD VENIPUNCTURE: CPT

## 2019-11-03 PROCEDURE — 96374 THER/PROPH/DIAG INJ IV PUSH: CPT

## 2019-11-03 PROCEDURE — 93010 ELECTROCARDIOGRAM REPORT: CPT | Performed by: INTERNAL MEDICINE

## 2019-11-03 PROCEDURE — 2580000003 HC RX 258: Performed by: INTERNAL MEDICINE

## 2019-11-03 PROCEDURE — 6370000000 HC RX 637 (ALT 250 FOR IP): Performed by: STUDENT IN AN ORGANIZED HEALTH CARE EDUCATION/TRAINING PROGRAM

## 2019-11-03 PROCEDURE — 6360000002 HC RX W HCPCS: Performed by: STUDENT IN AN ORGANIZED HEALTH CARE EDUCATION/TRAINING PROGRAM

## 2019-11-03 PROCEDURE — 81001 URINALYSIS AUTO W/SCOPE: CPT

## 2019-11-03 PROCEDURE — 94640 AIRWAY INHALATION TREATMENT: CPT

## 2019-11-03 PROCEDURE — 83605 ASSAY OF LACTIC ACID: CPT

## 2019-11-03 PROCEDURE — 6360000004 HC RX CONTRAST MEDICATION: Performed by: RADIOLOGY

## 2019-11-03 RX ORDER — PANTOPRAZOLE SODIUM 20 MG/1
20 TABLET, DELAYED RELEASE ORAL DAILY
Status: DISCONTINUED | OUTPATIENT
Start: 2019-11-03 | End: 2019-11-06 | Stop reason: HOSPADM

## 2019-11-03 RX ORDER — METOCLOPRAMIDE HYDROCHLORIDE 5 MG/ML
10 INJECTION INTRAMUSCULAR; INTRAVENOUS ONCE
Status: COMPLETED | OUTPATIENT
Start: 2019-11-03 | End: 2019-11-03

## 2019-11-03 RX ORDER — SODIUM CHLORIDE 9 MG/ML
INJECTION, SOLUTION INTRAVENOUS CONTINUOUS
Status: DISCONTINUED | OUTPATIENT
Start: 2019-11-03 | End: 2019-11-06 | Stop reason: HOSPADM

## 2019-11-03 RX ORDER — NICOTINE POLACRILEX 4 MG
15 LOZENGE BUCCAL PRN
Status: DISCONTINUED | OUTPATIENT
Start: 2019-11-03 | End: 2019-11-06 | Stop reason: HOSPADM

## 2019-11-03 RX ORDER — ALBUTEROL SULFATE 90 UG/1
2 AEROSOL, METERED RESPIRATORY (INHALATION) EVERY 6 HOURS PRN
Status: DISCONTINUED | OUTPATIENT
Start: 2019-11-03 | End: 2019-11-06 | Stop reason: HOSPADM

## 2019-11-03 RX ORDER — ONDANSETRON 2 MG/ML
4 INJECTION INTRAMUSCULAR; INTRAVENOUS EVERY 6 HOURS PRN
Status: DISCONTINUED | OUTPATIENT
Start: 2019-11-03 | End: 2019-11-04

## 2019-11-03 RX ORDER — 0.9 % SODIUM CHLORIDE 0.9 %
1000 INTRAVENOUS SOLUTION INTRAVENOUS ONCE
Status: COMPLETED | OUTPATIENT
Start: 2019-11-03 | End: 2019-11-03

## 2019-11-03 RX ORDER — ONDANSETRON 2 MG/ML
4 INJECTION INTRAMUSCULAR; INTRAVENOUS EVERY 6 HOURS PRN
Status: DISCONTINUED | OUTPATIENT
Start: 2019-11-03 | End: 2019-11-03 | Stop reason: SDUPTHER

## 2019-11-03 RX ORDER — POLYETHYLENE GLYCOL 3350 17 G/17G
17 POWDER, FOR SOLUTION ORAL DAILY
Status: DISCONTINUED | OUTPATIENT
Start: 2019-11-03 | End: 2019-11-06 | Stop reason: HOSPADM

## 2019-11-03 RX ORDER — DEXTROSE MONOHYDRATE 25 G/50ML
12.5 INJECTION, SOLUTION INTRAVENOUS PRN
Status: DISCONTINUED | OUTPATIENT
Start: 2019-11-03 | End: 2019-11-06 | Stop reason: HOSPADM

## 2019-11-03 RX ORDER — IPRATROPIUM BROMIDE AND ALBUTEROL SULFATE 2.5; .5 MG/3ML; MG/3ML
1 SOLUTION RESPIRATORY (INHALATION) EVERY 4 HOURS
Status: DISCONTINUED | OUTPATIENT
Start: 2019-11-03 | End: 2019-11-06 | Stop reason: HOSPADM

## 2019-11-03 RX ORDER — CYCLOBENZAPRINE HCL 10 MG
5 TABLET ORAL 3 TIMES DAILY
Status: DISCONTINUED | OUTPATIENT
Start: 2019-11-03 | End: 2019-11-03

## 2019-11-03 RX ORDER — AZELASTINE 1 MG/ML
2 SPRAY, METERED NASAL NIGHTLY
Status: DISCONTINUED | OUTPATIENT
Start: 2019-11-03 | End: 2019-11-03 | Stop reason: CLARIF

## 2019-11-03 RX ORDER — GABAPENTIN 300 MG/1
300 CAPSULE ORAL 3 TIMES DAILY
Status: DISCONTINUED | OUTPATIENT
Start: 2019-11-03 | End: 2019-11-06 | Stop reason: HOSPADM

## 2019-11-03 RX ORDER — DEXTROSE MONOHYDRATE 50 MG/ML
100 INJECTION, SOLUTION INTRAVENOUS PRN
Status: DISCONTINUED | OUTPATIENT
Start: 2019-11-03 | End: 2019-11-06 | Stop reason: HOSPADM

## 2019-11-03 RX ORDER — AMLODIPINE BESYLATE 10 MG/1
10 TABLET ORAL DAILY
Status: DISCONTINUED | OUTPATIENT
Start: 2019-11-03 | End: 2019-11-06 | Stop reason: HOSPADM

## 2019-11-03 RX ORDER — SODIUM CHLORIDE 0.9 % (FLUSH) 0.9 %
10 SYRINGE (ML) INJECTION PRN
Status: DISCONTINUED | OUTPATIENT
Start: 2019-11-03 | End: 2019-11-06 | Stop reason: HOSPADM

## 2019-11-03 RX ORDER — ACETAMINOPHEN 325 MG/1
650 TABLET ORAL ONCE
Status: COMPLETED | OUTPATIENT
Start: 2019-11-03 | End: 2019-11-03

## 2019-11-03 RX ORDER — ONDANSETRON 2 MG/ML
4 INJECTION INTRAMUSCULAR; INTRAVENOUS ONCE
Status: COMPLETED | OUTPATIENT
Start: 2019-11-03 | End: 2019-11-03

## 2019-11-03 RX ORDER — ACETAMINOPHEN 325 MG/1
650 TABLET ORAL EVERY 4 HOURS PRN
Status: DISCONTINUED | OUTPATIENT
Start: 2019-11-03 | End: 2019-11-06 | Stop reason: HOSPADM

## 2019-11-03 RX ORDER — AMITRIPTYLINE HYDROCHLORIDE 25 MG/1
50 TABLET, FILM COATED ORAL NIGHTLY
Status: DISCONTINUED | OUTPATIENT
Start: 2019-11-03 | End: 2019-11-06 | Stop reason: HOSPADM

## 2019-11-03 RX ORDER — ATORVASTATIN CALCIUM 40 MG/1
40 TABLET, FILM COATED ORAL DAILY
Status: DISCONTINUED | OUTPATIENT
Start: 2019-11-03 | End: 2019-11-06 | Stop reason: HOSPADM

## 2019-11-03 RX ORDER — BISACODYL 10 MG
10 SUPPOSITORY, RECTAL RECTAL DAILY PRN
Status: DISCONTINUED | OUTPATIENT
Start: 2019-11-03 | End: 2019-11-06 | Stop reason: HOSPADM

## 2019-11-03 RX ORDER — SODIUM CHLORIDE 0.9 % (FLUSH) 0.9 %
10 SYRINGE (ML) INJECTION EVERY 12 HOURS SCHEDULED
Status: DISCONTINUED | OUTPATIENT
Start: 2019-11-03 | End: 2019-11-06 | Stop reason: HOSPADM

## 2019-11-03 RX ADMIN — ONDANSETRON HYDROCHLORIDE 4 MG: 2 INJECTION, SOLUTION INTRAMUSCULAR; INTRAVENOUS at 07:09

## 2019-11-03 RX ADMIN — SODIUM CHLORIDE 1000 ML: 9 INJECTION, SOLUTION INTRAVENOUS at 08:46

## 2019-11-03 RX ADMIN — IPRATROPIUM BROMIDE AND ALBUTEROL SULFATE 3 ML: .5; 3 SOLUTION RESPIRATORY (INHALATION) at 17:20

## 2019-11-03 RX ADMIN — FAMOTIDINE 20 MG: 10 INJECTION INTRAVENOUS at 07:09

## 2019-11-03 RX ADMIN — ACETAMINOPHEN 650 MG: 325 TABLET, FILM COATED ORAL at 08:46

## 2019-11-03 RX ADMIN — ONDANSETRON 4 MG: 2 INJECTION INTRAMUSCULAR; INTRAVENOUS at 18:00

## 2019-11-03 RX ADMIN — ACETAMINOPHEN 650 MG: 325 TABLET, FILM COATED ORAL at 21:39

## 2019-11-03 RX ADMIN — SODIUM CHLORIDE 1000 ML: 9 INJECTION, SOLUTION INTRAVENOUS at 07:09

## 2019-11-03 RX ADMIN — GABAPENTIN 300 MG: 300 CAPSULE ORAL at 23:00

## 2019-11-03 RX ADMIN — SODIUM CHLORIDE: 9 INJECTION, SOLUTION INTRAVENOUS at 18:00

## 2019-11-03 RX ADMIN — ATORVASTATIN CALCIUM 40 MG: 40 TABLET, FILM COATED ORAL at 23:00

## 2019-11-03 RX ADMIN — METOCLOPRAMIDE 10 MG: 5 INJECTION, SOLUTION INTRAMUSCULAR; INTRAVENOUS at 08:09

## 2019-11-03 RX ADMIN — IOPAMIDOL 110 ML: 755 INJECTION, SOLUTION INTRAVENOUS at 08:58

## 2019-11-03 RX ADMIN — AMITRIPTYLINE HYDROCHLORIDE 50 MG: 25 TABLET, FILM COATED ORAL at 23:00

## 2019-11-03 RX ADMIN — IPRATROPIUM BROMIDE AND ALBUTEROL SULFATE 3 ML: .5; 3 SOLUTION RESPIRATORY (INHALATION) at 21:15

## 2019-11-03 RX ADMIN — Medication 10 ML: at 08:58

## 2019-11-03 ASSESSMENT — PAIN DESCRIPTION - PAIN TYPE: TYPE: ACUTE PAIN

## 2019-11-03 ASSESSMENT — ENCOUNTER SYMPTOMS
APNEA: 0
NAUSEA: 1
TROUBLE SWALLOWING: 0
EYE PAIN: 0
CONSTIPATION: 0
DIARRHEA: 0
WHEEZING: 0
CHEST TIGHTNESS: 0
PHOTOPHOBIA: 0
ABDOMINAL PAIN: 1
RHINORRHEA: 0
VOMITING: 1
BACK PAIN: 1
SHORTNESS OF BREATH: 0
COUGH: 0
SORE THROAT: 0

## 2019-11-03 ASSESSMENT — PAIN SCALES - GENERAL
PAINLEVEL_OUTOF10: 7
PAINLEVEL_OUTOF10: 9
PAINLEVEL_OUTOF10: 9

## 2019-11-04 LAB
ANION GAP SERPL CALCULATED.3IONS-SCNC: 19 MMOL/L (ref 7–16)
APTT: 26.3 SEC (ref 24.5–35.1)
BUN BLDV-MCNC: 11 MG/DL (ref 8–23)
CALCIUM SERPL-MCNC: 9.1 MG/DL (ref 8.6–10.2)
CHLORIDE BLD-SCNC: 102 MMOL/L (ref 98–107)
CO2: 20 MMOL/L (ref 22–29)
CREAT SERPL-MCNC: 0.6 MG/DL (ref 0.5–1)
GFR AFRICAN AMERICAN: >60
GFR NON-AFRICAN AMERICAN: >60 ML/MIN/1.73
GLUCOSE BLD-MCNC: 109 MG/DL (ref 74–99)
HCT VFR BLD CALC: 41.2 % (ref 34–48)
HEMOGLOBIN: 13.4 G/DL (ref 11.5–15.5)
INR BLD: 1.1
LACTATE DEHYDROGENASE: 514 U/L (ref 135–214)
MAGNESIUM: 2 MG/DL (ref 1.6–2.6)
MCH RBC QN AUTO: 29.6 PG (ref 26–35)
MCHC RBC AUTO-ENTMCNC: 32.5 % (ref 32–34.5)
MCV RBC AUTO: 90.9 FL (ref 80–99.9)
METER GLUCOSE: 113 MG/DL (ref 74–99)
METER GLUCOSE: 118 MG/DL (ref 74–99)
METER GLUCOSE: 61 MG/DL (ref 74–99)
METER GLUCOSE: 80 MG/DL (ref 74–99)
METER GLUCOSE: 88 MG/DL (ref 74–99)
PDW BLD-RTO: 14.6 FL (ref 11.5–15)
PLATELET # BLD: 182 E9/L (ref 130–450)
PMV BLD AUTO: 10.7 FL (ref 7–12)
POTASSIUM REFLEX MAGNESIUM: 3.2 MMOL/L (ref 3.5–5)
PROTHROMBIN TIME: 12.5 SEC (ref 9.3–12.4)
RBC # BLD: 4.53 E12/L (ref 3.5–5.5)
SODIUM BLD-SCNC: 141 MMOL/L (ref 132–146)
WBC # BLD: 10.6 E9/L (ref 4.5–11.5)

## 2019-11-04 PROCEDURE — 6370000000 HC RX 637 (ALT 250 FOR IP): Performed by: INTERNAL MEDICINE

## 2019-11-04 PROCEDURE — 6360000002 HC RX W HCPCS: Performed by: INTERNAL MEDICINE

## 2019-11-04 PROCEDURE — 85730 THROMBOPLASTIN TIME PARTIAL: CPT

## 2019-11-04 PROCEDURE — 6370000000 HC RX 637 (ALT 250 FOR IP): Performed by: NURSE PRACTITIONER

## 2019-11-04 PROCEDURE — 96376 TX/PRO/DX INJ SAME DRUG ADON: CPT

## 2019-11-04 PROCEDURE — 82962 GLUCOSE BLOOD TEST: CPT

## 2019-11-04 PROCEDURE — 2580000003 HC RX 258: Performed by: INTERNAL MEDICINE

## 2019-11-04 PROCEDURE — 85027 COMPLETE CBC AUTOMATED: CPT

## 2019-11-04 PROCEDURE — G0378 HOSPITAL OBSERVATION PER HR: HCPCS

## 2019-11-04 PROCEDURE — 96372 THER/PROPH/DIAG INJ SC/IM: CPT

## 2019-11-04 PROCEDURE — 85610 PROTHROMBIN TIME: CPT

## 2019-11-04 PROCEDURE — 94640 AIRWAY INHALATION TREATMENT: CPT

## 2019-11-04 PROCEDURE — 83735 ASSAY OF MAGNESIUM: CPT

## 2019-11-04 PROCEDURE — 80048 BASIC METABOLIC PNL TOTAL CA: CPT

## 2019-11-04 PROCEDURE — 2580000003 HC RX 258: Performed by: NURSE PRACTITIONER

## 2019-11-04 PROCEDURE — 36415 COLL VENOUS BLD VENIPUNCTURE: CPT

## 2019-11-04 PROCEDURE — 83615 LACTATE (LD) (LDH) ENZYME: CPT

## 2019-11-04 RX ORDER — HEPARIN SODIUM 10000 [USP'U]/ML
5000 INJECTION, SOLUTION INTRAVENOUS; SUBCUTANEOUS EVERY 8 HOURS
Status: DISCONTINUED | OUTPATIENT
Start: 2019-11-04 | End: 2019-11-06 | Stop reason: HOSPADM

## 2019-11-04 RX ORDER — ONDANSETRON 2 MG/ML
4 INJECTION INTRAMUSCULAR; INTRAVENOUS
Status: DISCONTINUED | OUTPATIENT
Start: 2019-11-04 | End: 2019-11-06 | Stop reason: HOSPADM

## 2019-11-04 RX ADMIN — AMLODIPINE BESYLATE 10 MG: 10 TABLET ORAL at 12:28

## 2019-11-04 RX ADMIN — ONDANSETRON 4 MG: 2 INJECTION INTRAMUSCULAR; INTRAVENOUS at 12:30

## 2019-11-04 RX ADMIN — POLYETHYLENE GLYCOL 3350 17 G: 17 POWDER, FOR SOLUTION ORAL at 22:45

## 2019-11-04 RX ADMIN — GABAPENTIN 300 MG: 300 CAPSULE ORAL at 12:29

## 2019-11-04 RX ADMIN — AMITRIPTYLINE HYDROCHLORIDE 50 MG: 25 TABLET, FILM COATED ORAL at 22:47

## 2019-11-04 RX ADMIN — ACETAMINOPHEN 650 MG: 325 TABLET, FILM COATED ORAL at 12:43

## 2019-11-04 RX ADMIN — IPRATROPIUM BROMIDE AND ALBUTEROL SULFATE 3 ML: .5; 3 SOLUTION RESPIRATORY (INHALATION) at 21:52

## 2019-11-04 RX ADMIN — IPRATROPIUM BROMIDE AND ALBUTEROL SULFATE 3 ML: .5; 3 SOLUTION RESPIRATORY (INHALATION) at 10:21

## 2019-11-04 RX ADMIN — IPRATROPIUM BROMIDE AND ALBUTEROL SULFATE 3 ML: .5; 3 SOLUTION RESPIRATORY (INHALATION) at 13:51

## 2019-11-04 RX ADMIN — LINAGLIPTIN 5 MG: 5 TABLET, FILM COATED ORAL at 12:29

## 2019-11-04 RX ADMIN — HEPARIN SODIUM 5000 UNITS: 10000 INJECTION INTRAVENOUS; SUBCUTANEOUS at 22:49

## 2019-11-04 RX ADMIN — IPRATROPIUM BROMIDE AND ALBUTEROL SULFATE 3 ML: .5; 3 SOLUTION RESPIRATORY (INHALATION) at 17:55

## 2019-11-04 RX ADMIN — POTASSIUM BICARBONATE 20 MEQ: 782 TABLET, EFFERVESCENT ORAL at 22:42

## 2019-11-04 RX ADMIN — Medication 15 G: at 07:15

## 2019-11-04 RX ADMIN — ATORVASTATIN CALCIUM 40 MG: 40 TABLET, FILM COATED ORAL at 22:48

## 2019-11-04 RX ADMIN — SODIUM CHLORIDE: 9 INJECTION, SOLUTION INTRAVENOUS at 12:36

## 2019-11-04 RX ADMIN — DEXTROSE MONOHYDRATE 100 ML/HR: 50 INJECTION, SOLUTION INTRAVENOUS at 07:15

## 2019-11-04 RX ADMIN — PANTOPRAZOLE SODIUM 20 MG: 20 TABLET, DELAYED RELEASE ORAL at 12:29

## 2019-11-04 RX ADMIN — GABAPENTIN 300 MG: 300 CAPSULE ORAL at 22:48

## 2019-11-04 RX ADMIN — ONDANSETRON 4 MG: 2 INJECTION INTRAMUSCULAR; INTRAVENOUS at 18:41

## 2019-11-04 RX ADMIN — SODIUM CHLORIDE: 9 INJECTION, SOLUTION INTRAVENOUS at 22:42

## 2019-11-04 ASSESSMENT — PAIN SCALES - GENERAL
PAINLEVEL_OUTOF10: 0
PAINLEVEL_OUTOF10: 6
PAINLEVEL_OUTOF10: 0

## 2019-11-05 ENCOUNTER — APPOINTMENT (OUTPATIENT)
Dept: MRI IMAGING | Age: 64
End: 2019-11-05
Payer: MEDICAID

## 2019-11-05 LAB
ALBUMIN SERPL-MCNC: 3.9 G/DL (ref 3.5–5.2)
ALP BLD-CCNC: 67 U/L (ref 35–104)
ALT SERPL-CCNC: 8 U/L (ref 0–32)
ANION GAP SERPL CALCULATED.3IONS-SCNC: 17 MMOL/L (ref 7–16)
AST SERPL-CCNC: 13 U/L (ref 0–31)
BILIRUB SERPL-MCNC: 1.4 MG/DL (ref 0–1.2)
BUN BLDV-MCNC: 5 MG/DL (ref 8–23)
CALCIUM SERPL-MCNC: 9.1 MG/DL (ref 8.6–10.2)
CHLORIDE BLD-SCNC: 98 MMOL/L (ref 98–107)
CO2: 22 MMOL/L (ref 22–29)
CREAT SERPL-MCNC: 0.6 MG/DL (ref 0.5–1)
GFR AFRICAN AMERICAN: >60
GFR NON-AFRICAN AMERICAN: >60 ML/MIN/1.73
GLUCOSE BLD-MCNC: 103 MG/DL (ref 74–99)
HCT VFR BLD CALC: 39.6 % (ref 34–48)
HEMOGLOBIN: 13.3 G/DL (ref 11.5–15.5)
LUPUS ANTICOAG DVVT: NORMAL
MCH RBC QN AUTO: 29.7 PG (ref 26–35)
MCHC RBC AUTO-ENTMCNC: 33.6 % (ref 32–34.5)
MCV RBC AUTO: 88.4 FL (ref 80–99.9)
METER GLUCOSE: 189 MG/DL (ref 74–99)
METER GLUCOSE: 69 MG/DL (ref 74–99)
METER GLUCOSE: 78 MG/DL (ref 74–99)
METER GLUCOSE: 94 MG/DL (ref 74–99)
PDW BLD-RTO: 14.3 FL (ref 11.5–15)
PLATELET # BLD: 170 E9/L (ref 130–450)
PMV BLD AUTO: 11 FL (ref 7–12)
POTASSIUM SERPL-SCNC: 3 MMOL/L (ref 3.5–5)
RBC # BLD: 4.48 E12/L (ref 3.5–5.5)
SODIUM BLD-SCNC: 137 MMOL/L (ref 132–146)
TOTAL PROTEIN: 7 G/DL (ref 6.4–8.3)
WBC # BLD: 10.8 E9/L (ref 4.5–11.5)

## 2019-11-05 PROCEDURE — 6370000000 HC RX 637 (ALT 250 FOR IP): Performed by: INTERNAL MEDICINE

## 2019-11-05 PROCEDURE — 6370000000 HC RX 637 (ALT 250 FOR IP): Performed by: NURSE PRACTITIONER

## 2019-11-05 PROCEDURE — 85613 RUSSELL VIPER VENOM DILUTED: CPT

## 2019-11-05 PROCEDURE — 6360000004 HC RX CONTRAST MEDICATION: Performed by: RADIOLOGY

## 2019-11-05 PROCEDURE — 96372 THER/PROPH/DIAG INJ SC/IM: CPT

## 2019-11-05 PROCEDURE — 36415 COLL VENOUS BLD VENIPUNCTURE: CPT

## 2019-11-05 PROCEDURE — 85027 COMPLETE CBC AUTOMATED: CPT

## 2019-11-05 PROCEDURE — 81270 JAK2 GENE: CPT

## 2019-11-05 PROCEDURE — 94640 AIRWAY INHALATION TREATMENT: CPT

## 2019-11-05 PROCEDURE — 80053 COMPREHEN METABOLIC PANEL: CPT

## 2019-11-05 PROCEDURE — 82962 GLUCOSE BLOOD TEST: CPT

## 2019-11-05 PROCEDURE — 74183 MRI ABD W/O CNTR FLWD CNTR: CPT

## 2019-11-05 PROCEDURE — A9579 GAD-BASE MR CONTRAST NOS,1ML: HCPCS | Performed by: RADIOLOGY

## 2019-11-05 PROCEDURE — 83516 IMMUNOASSAY NONANTIBODY: CPT

## 2019-11-05 PROCEDURE — 81240 F2 GENE: CPT

## 2019-11-05 PROCEDURE — G0378 HOSPITAL OBSERVATION PER HR: HCPCS

## 2019-11-05 PROCEDURE — 2580000003 HC RX 258: Performed by: INTERNAL MEDICINE

## 2019-11-05 PROCEDURE — 6360000002 HC RX W HCPCS: Performed by: INTERNAL MEDICINE

## 2019-11-05 RX ADMIN — ACETAMINOPHEN 650 MG: 325 TABLET, FILM COATED ORAL at 20:45

## 2019-11-05 RX ADMIN — IPRATROPIUM BROMIDE AND ALBUTEROL SULFATE 3 ML: .5; 3 SOLUTION RESPIRATORY (INHALATION) at 07:41

## 2019-11-05 RX ADMIN — GABAPENTIN 300 MG: 300 CAPSULE ORAL at 20:46

## 2019-11-05 RX ADMIN — POTASSIUM BICARBONATE 20 MEQ: 782 TABLET, EFFERVESCENT ORAL at 20:47

## 2019-11-05 RX ADMIN — SODIUM CHLORIDE: 9 INJECTION, SOLUTION INTRAVENOUS at 22:00

## 2019-11-05 RX ADMIN — GABAPENTIN 300 MG: 300 CAPSULE ORAL at 13:32

## 2019-11-05 RX ADMIN — POLYETHYLENE GLYCOL 3350 17 G: 17 POWDER, FOR SOLUTION ORAL at 13:29

## 2019-11-05 RX ADMIN — AMITRIPTYLINE HYDROCHLORIDE 50 MG: 25 TABLET, FILM COATED ORAL at 20:46

## 2019-11-05 RX ADMIN — IPRATROPIUM BROMIDE AND ALBUTEROL SULFATE 3 ML: .5; 3 SOLUTION RESPIRATORY (INHALATION) at 12:20

## 2019-11-05 RX ADMIN — ATORVASTATIN CALCIUM 40 MG: 40 TABLET, FILM COATED ORAL at 20:46

## 2019-11-05 RX ADMIN — IPRATROPIUM BROMIDE AND ALBUTEROL SULFATE 3 ML: .5; 3 SOLUTION RESPIRATORY (INHALATION) at 17:50

## 2019-11-05 RX ADMIN — HEPARIN SODIUM 5000 UNITS: 10000 INJECTION INTRAVENOUS; SUBCUTANEOUS at 23:04

## 2019-11-05 RX ADMIN — HEPARIN SODIUM 5000 UNITS: 10000 INJECTION INTRAVENOUS; SUBCUTANEOUS at 06:24

## 2019-11-05 RX ADMIN — GADOTERIDOL 11 ML: 279.3 INJECTION, SOLUTION INTRAVENOUS at 09:25

## 2019-11-05 RX ADMIN — POTASSIUM BICARBONATE 20 MEQ: 782 TABLET, EFFERVESCENT ORAL at 13:29

## 2019-11-05 RX ADMIN — IPRATROPIUM BROMIDE AND ALBUTEROL SULFATE 3 ML: .5; 3 SOLUTION RESPIRATORY (INHALATION) at 01:54

## 2019-11-05 RX ADMIN — PANTOPRAZOLE SODIUM 20 MG: 20 TABLET, DELAYED RELEASE ORAL at 13:29

## 2019-11-05 RX ADMIN — ACETAMINOPHEN 650 MG: 325 TABLET, FILM COATED ORAL at 13:07

## 2019-11-05 RX ADMIN — AMLODIPINE BESYLATE 10 MG: 10 TABLET ORAL at 13:28

## 2019-11-05 RX ADMIN — HEPARIN SODIUM 5000 UNITS: 10000 INJECTION INTRAVENOUS; SUBCUTANEOUS at 16:30

## 2019-11-05 ASSESSMENT — PAIN DESCRIPTION - PAIN TYPE
TYPE: ACUTE PAIN
TYPE: ACUTE PAIN

## 2019-11-05 ASSESSMENT — PAIN DESCRIPTION - FREQUENCY
FREQUENCY: INTERMITTENT
FREQUENCY: CONTINUOUS

## 2019-11-05 ASSESSMENT — PAIN SCALES - GENERAL
PAINLEVEL_OUTOF10: 3
PAINLEVEL_OUTOF10: 6
PAINLEVEL_OUTOF10: 6
PAINLEVEL_OUTOF10: 7

## 2019-11-05 ASSESSMENT — PAIN DESCRIPTION - LOCATION
LOCATION: HEAD;ABDOMEN
LOCATION: HEAD

## 2019-11-05 ASSESSMENT — PAIN DESCRIPTION - ONSET
ONSET: ON-GOING
ONSET: PROGRESSIVE

## 2019-11-05 ASSESSMENT — PAIN DESCRIPTION - DESCRIPTORS
DESCRIPTORS: ACHING
DESCRIPTORS: CONSTANT;ACHING

## 2019-11-05 ASSESSMENT — PAIN - FUNCTIONAL ASSESSMENT: PAIN_FUNCTIONAL_ASSESSMENT: ACTIVITIES ARE NOT PREVENTED

## 2019-11-05 ASSESSMENT — PAIN DESCRIPTION - PROGRESSION
CLINICAL_PROGRESSION: NOT CHANGED
CLINICAL_PROGRESSION: GRADUALLY IMPROVING

## 2019-11-05 ASSESSMENT — PAIN DESCRIPTION - ORIENTATION: ORIENTATION: ANTERIOR;OTHER (COMMENT)

## 2019-11-06 ENCOUNTER — TELEPHONE (OUTPATIENT)
Dept: PAIN MANAGEMENT | Age: 64
End: 2019-11-06

## 2019-11-06 VITALS
BODY MASS INDEX: 22.45 KG/M2 | WEIGHT: 122 LBS | HEART RATE: 82 BPM | DIASTOLIC BLOOD PRESSURE: 73 MMHG | SYSTOLIC BLOOD PRESSURE: 106 MMHG | OXYGEN SATURATION: 100 % | RESPIRATION RATE: 18 BRPM | TEMPERATURE: 98.3 F | HEIGHT: 62 IN

## 2019-11-06 LAB
ALBUMIN SERPL-MCNC: 3.1 G/DL (ref 3.5–5.2)
ALP BLD-CCNC: 63 U/L (ref 35–104)
ALT SERPL-CCNC: 7 U/L (ref 0–32)
ANION GAP SERPL CALCULATED.3IONS-SCNC: 13 MMOL/L (ref 7–16)
AST SERPL-CCNC: 12 U/L (ref 0–31)
BILIRUB SERPL-MCNC: 1 MG/DL (ref 0–1.2)
BUN BLDV-MCNC: 6 MG/DL (ref 8–23)
CALCIUM SERPL-MCNC: 8.6 MG/DL (ref 8.6–10.2)
CHLORIDE BLD-SCNC: 106 MMOL/L (ref 98–107)
CO2: 23 MMOL/L (ref 22–29)
CREAT SERPL-MCNC: 0.6 MG/DL (ref 0.5–1)
GFR AFRICAN AMERICAN: >60
GFR NON-AFRICAN AMERICAN: >60 ML/MIN/1.73
GLUCOSE BLD-MCNC: 88 MG/DL (ref 74–99)
METER GLUCOSE: 144 MG/DL (ref 74–99)
POTASSIUM SERPL-SCNC: 3.3 MMOL/L (ref 3.5–5)
SODIUM BLD-SCNC: 142 MMOL/L (ref 132–146)
TOTAL PROTEIN: 6.4 G/DL (ref 6.4–8.3)

## 2019-11-06 PROCEDURE — 6360000002 HC RX W HCPCS: Performed by: INTERNAL MEDICINE

## 2019-11-06 PROCEDURE — 6370000000 HC RX 637 (ALT 250 FOR IP): Performed by: INTERNAL MEDICINE

## 2019-11-06 PROCEDURE — 80053 COMPREHEN METABOLIC PANEL: CPT

## 2019-11-06 PROCEDURE — 2580000003 HC RX 258: Performed by: NURSE PRACTITIONER

## 2019-11-06 PROCEDURE — 96372 THER/PROPH/DIAG INJ SC/IM: CPT

## 2019-11-06 PROCEDURE — 82962 GLUCOSE BLOOD TEST: CPT

## 2019-11-06 PROCEDURE — 94640 AIRWAY INHALATION TREATMENT: CPT

## 2019-11-06 PROCEDURE — 36415 COLL VENOUS BLD VENIPUNCTURE: CPT

## 2019-11-06 PROCEDURE — 6370000000 HC RX 637 (ALT 250 FOR IP): Performed by: NURSE PRACTITIONER

## 2019-11-06 PROCEDURE — G0378 HOSPITAL OBSERVATION PER HR: HCPCS

## 2019-11-06 RX ORDER — GABAPENTIN 300 MG/1
300 CAPSULE ORAL ONCE
Status: COMPLETED | OUTPATIENT
Start: 2019-11-06 | End: 2019-11-06

## 2019-11-06 RX ADMIN — Medication 10 ML: at 08:34

## 2019-11-06 RX ADMIN — ACETAMINOPHEN 650 MG: 325 TABLET, FILM COATED ORAL at 02:51

## 2019-11-06 RX ADMIN — POLYETHYLENE GLYCOL 3350 17 G: 17 POWDER, FOR SOLUTION ORAL at 08:33

## 2019-11-06 RX ADMIN — AMLODIPINE BESYLATE 10 MG: 10 TABLET ORAL at 08:33

## 2019-11-06 RX ADMIN — POTASSIUM BICARBONATE 20 MEQ: 782 TABLET, EFFERVESCENT ORAL at 08:34

## 2019-11-06 RX ADMIN — GABAPENTIN 300 MG: 300 CAPSULE ORAL at 08:33

## 2019-11-06 RX ADMIN — IPRATROPIUM BROMIDE AND ALBUTEROL SULFATE 3 ML: .5; 3 SOLUTION RESPIRATORY (INHALATION) at 08:29

## 2019-11-06 RX ADMIN — LINAGLIPTIN 5 MG: 5 TABLET, FILM COATED ORAL at 08:33

## 2019-11-06 RX ADMIN — INSULIN LISPRO 2 UNITS: 100 INJECTION, SOLUTION INTRAVENOUS; SUBCUTANEOUS at 08:49

## 2019-11-06 RX ADMIN — HEPARIN SODIUM 5000 UNITS: 10000 INJECTION INTRAVENOUS; SUBCUTANEOUS at 06:33

## 2019-11-06 RX ADMIN — GABAPENTIN 300 MG: 300 CAPSULE ORAL at 11:53

## 2019-11-06 RX ADMIN — PANTOPRAZOLE SODIUM 20 MG: 20 TABLET, DELAYED RELEASE ORAL at 08:34

## 2019-11-06 ASSESSMENT — PAIN SCALES - GENERAL
PAINLEVEL_OUTOF10: 0
PAINLEVEL_OUTOF10: 7

## 2019-11-10 LAB — JAK2 GENE MUTATION QUAL: NOT DETECTED

## 2019-11-11 ENCOUNTER — OFFICE VISIT (OUTPATIENT)
Dept: FAMILY MEDICINE CLINIC | Age: 64
End: 2019-11-11
Payer: MEDICAID

## 2019-11-11 VITALS
HEIGHT: 62 IN | BODY MASS INDEX: 22.26 KG/M2 | HEART RATE: 64 BPM | WEIGHT: 121 LBS | DIASTOLIC BLOOD PRESSURE: 64 MMHG | SYSTOLIC BLOOD PRESSURE: 114 MMHG | TEMPERATURE: 98.1 F | OXYGEN SATURATION: 99 %

## 2019-11-11 DIAGNOSIS — G62.9 NEUROPATHY: ICD-10-CM

## 2019-11-11 DIAGNOSIS — G43.919 INTRACTABLE MIGRAINE WITHOUT STATUS MIGRAINOSUS, UNSPECIFIED MIGRAINE TYPE: ICD-10-CM

## 2019-11-11 DIAGNOSIS — Z09 HOSPITAL DISCHARGE FOLLOW-UP: Primary | ICD-10-CM

## 2019-11-11 DIAGNOSIS — H61.21 IMPACTED CERUMEN OF RIGHT EAR: ICD-10-CM

## 2019-11-11 LAB
PROTHROMBIN G20210A MUTATION: NEGATIVE
PT PCR SPECIMEN: NORMAL

## 2019-11-11 PROCEDURE — 69209 REMOVE IMPACTED EAR WAX UNI: CPT | Performed by: NURSE PRACTITIONER

## 2019-11-11 PROCEDURE — 1111F DSCHRG MED/CURRENT MED MERGE: CPT | Performed by: NURSE PRACTITIONER

## 2019-11-11 PROCEDURE — 99214 OFFICE O/P EST MOD 30 MIN: CPT | Performed by: NURSE PRACTITIONER

## 2019-11-11 RX ORDER — ACETAMINOPHEN 500 MG
500 TABLET ORAL EVERY 8 HOURS PRN
Qty: 90 TABLET | Refills: 0 | Status: SHIPPED
Start: 2019-11-11 | End: 2020-06-04 | Stop reason: SDUPTHER

## 2019-11-11 RX ORDER — GABAPENTIN 300 MG/1
300 CAPSULE ORAL 3 TIMES DAILY
Qty: 90 CAPSULE | Refills: 2 | Status: SHIPPED
Start: 2019-11-11 | End: 2020-02-17 | Stop reason: SDUPTHER

## 2019-11-11 ASSESSMENT — ENCOUNTER SYMPTOMS
DIARRHEA: 0
COLOR CHANGE: 0
EYE PAIN: 0
CHEST TIGHTNESS: 0
SHORTNESS OF BREATH: 0
SINUS PAIN: 0
VOMITING: 0
NAUSEA: 0
CONSTIPATION: 1
COUGH: 0
WHEEZING: 0

## 2019-11-20 LAB
ANTIPHOSPHOLIPID AB IGG: 5 GPL (ref 0–14)
ANTIPHOSPHOLIPID AB IGM: 2 MPL (ref 0–14)

## 2019-11-26 ENCOUNTER — TELEPHONE (OUTPATIENT)
Dept: FAMILY MEDICINE CLINIC | Age: 64
End: 2019-11-26

## 2019-11-26 DIAGNOSIS — E11.65 TYPE 2 DIABETES MELLITUS WITH HYPERGLYCEMIA, WITHOUT LONG-TERM CURRENT USE OF INSULIN (HCC): ICD-10-CM

## 2019-12-03 ENCOUNTER — OFFICE VISIT (OUTPATIENT)
Dept: PAIN MANAGEMENT | Age: 64
End: 2019-12-03
Payer: MEDICAID

## 2019-12-03 ENCOUNTER — PREP FOR PROCEDURE (OUTPATIENT)
Dept: PAIN MANAGEMENT | Age: 64
End: 2019-12-03

## 2019-12-03 VITALS
WEIGHT: 120 LBS | OXYGEN SATURATION: 98 % | HEIGHT: 62 IN | DIASTOLIC BLOOD PRESSURE: 72 MMHG | RESPIRATION RATE: 18 BRPM | BODY MASS INDEX: 22.08 KG/M2 | SYSTOLIC BLOOD PRESSURE: 120 MMHG | HEART RATE: 87 BPM

## 2019-12-03 DIAGNOSIS — G89.4 CHRONIC PAIN SYNDROME: Primary | ICD-10-CM

## 2019-12-03 DIAGNOSIS — M54.16 LUMBAR RADICULOPATHY: Primary | ICD-10-CM

## 2019-12-03 DIAGNOSIS — M54.41 CHRONIC BILATERAL LOW BACK PAIN WITH BILATERAL SCIATICA: ICD-10-CM

## 2019-12-03 DIAGNOSIS — M51.9 LUMBAR DISC DISORDER: ICD-10-CM

## 2019-12-03 DIAGNOSIS — M54.16 LUMBAR RADICULOPATHY: ICD-10-CM

## 2019-12-03 DIAGNOSIS — M54.42 CHRONIC BILATERAL LOW BACK PAIN WITH BILATERAL SCIATICA: ICD-10-CM

## 2019-12-03 DIAGNOSIS — G89.29 CHRONIC BILATERAL LOW BACK PAIN WITH BILATERAL SCIATICA: ICD-10-CM

## 2019-12-03 PROCEDURE — 4004F PT TOBACCO SCREEN RCVD TLK: CPT | Performed by: PAIN MEDICINE

## 2019-12-03 PROCEDURE — G8427 DOCREV CUR MEDS BY ELIG CLIN: HCPCS | Performed by: PAIN MEDICINE

## 2019-12-03 PROCEDURE — 99213 OFFICE O/P EST LOW 20 MIN: CPT | Performed by: PAIN MEDICINE

## 2019-12-03 PROCEDURE — 3017F COLORECTAL CA SCREEN DOC REV: CPT | Performed by: PAIN MEDICINE

## 2019-12-03 PROCEDURE — G8420 CALC BMI NORM PARAMETERS: HCPCS | Performed by: PAIN MEDICINE

## 2019-12-03 PROCEDURE — G8484 FLU IMMUNIZE NO ADMIN: HCPCS | Performed by: PAIN MEDICINE

## 2019-12-10 ENCOUNTER — HOSPITAL ENCOUNTER (OUTPATIENT)
Dept: OCCUPATIONAL THERAPY | Age: 64
Setting detail: THERAPIES SERIES
Discharge: HOME | End: 2019-12-10
Payer: MEDICAID

## 2019-12-18 ENCOUNTER — HOSPITAL ENCOUNTER (OUTPATIENT)
Age: 64
Discharge: HOME | End: 2019-12-20
Payer: MEDICAID

## 2019-12-18 ENCOUNTER — OFFICE VISIT (OUTPATIENT)
Dept: FAMILY MEDICINE CLINIC | Age: 64
End: 2019-12-18
Payer: MEDICAID

## 2019-12-18 VITALS
WEIGHT: 124 LBS | TEMPERATURE: 97.7 F | DIASTOLIC BLOOD PRESSURE: 78 MMHG | SYSTOLIC BLOOD PRESSURE: 120 MMHG | RESPIRATION RATE: 16 BRPM | BODY MASS INDEX: 21.97 KG/M2 | HEART RATE: 58 BPM | HEIGHT: 63 IN | OXYGEN SATURATION: 96 %

## 2019-12-18 DIAGNOSIS — E78.2 MIXED HYPERLIPIDEMIA: ICD-10-CM

## 2019-12-18 DIAGNOSIS — M79.674 GREAT TOE PAIN, RIGHT: ICD-10-CM

## 2019-12-18 DIAGNOSIS — E11.65 TYPE 2 DIABETES MELLITUS WITH HYPERGLYCEMIA, WITHOUT LONG-TERM CURRENT USE OF INSULIN (HCC): ICD-10-CM

## 2019-12-18 DIAGNOSIS — Z23 FLU VACCINE NEED: ICD-10-CM

## 2019-12-18 DIAGNOSIS — H61.22 IMPACTED CERUMEN OF LEFT EAR: ICD-10-CM

## 2019-12-18 DIAGNOSIS — I10 ESSENTIAL HYPERTENSION: ICD-10-CM

## 2019-12-18 DIAGNOSIS — G62.9 NEUROPATHY: ICD-10-CM

## 2019-12-18 DIAGNOSIS — E11.9 ENCOUNTER FOR DIABETIC FOOT EXAM (HCC): ICD-10-CM

## 2019-12-18 DIAGNOSIS — M79.674 GREAT TOE PAIN, RIGHT: Primary | ICD-10-CM

## 2019-12-18 LAB — URIC ACID, SERUM: 4.5 MG/DL (ref 2.4–5.7)

## 2019-12-18 PROCEDURE — 36415 COLL VENOUS BLD VENIPUNCTURE: CPT | Performed by: NURSE PRACTITIONER

## 2019-12-18 PROCEDURE — 4004F PT TOBACCO SCREEN RCVD TLK: CPT | Performed by: NURSE PRACTITIONER

## 2019-12-18 PROCEDURE — 99213 OFFICE O/P EST LOW 20 MIN: CPT | Performed by: NURSE PRACTITIONER

## 2019-12-18 PROCEDURE — G8420 CALC BMI NORM PARAMETERS: HCPCS | Performed by: NURSE PRACTITIONER

## 2019-12-18 PROCEDURE — 90686 IIV4 VACC NO PRSV 0.5 ML IM: CPT | Performed by: NURSE PRACTITIONER

## 2019-12-18 PROCEDURE — 2022F DILAT RTA XM EVC RTNOPTHY: CPT | Performed by: NURSE PRACTITIONER

## 2019-12-18 PROCEDURE — 3017F COLORECTAL CA SCREEN DOC REV: CPT | Performed by: NURSE PRACTITIONER

## 2019-12-18 PROCEDURE — 84550 ASSAY OF BLOOD/URIC ACID: CPT

## 2019-12-18 PROCEDURE — 36415 COLL VENOUS BLD VENIPUNCTURE: CPT

## 2019-12-18 PROCEDURE — G8427 DOCREV CUR MEDS BY ELIG CLIN: HCPCS | Performed by: NURSE PRACTITIONER

## 2019-12-18 PROCEDURE — 3045F PR MOST RECENT HEMOGLOBIN A1C LEVEL 7.0-9.0%: CPT | Performed by: NURSE PRACTITIONER

## 2019-12-18 PROCEDURE — 90471 IMMUNIZATION ADMIN: CPT | Performed by: NURSE PRACTITIONER

## 2019-12-18 PROCEDURE — G8482 FLU IMMUNIZE ORDER/ADMIN: HCPCS | Performed by: NURSE PRACTITIONER

## 2019-12-18 RX ORDER — ATORVASTATIN CALCIUM 40 MG/1
40 TABLET, FILM COATED ORAL DAILY
Qty: 90 TABLET | Refills: 1 | Status: SHIPPED
Start: 2019-12-18 | End: 2021-08-13

## 2019-12-18 ASSESSMENT — ENCOUNTER SYMPTOMS
DIARRHEA: 0
VOMITING: 0
SHORTNESS OF BREATH: 0
CHEST TIGHTNESS: 0
CONSTIPATION: 0
COLOR CHANGE: 0
BACK PAIN: 1
EYE PAIN: 0
SINUS PAIN: 0
NAUSEA: 0
WHEEZING: 0
COUGH: 0

## 2019-12-19 ENCOUNTER — TELEPHONE (OUTPATIENT)
Dept: FAMILY MEDICINE CLINIC | Age: 64
End: 2019-12-19

## 2019-12-20 RX ORDER — GABAPENTIN 300 MG/1
300 CAPSULE ORAL 3 TIMES DAILY
COMMUNITY
End: 2020-01-28 | Stop reason: SDUPTHER

## 2019-12-20 NOTE — PROGRESS NOTES
[facility] PRE-ADMISSION TESTING INSTRUCTIONS    The Preadmission Testing patient is instructed accordingly using the following criteria (check applicable):    ARRIVAL INSTRUCTIONS:  [x] Parking the day of Surgery is located in the Main Entrance lot. Upon entering the door, make an immediate right to the surgery reception desk    [] [phone] is available Monday through Friday 6 am to 6 pm    [x] Bring photo ID and insurance card    [] Bring in a copy of Living will or Durable Power of  papers. [x] Please be sure to arrange for responsible adult to provide transportation to and from the hospital    [x] Please arrange for responsible adult to be with you for the 24 hour period post procedure due to having anesthesia      GENERAL INSTRUCTIONS:    [x] Nothing by mouth after midnight, including gum, candy, mints or water    [x] You may brush your teeth, but do not swallow any water    [x] Take medications as instructed with 1-2 oz of water    [x] Stop herbal supplements and vitamins 5 days prior to procedure    [x] Follow preop dosing of blood thinners per physician instructions    [] Take 1/2 dose of evening insulin, but no insulin after midnight    [x] No oral diabetic medications after midnight    [x] If diabetic and have low blood sugar or feel symptomatic, take 1-2oz apple juice only    [x] Bring inhalers day of surgery    [] Bring C-PAP/ Bi-Pap day of surgery    [] Bring urine specimen day of surgery    [x] Shower or bath with soap, lather and rinse well, AM of Surgery, no lotion, powders or creams to surgical site    [] Follow bowel prep as instructed per surgeon    [x] No tobacco products within 24 hours of surgery     [x] No alcohol or illegal drug use within 24 hours of surgery.     [x] Jewelry, body piercing's, eyeglasses, contact lenses and dentures are not permitted into surgery (bring cases)      [x] Please do not wear any nail polish, make up or hair products on the day of surgery    [x] If not already done, you can expect a call from registration    [x] You can expect a call the business day prior to procedure to notify you if your arrival time changes    [x] If you receive a survey after surgery we would greatly appreciate your comments    [] Parent/guardian of a minor must accompany their child and remain on the premises  the entire time they are under our care     [] Pediatric patients may bring favorite toy, blanket or comfort item with them    [] A caregiver or family member must remain with the patient during their stay if they are mentally handicapped, have dementia, disoriented or unable to use a call light or would be a safety concern if left unattended    [x] Please notify surgeon if you develop any illness between now and time of surgery (cold, cough, sore throat, fever, nausea, vomiting) or any signs of infections  including skin, wounds, and dental.    [x]  The Outpatient Pharmacy is available to fill your prescription here on your day of surgery, ask your preop nurse for details    [x] Other instructions  EDUCATIONAL MATERIALS PROVIDED:    [] PAT Preoperative Education Packet/Booklet     [] Medication List    [] Fluoroscopy Information Pamphlet    [] Transfusion bracelet applied with instructions    [] Joint replacement video reviewed    [] Shower with soap, lather and rinse well, and use CHG wipes provided the evening before surgery as instructed

## 2019-12-22 RX ORDER — AMLODIPINE BESYLATE 10 MG/1
10 TABLET ORAL DAILY
Qty: 90 TABLET | Refills: 1 | Status: SHIPPED
Start: 2019-12-22 | End: 2020-02-07 | Stop reason: SDUPTHER

## 2019-12-23 ENCOUNTER — HOSPITAL ENCOUNTER (OUTPATIENT)
Dept: GENERAL RADIOLOGY | Age: 64
Discharge: HOME | End: 2019-12-25
Payer: MEDICAID

## 2019-12-23 ENCOUNTER — HOSPITAL ENCOUNTER (OUTPATIENT)
Age: 64
Discharge: HOME | End: 2019-12-25
Payer: MEDICAID

## 2019-12-23 DIAGNOSIS — M79.674 GREAT TOE PAIN, RIGHT: ICD-10-CM

## 2019-12-23 PROCEDURE — 73620 X-RAY EXAM OF FOOT: CPT

## 2019-12-26 ENCOUNTER — ANESTHESIA (OUTPATIENT)
Dept: OPERATING ROOM | Age: 64
End: 2019-12-26
Payer: MEDICAID

## 2019-12-26 ENCOUNTER — HOSPITAL ENCOUNTER (OUTPATIENT)
Age: 64
Setting detail: OUTPATIENT SURGERY
Discharge: HOME | End: 2019-12-26
Attending: PAIN MEDICINE | Admitting: PAIN MEDICINE
Payer: MEDICAID

## 2019-12-26 ENCOUNTER — HOSPITAL ENCOUNTER (OUTPATIENT)
Dept: GENERAL RADIOLOGY | Age: 64
Discharge: HOME | End: 2019-12-28
Attending: PAIN MEDICINE
Payer: MEDICAID

## 2019-12-26 ENCOUNTER — ANESTHESIA EVENT (OUTPATIENT)
Dept: OPERATING ROOM | Age: 64
End: 2019-12-26
Payer: MEDICAID

## 2019-12-26 VITALS
TEMPERATURE: 98 F | WEIGHT: 124 LBS | HEART RATE: 60 BPM | SYSTOLIC BLOOD PRESSURE: 120 MMHG | HEIGHT: 63 IN | BODY MASS INDEX: 21.97 KG/M2 | RESPIRATION RATE: 16 BRPM | DIASTOLIC BLOOD PRESSURE: 71 MMHG | OXYGEN SATURATION: 100 %

## 2019-12-26 VITALS — DIASTOLIC BLOOD PRESSURE: 69 MMHG | OXYGEN SATURATION: 96 % | SYSTOLIC BLOOD PRESSURE: 99 MMHG

## 2019-12-26 DIAGNOSIS — R52 PAIN MANAGEMENT: ICD-10-CM

## 2019-12-26 LAB — METER GLUCOSE: 121 MG/DL (ref 74–99)

## 2019-12-26 PROCEDURE — 2500000003 HC RX 250 WO HCPCS: Performed by: PAIN MEDICINE

## 2019-12-26 PROCEDURE — 2580000003 HC RX 258: Performed by: NURSE ANESTHETIST, CERTIFIED REGISTERED

## 2019-12-26 PROCEDURE — 7100000010 HC PHASE II RECOVERY - FIRST 15 MIN: Performed by: PAIN MEDICINE

## 2019-12-26 PROCEDURE — 6360000002 HC RX W HCPCS: Performed by: PAIN MEDICINE

## 2019-12-26 PROCEDURE — 62323 NJX INTERLAMINAR LMBR/SAC: CPT | Performed by: PAIN MEDICINE

## 2019-12-26 PROCEDURE — 6360000004 HC RX CONTRAST MEDICATION: Performed by: PAIN MEDICINE

## 2019-12-26 PROCEDURE — 7100000011 HC PHASE II RECOVERY - ADDTL 15 MIN: Performed by: PAIN MEDICINE

## 2019-12-26 PROCEDURE — 2709999900 HC NON-CHARGEABLE SUPPLY: Performed by: PAIN MEDICINE

## 2019-12-26 PROCEDURE — 3600000002 HC SURGERY LEVEL 2 BASE: Performed by: PAIN MEDICINE

## 2019-12-26 PROCEDURE — 3209999900 FLUORO FOR SURGICAL PROCEDURES

## 2019-12-26 PROCEDURE — 3700000000 HC ANESTHESIA ATTENDED CARE: Performed by: PAIN MEDICINE

## 2019-12-26 PROCEDURE — 6360000002 HC RX W HCPCS: Performed by: NURSE ANESTHETIST, CERTIFIED REGISTERED

## 2019-12-26 PROCEDURE — 82962 GLUCOSE BLOOD TEST: CPT

## 2019-12-26 RX ORDER — MIDAZOLAM HYDROCHLORIDE 1 MG/ML
INJECTION INTRAMUSCULAR; INTRAVENOUS PRN
Status: DISCONTINUED | OUTPATIENT
Start: 2019-12-26 | End: 2019-12-26 | Stop reason: SDUPTHER

## 2019-12-26 RX ORDER — METHYLPREDNISOLONE ACETATE 40 MG/ML
INJECTION, SUSPENSION INTRA-ARTICULAR; INTRALESIONAL; INTRAMUSCULAR; SOFT TISSUE PRN
Status: DISCONTINUED | OUTPATIENT
Start: 2019-12-26 | End: 2019-12-26 | Stop reason: ALTCHOICE

## 2019-12-26 RX ORDER — PROPOFOL 10 MG/ML
INJECTION, EMULSION INTRAVENOUS PRN
Status: DISCONTINUED | OUTPATIENT
Start: 2019-12-26 | End: 2019-12-26 | Stop reason: SDUPTHER

## 2019-12-26 RX ORDER — SODIUM CHLORIDE 9 MG/ML
INJECTION, SOLUTION INTRAVENOUS CONTINUOUS PRN
Status: DISCONTINUED | OUTPATIENT
Start: 2019-12-26 | End: 2019-12-26 | Stop reason: SDUPTHER

## 2019-12-26 RX ORDER — LIDOCAINE HYDROCHLORIDE 5 MG/ML
INJECTION, SOLUTION INFILTRATION; INTRAVENOUS PRN
Status: DISCONTINUED | OUTPATIENT
Start: 2019-12-26 | End: 2019-12-26 | Stop reason: ALTCHOICE

## 2019-12-26 RX ORDER — FENTANYL CITRATE 50 UG/ML
INJECTION, SOLUTION INTRAMUSCULAR; INTRAVENOUS PRN
Status: DISCONTINUED | OUTPATIENT
Start: 2019-12-26 | End: 2019-12-26 | Stop reason: SDUPTHER

## 2019-12-26 RX ADMIN — PROPOFOL 20 MG: 10 INJECTION, EMULSION INTRAVENOUS at 09:40

## 2019-12-26 RX ADMIN — SODIUM CHLORIDE: 9 INJECTION, SOLUTION INTRAVENOUS at 09:31

## 2019-12-26 RX ADMIN — FENTANYL CITRATE 50 MCG: 50 INJECTION, SOLUTION INTRAMUSCULAR; INTRAVENOUS at 09:35

## 2019-12-26 RX ADMIN — FENTANYL CITRATE 50 MCG: 50 INJECTION, SOLUTION INTRAMUSCULAR; INTRAVENOUS at 09:33

## 2019-12-26 RX ADMIN — MIDAZOLAM 2 MG: 1 INJECTION INTRAMUSCULAR; INTRAVENOUS at 09:33

## 2019-12-26 ASSESSMENT — PAIN - FUNCTIONAL ASSESSMENT: PAIN_FUNCTIONAL_ASSESSMENT: 0-10

## 2019-12-26 ASSESSMENT — PULMONARY FUNCTION TESTS
PIF_VALUE: 1
PIF_VALUE: 0
PIF_VALUE: 1
PIF_VALUE: 0
PIF_VALUE: 1

## 2019-12-26 ASSESSMENT — LIFESTYLE VARIABLES: SMOKING_STATUS: 1

## 2019-12-26 ASSESSMENT — PAIN DESCRIPTION - DESCRIPTORS: DESCRIPTORS: ACHING

## 2019-12-26 NOTE — H&P
[facility] Pain Management        [address] [facility], [address]  Dept: [phone]    Procedure History & Physical      Megan Meraz     HPI:    Patient  is here for LBP and R>LLE pain for L4-5 RICARDO  Labs/imaging studies reviewed   All question and concerns addressed including R/B/A associated with the procedure    Past Medical History:   Diagnosis Date    Arthritis     Asthma     controlled     Depression     Diabetes mellitus ([address]     Fibromyalgia     GERD (gastroesophageal reflux disease)     Hepatitis C 2016    treated     Hyperlipidemia     Hypertension     Overactive bladder     Spleen injury     hospitalized 11/2019 - resolved as of 12-20-19        Past Surgical History:   Procedure Laterality Date    ANESTHESIA NERVE BLOCK N/A 3/26/2019    L4-5 EPIDURAL STEROID INJECTION performed by Danya Puente DO at [facility] Right 6/5/2019    RIGHT ENDOSCOPIC CARPAL TUNNEL RELEASE performed by Caro Fischer MD at [facility]      ENDOSCOPY, COLON, DIAGNOSTIC      EPIDURAL STEROID INJECTION N/A 5/16/2019    L4-5 EPIDURAL STEROID INJECTION performed by Danya Puente DO at [address] [place] [address]  03/26/2019    with sedation    OVARY REMOVAL      1981 left    [address] [facility] Right 6/5/2019    RIGHT ULNAR NERVE DECOMPRESSION AT ELBOW WITH POSSIBLE NERVE TRANSPOSITION performed by Caro Fischer MD at [address] [place] [address] ARTHROSCOPY Right        Prior to Admission medications    Medication Sig Start Date End Date Taking? Authorizing Provider   amLODIPine (NORVASC) 10 MG tablet Take 1 tablet by mouth daily 12/22/19  Yes KAIT Plata CNP   gabapentin (NEURONTIN) 300 MG capsule Take 300 mg by mouth 3 times daily.  Instructed to take am of procedure   Yes Historical Provider, MD   atorvastatin (LIPITOR) 40 MG tablet Take 1 tablet by mouth daily 12/18/19  Yes KAIT Plata CNP

## 2019-12-27 ENCOUNTER — CARE COORDINATION (OUTPATIENT)
Dept: CARE COORDINATION | Age: 64
End: 2019-12-27

## 2020-01-03 ENCOUNTER — CARE COORDINATION (OUTPATIENT)
Dept: CARE COORDINATION | Age: 65
End: 2020-01-03

## 2020-01-03 SDOH — HEALTH STABILITY: PHYSICAL HEALTH: ON AVERAGE, HOW MANY MINUTES DO YOU ENGAGE IN EXERCISE AT THIS LEVEL?: 0 MIN

## 2020-01-03 SDOH — SOCIAL STABILITY: SOCIAL NETWORK: ARE YOU MARRIED, WIDOWED, DIVORCED, SEPARATED, NEVER MARRIED, OR LIVING WITH A PARTNER?: DIVORCED

## 2020-01-03 SDOH — ECONOMIC STABILITY: FOOD INSECURITY: WITHIN THE PAST 12 MONTHS, THE FOOD YOU BOUGHT JUST DIDN'T LAST AND YOU DIDN'T HAVE MONEY TO GET MORE.: NEVER TRUE

## 2020-01-03 SDOH — ECONOMIC STABILITY: FOOD INSECURITY: WITHIN THE PAST 12 MONTHS, YOU WORRIED THAT YOUR FOOD WOULD RUN OUT BEFORE YOU GOT MONEY TO BUY MORE.: NEVER TRUE

## 2020-01-03 SDOH — HEALTH STABILITY: PHYSICAL HEALTH: ON AVERAGE, HOW MANY DAYS PER WEEK DO YOU ENGAGE IN MODERATE TO STRENUOUS EXERCISE (LIKE A BRISK WALK)?: 0 DAYS

## 2020-01-03 SDOH — ECONOMIC STABILITY: TRANSPORTATION INSECURITY
IN THE PAST 12 MONTHS, HAS THE LACK OF TRANSPORTATION KEPT YOU FROM MEDICAL APPOINTMENTS OR FROM GETTING MEDICATIONS?: NO

## 2020-01-03 SDOH — SOCIAL STABILITY: SOCIAL NETWORK: IN A TYPICAL WEEK, HOW MANY TIMES DO YOU TALK ON THE PHONE WITH FAMILY, FRIENDS, OR NEIGHBORS?: NEVER

## 2020-01-03 SDOH — HEALTH STABILITY: MENTAL HEALTH
STRESS IS WHEN SOMEONE FEELS TENSE, NERVOUS, ANXIOUS, OR CAN'T SLEEP AT NIGHT BECAUSE THEIR MIND IS TROUBLED. HOW STRESSED ARE YOU?: VERY MUCH

## 2020-01-03 SDOH — SOCIAL STABILITY: SOCIAL NETWORK: HOW OFTEN DO YOU ATTEND CHURCH OR RELIGIOUS SERVICES?: NEVER

## 2020-01-03 SDOH — ECONOMIC STABILITY: TRANSPORTATION INSECURITY
IN THE PAST 12 MONTHS, HAS LACK OF TRANSPORTATION KEPT YOU FROM MEETINGS, WORK, OR FROM GETTING THINGS NEEDED FOR DAILY LIVING?: NO

## 2020-01-03 SDOH — SOCIAL STABILITY: SOCIAL NETWORK: HOW OFTEN DO YOU GET TOGETHER WITH FRIENDS OR RELATIVES?: MORE THAN THREE TIMES A WEEK

## 2020-01-03 SDOH — SOCIAL STABILITY: SOCIAL NETWORK: HOW OFTEN DO YOU ATTENT MEETINGS OF THE CLUB OR ORGANIZATION YOU BELONG TO?: NEVER

## 2020-01-03 SDOH — SOCIAL STABILITY: SOCIAL NETWORK
DO YOU BELONG TO ANY CLUBS OR ORGANIZATIONS SUCH AS CHURCH GROUPS UNIONS, FRATERNAL OR ATHLETIC GROUPS, OR SCHOOL GROUPS?: NO

## 2020-01-03 SDOH — ECONOMIC STABILITY: INCOME INSECURITY: HOW HARD IS IT FOR YOU TO PAY FOR THE VERY BASICS LIKE FOOD, HOUSING, MEDICAL CARE, AND HEATING?: VERY HARD

## 2020-01-03 NOTE — LETTER
1/3/2020    Lita Valiente  [address] [facility] [address]    Dear Lita Valiente,    I enjoyed speaking with you and wanted to send some additional information. Dr.Kelly Copeland, KAIT - CNP and I will work together to ensure your needs are met and help you achieve your health goals. We are committed to walk with you on this journey and look forward to working with you. Please feel free to contact me with any questions or concerns. I am available by phone or for appointments at the office. You can reach me at [phone] [facility]) or [phone] (Work cell).       In good health,       [facility] Floor, [address] [facility]

## 2020-01-03 NOTE — CARE COORDINATION
Ambulatory Care Coordination Note  1/3/2020  CM Risk Score: 10  Charlson 10 Year Mortality Risk Score: 10%     ACC: Aroldo Tamez, RN    Summary Note:   -ACM called patient to discuss Care Coordination Program. Pt agreeable to enrollment. Pt being enrolled for DM and depression. *Enrollment  -Reyna Cage reports that she lives in a private residence with her children and her significant other.  -Has a straight cane that she doesn't use as she states she is nervous that she'll fall walking with it  -ACM discussed the importance of using the cane to help with her balance, verbalized understanding.  -Reyna Cage reports having a HHA from [address] [place] [address] [employer] to assist her with bathing and dressing that is through [employer].  -She receives transportation from [address] [place] [address] for her OV. *DM  -Reports that she checks her BS every few days and they typically range from 112-138 per pt. And states that if she gets dizzy she immediately checks her BS.  -Denies any s/sx of hypo/hyperglycemia  -States that she doesn't follow a diabetic diet as she doesn't eat a lot d/t issues with her stomach and getting nauseated often, but states that she is a '''sweet eater. \"  -[facility] discussed DM zone tool and will send to [address] [place] [address] that she sees Dr Malgorzata Xiong (GI) for her stomach issues, ACM encouraged her to call office for f/u regarding frequent nausea, verbalized understanding. -[facility] discussed Diabetic education classes and a dietician with Reyna Cage. She is undecided at this time. -[facility] discussed the importance of having yearly diabetic eye and foot exams, verbalized understanding and states that she has to call Dr German Paniagua office and establish a new eye Dr. Mishel Adair states that she had an epidural placed 12/26/19 for her chronic back pain.  -She is scheduled to see Dr Rosemarie Schwarz on 1/10/20 for pain management.     *Medications   -Medications reviewed and she reports taking them as prescribed in her pill organizer.   -She does report that in past she has occasionally forgotten to take her medications and her HHA was assisting her with them but she is no longer able to do so. -ACM discussed pill pack option, will send pt information.   -Manuel Bartholomew states that she has a Medical Marijuana card and takes the medication every 4-8 hours as need for pain and that it helps relieve the pain in her back. *Food   -Manuel Bartholomew reports going to the [employer], [address] for [employer] and sometimes fresh vegetables. Eligio Nguyen reports that she is currently responsible for paying all of the utilities in her home and is nervous as she states she will be receiving a shut off notice for her water. -ACM discussed a  referral for this to see if there are any resources to assist pt and pt is agreeable    *Plan  -Care coordination  -ACM will make SW referral to [facility],  for assistance with counseling needs for depression, anxiety, and stress and for financial strain of water bill. -ACM will send DM zone tool, [employer] store shopping handout, smoking cessation brochure, [facility] pharmacy information, falls prevention information, where should you go and right care, right place, right time handouts. -ACM will f/u im about 1 week to check progress, assess needs, and discuss mailed information. Ambulatory Care Coordination Assessment    Care Coordination Protocol  Program Enrollment:  Complex Care  Referral from Primary Care Provider:  No  Week 1 - Initial Assessment     Do you have all of your prescriptions and are they filled?:  No  Barriers to medication adherence:  None  Are you able to afford your medications?:  Yes  How often do you have trouble taking your medications the way you have been told to take them?:  I always take them as prescribed.      Do you have Home O2 Therapy?:  No      Ability to seek help/take action for Emergent Urgent situations i.e. fire, crime, inclement 1/17/20 Yes [facility], APRN - CNP   SITagliptin (JANUVIA) 50 MG tablet Take 1 tablet by mouth daily 11/26/19  Yes [facility], KAIT - CNP   acetaminophen (APAP EXTRA STRENGTH) 500 MG tablet Take 1 tablet by mouth every 8 hours as needed for Pain 11/11/19  Yes [facility], APRN - CINDY   polyethylene glycol (GLYCOLAX) powder Take 17 g by mouth daily   Yes Historical Provider, MD   Ertugliflozin L-PyroglutamicAc (STEGLATRO) 5 MG TABS Take 5 mg by mouth daily 10/1/19  Yes [facility], APRN - CINDY   ipratropium-albuterol (DUONEB) 0.5-2.5 (3) MG/3ML SOLN nebulizer solution Inhale 1 vial into the lungs every 4 hours Instructed to take am of procedure   Yes Historical Provider, MD   azelastine (ASTELIN) 0.1 % nasal spray 2 sprays by Nasal route nightly Use in each nostril as directed 7/11/19  Yes Brianna Guillaume MD   albuterol sulfate  (90 Base) MCG/ACT inhaler Inhale 2 puffs into the lungs every 6 hours as needed for Wheezing Rescue inhaler  Patient taking differently: Inhale 2 puffs into the lungs every 6 hours as needed for Wheezing Rescue inhaler  Instructed to take am of procedure and bring 7/11/19  Yes Brianna Guillaume MD   pantoprazole (PROTONIX) 20 MG tablet Take 1 tablet by mouth daily  Patient taking differently: Take 40 mg by mouth daily Instructed to take am of procedure 5/26/19  Yes Bessy Vines MD   amitriptyline (ELAVIL) 50 MG tablet Take 1 tablet by mouth nightly 4/25/19  Yes Manisha Barth MD   sodium chloride (ALTAMIST SPRAY) 0.65 % nasal spray 1 spray by Nasal route as needed for Congestion 3/20/19  Yes Manisha Barth MD   acyclovir (ZOVIRAX) 400 MG tablet Take 400 mg by mouth as needed Shingles - no recent issues as of 12-20-19   Yes Historical Provider, MD   gabapentin (NEURONTIN) 300 MG capsule Take 1 capsule by mouth 3 times daily for 30 days.  11/11/19 12/11/19  [facility], KAIT - CNP   EPINEPHrine (EPIPEN) 0.3 MG/0.3ML SOAJ injection Use as directed for allergic reaction 4/25/19

## 2020-01-07 NOTE — TELEPHONE ENCOUNTER
I agree with the Care Coordinator's Plan of Care! I think this is an excellent plan and I am glad that [facility] opened up to you so that you may use your resources. Our protocol in the office is that the utility company can fax us a letter, we will write a \"do not shut off\" and sign it based upon their medical need. However, our protocol is to only do this 1x/year for each utility, or some would have us doing it all the time. She should also know that it does not mean they are excused from making any payments. Just an option. Have a great day!

## 2020-01-08 ENCOUNTER — CARE COORDINATION (OUTPATIENT)
Dept: FAMILY MEDICINE CLINIC | Age: 65
End: 2020-01-08

## 2020-01-08 NOTE — PROGRESS NOTES
INJECTION performed by Rolf Kraft DO at [address]  03/26/2019    with sedation    OVARY REMOVAL      1981 left     PAIN MANAGEMENT PROCEDURE N/A 12/26/2019    L4, L5 EPIDURAL STEROID INJECTION ([id number]) performed by Rolf Kraft DO at [facility] Right 6/5/2019    RIGHT ULNAR NERVE DECOMPRESSION AT ELBOW WITH POSSIBLE NERVE TRANSPOSITION performed by Mona Oviedo MD at [address] [place] [address] ARTHROSCOPY Right        Prior to Admission medications    Medication Sig Start Date End Date Taking? Authorizing Provider   amLODIPine (NORVASC) 10 MG tablet Take 1 tablet by mouth daily 12/22/19  Yes KAIT Pedroza CNP   gabapentin (NEURONTIN) 300 MG capsule Take 300 mg by mouth 3 times daily.  Instructed to take am of procedure   Yes Historical Provider, MD   atorvastatin (LIPITOR) 40 MG tablet Take 1 tablet by mouth daily 12/18/19  Yes KAIT Pedroza CNP   carbamide peroxide (DEBROX) 6.5 % otic solution Place 5 drops into the left ear 2 times daily 12/18/19 1/17/20 Yes KAIT Pedroza CNP   SITagliptin (JANUVIA) 50 MG tablet Take 1 tablet by mouth daily 11/26/19  Yes KAIT Pedroza CNP   acetaminophen (APAP EXTRA STRENGTH) 500 MG tablet Take 1 tablet by mouth every 8 hours as needed for Pain 11/11/19  Yes KAIT Pedroza CNP   polyethylene glycol (GLYCOLAX) powder Take 17 g by mouth daily   Yes Historical Provider, MD   Ertugliflozin L-PyroglutamicAc (STEGLATRO) 5 MG TABS Take 5 mg by mouth daily 10/1/19  Yes KAIT Pedroza CNP   ipratropium-albuterol (DUONEB) 0.5-2.5 (3) MG/3ML SOLN nebulizer solution Inhale 1 vial into the lungs every 4 hours Instructed to take am of procedure   Yes Historical Provider, MD   azelastine (ASTELIN) 0.1 % nasal spray 2 sprays by Nasal route nightly Use in each nostril as directed 7/11/19  Yes Alfonso Wolfe MD   albuterol sulfate  (90 Base) MCG/ACT inhaler Inhale 2 puffs into the lungs every 6 hours as needed for Wheezing Rescue inhaler  Patient taking differently: Inhale 2 puffs into the lungs every 6 hours as needed for Wheezing Rescue inhaler  Instructed to take am of procedure and bring 7/11/19  Yes Sri Solomon MD   pantoprazole (PROTONIX) 20 MG tablet Take 1 tablet by mouth daily  Patient taking differently: Take 40 mg by mouth daily Instructed to take am of procedure 5/26/19  Yes Reanna Alvarenga MD   amitriptyline (ELAVIL) 50 MG tablet Take 1 tablet by mouth nightly 4/25/19  Yes Estela Argueta MD   EPINEPHrine (EPIPEN) 0.3 MG/0.3ML SOAJ injection Use as directed for allergic reaction 4/25/19  Yes Estela Argueta MD   sodium chloride (ALTAMIST SPRAY) 0.65 % nasal spray 1 spray by Nasal route as needed for Congestion 3/20/19  Yes Estela Argueta MD   cyclobenzaprine (FLEXERIL) 10 MG tablet Take 5 mg by mouth 3 times daily    Yes Historical Provider, MD   acyclovir (ZOVIRAX) 400 MG tablet Take 400 mg by mouth as needed Shingles - no recent issues as of 12-20-19   Yes Historical Provider, MD   gabapentin (NEURONTIN) 300 MG capsule Take 1 capsule by mouth 3 times daily for 30 days.  11/11/19 12/11/19  KAIT Ordonez - CNP       Allergies   Allergen Reactions    Adhesive Tape Rash     bruising    Nsaids      Irritates IBS    Penicillins Hives       Social History     Socioeconomic History    Marital status: Single     Spouse name: Not on file    Number of children: 11    Years of education: Not on file    Highest education level: Some college, no degree   Occupational History    Not on file   Social Needs    Financial resource strain: Very hard    Food insecurity:     Worry: Never true     Inability: Never true    Transportation needs:     Medical: No     Non-medical: No   Tobacco Use    Smoking status: Current Every Day Smoker     Packs/day: 0.25     Years: 45.00     Pack years: 11.25     Types: Cigarettes    Smokeless tobacco: Never Used   Substance and Sexual Activity    Alcohol use: Yes     Comment: socially    Drug use: Yes     Types: Marijuana     Comment: holds medical card-Vaping    Sexual activity: Not Currently     Birth control/protection: Injection   Lifestyle    Physical activity:     Days per week: 0 days     Minutes per session: 0 min    Stress: Very much   Relationships    Social connections:     Talks on phone: Never     Gets together: More than three times a week     Attends Mosque service: Never     Active member of club or organization: No     Attends meetings of clubs or organizations: Never     Relationship status:     Intimate partner violence:     Fear of current or ex partner: Not on file     Emotionally abused: Not on file     Physically abused: Not on file     Forced sexual activity: Not on file   Other Topics Concern    Not on file   Social History Narrative    -Referral to SW for financial strain and stress,depression, anxiety    -Smoking cessation brochure being mailed out    -Pt utilizes 3 food pantries    -Receives transportation from [employer] for OV       Family History   Problem Relation Age of Onset    Diabetes Mother     Cancer Mother     Alcohol Abuse Father        REVIEW OF SYSTEMS:     Nikkie Loza denies fever/chills, chest pain, shortness of breath, new bowel or bladder complaints. All other review of systems was negative. PHYSICAL EXAMINATION:      /68   Pulse 88   Temp 97.8 °F (36.6 °C) (Oral)   Resp 20   Ht 5' 2.5\" (1.588 m)   Wt 125 lb (56.7 kg)   SpO2 99%   BMI 22.50 kg/m²     General:      General appearance:pleasant and well-hydrated, in no discomfort and A & O x3  Build:Normal Weight  Function:Moves about room without difficulty. HEENT:    Head:normocephalic and atraumatic  Pupils:regular, round and equal.  Sclera: icterus absent,    Lungs:    Breathing:Normal expansion. No accessory muscle use.     Abdomen:    Shape:non-distended and normal  Tenderness:none  Guarding:none    Lumbar spine:    Spine inspection:normal   CVA tenderness:No   Palpation:tenderness paravertebral muscles, left, right and positive. Range of motion:abnormal mildly in lateral bending, flexion, extension rotation bilateral and is painful. Musculoskeletal:    Trigger points in Paravertebral:absent bilaterally  SI joint tenderness:negative right, negative left              CARLA test:not done right, not done left  Piriformis tenderness:negative right, negative left  Trochanteric bursa tenderness:negative right, negative left  SLR:negative right, negative left, sitting     Extremities:    Tremors:None bilaterally upper and lower  Intact:Yes  Varicose veins:absent   Pulses:dorsalis pedis pulses 2+ bilaterally  Cyanosis:none  Edema:Normal    Neurological:    Sensory:normal to light touch   Motor:   Right Grip5/5              Left Grip5/5               Right Bicep5/5           Left Bicep5/5              Right Triceps5/5       Left Triceps5/5          Right Deltoid5/5     Left Deltoid5/5                  Right Quadriceps5/5          Left Quadriceps5/5           Right Gastrocnemius5/5    Left Gastrocnemius5/5  Right Ant Tibialis5/5  Left Ant Tibialis5/5  [address]    Dermatology:    Skin:no unusual rashes, no skin lesions    Impression:    LBP and R>LLE pain  2016 Lumbar MRI shows mild degenerative changes with moderate L4-5 right foraminal stenosis and mild-moderate foraminal stenosis on the left    Pt would like to discuss cervical complaints at the next visit     L4-5 RICARDO #1 with 75% relief, #2 gave 90% relief x 3 months that has since worn off, #3 gave only 3 days of relief (pt schedules with anesthesia)  Update lumbar MRI w/o contrast given decreasing effects from RICARDO's with worsening pain  HgA1C from 10/2019 was 7.5 (previously >12).      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

## 2020-01-10 ENCOUNTER — CARE COORDINATION (OUTPATIENT)
Dept: CARE COORDINATION | Age: 65
End: 2020-01-10

## 2020-01-10 ENCOUNTER — OFFICE VISIT (OUTPATIENT)
Dept: PAIN MANAGEMENT | Age: 65
End: 2020-01-10
Payer: MEDICAID

## 2020-01-10 VITALS
OXYGEN SATURATION: 99 % | HEART RATE: 88 BPM | HEIGHT: 63 IN | DIASTOLIC BLOOD PRESSURE: 68 MMHG | BODY MASS INDEX: 22.15 KG/M2 | WEIGHT: 125 LBS | RESPIRATION RATE: 20 BRPM | TEMPERATURE: 97.8 F | SYSTOLIC BLOOD PRESSURE: 112 MMHG

## 2020-01-10 PROCEDURE — 4004F PT TOBACCO SCREEN RCVD TLK: CPT | Performed by: PAIN MEDICINE

## 2020-01-10 PROCEDURE — 3017F COLORECTAL CA SCREEN DOC REV: CPT | Performed by: PAIN MEDICINE

## 2020-01-10 PROCEDURE — G8427 DOCREV CUR MEDS BY ELIG CLIN: HCPCS | Performed by: PAIN MEDICINE

## 2020-01-10 PROCEDURE — G8420 CALC BMI NORM PARAMETERS: HCPCS | Performed by: PAIN MEDICINE

## 2020-01-10 PROCEDURE — 99213 OFFICE O/P EST LOW 20 MIN: CPT | Performed by: PAIN MEDICINE

## 2020-01-10 PROCEDURE — G8482 FLU IMMUNIZE ORDER/ADMIN: HCPCS | Performed by: PAIN MEDICINE

## 2020-01-10 NOTE — CARE COORDINATION
ACM left message for patient to return call. Re: Follow up: DM, mailed information,  Review POC, Education, Goals. Contact information given.

## 2020-01-10 NOTE — PROGRESS NOTES
Emerald Simmons presents to the [facility] on 1/10/2020. Monica Call is complaining of pain rt. Sacral region . The pain is constant. The pain is described as aching and stabbing. Pain is rated on her best day at a 6, on her worst day at a 9, and on average at a 7 on the VAS scale. She took her last dose of     Any procedures since your last visit: Yes, with 60 % relief x 3 days     Pacemaker or defibrilator: No managed by . She is not on NSAIDS and is not on anticoagulation medications to include none and is managed by      /68   Pulse 88   Temp 97.8 °F (36.6 °C) (Oral)   Resp 20   Ht 5' 2.5\" (1.588 m)   Wt 125 lb (56.7 kg)   SpO2 99%   BMI 22.50 kg/m²      No LMP recorded.  Patient is postmenopausal.

## 2020-01-13 ENCOUNTER — CARE COORDINATION (OUTPATIENT)
Dept: CARE COORDINATION | Age: 65
End: 2020-01-13

## 2020-01-13 NOTE — CARE COORDINATION
(STEGLATRO) 5 MG TABS Take 5 mg by mouth daily 10/1/19   KAIT Sommer - CINDY   ipratropium-albuterol (DUONEB) 0.5-2.5 (3) MG/3ML SOLN nebulizer solution Inhale 1 vial into the lungs every 4 hours Instructed to take am of procedure    Historical Provider, MD   azelastine (ASTELIN) 0.1 % nasal spray 2 sprays by Nasal route nightly Use in each nostril as directed 7/11/19   Luisa Arriola MD   albuterol sulfate  (90 Base) MCG/ACT inhaler Inhale 2 puffs into the lungs every 6 hours as needed for Wheezing Rescue inhaler  Patient taking differently: Inhale 2 puffs into the lungs every 6 hours as needed for Wheezing Rescue inhaler  Instructed to take am of procedure and bring 7/11/19   Luisa Arriola MD   pantoprazole (PROTONIX) 20 MG tablet Take 1 tablet by mouth daily  Patient taking differently: Take 40 mg by mouth daily Instructed to take am of procedure 5/26/19   Renee Chacon MD   amitriptyline (ELAVIL) 50 MG tablet Take 1 tablet by mouth nightly 4/25/19   Deborah Mccarthy MD   EPINEPHrine (EPIPEN) 0.3 MG/0.3ML SOAJ injection Use as directed for allergic reaction 4/25/19   Deborah Mccarthy MD   sodium chloride (ALTAMIST SPRAY) 0.65 % nasal spray 1 spray by Nasal route as needed for Congestion 3/20/19   Deborah Mccarthy MD   cyclobenzaprine (FLEXERIL) 10 MG tablet Take 5 mg by mouth 3 times daily     Historical Provider, MD   acyclovir (ZOVIRAX) 400 MG tablet Take 400 mg by mouth as needed Shingles - no recent issues as of 12-20-19    Historical Provider, MD       Future Appointments   Date Time Provider Ilan Cramer   1/21/2020 10:00 AM JUAN Mar PT   2/11/2020 10:00 AM Marvin Elizabeth DO BDM Pain [facility]   3/20/2020  1:00 PM KAIT Arceo CNP [facility]     ,   Diabetes Assessment    Medic Alert ID:  Yes  Meal Planning:  None   How often do you test your blood sugar?:  Daily   Do you have barriers with adherence to non-pharmacologic self-management interventions? (Nutrition/Exercise/Self-Monitoring):  Yes   Have you ever had to go to the ED for symptoms of low blood sugar?:  No           and   General Assessment

## 2020-01-13 NOTE — CARE COORDINATION
ACM left message for patient to return call. Re: Follow up: DM, Review POC, Education, Goals. Contact information given.

## 2020-01-13 NOTE — CARE COORDINATION
MICHAELA received VM from pt to report her AM BS of 172 and that she has attempted to contact [facility], .

## 2020-01-14 ENCOUNTER — CARE COORDINATION (OUTPATIENT)
Dept: FAMILY MEDICINE CLINIC | Age: 65
End: 2020-01-14

## 2020-01-16 ENCOUNTER — CARE COORDINATION (OUTPATIENT)
Dept: FAMILY MEDICINE CLINIC | Age: 65
End: 2020-01-16

## 2020-01-16 RX ORDER — CYCLOBENZAPRINE HCL 10 MG
5 TABLET ORAL 2 TIMES DAILY PRN
Qty: 15 TABLET | Refills: 0 | Status: SHIPPED | OUTPATIENT
Start: 2020-01-16 | End: 2020-01-31

## 2020-01-17 ENCOUNTER — CARE COORDINATION (OUTPATIENT)
Dept: FAMILY MEDICINE CLINIC | Age: 65
End: 2020-01-17

## 2020-01-17 NOTE — CARE COORDINATION
HEBER finally was able to reach Ave Elliott. Reviewed with Ave Elliott her current situation. She lives with her 2 adult children in her sig other Sonal Julian) home. All bills are in Vernida Men name. The arrangement is for Ave Elliott to pay the utilities and Stewart Garcia to pay the mortgage and his own car/truck payments, etc.  Ave Elliott states that her adult children work minium wage jobs that [employer] pay well and they can't contribute to the household income. Ave Elliott is on SSD and gets $576.00 per month. She states she has a 24year old son in [place] that attends college so he is still receiving child support money monthly. The card is in her name but her son has it with him. She states that even though it is child support for her son SS counted it against her income amount and that is why she only receives $576. Ave Elliott is active with [employer] and has HHA's 3x a week for 3 hrs a day. They help with her bath, cleaning, medications, and cooking meals. She states she is aware of food pantry help and has used in the past.  Processed her water bill concern and she states that she owes $116.00 on it. States the water dept told her not to worry about it getting shut off but she states that it will be high again when the next bill comes if she doesn't pay that balance. She states she pays every 2 months. Processed calling [employer], [employer], [employer], or [employer] as well as reaching out to a [employer]. Ave Elliott was a little argumentative at first stating she called some of those places in Dec from the list the water dept gave her to help pay her bill. Stated they all told her it was the end of the year so they had no funds left. LSW reminded Ave Elliott that we are at the beginning of a new year and funds should be there now. Encouraged her to call all places listed for help paying the $116.00. She verbalized agreement and asked that LSW call in a few weeks to make sure she called.     Processed with Ave Elliott getting an appointment with Katelyn for PIP but she states they were denied as the utilities are in Sayre name. He receives VA income of $3200 per month. He is not willing to put them into her name. She states the gas and electric company work with her on the bills so she has no issue with them. She feels her water bill is so high because they have a house rule that the toilet must be cleaned out (with  type agent) after every BM, therefore the toilet is being flushed twice after each person has a BM. LSW offered help with counseling services but Nikkie Loza stated she was not interested in outpatient counseling at this time. LSW will make another follow up call to remind Nikkie Loza to call for help with her water bill as requested and then sign off the care team.  Deisy White is not able to provide assistance in paying the water bill and there is no medical cert that is accepted for emergent water need.

## 2020-01-20 ENCOUNTER — CARE COORDINATION (OUTPATIENT)
Dept: CARE COORDINATION | Age: 65
End: 2020-01-20

## 2020-01-20 NOTE — CARE COORDINATION
Ambulatory Care Coordination Note  1/20/2020  CM Risk Score: 10  Charlson 10 Year Mortality Risk Score: 10%     ACC: Aroldo Tamez, RN    Summary Note:   -ACM called and spoke with [facility] for DM f/u, check progress, and assess needs. -ACM discussed smoking cessation intake appt on 1/28/2020 at 9am, verbalized understanding and wrote herself a reminder.  -Reports that she has dentist appts 1/22 and 1/23.  -Medications reviewed and she reports taking them as prescribed. *DM  -Reports that she hasn't been checking her BS regularly but she will start as she knows ACM will be calling her. -ACM asked pt to check BS now,   -ACM discussed importance of consistently checking BS, verbalized understanding.  -Denies any hypo/hyperglycemia episodes  -Reports DM zone tool use     *Plan  -Care coordination  -Reinforce DM zone tool use and \"where should you go\" handout  -ACM will f/u with [facility] in about 1 week to check progress and assess needs.           Care Coordination Interventions    Program Enrollment:  Complex Care  Referral from Primary Care Provider:  No  Suggested Interventions and Community Resources  Behavorial Health:  Completed (Comment: 1/3/2020-interested in OP counseling for depression and anxiety, and stress, will refer to )  Diabetes Education:  Not Started (Comment: 1/3/2020-discussed)  Fall Risk Prevention:  Completed (Comment: 1/3/2020-discussed, mailed falls prevention info)  Home Health Services:  Completed (Comment: 1/3/2020-HHA from [facility] givers-3 days/wk)  Medication Assistance Program:  Not Started (Comment: 1/3/2020-discussed, no issues with affording meds at this time)  Medi Set or Pill Pack:  Completed (Comment: 1/3/2020-discussed, will send information)  Physical Therapy:  Completed (Comment: 1/13/2020-starts 1/21/2020)  Smoking Cessation:  Completed (Comment: 1/3/2020-discussed, mailed brochure per pt request)  Social Work:  Completed (Comment: 1/3/2020-discussed, will refer to Tevin Giang for financial issues with water bill and for counseling services)  Other Services:  Completed (Comment: 1/3/2020-recevied medical marijuana therapy for pain managment)  Transportation Support:  Completed  Zone Management Tools:  Completed (Comment: 1/3/2020-discussed DM zone tool, mailed tool)  Other Services or Interventions:  1/3/2020-uses [facility], [address]    None         Prior to Admission medications    Medication Sig Start Date End Date Taking? Authorizing Provider   cyclobenzaprine (FLEXERIL) 10 MG tablet Take 0.5 tablets by mouth 2 times daily as needed for Muscle spasms 1/16/20 1/31/20  KAIT Gomez CNP   amLODIPine (NORVASC) 10 MG tablet Take 1 tablet by mouth daily 12/22/19   KAIT Gomez CNP   gabapentin (NEURONTIN) 300 MG capsule Take 300 mg by mouth 3 times daily. Instructed to take am of procedure    Historical Provider, MD   atorvastatin (LIPITOR) 40 MG tablet Take 1 tablet by mouth daily 12/18/19   KAIT Gomez CNP   SITagliptin (JANUVIA) 50 MG tablet Take 1 tablet by mouth daily 11/26/19   KAIT Gomez CNP   acetaminophen (APAP EXTRA STRENGTH) 500 MG tablet Take 1 tablet by mouth every 8 hours as needed for Pain 11/11/19   KAIT Gomez CNP   gabapentin (NEURONTIN) 300 MG capsule Take 1 capsule by mouth 3 times daily for 30 days.  11/11/19 1/13/20  KAIT Gomez CNP   polyethylene glycol (GLYCOLAX) powder Take 17 g by mouth daily    Historical Provider, MD   Ertugliflozin L-PyroglutamicAc (STEGLATRO) 5 MG TABS Take 5 mg by mouth daily 10/1/19   KAIT Gomez CNP   ipratropium-albuterol (DUONEB) 0.5-2.5 (3) MG/3ML SOLN nebulizer solution Inhale 1 vial into the lungs every 4 hours Instructed to take am of procedure    Historical Provider, MD   azelastine (ASTELIN) 0.1 % nasal spray 2 sprays by Nasal route nightly Use in each nostril as directed

## 2020-01-22 ENCOUNTER — TELEPHONE (OUTPATIENT)
Dept: INTERNAL MEDICINE | Age: 65
End: 2020-01-22

## 2020-01-22 NOTE — TELEPHONE ENCOUNTER
Unable to reach patient by phone,messge left with appointment for allergy clinic on 1/23/20 at 930am

## 2020-01-23 ENCOUNTER — TELEPHONE (OUTPATIENT)
Dept: PAIN MANAGEMENT | Age: 65
End: 2020-01-23

## 2020-01-28 ENCOUNTER — HOSPITAL ENCOUNTER (OUTPATIENT)
Dept: PSYCHIATRY | Age: 65
Discharge: HOME | End: 2020-01-28
Payer: MEDICAID

## 2020-01-28 ENCOUNTER — CARE COORDINATION (OUTPATIENT)
Dept: CARE COORDINATION | Age: 65
End: 2020-01-28

## 2020-01-28 PROCEDURE — 94250 HC DIFFUSION: CPT | Performed by: COUNSELOR

## 2020-01-28 NOTE — CARE COORDINATION
Ambulatory Care Coordination Note  1/28/2020  CM Risk Score: 10  Charlson 10 Year Mortality Risk Score: 10%     ACC: Aroldo Tamez, RN    Summary Note:   -ACM received return call from Prasanth Rosales for DM f/u, check progress, assess needs, and discuss past and upcoming appts.  -She reports that she went to urgent care on [address] today after reviewing handouts on where to go depending on the symptoms, for swelling and pain of the glands in her face bilaterally after having wisdom teeth pulled on 1/23, and a headache, reports that they gave her a prescription for a steroid (unsure of dose and exactly which medication), which she hasn't picked up yet, but plans to.  Columba Grace states that she thinks it is from her allergies and has been playing \"phone tag\" with Dr Silva Courser office to obtain an appt. -ACM discussed with Prasanth Rosales that if symptoms don't resolve she should contact her dentist Dr Caty Hess for f/u and then PCP if symptoms have not improved, verbalized understanding  -Prasanth Rosales reports that she is now receiving home delivered meals from [employer] every other Thursday and she is pleased with the variety of healthy foods that are in the boxes. -Medications reviewed, Prasanth Rosales reports that she was given Clindamycin 300mg PO daily by her dentist, Dr Caty Hess on 1/23/2020 and has about 4 days of medication left as well as Peridex swish and spit BID on 1/21/2020.  Columba Grace was concerned about her BP at urgent being 144/90s. ACM educated Prasanth Rosales that BP can increase when in pain, verbalized understanding.     *Appts  -Dental appt-1/30/2020 to have cavities filled,   -Attended smoking cessation intake appt today and will return 2/12/2020 for additional sessions    *DM  -BS this   -Denies any s/sx hypo/hyperglycemia  -Reports that she reviews the DM zone tool daily  -Reports that her appetite is good and that she eats sweets in moderation, ACM discussed the importance of limiting sweets, verbalized understanding    *Plan  -Care (NORVASC) 10 MG tablet Take 1 tablet by mouth daily 12/22/19   KAIT Pearson - CNP   gabapentin (NEURONTIN) 300 MG capsule Take 300 mg by mouth 3 times daily. Instructed to take am of procedure    Historical Provider, MD   atorvastatin (LIPITOR) 40 MG tablet Take 1 tablet by mouth daily 12/18/19   KAIT Pearson CNP   SITagliptin (JANUVIA) 50 MG tablet Take 1 tablet by mouth daily 11/26/19   KAIT Pearson CNP   acetaminophen (APAP EXTRA STRENGTH) 500 MG tablet Take 1 tablet by mouth every 8 hours as needed for Pain 11/11/19   KAIT Pearson CNP   gabapentin (NEURONTIN) 300 MG capsule Take 1 capsule by mouth 3 times daily for 30 days.  11/11/19 1/20/20  KAIT Pearson CNP   polyethylene glycol (GLYCOLAX) powder Take 17 g by mouth daily    Historical Provider, MD   Ertugliflozin L-PyroglutamicAc (STEGLATRO) 5 MG TABS Take 5 mg by mouth daily 10/1/19   KAIT Pearson CNP   ipratropium-albuterol (DUONEB) 0.5-2.5 (3) MG/3ML SOLN nebulizer solution Inhale 1 vial into the lungs every 4 hours Instructed to take am of procedure    Historical Provider, MD   azelastine (ASTELIN) 0.1 % nasal spray 2 sprays by Nasal route nightly Use in each nostril as directed 7/11/19   Noelle Tsai MD   albuterol sulfate  (90 Base) MCG/ACT inhaler Inhale 2 puffs into the lungs every 6 hours as needed for Wheezing Rescue inhaler  Patient taking differently: Inhale 2 puffs into the lungs every 6 hours as needed for Wheezing Rescue inhaler  Instructed to take am of procedure and bring 7/11/19   Noelle Tsai MD   pantoprazole (PROTONIX) 20 MG tablet Take 1 tablet by mouth daily  Patient taking differently: Take 40 mg by mouth daily Instructed to take am of procedure 5/26/19   Xavier Johnson MD   amitriptyline (ELAVIL) 50 MG tablet Take 1 tablet by mouth nightly 4/25/19   Loyda Hobbs MD   EPINEPHrine (EPIPEN) 0.3 MG/0.3ML SOAJ injection Use as directed for allergic reaction 4/25/19 Joseline Pena MD   sodium chloride (ALTAMIST SPRAY) 0.65 % nasal spray 1 spray by Nasal route as needed for Congestion 3/20/19   Joseline Pena MD   acyclovir (ZOVIRAX) 400 MG tablet Take 400 mg by mouth as needed Shingles - no recent issues as of 12-20-19    Historical Provider, MD       Future Appointments   Date Time Provider Ilan Cramer   2/5/2020  9:30 AM SEB MRI-B SEBZ MRI SEB Radiolog   2/11/2020 10:00 AM Veena Delaney DO BDM Pain HP   3/20/2020  1:00 PM Alpa Sera, APRN - CNP [facility]     ,   Diabetes Assessment    Medic Alert ID:  Yes  Meal Planning:  None   How often do you test your blood sugar?:  Daily   Do you have barriers with adherence to non-pharmacologic self-management interventions?  (Nutrition/Exercise/Self-Monitoring):  Yes   Have you ever had to go to the ED for symptoms of low blood sugar?:  No           and   General Assessment

## 2020-01-30 ENCOUNTER — CARE COORDINATION (OUTPATIENT)
Dept: FAMILY MEDICINE CLINIC | Age: 65
End: 2020-01-30

## 2020-01-30 NOTE — CARE COORDINATION
DAMEONW placed the follow up reminder call to Izabella Lemus as she requested. There was no answer. LSW left a detailed VM for Izabella Lemus reminding her to call the community agency's that we had talked about on our last call. LSW did list them on the VM for Izabella Lemus. This is LSW's last call with Izabella Lemus as there were no needs [facility] could assist with outside of giving the local agency names to possibly assist with the water bill. Maru verbalized agreement at last outreach.

## 2020-02-04 ENCOUNTER — CARE COORDINATION (OUTPATIENT)
Dept: CARE COORDINATION | Age: 65
End: 2020-02-04

## 2020-02-04 NOTE — CARE COORDINATION
Ambulatory Care Coordination Note  2/4/2020  CM Risk Score: 10  Charlson 10 Year Mortality Risk Score: 10%     ACC: Aroldo Tamez, RN    Summary Note:   -ACM called and spoke with Theo Eid for DM f/u, check progress, and assess needs. Peyman Mejia reports that she left town suddenly for a family emergency and ran out of her medications. She inquired if the office would be able to transfer her medications as she states she is not sure when she will be returning to [facility] encouraged Theo Eid to contact her provider office and see if they are agreeable to do so, ACM confirmed that she had the phone number, verbalized understanding. -ACM also informed pt that she has an upcoming MRI appt 2/5/2020 and an OV with Dr Renetta Smith on 2/11/2020. Encouraged her to call and cancel to reschedule appts, verbalized understanding.  -Reinforced when to contact provider for symptoms or changes in condition and when to report to ER, verbalized understanding. *DM  -Reports that she has not been checking her BS since being out of town for a family emergency. ACM inquired if she had her glucometer with her and she stated no. ACM discussed the importance of her remembering to take her glucometer and medications with her even in emergent out of town situations as to try to protect herself from getting sick as well as her being able to continue managing her health, verbalized understanding. *Plan  -Care coordination   -Reinforced to Theo Eid calling provider office regarding not having any medications nor a glucometer on hand with her wheal she is out of town.   -ACM will f/u in about 1 week since Theo Eid stated she wasn't sure when she'll return to [facility] to check progress and assess needs, ACM instructed Theo Eid to contact ACM if she returns home before next outreach, verbalized understanding            Care Coordination Interventions    Program Enrollment:  Complex Care  Referral from Primary Care Provider:  No  Suggested Interventions and Community Resources  Behavorial Health:  Completed (Comment: 1/3/2020-interested in OP counseling for depression and anxiety, and stress, will refer to )  Diabetes Education:  Not Started (Comment: 1/3/2020-discussed)  Fall Risk Prevention:  Completed (Comment: 1/3/2020-discussed, mailed falls prevention info)  [address]:  Completed (Comment: 1/3/2020-HHA from [place] days/wk)  Meals on Wheels:  Completed (Comment: 1/28/2020-receiving delivered meals from [employer] every other Thursday)  Medication Assistance Program:  Not Started (Comment: 1/3/2020-discussed, no issues with affording meds at this time)  Medi Set or Pill Pack:  Completed (Comment: 1/3/2020-discussed, will send information)  Physical Therapy:  Completed (Comment: 1/13/2020-starts 1/21/2020)  Smoking Cessation:  Completed (Comment: 1/3/2020-discussed, mailed brochure per pt request)  Social Work:  Completed (Comment: 1/3/2020-discussed, will refer to [employer] for financial issues with water bill and for counseling services)  Other Services:  Completed (Comment: 1/3/2020-recevied medical marijuana therapy for pain managment)  Transportation Support:  Completed  Zone Management Tools:  Completed (Comment: 1/3/2020-discussed DM zone tool, mailed tool)  Other Services or Interventions:  1/3/2020-uses [address]    None         Prior to Admission medications    Medication Sig Start Date End Date Taking?  Authorizing Provider   SITagliptin (JANUVIA) 50 MG tablet Take 1 tablet by mouth daily 1/28/20   Terryl Loser, APRN - CNP   amLODIPine (NORVASC) 10 MG tablet Take 1 tablet by mouth daily 12/22/19   Terryl Loser, APRN - CNP   atorvastatin (LIPITOR) 40 MG tablet Take 1 tablet by mouth daily 12/18/19   Terryl Loser, APRN - CNP   acetaminophen (APAP EXTRA STRENGTH) 500 MG tablet Take 1 tablet by mouth every 8 hours as needed for Pain 11/11/19   KAIT Tony CNP   gabapentin (NEURONTIN) 300 MG capsule Take 1 capsule by mouth 3 times daily for 30 days.  11/11/19 1/28/20  KAIT Tony CNP   polyethylene glycol (GLYCOLAX) powder Take 17 g by mouth daily    Historical Provider, MD   Ertugliflozin L-PyroglutamicAc (STEGLATRO) 5 MG TABS Take 5 mg by mouth daily 10/1/19   KAIT Tony CNP   ipratropium-albuterol (DUONEB) 0.5-2.5 (3) MG/3ML SOLN nebulizer solution Inhale 1 vial into the lungs every 4 hours Instructed to take am of procedure    Historical Provider, MD   azelastine (ASTELIN) 0.1 % nasal spray 2 sprays by Nasal route nightly Use in each nostril as directed 7/11/19   Jesus Stroud MD   albuterol sulfate  (90 Base) MCG/ACT inhaler Inhale 2 puffs into the lungs every 6 hours as needed for Wheezing Rescue inhaler  Patient taking differently: Inhale 2 puffs into the lungs every 6 hours as needed for Wheezing Rescue inhaler  Instructed to take am of procedure and bring 7/11/19   Jesus Stroud MD   pantoprazole (PROTONIX) 20 MG tablet Take 1 tablet by mouth daily  Patient taking differently: Take 40 mg by mouth daily Instructed to take am of procedure 5/26/19   Erickson El MD   amitriptyline (ELAVIL) 50 MG tablet Take 1 tablet by mouth nightly 4/25/19   Madelin De Jesus MD   EPINEPHrine (EPIPEN) 0.3 MG/0.3ML SOAJ injection Use as directed for allergic reaction 4/25/19   Madelin De Jesus MD   sodium chloride (ALTAMIST SPRAY) 0.65 % nasal spray 1 spray by Nasal route as needed for Congestion 3/20/19   Madelin De Jesus MD   acyclovir (ZOVIRAX) 400 MG tablet Take 400 mg by mouth as needed Shingles - no recent issues as of 12-20-19    Historical Provider, MD       Future Appointments   Date Time Provider Ilan Cramer   2/5/2020  9:30 AM SEB MRI-B SEBZ MRI SEB Radiolog   2/11/2020 10:00 AM DO JOVAN HayesM Pain HMHP   2/12/2020  9:00 AM SCHEDULE, [facility] TOBACCO [facility] SEYZ TOB TRT [facility]   2/19/2020  9:00 AM

## 2020-02-07 RX ORDER — AMLODIPINE BESYLATE 10 MG/1
10 TABLET ORAL DAILY
Qty: 30 TABLET | Refills: 1 | Status: SHIPPED | OUTPATIENT
Start: 2020-02-07 | End: 2021-01-20

## 2020-02-07 NOTE — TELEPHONE ENCOUNTER
Had family emergency in [place] and needs amlodipine and Januvia called in because she doesn't have them on her.       [facility] Thor [phone]    Last Appointment:  12/18/2019  Future Appointments   Date Time Provider Ilan Cramer   2/12/2020  9:00 AM SCHEDULE, SEYZ TOBACCO GROUP SEYZ TOB TRT [facility]   2/19/2020  9:00 AM SCHEDULE, SEYZ TOBACCO GROUP SEYZ TOB TRT [facility]   2/26/2020  9:00 AM SCHEDULE, SEYZ TOBACCO GROUP SEYZ TOB TRT [facility]   3/4/2020  9:00 AM SCHEDULE, SEYZ TOBACCO GROUP SEYZ TOB TRT [facility]   3/11/2020  9:00 AM SCHEDULE, SEYZ TOBACCO GROUP SEYZ TOB TRT [facility]   3/18/2020  9:00 AM SCHEDULE, SEYZ TOBACCO GROUP SEYZ TOB TRT [facility]   3/20/2020  1:00 PM KAIT Pedroza - CNP [facility]

## 2020-02-11 ENCOUNTER — CARE COORDINATION (OUTPATIENT)
Dept: CARE COORDINATION | Age: 65
End: 2020-02-11

## 2020-02-11 NOTE — CARE COORDINATION
MICHAELA called and spoke with [address] at [facility] in regards to pt stating that she is out of her Januvia, Norvasc, Elavil, and Neurontin. Pt also doesn't have her glucometer and nebulizer at this time.
mouth every 8 hours as needed for Pain  Patient not taking: Reported on 2/4/2020 11/11/19   KAIT Oneill CNP   gabapentin (NEURONTIN) 300 MG capsule Take 1 capsule by mouth 3 times daily for 30 days. Patient not taking: Reported on 2/4/2020 11/11/19 1/28/20  KAIT Oneill CNP   ipratropium-albuterol (DUONEB) 0.5-2.5 (3) MG/3ML SOLN nebulizer solution Inhale 1 vial into the lungs every 4 hours Instructed to take am of procedure    Historical Provider, MD   albuterol sulfate  (90 Base) MCG/ACT inhaler Inhale 2 puffs into the lungs every 6 hours as needed for Wheezing Rescue inhaler  Patient not taking: Reported on 2/4/2020 7/11/19   Rahel Eisenberg MD   amitriptyline (ELAVIL) 50 MG tablet Take 1 tablet by mouth nightly  Patient not taking: Reported on 2/4/2020 4/25/19   Owen Giang MD   acyclovir (ZOVIRAX) 400 MG tablet Take 400 mg by mouth as needed Shingles - no recent issues as of 12-20-19    Historical Provider, MD       Future Appointments   Date Time Provider Ilan Cramer   2/12/2020  9:00 AM SCHEDULE, SEYZ TOBACCO GROUP SEYZ TOB TRT [facility]   2/19/2020  9:00 AM SCHEDULE, SEYZ TOBACCO GROUP SEYZ TOB TRT [facility]   2/26/2020  9:00 AM SCHEDULE, SEYZ TOBACCO GROUP SEYZ TOB TRT [facility]   3/4/2020  9:00 AM SCHEDULE, SEYZ TOBACCO GROUP SEYZ TOB TRT [facility]   3/11/2020  9:00 AM SCHEDULE, SEYZ TOBACCO GROUP SEYZ TOB TRT [facility]   3/18/2020  9:00 AM SCHEDULE, SEYZ TOBACCO GROUP SEYZ TOB TRT [facility]   3/20/2020  1:00 PM Janeice Bakes, APRN - CNP [facility]     ,   Diabetes Assessment    Medic Alert ID:  Yes  Meal Planning:  None   How often do you test your blood sugar?:  Daily   Do you have barriers with adherence to non-pharmacologic self-management interventions?  (Nutrition/Exercise/Self-Monitoring):  Yes   Have you ever had to go to the ED for symptoms of low blood sugar?:  No           and   General Assessment    Do you have any symptoms that are

## 2020-02-17 RX ORDER — GABAPENTIN 300 MG/1
300 CAPSULE ORAL 3 TIMES DAILY
Qty: 90 CAPSULE | Refills: 0 | Status: SHIPPED | OUTPATIENT
Start: 2020-02-17 | End: 2020-06-04

## 2020-02-17 RX ORDER — AMITRIPTYLINE HYDROCHLORIDE 50 MG/1
50 TABLET, FILM COATED ORAL NIGHTLY
Qty: 30 TABLET | Refills: 0 | Status: SHIPPED | OUTPATIENT
Start: 2020-02-17 | End: 2020-08-24

## 2020-02-24 ENCOUNTER — TELEPHONE (OUTPATIENT)
Dept: FAMILY MEDICINE CLINIC | Age: 65
End: 2020-02-24

## 2020-03-05 ENCOUNTER — TELEPHONE (OUTPATIENT)
Dept: FAMILY MEDICINE CLINIC | Age: 65
End: 2020-03-05

## 2020-03-05 NOTE — TELEPHONE ENCOUNTER
Approval # [id number] for diabetic medication was approved / letter was sent to patient. [place]oke with patient and [place] is [place] get letter. She will call back with pharmacy# [facility] location.

## 2020-03-11 ENCOUNTER — TELEPHONE (OUTPATIENT)
Dept: OBGYN | Age: 65
End: 2020-03-11

## 2020-03-19 ENCOUNTER — TELEPHONE (OUTPATIENT)
Dept: FAMILY MEDICINE CLINIC | Age: 65
End: 2020-03-19

## 2020-05-26 ENCOUNTER — TELEPHONE (OUTPATIENT)
Dept: FAMILY MEDICINE CLINIC | Age: 65
End: 2020-05-26

## 2020-05-26 NOTE — TELEPHONE ENCOUNTER
Pt LVM saying her sugars been in the two hundreds pt stated she hasn't been able to get the [facility] pt stated her daughters doctor gave her a sample but she has been out the last couple days. Pt said [place] is back in [place] in case you send in meds Please advise.

## 2020-05-29 ENCOUNTER — TELEPHONE (OUTPATIENT)
Dept: FAMILY MEDICINE CLINIC | Age: 65
End: 2020-05-29

## 2020-05-29 NOTE — TELEPHONE ENCOUNTER
Pt called and LVM on our machine saying her sugar was high 290 do we have any Januvia samples for her ? Please advise.

## 2020-06-04 ENCOUNTER — OFFICE VISIT (OUTPATIENT)
Dept: FAMILY MEDICINE CLINIC | Age: 65
End: 2020-06-04
Payer: MEDICAID

## 2020-06-04 VITALS
WEIGHT: 132 LBS | SYSTOLIC BLOOD PRESSURE: 100 MMHG | DIASTOLIC BLOOD PRESSURE: 70 MMHG | TEMPERATURE: 97 F | BODY MASS INDEX: 24.29 KG/M2 | RESPIRATION RATE: 14 BRPM | HEIGHT: 62 IN | OXYGEN SATURATION: 99 % | HEART RATE: 84 BPM

## 2020-06-04 LAB
CHP ED QC CHECK: NORMAL
GLUCOSE BLD-MCNC: 166 MG/DL
HBA1C MFR BLD: 6.7 %

## 2020-06-04 PROCEDURE — 4004F PT TOBACCO SCREEN RCVD TLK: CPT | Performed by: NURSE PRACTITIONER

## 2020-06-04 PROCEDURE — 82962 GLUCOSE BLOOD TEST: CPT | Performed by: NURSE PRACTITIONER

## 2020-06-04 PROCEDURE — G8420 CALC BMI NORM PARAMETERS: HCPCS | Performed by: NURSE PRACTITIONER

## 2020-06-04 PROCEDURE — 3044F HG A1C LEVEL LT 7.0%: CPT | Performed by: NURSE PRACTITIONER

## 2020-06-04 PROCEDURE — 2022F DILAT RTA XM EVC RTNOPTHY: CPT | Performed by: NURSE PRACTITIONER

## 2020-06-04 PROCEDURE — 83036 HEMOGLOBIN GLYCOSYLATED A1C: CPT | Performed by: NURSE PRACTITIONER

## 2020-06-04 PROCEDURE — G8427 DOCREV CUR MEDS BY ELIG CLIN: HCPCS | Performed by: NURSE PRACTITIONER

## 2020-06-04 PROCEDURE — 99213 OFFICE O/P EST LOW 20 MIN: CPT | Performed by: NURSE PRACTITIONER

## 2020-06-04 PROCEDURE — 3017F COLORECTAL CA SCREEN DOC REV: CPT | Performed by: NURSE PRACTITIONER

## 2020-06-04 RX ORDER — ERTUGLIFLOZIN 5 MG/1
5 TABLET, FILM COATED ORAL DAILY
Qty: 90 TABLET | Refills: 0 | Status: SHIPPED
Start: 2020-06-04 | End: 2020-09-16 | Stop reason: SDUPTHER

## 2020-06-04 RX ORDER — ACETAMINOPHEN 500 MG
500 TABLET ORAL EVERY 8 HOURS PRN
Qty: 90 TABLET | Refills: 0 | Status: SHIPPED
Start: 2020-06-04 | End: 2020-09-29 | Stop reason: SDUPTHER

## 2020-06-04 RX ORDER — GLIPIZIDE 5 MG/1
5 TABLET ORAL DAILY
Qty: 90 TABLET | Refills: 0 | Status: SHIPPED
Start: 2020-06-04 | End: 2021-05-05

## 2020-06-04 ASSESSMENT — PATIENT HEALTH QUESTIONNAIRE - PHQ9
1. LITTLE INTEREST OR PLEASURE IN DOING THINGS: 0
SUM OF ALL RESPONSES TO PHQ QUESTIONS 1-9: 0
SUM OF ALL RESPONSES TO PHQ9 QUESTIONS 1 & 2: 0
SUM OF ALL RESPONSES TO PHQ QUESTIONS 1-9: 0
2. FEELING DOWN, DEPRESSED OR HOPELESS: 0

## 2020-06-04 NOTE — PROGRESS NOTES
impacted cerumen. Ears:      Comments: ENT: canals clear, TMs intact and pearly gray, nasal turbinates erythematous and boggy, pharynx shows erythema and post nasal drip       Nose: Rhinorrhea present. No congestion. Mouth/Throat:      Mouth: Mucous membranes are moist.      Pharynx: Oropharynx is clear. No oropharyngeal exudate or posterior oropharyngeal erythema. Eyes:      Extraocular Movements: Extraocular movements intact. Conjunctiva/sclera: Conjunctivae normal.      Pupils: Pupils are equal, round, and reactive to light. Neck:      Musculoskeletal: Normal range of motion and neck supple. Thyroid: No thyromegaly. Cardiovascular:      Rate and Rhythm: Normal rate and regular rhythm. Pulses: Normal pulses. Heart sounds: Normal heart sounds. No murmur. Pulmonary:      Effort: Pulmonary effort is normal. No respiratory distress. Breath sounds: Normal breath sounds. No wheezing. Abdominal:      General: Bowel sounds are normal. There is no distension. Palpations: Abdomen is soft. Tenderness: There is no abdominal tenderness. There is no guarding or rebound. Genitourinary:     Vagina: Normal.      Comments: Deferred. Musculoskeletal: Normal range of motion. Lymphadenopathy:      Cervical: No cervical adenopathy. Skin:     General: Skin is warm and dry. Capillary Refill: Capillary refill takes less than 2 seconds. Findings: No bruising, erythema or rash. Neurological:      General: No focal deficit present. Mental Status: She is alert and oriented to person, place, and time. Mental status is at baseline. Cranial Nerves: No cranial nerve deficit. Sensory: No sensory deficit. Motor: No weakness. Coordination: Coordination normal.      Gait: Gait normal.   Psychiatric:         Mood and Affect: Mood normal.         Behavior: Behavior normal.         Thought Content:  Thought content normal.         Judgment: Judgment normal.

## 2020-06-06 PROBLEM — M05.741 RHEUMATOID ARTHRITIS INVOLVING BOTH HANDS WITH POSITIVE RHEUMATOID FACTOR (HCC): Status: ACTIVE | Noted: 2020-06-06

## 2020-06-06 PROBLEM — J45.41 MODERATE PERSISTENT ASTHMA WITH ACUTE EXACERBATION: Status: ACTIVE | Noted: 2020-06-06

## 2020-06-06 PROBLEM — M05.742 RHEUMATOID ARTHRITIS INVOLVING BOTH HANDS WITH POSITIVE RHEUMATOID FACTOR (HCC): Status: ACTIVE | Noted: 2020-06-06

## 2020-06-06 ASSESSMENT — ENCOUNTER SYMPTOMS
VOMITING: 0
CONSTIPATION: 0
SHORTNESS OF BREATH: 0
SINUS PAIN: 0
COUGH: 0
NAUSEA: 0
EYE PAIN: 0
CHEST TIGHTNESS: 0
DIARRHEA: 0
WHEEZING: 0
COLOR CHANGE: 0

## 2020-06-09 ENCOUNTER — OFFICE VISIT (OUTPATIENT)
Dept: OBGYN | Age: 65
End: 2020-06-09
Payer: MEDICAID

## 2020-06-09 ENCOUNTER — HOSPITAL ENCOUNTER (OUTPATIENT)
Age: 65
Discharge: HOME | End: 2020-06-11
Payer: MEDICAID

## 2020-06-09 VITALS
HEIGHT: 62 IN | SYSTOLIC BLOOD PRESSURE: 106 MMHG | TEMPERATURE: 98 F | WEIGHT: 130 LBS | DIASTOLIC BLOOD PRESSURE: 66 MMHG | BODY MASS INDEX: 23.92 KG/M2 | HEART RATE: 55 BPM

## 2020-06-09 LAB
CLUE CELLS: NORMAL
SOURCE WET PREP: NORMAL
TRICHOMONAS PREP: NORMAL
YEAST WET PREP: NORMAL

## 2020-06-09 PROCEDURE — G8427 DOCREV CUR MEDS BY ELIG CLIN: HCPCS | Performed by: NURSE PRACTITIONER

## 2020-06-09 PROCEDURE — 99213 OFFICE O/P EST LOW 20 MIN: CPT | Performed by: NURSE PRACTITIONER

## 2020-06-09 PROCEDURE — 4004F PT TOBACCO SCREEN RCVD TLK: CPT | Performed by: NURSE PRACTITIONER

## 2020-06-09 PROCEDURE — 3017F COLORECTAL CA SCREEN DOC REV: CPT | Performed by: NURSE PRACTITIONER

## 2020-06-09 PROCEDURE — 87210 SMEAR WET MOUNT SALINE/INK: CPT

## 2020-06-09 PROCEDURE — G8420 CALC BMI NORM PARAMETERS: HCPCS | Performed by: NURSE PRACTITIONER

## 2020-06-09 RX ORDER — ESTRADIOL 0.1 MG/G
1 CREAM VAGINAL DAILY
Qty: 1 TUBE | Refills: 0 | Status: SHIPPED | OUTPATIENT
Start: 2020-06-09 | End: 2020-09-23 | Stop reason: ALTCHOICE

## 2020-06-09 RX ORDER — ACYCLOVIR 400 MG/1
400 TABLET ORAL 2 TIMES DAILY
Qty: 20 TABLET | Refills: 0 | Status: SHIPPED
Start: 2020-06-09 | End: 2021-02-08 | Stop reason: SDUPTHER

## 2020-06-09 ASSESSMENT — ENCOUNTER SYMPTOMS
RESPIRATORY NEGATIVE: 1
GASTROINTESTINAL NEGATIVE: 1
ALLERGIC/IMMUNOLOGIC NEGATIVE: 1
EYES NEGATIVE: 1

## 2020-06-09 NOTE — PROGRESS NOTES
Subjective:      Patient ID: Herb Peterson is a 62 y.o. female. HPI:Patient is in today for history of herpes/shingles and vaginal itching and dryness, along with well,  gyn checkup   Last pap smear was 2019: negative  Patient is post menopausal  Denies postmenopausal bleeding, vaginal discharge, pain with urination  Patient states she had  Herpes flare up 3 weeks ago and had no medication  Patient is sexual active with 1 partner and does not use protection    Review of Systems   Constitutional: Negative. HENT: Negative. Eyes: Negative. Respiratory: Negative. Cardiovascular: Negative. Gastrointestinal: Negative. Endocrine: Negative. Genitourinary:        Vaginal Itching   Musculoskeletal: Negative. Skin: Negative. Allergic/Immunologic: Negative. Neurological: Negative. Hematological: Negative. Psychiatric/Behavioral: Negative. Objective:   Physical Exam  Constitutional:       Appearance: Normal appearance. Cardiovascular:      Rate and Rhythm: Normal rate and regular rhythm. Pulmonary:      Effort: Pulmonary effort is normal.      Breath sounds: Normal breath sounds. Abdominal:      General: Bowel sounds are normal.      Palpations: Abdomen is soft. Genitourinary:     Exam position: Lithotomy position. Labia:         Right: No rash, tenderness, lesion or injury. Left: No rash, tenderness, lesion or injury. Vagina: Vaginal discharge present. Cervix: Normal.      Uterus: Normal.       Adnexa:         Right: Mass present. Rectum: Normal.      Comments: Pelvic examination is unremarkable except for fragile vaginal mucosa characterized by pallor, decreased elasticity, disappearance of rugae, and petechiae. Vaginal secretions are scant and odorless  Skin:     General: Skin is warm and dry. Neurological:      Mental Status: She is alert. Psychiatric:         Behavior: Behavior normal.         Assessment:       Diagnosis Orders   1.

## 2020-06-18 NOTE — PROGRESS NOTES
Yes Ambika Bhatti MD   albuterol sulfate  (90 Base) MCG/ACT inhaler Inhale 2 puffs into the lungs every 6 hours as needed for Wheezing Rescue inhaler 7/11/19  Yes Ambika Bhatti MD   pantoprazole (PROTONIX) 20 MG tablet Take 1 tablet by mouth daily 5/26/19  Yes Lucio Stewart MD   EPINEPHrine [facility]) 0.3 MG/0.3ML SOAJ injection Use as directed for allergic reaction 4/25/19  Yes Maeve Boyd MD   sodium chloride (ALTAMIST SPRAY) 0.65 % nasal spray 1 spray by Nasal route as needed for Congestion 3/20/19  Yes Maeve Boyd MD   amitriptyline (ELAVIL) 50 MG tablet Take 1 tablet by mouth nightly  Patient not taking: Reported on 6/19/2020 2/17/20   KAIT Urbina - CNP       Allergies   Allergen Reactions    Adhesive Tape Rash     bruising    Nsaids      Irritates IBS    Penicillins Hives       Social History     Socioeconomic History    Marital status: Single     Spouse name: Not on file    Number of children: 11    Years of education: Not on file    Highest education level: Some college, no degree   Occupational History    Not on file   Social Needs    Financial resource strain: Very hard    Food insecurity     Worry: Never true     Inability: Never true    Transportation needs     Medical: No     Non-medical: No   Tobacco Use    Smoking status: Current Every Day Smoker     Packs/day: 0.25     Years: 45.00     Pack years: 11.25     Types: Cigarettes    Smokeless tobacco: Never Used   Substance and Sexual Activity    Alcohol use: Yes     Comment: socially    Drug use: Yes     Types: Marijuana     Comment: holds medical card-Vaping    Sexual activity: Yes     Partners: Male     Birth control/protection: Injection   Lifestyle    Physical activity     Days per week: 0 days     Minutes per session: 0 min    Stress: Very much   Relationships    Social connections     Talks on phone: Never     Gets together: More than three times a week     Attends Orthodoxy service: Never     Active member months that has since worn off, #3 gave only 3 days of relief (pt schedules with anesthesia)  Update lumbar MRI w/o contrast given decreasing effects from RICARDO's with worsening pain was ordered at the last visit but patient subsequently went to [address] from 10/2019 was 7.5 (previously >12).      Pt aware she needs to alert our office if she were to begin ASA or another blood thinner for safety during injections    >25 minutes spent on this case with >50% in face to face contact    Cc: Referring physician

## 2020-06-19 ENCOUNTER — OFFICE VISIT (OUTPATIENT)
Dept: PAIN MANAGEMENT | Age: 65
End: 2020-06-19
Payer: MEDICAID

## 2020-06-19 VITALS
HEART RATE: 63 BPM | SYSTOLIC BLOOD PRESSURE: 128 MMHG | TEMPERATURE: 98.5 F | DIASTOLIC BLOOD PRESSURE: 60 MMHG | WEIGHT: 130 LBS | OXYGEN SATURATION: 100 % | BODY MASS INDEX: 23.92 KG/M2 | HEIGHT: 62 IN

## 2020-06-19 PROCEDURE — 3017F COLORECTAL CA SCREEN DOC REV: CPT | Performed by: PAIN MEDICINE

## 2020-06-19 PROCEDURE — 4004F PT TOBACCO SCREEN RCVD TLK: CPT | Performed by: PAIN MEDICINE

## 2020-06-19 PROCEDURE — G8420 CALC BMI NORM PARAMETERS: HCPCS | Performed by: PAIN MEDICINE

## 2020-06-19 PROCEDURE — 99214 OFFICE O/P EST MOD 30 MIN: CPT | Performed by: PAIN MEDICINE

## 2020-06-19 PROCEDURE — G8427 DOCREV CUR MEDS BY ELIG CLIN: HCPCS | Performed by: PAIN MEDICINE

## 2020-06-19 RX ORDER — CYCLOBENZAPRINE HCL 10 MG
10 TABLET ORAL 3 TIMES DAILY PRN
COMMUNITY
End: 2020-07-31 | Stop reason: SDUPTHER

## 2020-06-19 RX ORDER — GABAPENTIN 300 MG/1
1 CAPSULE ORAL 3 TIMES DAILY
COMMUNITY
Start: 2019-11-03 | End: 2021-04-07 | Stop reason: SDUPTHER

## 2020-06-19 NOTE — PROGRESS NOTES
Do you currently have any of the following:    Fever: No  Headache:  yes  Cough: No  Shortness of breath: Yes  Exposed to anyone with these symptoms: No         Evita Barbie presents to the [facility] on 6/19/2020. Megan Fulton is complaining of back and neck pain. The pain is intermittent. The pain is described as shooting. Pain is rated on her best day at a 5, on her worst day at a 8, and on average at a 5 on the VAS scale. She took her last dose of Neurontin last evening. Any procedures since your last visit: No    Pacemaker or defibrilator: No .    She is not on NSAIDS and is not on anticoagulation medications to include none    /60 (Site: Right Upper Arm, Position: Sitting, Cuff Size: Medium Adult)   Pulse 63   Temp 98.5 °F (36.9 °C) (Oral)   Ht 5' 2\" (1.575 m)   Wt 130 lb (59 kg)   SpO2 100%   BMI 23.78 kg/m²      No LMP recorded.  Patient is postmenopausal.

## 2020-07-02 RX ORDER — AMITRIPTYLINE HYDROCHLORIDE 50 MG/1
TABLET, FILM COATED ORAL
Qty: 30 TABLET | Refills: 2 | OUTPATIENT
Start: 2020-07-02

## 2020-07-31 RX ORDER — AMITRIPTYLINE HYDROCHLORIDE 50 MG/1
50 TABLET, FILM COATED ORAL NIGHTLY
Qty: 30 TABLET | Refills: 0 | OUTPATIENT
Start: 2020-07-31

## 2020-07-31 RX ORDER — CYCLOBENZAPRINE HCL 10 MG
10 TABLET ORAL 3 TIMES DAILY PRN
Qty: 90 TABLET | Refills: 0 | Status: SHIPPED | OUTPATIENT
Start: 2020-07-31 | End: 2020-08-30

## 2020-07-31 NOTE — TELEPHONE ENCOUNTER
Declining amitriptyline as I do not prescribe that dosage for pain control, that dosage is used as a sleep aid and her nurse practitioner for primary care is the one that has been prescribing that.

## 2020-07-31 NOTE — TELEPHONE ENCOUNTER
Pt. Called and stated that she needed a refill on these pended meds. She stated that at the visit it was discussed that you will prescribe these meds for her.  Thanks

## 2020-08-18 ENCOUNTER — NURSE TRIAGE (OUTPATIENT)
Dept: OTHER | Facility: CLINIC | Age: 65
End: 2020-08-18

## 2020-08-18 ENCOUNTER — HOSPITAL ENCOUNTER (OUTPATIENT)
Age: 65
Discharge: HOME | End: 2020-08-20
Payer: MEDICAID

## 2020-08-18 ENCOUNTER — HOSPITAL ENCOUNTER (OUTPATIENT)
Dept: GENERAL RADIOLOGY | Age: 65
Discharge: HOME | End: 2020-08-20
Payer: MEDICAID

## 2020-08-18 PROCEDURE — 72050 X-RAY EXAM NECK SPINE 4/5VWS: CPT

## 2020-08-18 PROCEDURE — 72120 X-RAY BEND ONLY L-S SPINE: CPT

## 2020-08-20 ENCOUNTER — HOSPITAL ENCOUNTER (OUTPATIENT)
Dept: PSYCHIATRY | Age: 65
Discharge: HOME | End: 2020-08-20
Payer: MEDICAID

## 2020-08-20 NOTE — PROGRESS NOTES
disease. No significant subluxation with flexion or extension views. Potential Aberrant Drug-Related Behavior: no       Urine Drug Screening:  None - no opioids initiated.     OARRS report:  09/2019 consistent  12/2019 consistent  6/2020 - on medical marijuana  8/2020 - same as above   Past Medical History:   Diagnosis Date    Arthritis     Asthma     controlled     Depression     Diabetes mellitus ([facility] [address]     Fibromyalgia     GERD (gastroesophageal reflux disease)     Hepatitis C 2016    treated     Hyperlipidemia     Hypertension     Overactive bladder     Spleen injury     hospitalized 11/2019 - resolved as of 12-20-19        Past Surgical History:   Procedure Laterality Date    ANESTHESIA NERVE BLOCK N/A 3/26/2019    L4-5 EPIDURAL STEROID INJECTION performed by Florentin Gonsales DO at [facility] Right 6/5/2019    RIGHT ENDOSCOPIC CARPAL TUNNEL RELEASE performed by Rosita Houser MD at [facility]      ENDOSCOPY, COLON, DIAGNOSTIC      EPIDURAL STEROID INJECTION N/A 5/16/2019    L4-5 EPIDURAL STEROID INJECTION performed by Florentin Gonsales DO at [address] [place] [address]  03/26/2019    with sedation    OVARY REMOVAL      1981 left     PAIN MANAGEMENT PROCEDURE N/A 12/26/2019    L4, L5 EPIDURAL STEROID INJECTION ([id number]) performed by [facility]in [facility]onsales DO [facility] 6/5/2019    RIGHT ULNAR NERVE DECOMPRESSION AT ELBOW WITH POSSIBLE NERVE TRANSPOSITION performed by Rosita Houser MD at [address] [place] [address] ARTHROSCOPY Right        Prior to Admission medications    Medication Sig Start Date End Date Taking? Authorizing Provider   gabapentin (NEURONTIN) 300 MG capsule Take 1 capsule by mouth 3 times daily.  11/3/19  Yes Historical Provider, MD   estradiol (ESTRACE VAGINAL) 0.1 MG/GM vaginal cream Place 1 g vaginally daily 6/9/20  Yes Alirio Delong APRN - CNP   glipiZIDE (GLUCOTROL) 5 MG tablet Take 1 tablet by mouth daily 6/4/20  Yes April Inch, APRN - CNP   Ertugliflozin L-PyroglutamicAc (STEGLATRO) 5 MG TABS Take 5 mg by mouth daily 6/4/20  Yes April Inch, APRN - CNP   acetaminophen (APAP EXTRA STRENGTH) 500 MG tablet Take 1 tablet by mouth every 8 hours as needed for Pain 6/4/20  Yes April Inch, APRN - CNP   amLODIPine (NORVASC) 10 MG tablet Take 1 tablet by mouth daily 2/7/20  Yes April Inch, APRN - CNP   atorvastatin (LIPITOR) 40 MG tablet Take 1 tablet by mouth daily 12/18/19  Yes April Inch, APRN - CNP   polyethylene glycol (GLYCOLAX) powder Take 17 g by mouth daily   Yes Historical Provider, MD   ipratropium-albuterol (DUONEB) 0.5-2.5 (3) MG/3ML SOLN nebulizer solution Inhale 1 vial into the lungs every 4 hours Instructed to take am of procedure   Yes Historical Provider, MD   azelastine (ASTELIN) 0.1 % nasal spray 2 sprays by Nasal route nightly Use in each nostril as directed 7/11/19  Yes Katie Connolly MD   albuterol sulfate  (90 Base) MCG/ACT inhaler Inhale 2 puffs into the lungs every 6 hours as needed for Wheezing Rescue inhaler 7/11/19  Yes Katie Connolly MD   pantoprazole (PROTONIX) 20 MG tablet Take 1 tablet by mouth daily 5/26/19  Yes Velma Brittle, MD   EPINEPHrine (EPIPEN) 0.3 MG/0.3ML SOAJ injection Use as directed for allergic reaction 4/25/19  Yes Licia Barthel, MD   sodium chloride (ALTAMIST SPRAY) 0.65 % nasal spray 1 spray by Nasal route as needed for Congestion 3/20/19  Yes Licia Barthel, MD   cyclobenzaprine (FLEXERIL) 10 MG tablet Take 1 tablet by mouth 3 times daily as needed for Muscle spasms  Patient not taking: Reported on 8/21/2020 7/31/20 8/30/20  Teressa DO Benoit   acyclovir (ZOVIRAX) 400 MG tablet Take 1 tablet by mouth 2 times daily for 10 days 6/9/20 6/19/20  KAIT Taveras CNP   amitriptyline (ELAVIL) 50 MG tablet Take 1 tablet by mouth nightly  Patient not taking: Reported on 6/19/2020  Cancer Mother     Alcohol Abuse Father        REVIEW OF SYSTEMS:     Gracie Valverde denies fever/chills, chest pain, shortness of breath, new bowel or bladder complaints. All other review of systems was negative. PHYSICAL EXAMINATION:      BP (!) 140/84   Pulse 80   Temp 98 °F (36.7 °C)   Resp 18   Ht 5' 2\" (1.575 m)   Wt 130 lb (59 kg)   SpO2 99%   BMI 23.78 kg/m²     General:      General appearance:pleasant and well-hydrated, in no discomfort and A & O x3  Build:Normal Weight  Function:Moves about room without difficulty. HEENT:    Head:normocephalic and atraumatic  Pupils:regular, round and equal.  Sclera: icterus absent    Lungs:    Breathing:Normal expansion. No accessory muscle use. Abdomen:    Shape:non-distended and normal  Tenderness:none  Guarding:none    Cervical spine:    Inspection:normal  Palpation:tenderness paravertebral muscles, tenderness trapezium, left, right and positive. Range of motion:abnormal mildly in flexion, extension rotation bilateral and is painful. Lumbar spine:    Spine inspection:normal   Palpation:tenderness paravertebral muscles, left, right and positive. Range of motion:abnormal mildly in lateral bending, flexion, extension rotation bilateral and is painful.     Musculoskeletal:    Trigger points in trapezius:absent bilaterally  Trigger points in rhomboids:absent bilaterally  Trigger points in Paraveteral:absent bilaterally  Trigger points in supraspinatus/infraspinatus:absent  Spurling's:negative right, negative left    Jacobsen's:negative right, negative left   SI joint tenderness:negative right, negative left              CARLA test:not done right, not done left  Piriformis tenderness:negative right, negative left  Trochanteric bursa tenderness:negative right, negative left  SLR:negative right, negative left, sitting     Extremities:    Tremors:None bilaterally upper and lower  Range of motion:Generally normal shoulders, pain with internal rotation of hips negative. Intact:Yes  Varicose veins:absent   Pulses:present Lt radial  Cyanosis:none  Edema:none x all 4 extremities    Neurological:    Sensory:normal to light touch x all 4 extremities    Motor:   Right Grip5/5              Left Grip5/5               Right Bicep5/5           Left Bicep5/5              Right Triceps5/5       Left Triceps5/5          Right Deltoid5/5     Left Deltoid5/5                  Right Quadriceps5/5          Left Quadriceps5/5           Right Gastrocnemius5/5    Left Gastrocnemius5/5  Right Ant Tibialis5/5  Left Ant Tibialis5/5    Reflexes:    Right Brachioradialis reflex2+  Left Brachioradialis reflex2+  Right Biceps reflex2+  Left Biceps reflex2+  Right Triceps reflex2+  Left Triceps reflex2+  Right Quadriceps reflex2+  Left Quadriceps reflex2+  Right Achilles reflex2+  Left Achilles reflex2+  Gait:normal station    Dermatology:    Skin:no rashes or lesions noted      Impression:    LBP and R>LLE pain  2016 Lumbar MRI shows mild degenerative changes with moderate L4-5 right foraminal stenosis and mild-moderate foraminal stenosis on the left  Cervical and lumbar xray shows significant degeneration  Cervical pain radiating to the upper arms    Pt states she is not feeling well today due to \"taking to drinking liquor to help me sleep\"     OARRS reviewed  Xray cervical and lumbar reviewed  PT - scheduled to begin next week  L4-5 RICARDO #1 with 75% relief, #2 gave 90% relief x 3 months that has since worn off, #3 gave only 3 days of relief (pt schedules with anesthesia)  Update lumbar MRI w/o contrast given decreasing effects from RICARDO's with worsening pain was ordered at the last visit but patient subsequently went to [place] first  HgA1C from 10/2019 was 7.5 (previously >12).      Pt aware she needs to alert our office if she were to begin ASA or another blood thinner for safety during injections  Encouraged in smoking cessation, she's working with psychiatry on this    Cc: Referring physician

## 2020-08-21 ENCOUNTER — OFFICE VISIT (OUTPATIENT)
Dept: PAIN MANAGEMENT | Age: 65
End: 2020-08-21
Payer: MEDICAID

## 2020-08-21 VITALS
HEIGHT: 62 IN | SYSTOLIC BLOOD PRESSURE: 140 MMHG | DIASTOLIC BLOOD PRESSURE: 84 MMHG | BODY MASS INDEX: 23.92 KG/M2 | HEART RATE: 80 BPM | OXYGEN SATURATION: 99 % | WEIGHT: 130 LBS | RESPIRATION RATE: 18 BRPM | TEMPERATURE: 98 F

## 2020-08-21 PROCEDURE — G8427 DOCREV CUR MEDS BY ELIG CLIN: HCPCS | Performed by: PAIN MEDICINE

## 2020-08-21 PROCEDURE — 4004F PT TOBACCO SCREEN RCVD TLK: CPT | Performed by: PAIN MEDICINE

## 2020-08-21 PROCEDURE — G8420 CALC BMI NORM PARAMETERS: HCPCS | Performed by: PAIN MEDICINE

## 2020-08-21 PROCEDURE — 99213 OFFICE O/P EST LOW 20 MIN: CPT | Performed by: PAIN MEDICINE

## 2020-08-21 PROCEDURE — 3017F COLORECTAL CA SCREEN DOC REV: CPT | Performed by: PAIN MEDICINE

## 2020-08-21 NOTE — PROGRESS NOTES
Do you currently have any of the following:    Fever: No  Headache:  No  Cough: No  Shortness of breath: No  Exposed to anyone with these symptoms: No         Margie Martin presents to the [facility] on 8/21/2020. Leslie Martinez is complaining of pain in the neck and shoulder and lower back and arms . The pain is constant. The pain is described as aching, throbbing, shooting and stabbing. Pain is rated on her best day at a 9, on her worst day at a 9, and on average at a 9 on the VAS scale. She took her last dose of Neurontin and Tylenol . Medical  marijuana     Any procedures since your last visit: No, with none % relief. Pacemaker or defibrilator: No managed by none. She is not on NSAIDS and is not on anticoagulation medicationsBP (!) 140/84   Pulse 80   Temp 98 °F (36.7 °C)   Resp 18   Ht 5' 2\" (1.575 m)   Wt 130 lb (59 kg)   SpO2 99%   BMI 23.78 kg/m²      No LMP recorded.  Patient is postmenopausal.

## 2020-08-24 ENCOUNTER — HOSPITAL ENCOUNTER (OUTPATIENT)
Dept: PSYCHIATRY | Age: 65
Discharge: HOME | End: 2020-08-24
Payer: MEDICAID

## 2020-08-24 RX ORDER — BUPROPION HYDROCHLORIDE 150 MG/1
150 TABLET, EXTENDED RELEASE ORAL EVERY MORNING
Qty: 30 TABLET | Refills: 1 | Status: SHIPPED
Start: 2020-08-24 | End: 2020-09-23

## 2020-08-24 NOTE — FLOWSHEET NOTE
Spoke with Dr. Cristian Lam  Confirmed, Client will start with 150 mg of zyban per day and continue with 150 mg per day for duration of program.

## 2020-08-26 ENCOUNTER — HOSPITAL ENCOUNTER (OUTPATIENT)
Dept: PHYSICAL THERAPY | Age: 65
Setting detail: THERAPIES SERIES
Discharge: HOME | End: 2020-08-26
Payer: MEDICAID

## 2020-08-26 PROCEDURE — 97161 PT EVAL LOW COMPLEX 20 MIN: CPT | Performed by: PHYSICAL THERAPIST

## 2020-08-26 ASSESSMENT — PAIN DESCRIPTION - FREQUENCY: FREQUENCY: CONTINUOUS

## 2020-08-26 ASSESSMENT — PAIN DESCRIPTION - LOCATION: LOCATION: BACK;NECK

## 2020-08-26 ASSESSMENT — PAIN SCALES - GENERAL: PAINLEVEL_OUTOF10: 6
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
## 2020-08-31 PROBLEM — E11.65 TYPE 2 DIABETES MELLITUS WITH HYPERGLYCEMIA, WITHOUT LONG-TERM CURRENT USE OF INSULIN (HCC): Status: ACTIVE | Noted: 2020-08-31

## 2020-09-01 ENCOUNTER — HOSPITAL ENCOUNTER (OUTPATIENT)
Dept: PHYSICAL THERAPY | Age: 65
Setting detail: THERAPIES SERIES
Discharge: HOME | End: 2020-09-01
Payer: MEDICAID

## 2020-09-01 PROCEDURE — 97113 AQUATIC THERAPY/EXERCISES: CPT

## 2020-09-02 NOTE — PROGRESS NOTES
[facility]  Phone: [phone] Fax: [phone]        Physical Therapy Aquatic Flow Sheet       Patient Name:  Nabila Hodge    :  1955  Restrictions/Precautions:    Diagnosis:    Treatment Diagnosis:  Chronic pain  Insurance/Certification information:  [employer]  Referring Physician:  Courtney Shah of care signed (Y/N):    Visit# / total visits:    Pain level: 10/10  Electronically signed by:  Jimi Diaz PTA  Time In:1315  Time FCW:927      Key  B= Belt DB= Dumbells T= Theratube   H= Hydrotone N= Noodles W= Weights   P= Paddles S= Speedo equipment K= Kickboard     Exercises/Activities   Warm-up/Amb  Minutes   Exercises  Minutes    Slow forward  5   HR/TR  2    Slow sideways  5  Marches  2    Slow backwards  5  Mini-squats  2    Medium forward    Forward backward kick  2    Medium sideways    Hip abduction  2    Small shuffle    Hamstring curls      Jog    Knee extension      Braiding    Pelvic tilts      Bicycling  5  Scap squeezes          Shoulder flex/ext      Functional    Shoulder abd/add      Step    Shoulder H. abd/add      Lifting    Shoulder IR/ER      Hand to opp knee    Rowing      Push down squat    Bilateral pull down      UE PNF    Push/pull      LE PNF    Push downs      Wall push ups    Arm circles      SLS    Elbow flex/ext          Chin tuck      Stretching    UT shrugs/rolls      Gastroc/Soleus    Rocking horse      Hamstring          SKTC    Other      Piriformis          Hip flexor          Ladder pull          Pec stretch          Post deltoid           Timed Code Treatment Minutes:  30  Total Treatment Minutes:  30    Treatment/Activity Tolerance:  [x] Patient tolerated treatment well [] Patient limited by fatique  [] Patient limited by pain [] Patient limited by other medical complications  [] Other:     Prognosis: [x] Good [] Fair  [] Poor    Patient Requires Follow-up: [x] Yes  [] No    Plan:   [x] Continue per plan of care [] Alter current

## 2020-09-03 ENCOUNTER — HOSPITAL ENCOUNTER (OUTPATIENT)
Dept: PHYSICAL THERAPY | Age: 65
Setting detail: THERAPIES SERIES
Discharge: HOME | End: 2020-09-03
Payer: MEDICAID

## 2020-09-03 PROCEDURE — 97113 AQUATIC THERAPY/EXERCISES: CPT

## 2020-09-03 NOTE — PROGRESS NOTES
[facility]  Phone: [phone] Fax: [phone]        Physical Therapy Aquatic Flow Sheet       Patient Name:  Anila Lewis    :  1955  Restrictions/Precautions:    Diagnosis:    Treatment Diagnosis:  Chronic pain  Insurance/Certification information:  [employer]  Referring Physician:  Louisa Forbes of care signed (Y/N):    Visit# / total visits:  3/12  Pain level: 10/10  Electronically signed by:  Edson Aldana PTA  Time In:1315  Time LBF:7789      Key  B= Belt DB= Dumbells T= Theratube   H= Hydrotone N= Noodles W= Weights   P= Paddles S= Speedo equipment K= Kickboard     Exercises/Activities   Warm-up/Amb  Minutes   Exercises  Minutes    Slow forward  5   HR/TR  2    Slow sideways  5  Marches  2    Slow backwards  5  Mini-squats  2    Medium forward    Forward backward kick  2    Medium sideways    Hip abduction  2    Small shuffle    Hamstring curls      Jog    Knee extension      Braiding    Pelvic tilts      Bicycling  5  Scap squeezes          Shoulder flex/ext      Functional    Shoulder abd/add      Step    Shoulder H. abd/add      Lifting    Shoulder IR/ER      Hand to opp knee    Rowing      Push down squat    Bilateral pull down      UE PNF    Push/pull      LE PNF    Push downs      Wall push ups    Arm circles      SLS    Elbow flex/ext          Chin tuck      Stretching    UT shrugs/rolls      Gastroc/Soleus    Rocking horse      Hamstring          SKTC    Other      Piriformis          Hip flexor          Ladder pull          Pec stretch          Post deltoid           Timed Code Treatment Minutes:  30  Total Treatment Minutes:  30    Treatment/Activity Tolerance:  [x] Patient tolerated treatment well [] Patient limited by fatique  [] Patient limited by pain [] Patient limited by other medical complications  [x] Other: Problem List:Functions, Activity limitations: Decreased ROM; Decreased strength;Decreased posture; Increased pain    Prognosis: [x] Good [] Fair  []

## 2020-09-09 ENCOUNTER — HOSPITAL ENCOUNTER (OUTPATIENT)
Dept: PHYSICAL THERAPY | Age: 65
Setting detail: THERAPIES SERIES
Discharge: HOME | End: 2020-09-09
Payer: MEDICAID

## 2020-09-09 PROCEDURE — 97113 AQUATIC THERAPY/EXERCISES: CPT

## 2020-09-09 NOTE — PROGRESS NOTES
[facility]  Phone: [phone] Fax: [phone]        Physical Therapy Aquatic Flow Sheet       Patient Name:  Sukhdeep Belle    :  1955  Restrictions/Precautions:    Diagnosis:    Treatment Diagnosis:  Chronic pain  Insurance/Certification information:  Vladislav Contreras  Referring Physician:  Graeme Salomon of care signed (Y/N):    Visit# / total visits:    Pain level: 10/10  Electronically signed by:  Linda Henderson PTA  Time In:1315  Time ZMN:6304      Key  B= Belt DB= Dumbells T= Theratube   H= Hydrotone N= Noodles W= Weights   P= Paddles S= Speedo equipment K= Kickboard     Exercises/Activities   Warm-up/Amb  Minutes   Exercises  Minutes    Slow forward  5   HR/TR  2    Slow sideways  5  Marches  2    Slow backwards  5  Mini-squats  2    Medium forward    Forward backward kick  2    Medium sideways    Hip abduction  2    Small shuffle    Hamstring curls      Jog    Knee extension      Braiding    Pelvic tilts      Bicycling  5  Scap squeezes          Shoulder flex/ext      Functional    Shoulder abd/add      Step    Shoulder H. abd/add      Lifting    Shoulder IR/ER      Hand to opp knee    Rowing      Push down squat    Bilateral pull down      UE PNF    Push/pull      LE PNF    Push downs      Wall push ups    Arm circles      SLS    Elbow flex/ext          Chin tuck      Stretching    UT shrugs/rolls      Gastroc/Soleus    Rocking horse      Hamstring          SKTC    Other      Piriformis          Hip flexor          Ladder pull          Pec stretch          Post deltoid           Timed Code Treatment Minutes:  30  Total Treatment Minutes:  30    Treatment/Activity Tolerance:  [x] Patient tolerated treatment well [] Patient limited by fatique  [] Patient limited by pain [] Patient limited by other medical complications  [x] Other: Problem List:Functions, Activity limitations: Decreased ROM; Decreased strength;Decreased posture; Increased pain    Prognosis: [x] Good [] Fair  []

## 2020-09-10 ENCOUNTER — HOSPITAL ENCOUNTER (OUTPATIENT)
Dept: PHYSICAL THERAPY | Age: 65
Setting detail: THERAPIES SERIES
Discharge: HOME | End: 2020-09-10
Payer: MEDICAID

## 2020-09-10 PROCEDURE — 97113 AQUATIC THERAPY/EXERCISES: CPT

## 2020-09-10 NOTE — PROGRESS NOTES
[facility]  Phone: [phone] Fax: [phone]        Physical Therapy Aquatic Flow Sheet       Patient Name:  Gilbert Alexander    :  1955  Restrictions/Precautions:    Diagnosis:    Treatment Diagnosis:  Chronic pain  Insurance/Certification information:  William Jernigan  Referring Physician:  Joesph Skelton of care signed (Y/N):    Visit# / total visits:    Pain level: 10/10  Electronically signed by:  Yamilet Yu PTA  Time In:1315  Time CHB:3150      Key  B= Belt DB= Dumbells T= Theratube   H= Hydrotone N= Noodles W= Weights   P= Paddles S= Speedo equipment K= Kickboard     Exercises/Activities   Warm-up/Amb  Minutes   Exercises  Minutes    Slow forward  5   HR/TR  2    Slow sideways  5  Marches  2    Slow backwards  5  Mini-squats  2    Medium forward    Forward backward kick  2    Medium sideways    Hip abduction  2    Small shuffle    Hamstring curls      Jog    Knee extension      Braiding    Pelvic tilts      Bicycling  5  Scap squeezes          Shoulder flex/ext      Functional    Shoulder abd/add      Step    Shoulder H. abd/add      Lifting    Shoulder IR/ER      Hand to opp knee    Rowing      Push down squat    Bilateral pull down      UE PNF    Push/pull      LE PNF    Push downs      Wall push ups    Arm circles      SLS    Elbow flex/ext          Chin tuck      Stretching    UT shrugs/rolls      Gastroc/Soleus    Rocking horse      Hamstring          SKTC    Other      Piriformis          Hip flexor          Ladder pull          Pec stretch          Post deltoid           Timed Code Treatment Minutes:  30  Total Treatment Minutes:  30    Treatment/Activity Tolerance:  [x] Patient tolerated treatment well [] Patient limited by fatique  [] Patient limited by pain [] Patient limited by other medical complications  [x] Other: subjective pain rating stays at 10/10 -from evaluation (2020) to today 2020 -physical presentation does not match 10/10 pain-body movements fluid and appear normal (?)  Problem List:Functions, Activity limitations: Decreased ROM; Decreased strength;Decreased posture; Increased pain    Prognosis: [x] Good [] Fair  [] Poor    Patient Requires Follow-up: [x] Yes  [] No    Plan:   [x] Continue per plan of care [] Alter current plan (see comments)  [] Plan of care initiated [] Hold pending MD visit [] Discharge    Treatment Charges: Mins Units   Initial Evaluation     Re-Evaluation     Ther Exercise         TE     Aquatic Treatment 30 2   Ther Activities        TA     Gait Training          GT     Neuro Re-education NR     Modalities     Non-Billable Service Time     Other     Total Time/Units 30 2             Electronically signed by:  Era Diaz PTA [id number]

## 2020-09-16 RX ORDER — ERTUGLIFLOZIN 5 MG/1
5 TABLET, FILM COATED ORAL DAILY
Qty: 90 TABLET | Refills: 1 | Status: SHIPPED
Start: 2020-09-16 | End: 2021-04-07 | Stop reason: SDUPTHER

## 2020-09-16 NOTE — TELEPHONE ENCOUNTER
Last Appointment:  8/31/2020  Future Appointments   Date Time Provider Ilan Sotoi   9/17/2020  2:00 PM Mariahbecca RODRIGUEZ PT [facility]   9/21/2020  2:30 PM SCHEDULE, [employer] SEYZ TOB TRT [facility]   9/22/2020  2:00 PM Mariahbecca RODRIGUEZ PT [facility]   9/24/2020  2:00 PM Mariahbecca RODRIGUEZ PT [facility]   9/28/2020  2:30 PM SCHEDULE, [employer] SEYZ TOB TRT [facility]   9/29/2020  4:00 PM KAIT Chan - CNP [facility]   9/30/2020 11:30 AM Glenys Hamman, DO BDM PAIN [facility]   10/5/2020  2:30 PM SCHEDULE, [employer] SEYZ TOB TRT [facility]

## 2020-09-17 ENCOUNTER — HOSPITAL ENCOUNTER (OUTPATIENT)
Dept: PHYSICAL THERAPY | Age: 65
Setting detail: THERAPIES SERIES
Discharge: HOME | End: 2020-09-17
Payer: MEDICAID

## 2020-09-17 NOTE — PROGRESS NOTES
[facility]  Phone: [phone] Fax: [phone]     Physical Therapy  Cancellation/No-show Note  Patient Name:  Gen Rg  :  1955   Date:  2020    For today's appointment patient:  [x]  Cancelled  []  Rescheduled appointment  []  No-show     Reason given by patient:  [x]  Patient ill  []  Conflicting appointment  []  No transportation    []  Conflict with work  []  No reason given  []  Other:     Comments:      Electronically signed by:  Casi Garza  PTA [id number]

## 2020-09-22 ENCOUNTER — HOSPITAL ENCOUNTER (OUTPATIENT)
Dept: PHYSICAL THERAPY | Age: 65
Setting detail: THERAPIES SERIES
Discharge: HOME | End: 2020-09-22
Payer: MEDICAID

## 2020-09-22 PROCEDURE — 97113 AQUATIC THERAPY/EXERCISES: CPT

## 2020-09-23 ENCOUNTER — TELEPHONE (OUTPATIENT)
Dept: FAMILY MEDICINE CLINIC | Age: 65
End: 2020-09-23

## 2020-09-23 ENCOUNTER — OFFICE VISIT (OUTPATIENT)
Dept: FAMILY MEDICINE CLINIC | Age: 65
End: 2020-09-23
Payer: MEDICAID

## 2020-09-23 VITALS
RESPIRATION RATE: 16 BRPM | HEART RATE: 58 BPM | SYSTOLIC BLOOD PRESSURE: 132 MMHG | BODY MASS INDEX: 22.23 KG/M2 | WEIGHT: 120.8 LBS | HEIGHT: 62 IN | TEMPERATURE: 97.9 F | DIASTOLIC BLOOD PRESSURE: 86 MMHG | OXYGEN SATURATION: 98 %

## 2020-09-23 PROCEDURE — 99213 OFFICE O/P EST LOW 20 MIN: CPT | Performed by: NURSE PRACTITIONER

## 2020-09-23 RX ORDER — TRIAMCINOLONE ACETONIDE 40 MG/ML
40 INJECTION, SUSPENSION INTRA-ARTICULAR; INTRAMUSCULAR ONCE
Status: COMPLETED | OUTPATIENT
Start: 2020-09-23 | End: 2020-09-23

## 2020-09-23 RX ORDER — METHYLPREDNISOLONE 4 MG/1
TABLET ORAL
Qty: 1 KIT | Refills: 0 | Status: SHIPPED
Start: 2020-09-23 | End: 2021-10-18

## 2020-09-23 RX ORDER — CYCLOBENZAPRINE HCL 5 MG
5 TABLET ORAL 3 TIMES DAILY PRN
Qty: 15 TABLET | Refills: 0 | Status: SHIPPED
Start: 2020-09-23 | End: 2021-04-07 | Stop reason: SDUPTHER

## 2020-09-23 RX ORDER — KETOROLAC TROMETHAMINE 30 MG/ML
30 INJECTION, SOLUTION INTRAMUSCULAR; INTRAVENOUS ONCE
Status: COMPLETED | OUTPATIENT
Start: 2020-09-23 | End: 2020-09-23

## 2020-09-23 RX ADMIN — KETOROLAC TROMETHAMINE 30 MG: 30 INJECTION, SOLUTION INTRAMUSCULAR; INTRAVENOUS at 14:38

## 2020-09-23 RX ADMIN — TRIAMCINOLONE ACETONIDE 40 MG: 40 INJECTION, SUSPENSION INTRA-ARTICULAR; INTRAMUSCULAR at 14:39

## 2020-09-23 NOTE — TELEPHONE ENCOUNTER
George Varma,    Patient called stating that she has neck and back pain with sciatica. She cannot wait until 09/29/20 for office visit with you for pain med refill.     She is requesting a few pills to help with the pain or should I send her to walk in?

## 2020-09-23 NOTE — PROGRESS NOTES
Chief Complaint:   Back Pain (several years, out of amitriptyline & flexeril)      History of Present Illness   Source of history provided by:  patientQuincy Tillman is a 60 y.o. old female who has a past medical history of:   Past Medical History:   Diagnosis Date    Arthritis     Asthma     controlled     Depression     Diabetes mellitus ([address]     Fibromyalgia     GERD (gastroesophageal reflux disease)     Hepatitis C 2016    treated     Hyperlipidemia     Hypertension     Overactive bladder     Spleen injury     hospitalized 11/2019 - resolved as of 12-20-19     presents to the walk in clinic for evaluation of lower back pain for the past 14 days. Pt denies any known injury. Pt has a history of chronic back pain. Patient follows up with pain management monthly. Pt states the pain is worse with movement and improves with lying flat. There is positive radiation of the pain into the right buttocks/leg. Pt denies any bowel/bladder incontinence, abdominal pain, hematuria, N/V/D, fever, chills, HA, neck pain, recent illness, dysuria, or lethargy. ROS    Unless otherwise stated in this report or unable to obtain because of the patient's clinical or mental status as evidenced by the medical record, this patients's positive and negative responses for Review of Systems, constitutional, psych, eyes, ENT, cardiovascular, respiratory, gastrointestinal, neurological, genitourinary, musculoskeletal, integument systems and systems related to the presenting problem are either stated in the preceding or were not pertinent or were negative for the symptoms and/or complaints related to the medical problem.     Past Medical History:   Past Surgical History:   Procedure Laterality Date    ANESTHESIA NERVE BLOCK N/A 3/26/2019    L4-5 EPIDURAL STEROID INJECTION performed by Brittni Wu DO at [facility] Right 6/5/2019    RIGHT ENDOSCOPIC CARPAL TUNNEL RELEASE performed by Sree Plascencia Maria L Fenton MD at [facility] AND [facility]      ENDOSCOPY, COLON, DIAGNOSTIC      EPIDURAL STEROID INJECTION N/A 5/16/2019    L4-5 EPIDURAL STEROID INJECTION performed by Kalani Hardy DO at [address]  03/26/2019    with sedation    OVARY REMOVAL      1981 left     PAIN MANAGEMENT PROCEDURE N/A 12/26/2019    L4, L5 EPIDURAL STEROID INJECTION ([id number]) performed by Kalani Hardy DO at [facility] Right 6/5/2019    RIGHT ULNAR NERVE DECOMPRESSION AT ELBOW WITH POSSIBLE NERVE TRANSPOSITION performed by Cliff Martinez MD at [address] [place] [address] ARTHROSCOPY Right      Social History:  reports that she quit smoking about 2 weeks ago. Her smoking use included cigarettes. She has a 11.25 pack-year smoking history. She has never used smokeless tobacco. She reports current alcohol use. She reports current drug use. Drug: Marijuana. Family History: family history includes Alcohol Abuse in her father; Cancer in her mother; Diabetes in her mother. Allergies: Adhesive tape; Nsaids; and Penicillins    Physical Exam         VS:  /86   Pulse 58   Temp 97.9 °F (36.6 °C) (Infrared)   Resp 16   Ht 5' 2\" (1.575 m)   Wt 120 lb 12.8 oz (54.8 kg)   SpO2 98%   BMI 22.09 kg/m²    Oxygen Saturation Interpretation: Normal.    Constitutional:  Alert, development consistent with age. Neck:  Normal ROM. Supple. No TTP. Lungs:  Clear to auscultation and breath sounds equal.  Heart:  Regular rate and rhythm, normal heart sounds, without pathological murmurs, ectopy, gallops, or rubs. Abdomen:  Soft, nontender, good bowel sounds. No firm or pulsatile mass. Back: Tenderness: TTP over lower back with no appreciable midline tenderness. Swelling: No edema. Range of Motion: Decreased lateral and front to back ROM due to pain. CVA Tenderness: No CVA tenderness bilaterally.             Straight leg raising:

## 2020-09-24 ENCOUNTER — HOSPITAL ENCOUNTER (OUTPATIENT)
Dept: PHYSICAL THERAPY | Age: 65
Setting detail: THERAPIES SERIES
Discharge: HOME | End: 2020-09-24
Payer: MEDICAID

## 2020-09-24 PROCEDURE — 97113 AQUATIC THERAPY/EXERCISES: CPT

## 2020-09-24 NOTE — PROGRESS NOTES
[facility]  Phone: [phone] Fax: [phone]        Physical Therapy Aquatic Flow Sheet       Patient Name:  Benetta Spurling    :  1955  Restrictions/Precautions:    Diagnosis:    Treatment Diagnosis:  Chronic pain  Insurance/Certification information:  Néstor Umanzor  Referring Physician:  Alma Mello of care signed (Y/N):    Visit# / total visits:    Pain level: 10/10    Electronically signed by:  Lola Pinzon PTA  Time In:1315  Time JUANPABLO:5883      Key  B= Belt DB= Dumbells T= Theratube   H= Hydrotone N= Noodles W= Weights   P= Paddles S= Speedo equipment K= Kickboard     Exercises/Activities   Warm-up/Amb  Minutes   Exercises  Minutes    Slow forward  5   HR/TR  2    Slow sideways  5  Marches  2    Slow backwards  5  Mini-squats  2    Medium forward    Forward backward kick  2    Medium sideways    Hip abduction  2    Small shuffle    Hamstring curls      Jog    Knee extension      Braiding    Pelvic tilts      Bicycling  5  Scap squeezes          Shoulder flex/ext      Functional    Shoulder abd/add      Step    Shoulder H. abd/add      Lifting    Shoulder IR/ER      Hand to opp knee    Rowing      Push down squat    Bilateral pull down      UE PNF    Push/pull      LE PNF    Push downs      Wall push ups    Arm circles      SLS    Elbow flex/ext          Chin tuck      Stretching    UT shrugs/rolls      Gastroc/Soleus    Rocking horse      Hamstring          SKTC    Other      Piriformis          Hip flexor          Ladder pull          Pec stretch          Post deltoid           Timed Code Treatment Minutes:  30  Total Treatment Minutes:  30    Treatment/Activity Tolerance:  [x] Patient tolerated treatment well [] Patient limited by fatique  [] Patient limited by pain [] Patient limited by other medical complications  [x] Other: subjective pain rating stays at 1010 -from evaluation (2020) to today 2020 -physical presentation does not match 10/10 pain-body

## 2020-09-25 ENCOUNTER — TELEPHONE (OUTPATIENT)
Dept: FAMILY MEDICINE CLINIC | Age: 65
End: 2020-09-25

## 2020-09-25 NOTE — TELEPHONE ENCOUNTER
She said her pharmacist told her to check her sugar daily. She is testing after each meal (3x a day).   Her prior pcp in [place] had prescribed the [employer]

## 2020-09-29 ENCOUNTER — OFFICE VISIT (OUTPATIENT)
Dept: FAMILY MEDICINE CLINIC | Age: 65
End: 2020-09-29
Payer: MEDICAID

## 2020-09-29 ENCOUNTER — HOSPITAL ENCOUNTER (OUTPATIENT)
Dept: PHYSICAL THERAPY | Age: 65
Setting detail: THERAPIES SERIES
Discharge: HOME | End: 2020-09-29
Payer: MEDICAID

## 2020-09-29 VITALS
SYSTOLIC BLOOD PRESSURE: 116 MMHG | OXYGEN SATURATION: 98 % | WEIGHT: 123.8 LBS | BODY MASS INDEX: 21.93 KG/M2 | TEMPERATURE: 98.1 F | RESPIRATION RATE: 16 BRPM | DIASTOLIC BLOOD PRESSURE: 70 MMHG | HEIGHT: 63 IN | HEART RATE: 93 BPM

## 2020-09-29 LAB — HBA1C MFR BLD: 6.1 %

## 2020-09-29 PROCEDURE — G8420 CALC BMI NORM PARAMETERS: HCPCS | Performed by: NURSE PRACTITIONER

## 2020-09-29 PROCEDURE — 97113 AQUATIC THERAPY/EXERCISES: CPT

## 2020-09-29 PROCEDURE — 99213 OFFICE O/P EST LOW 20 MIN: CPT | Performed by: NURSE PRACTITIONER

## 2020-09-29 PROCEDURE — 1036F TOBACCO NON-USER: CPT | Performed by: NURSE PRACTITIONER

## 2020-09-29 PROCEDURE — G8427 DOCREV CUR MEDS BY ELIG CLIN: HCPCS | Performed by: NURSE PRACTITIONER

## 2020-09-29 PROCEDURE — 3017F COLORECTAL CA SCREEN DOC REV: CPT | Performed by: NURSE PRACTITIONER

## 2020-09-29 PROCEDURE — 3044F HG A1C LEVEL LT 7.0%: CPT | Performed by: NURSE PRACTITIONER

## 2020-09-29 PROCEDURE — 83036 HEMOGLOBIN GLYCOSYLATED A1C: CPT | Performed by: NURSE PRACTITIONER

## 2020-09-29 PROCEDURE — 2022F DILAT RTA XM EVC RTNOPTHY: CPT | Performed by: NURSE PRACTITIONER

## 2020-09-29 RX ORDER — ACETAMINOPHEN 500 MG
500 TABLET ORAL EVERY 8 HOURS PRN
Qty: 90 TABLET | Refills: 0 | Status: SHIPPED
Start: 2020-09-29 | End: 2020-12-14 | Stop reason: SDUPTHER

## 2020-09-29 ASSESSMENT — ENCOUNTER SYMPTOMS
SHORTNESS OF BREATH: 0
NAUSEA: 0
DIARRHEA: 0
COLOR CHANGE: 0
CONSTIPATION: 0
COUGH: 0
EYE PAIN: 0
BACK PAIN: 1
VOMITING: 0
SINUS PAIN: 0
CHEST TIGHTNESS: 0
WHEEZING: 0

## 2020-09-29 NOTE — TELEPHONE ENCOUNTER
Should I just get rid of this message so its not hanging around in my box? Not sure if you are going to do this or wait till she comes in to talk to her.     Thanks

## 2020-09-29 NOTE — PROGRESS NOTES
[address]  [address] [facility], [address] [facility]    Follow up Note      Monik Melton     Date of Visit:  9/30/2020    CC:  Patient presents for follow up   Chief Complaint   Patient presents with    Pain     whole back and back of the arm and wrist and hands      HPI:    Pain is worse. Change in quality of symptoms:no. Medication side effects:not applicable . Recent diagnostic testing:none. Recent interventional procedures:none since last visit. She has not been on anticoagulation medications to include ASA, NSAIDS, Plavix, heparin, LMW heparin and warfarin. The patient  has not been on herbal supplements. The patient is diabetic. Imaging:   Lumbar MRI 9/2016 -   Mild degenerative changes with moderate foraminal stenosis at L4-5 on the right and mild to moderate foraminal stenosis on the left    Xray cervical 2020 -  Alignment of the vertebral bodies appears to be near-anatomic    No fracture or foreign body is identified. The disc spaces demonstrate moderately severe C5-C6 and C6-C7    degenerative narrowing. There are prominent anterior osteophytes present in the lower cervical    spine. .    There is no significant loss of the vertebral body height    There are mild degenerative changes involving the facets.              Impression    Findings consistent with advanced degenerative disc    disease and degenerative facets disease      Xray lumbar 2020 -  Alignment of the vertebral bodies appears to be near-anatomic    No fracture or foreign body is identified. The disc spaces demonstrate moderate L4-L5 degenerative narrowing. There are multilevel endplate spurs present. There is no significant loss of the vertebral body height    There are moderate degenerative changes involving the facets. Flexion-extension reveals no significant subluxation.              Impression    Findings consistent with degenerative disc disease and    degenerative facets disease. 1 tablet by mouth daily 9/29/20  Yes KAIT Bee CNP   methylPREDNISolone (MEDROL DOSEPACK) 4 MG tablet Take by mouth. 9/23/20  Yes KAIT Welch CNP   cyclobenzaprine (FLEXERIL) 5 MG tablet Take 1 tablet by mouth 3 times daily as needed for Muscle spasms 9/23/20  Yes KAIT Welch CNP   Ertugliflozin L-PyroglutamicAc (STEGLATRO) 5 MG TABS Take 5 mg by mouth daily 9/16/20  Yes KAIT Bee CNP   gabapentin (NEURONTIN) 300 MG capsule Take 1 capsule by mouth 3 times daily.  11/3/19  Yes Historical Provider, MD   glipiZIDE (GLUCOTROL) 5 MG tablet Take 1 tablet by mouth daily 6/4/20  Yes KAIT Bee CNP   amLODIPine (NORVASC) 10 MG tablet Take 1 tablet by mouth daily 2/7/20  Yes KAIT Bee CNP   atorvastatin (LIPITOR) 40 MG tablet Take 1 tablet by mouth daily 12/18/19  Yes KAIT Bee CNP   polyethylene glycol (GLYCOLAX) powder Take 17 g by mouth daily   Yes Historical Provider, MD   ipratropium-albuterol (DUONEB) 0.5-2.5 (3) MG/3ML SOLN nebulizer solution Inhale 1 vial into the lungs every 4 hours Instructed to take am of procedure   Yes Historical Provider, MD   azelastine (ASTELIN) 0.1 % nasal spray 2 sprays by Nasal route nightly Use in each nostril as directed 7/11/19  Yes Aminah Costello MD   albuterol sulfate  (90 Base) MCG/ACT inhaler Inhale 2 puffs into the lungs every 6 hours as needed for Wheezing Rescue inhaler 7/11/19  Yes Aminah Costello MD   pantoprazole (PROTONIX) 20 MG tablet Take 1 tablet by mouth daily 5/26/19  Yes Darnell Rodriguez MD   EPINEPHrine (EPIPEN) 0.3 MG/0.3ML SOAJ injection Use as directed for allergic reaction 4/25/19  Yes Ahsan Weinberg MD   sodium chloride (ALTAMIST SPRAY) 0.65 % nasal spray 1 spray by Nasal route as needed for Congestion 3/20/19  Yes Foremanlauren Weinberg MD   acyclovir (ZOVIRAX) 400 MG tablet Take 1 tablet by mouth 2 times daily for 10 days 6/9/20 9/29/20  KAIT Ladd       Allergies  Cancer Mother     Alcohol Abuse Father        REVIEW OF SYSTEMS:     Brooks Archibald denies fever/chills, chest pain, shortness of breath, new bowel or bladder complaints. All other review of systems was negative. PHYSICAL EXAMINATION:      /70   Pulse 68   Temp 97.7 °F (36.5 °C)   Resp 18   Ht 5' 2\" (1.575 m)   Wt 122 lb (55.3 kg)   SpO2 98%   BMI 22.31 kg/m²     General:      General appearance:pleasant and well-hydrated, in no discomfort and A & O x3  Build:Normal Weight  Function:Moves about room without difficulty. HEENT:    Head:normocephalic and atraumatic  Pupils:regular, round and equal.  Sclera: icterus absent    Lungs:    Breathing:Normal expansion. No accessory muscle use. Abdomen:    Shape:non-distended and normal  Tenderness:none  Guarding:none    Cervical spine:    Inspection:normal  Palpation:tenderness paravertebral muscles, tenderness trapezium, left, right and positive. Range of motion:abnormal mildly in flexion, extension rotation bilateral and is painful. Lumbar spine:    Spine inspection:normal   Palpation:tenderness paravertebral muscles, left, right and positive. Range of motion:abnormal mildly in lateral bending, flexion, extension rotation bilateral and is painful.     Musculoskeletal:    Trigger points in trapezius:absent bilaterally  Trigger points in rhomboids:absent bilaterally  Trigger points in Paraveteral:absent bilaterally  Trigger points in supraspinatus/infraspinatus:absent  Spurling's:negative right, negative left    Jacobsen's:negative right, negative left   SI joint tenderness:negative right, negative left              CARLA test:not done right, not done left  Piriformis tenderness:negative right, negative left  Trochanteric bursa tenderness:negative right, negative left  SLR:negative right, negative left, sitting     Extremities:    Tremors:None bilaterally upper and lower  Range of motion:Generally normal shoulders, pain with internal rotation of hips negative.   Intact:Yes  Varicose veins:absent   Pulses:present Lt radial  Cyanosis:none  Edema:none x all 4 extremities    Neurological:    Sensory:normal to light touch x all 4 extremities    Motor:   Right Grip5/5              Left Grip5/5               Right Bicep5/5           Left Bicep5/5              Right Triceps5/5       Left Triceps5/5          Right Deltoid5/5     Left Deltoid5/5                  Right Quadriceps5/5          Left Quadriceps5/5           Right Gastrocnemius5/5    Left Gastrocnemius5/5  Right Ant Tibialis5/5  Left Ant Tibialis5/5    Reflexes:    Right Brachioradialis reflex2+  Left Brachioradialis reflex2+  Right Biceps reflex2+  Left Biceps reflex2+  Right Triceps reflex2+  Left Triceps reflex2+  Right Quadriceps reflex2+  Left Quadriceps reflex2+  Right Achilles reflex2+  Left Achilles reflex2+  Gait:normal station    Dermatology:    Skin:no rashes or lesions noted      Impression:    LBP and R>LLE pain  2016 Lumbar MRI shows mild degenerative changes with moderate L4-5 right foraminal stenosis and mild-moderate foraminal stenosis on the left  Cervical and lumbar xray shows significant degeneration  Cervical pain radiating to the upper arms  At prior visit, pt stated she was not feeling well today due to \"taking to drinking liquor to help me sleep\"    Pt states pain increased since weather change  APRN gave her steroid IM injection last week  Is doing smoking cessation classes    Exam unchanged today     [place] reviewed - no medical marijuana since 7/2020  PT - has had 6 sessions including her eval, encouraged to complete so we can order lumbar MRI  L4-5 RICARDO #1 with 75% relief, #2 gave 90% relief x 3 months that has since worn off, #3 gave only 3 days of relief (pt schedules with anesthesia)  Update lumbar MRI w/o contrast given decreasing effects from RICARDO's with worsening pain was ordered at the last visit but patient subsequently went to [place] first  HgA1C from 6/2020 was 6.7 (previously >12).      Pt aware she needs to alert our office if she were to begin ASA or another blood thinner for safety during injections  Encouraged in smoking cessation, she's working with psychiatry on this    Cc: Referring physician

## 2020-09-29 NOTE — PROGRESS NOTES
Darnell Engel is a 60 y.o. female who presents today for     Chief Complaint   Patient presents with    3 Month Follow-Up     She has been using the flexeril & finish the medrol dose pack for her back pain. She follows with pain management & will be having injections in the near future. She has been drinking more water, over 32 oz of water a day & watching her diet. [address]:    Patient's past medical, surgical, social and/or family history reviewed, updated in chart, and are non-contributory (unless otherwise stated). Medications and allergies also reviewed and updated in chart. Review of Systems    Review of Systems   Constitutional: Negative for activity change, appetite change, chills and fever. HENT: Negative for congestion, ear pain, sinus pain and sneezing. Eyes: Negative for pain and visual disturbance. Respiratory: Negative for cough, chest tightness, shortness of breath (improvement) and wheezing. Cardiovascular: Negative for chest pain, palpitations and leg swelling. Gastrointestinal: Negative for constipation, diarrhea, nausea and vomiting. Endocrine: Negative for cold intolerance, heat intolerance and polyuria. Genitourinary: Negative for difficulty urinating. Musculoskeletal: Positive for arthralgias, back pain and myalgias. Skin: Negative for color change and rash. Neurological: Negative for dizziness, light-headedness and headaches. Hematological: Negative for adenopathy. Does not bruise/bleed easily. Psychiatric/Behavioral: Negative for agitation, decreased concentration, sleep disturbance and suicidal ideas. The patient is not nervous/anxious.       Physical Exam:    VS:  /70   Pulse 93   Temp 98.1 °F (36.7 °C) (Infrared)   Resp 16   Ht 5' 2.5\" (1.588 m)   Wt 123 lb 12.8 oz (56.2 kg)   SpO2 98%   BMI 22.28 kg/m²     LAST WEIGHT:  Wt Readings from Last 3 Encounters:   09/30/20 122 lb (55.3 kg)   09/29/20 123 lb 12.8 oz (56.2 kg)   09/23/20 120 lb 12.8 oz (54.8 kg)       BMI Readings from Last 3 Encounters:   09/30/20 22.31 kg/m²   09/29/20 22.28 kg/m²   09/23/20 22.09 kg/m²     Physical Exam  Vitals signs and nursing note reviewed. Constitutional:       General: She is not in acute distress. Appearance: Normal appearance. She is well-developed and normal weight. She is not ill-appearing, toxic-appearing or diaphoretic. HENT:      Head: Normocephalic and atraumatic. Right Ear: Tympanic membrane, ear canal and external ear normal. There is no impacted cerumen. Left Ear: Tympanic membrane, ear canal and external ear normal. There is no impacted cerumen. Ears:      Comments: ENT: canals clear, TMs intact and pearly gray, nasal turbinates erythematous and boggy, pharynx shows erythema and post nasal drip       Nose: Rhinorrhea present. No congestion. Mouth/Throat:      Mouth: Mucous membranes are moist.      Pharynx: Oropharynx is clear. No oropharyngeal exudate or posterior oropharyngeal erythema. Eyes:      Extraocular Movements: Extraocular movements intact. Conjunctiva/sclera: Conjunctivae normal.      Pupils: Pupils are equal, round, and reactive to light. Neck:      Musculoskeletal: Normal range of motion and neck supple. Thyroid: No thyromegaly. Cardiovascular:      Rate and Rhythm: Normal rate and regular rhythm. Pulses: Normal pulses. Heart sounds: Normal heart sounds. No murmur. Pulmonary:      Effort: Pulmonary effort is normal. No respiratory distress. Breath sounds: Normal breath sounds. No wheezing. Abdominal:      General: Bowel sounds are normal. There is no distension. Palpations: Abdomen is soft. Tenderness: There is no abdominal tenderness. There is no guarding or rebound. Genitourinary:     Vagina: Normal.      Comments: Deferred. Musculoskeletal: Normal range of motion. Lymphadenopathy:      Cervical: No cervical adenopathy.    Skin:     General: Skin is warm and dry.      Capillary Refill: Capillary refill takes less than 2 seconds. Findings: No bruising, erythema or rash. Neurological:      General: No focal deficit present. Mental Status: She is alert and oriented to person, place, and time. Mental status is at baseline. Cranial Nerves: No cranial nerve deficit. Sensory: No sensory deficit. Motor: No weakness. Coordination: Coordination normal.      Gait: Gait normal.   Psychiatric:         Mood and Affect: Mood normal.         Behavior: Behavior normal.         Thought Content: Thought content normal.         Judgment: Judgment normal.     Labs:    Lab Results   Component Value Date     11/06/2019    K 3.3 11/06/2019    K 3.2 11/04/2019     11/06/2019    CO2 23 11/06/2019    BUN 6 11/06/2019    CREATININE 0.6 11/06/2019    PROT 6.4 11/06/2019    LABALBU 3.1 11/06/2019    CALCIUM 8.6 11/06/2019    GFRAA >60 11/06/2019    LABGLOM >60 11/06/2019    GLUCOSE 166 06/04/2020    AST 12 11/06/2019    ALT 7 11/06/2019    ALKPHOS 63 11/06/2019    BILITOT 1.0 11/06/2019    TSH 1.560 08/08/2019    HDL 95 01/24/2019    LDLCALC 107 01/24/2019    LABA1C 6.1 09/29/2020        No results found for: CHOL  No results found for: TRIG  Lab Results   Component Value Date    HDL 95 01/24/2019     Lab Results   Component Value Date    LDLCALC 107 (H) 01/24/2019       Lab Results   Component Value Date    LABA1C 6.1 09/29/2020    LABA1C 6.7 06/04/2020    LABA1C 7.5 (H) 10/03/2019     Lab Results   Component Value Date    LDLCALC 107 (H) 01/24/2019    CREATININE 0.6 11/06/2019           Assessment / Plan: Jorge Francois was seen today for 3 month follow-up. Diagnoses and all orders for this visit:    Intractable migraine without status migrainosus, unspecified migraine type  -     acetaminophen (APAP EXTRA STRENGTH) 500 MG tablet;  Take 1 tablet by mouth every 8 hours as needed for Pain  - The current medical regimen is effective;  continue present plan and medications. Type 2 diabetes mellitus with hyperglycemia, without long-term current use of insulin (HCC) (improving)  -     SITagliptin (JANUVIA) 50 MG tablet; Take 1 tablet by mouth daily  -     POCT glycosylated hemoglobin (Hb A1C)  - The current medical regimen is effective;  continue present plan and medications. - 6.7 decreased to 6.1%      Follow Up:    No follow-ups on file. or sooner if necessary. Educational materials and/or home exercises printed for patient's review and were included in patient instructions on his/her AfterVisit Summary and given to patient at the end of visit. Counseled regarding above diagnosis,including possible risks and complications,  especially if left uncontrolled. Counseled regarding the possible side effects, risks, benefits and alternatives to treatment; patient and/or guardian verbalizes understanding, agrees, feels comfortable with and wishes to proceed with above treatment plan. Advised patient to call with any new medication issues, and read all Rx info from pharmacy to assure of all possible risks and side effects of medication before taking. Reviewed age and gender appropriate health screening exams and vaccinations. Advised patient regarding importance of keeping up with recommended health maintenance and to schedule as soon as possible if overdue, as this is important in assessing for undiagnosed pathology, especially cancer, as well as protecting against potentially harmful/life threatening disease. Patient and/or guardian verbalizes understandingand agrees with above counseling, assessment and plan. All questions answered.     KAIT Arteaga - CNP

## 2020-09-30 ENCOUNTER — OFFICE VISIT (OUTPATIENT)
Dept: PAIN MANAGEMENT | Age: 65
End: 2020-09-30
Payer: MEDICAID

## 2020-09-30 VITALS
BODY MASS INDEX: 22.45 KG/M2 | HEART RATE: 68 BPM | HEIGHT: 62 IN | WEIGHT: 122 LBS | SYSTOLIC BLOOD PRESSURE: 112 MMHG | DIASTOLIC BLOOD PRESSURE: 70 MMHG | RESPIRATION RATE: 18 BRPM | TEMPERATURE: 97.7 F | OXYGEN SATURATION: 98 %

## 2020-09-30 PROCEDURE — 99213 OFFICE O/P EST LOW 20 MIN: CPT | Performed by: PAIN MEDICINE

## 2020-09-30 PROCEDURE — 1036F TOBACCO NON-USER: CPT | Performed by: PAIN MEDICINE

## 2020-09-30 PROCEDURE — G8420 CALC BMI NORM PARAMETERS: HCPCS | Performed by: PAIN MEDICINE

## 2020-09-30 PROCEDURE — 3017F COLORECTAL CA SCREEN DOC REV: CPT | Performed by: PAIN MEDICINE

## 2020-09-30 PROCEDURE — G8427 DOCREV CUR MEDS BY ELIG CLIN: HCPCS | Performed by: PAIN MEDICINE

## 2020-09-30 NOTE — PROGRESS NOTES
Do you currently have any of the following:    Fever: No  Headache:  No  Cough: No  Shortness of breath: No  Exposed to anyone with these symptoms: Fior Herbert presents to the [facility] on 9/30/2020. [facility] is complaining of pain in the back and down the arm to the hands and wrist . The pain is constant. The pain is described as aching, throbbing, shooting and stabbing. Pain is rated on her best day at a 8, on her worst day at a 8, and on average at a 8 on the VAS scale. She took her last dose of Neurontin, Tylenol and cbd rub and flexeril  . Any procedures since your last visit: No, with none % relief. Pacemaker or defibrilator: No managed by none. She is not on NSAIDS and is not on anticoagulation medications     /70   Pulse 68   Temp 97.7 °F (36.5 °C)   Resp 18   Ht 5' 2\" (1.575 m)   Wt 122 lb (55.3 kg)   SpO2 98%   BMI 22.31 kg/m²      No LMP recorded.  Patient is postmenopausal.

## 2020-10-01 ENCOUNTER — HOSPITAL ENCOUNTER (OUTPATIENT)
Dept: PHYSICAL THERAPY | Age: 65
Setting detail: THERAPIES SERIES
Discharge: HOME | End: 2020-10-01
Payer: MEDICAID

## 2020-10-01 NOTE — PROGRESS NOTES
[facility]  Phone: [phone] Fax: [phone]        Physical Therapy Aquatic Flow Sheet       Patient Name:  Makenna Patel    :  1955  Restrictions/Precautions:    Diagnosis:    Treatment Diagnosis:  Chronic pain  Insurance/Certification information:  Mora Hough  Referring Physician:  Baylee Bravo of care signed (Y/N):    Visit# / total visits:    Pain level: 10/10    Electronically signed by:  Lavern Brower PTA  Time AA:2747  Time ORE:8042    Subjective:  Pt reports \"no change\" in her LBP which remains 10/10. \"  Pt at  visits thus far.   Key   B= Belt DB= Dumbells T= Theratube   H= Hydrotone N= Noodles W= Weights   P= Paddles S= Speedo equipment K= Kickboard     Exercises/Activities   Warm-up/Amb  Minutes   Exercises  Minutes    Slow forward  5   HR/TR  2    Slow sideways  5  Marches  2    Slow backwards  5  Mini-squats  2    Medium forward    Forward backward kick  2    Medium sideways    Hip abduction  2    Small shuffle    Hamstring curls      Jog    Knee extension      Braiding    Pelvic tilts      Bicycling  5  Scap squeezes          Shoulder flex/ext      Functional    Shoulder abd/add      Step    Shoulder H. abd/add      Lifting    Shoulder IR/ER      Hand to opp knee    Rowing      Push down squat    Bilateral pull down      UE PNF    Push/pull      LE PNF    Push downs      Wall push ups    Arm circles      SLS    Elbow flex/ext          Chin tuck      Stretching    UT shrugs/rolls      Gastroc/Soleus    Rocking horse      Hamstring          SKTC    Other      Piriformis          Hip flexor          Ladder pull          Pec stretch          Post deltoid           Timed Code Treatment Minutes:  30  Total Treatment Minutes:  30    Treatment/Activity Tolerance:  [x] Patient tolerated treatment well [] Patient limited by fatique  [] Patient limited by pain [] Patient limited by other medical complications  [x] Other: subjective pain rating stays at 10/10 -from evaluation (8/26/2020) to [id number] -physical presentation does not match 10/10 pain-body movements fluid and appear normal (?)  Problem List:Functions, Activity limitations: Decreased ROM; Decreased strength;Decreased posture; Increased pain    Prognosis: [x] Good [] Fair  [] Poor    Patient Requires Follow-up: [x] Yes  [] No    Plan:   [x] Continue per plan of care [] Alter current plan (see comments)  [] Plan of care initiated [] Hold pending MD visit [] Discharge    Treatment Charges: Mins Units   Initial Evaluation     Re-Evaluation     Ther Exercise         TE     Aquatic Treatment 30 2   Ther Activities        TA     Gait Training          GT     Neuro Re-education NR     Modalities     Non-Billable Service Time     Other     Total Time/Units 30 2             Electronically signed by:  Chucky Plasencia PTA [id number]

## 2020-10-06 ENCOUNTER — HOSPITAL ENCOUNTER (OUTPATIENT)
Dept: PHYSICAL THERAPY | Age: 65
Setting detail: THERAPIES SERIES
Discharge: HOME | End: 2020-10-06
Payer: MEDICAID

## 2020-12-11 NOTE — TELEPHONE ENCOUNTER
Last Appointment:  9/29/2020  Future Appointments   Date Time Provider Ilan Cramer   1/4/2021 10:00 AM Raj Godoy, [address]

## 2020-12-14 RX ORDER — ACETAMINOPHEN 500 MG
500 TABLET ORAL EVERY 8 HOURS PRN
Qty: 90 TABLET | Refills: 0 | Status: SHIPPED
Start: 2020-12-14 | End: 2021-02-08 | Stop reason: SDUPTHER

## 2021-01-20 ENCOUNTER — TELEPHONE (OUTPATIENT)
Dept: ADMINISTRATIVE | Age: 66
End: 2021-01-20

## 2021-02-08 ENCOUNTER — OFFICE VISIT (OUTPATIENT)
Dept: FAMILY MEDICINE CLINIC | Age: 66
End: 2021-02-08
Payer: MEDICAID

## 2021-02-08 VITALS
HEART RATE: 66 BPM | OXYGEN SATURATION: 98 % | DIASTOLIC BLOOD PRESSURE: 78 MMHG | BODY MASS INDEX: 20.2 KG/M2 | WEIGHT: 114 LBS | RESPIRATION RATE: 18 BRPM | TEMPERATURE: 96.6 F | SYSTOLIC BLOOD PRESSURE: 110 MMHG | HEIGHT: 63 IN

## 2021-02-08 DIAGNOSIS — E11.65 TYPE 2 DIABETES MELLITUS WITH HYPERGLYCEMIA, WITHOUT LONG-TERM CURRENT USE OF INSULIN (HCC): Primary | ICD-10-CM

## 2021-02-08 DIAGNOSIS — E55.9 VITAMIN D DEFICIENCY: ICD-10-CM

## 2021-02-08 DIAGNOSIS — Z23 FLU VACCINE NEED: ICD-10-CM

## 2021-02-08 DIAGNOSIS — J30.89 PERENNIAL ALLERGIC RHINITIS: ICD-10-CM

## 2021-02-08 DIAGNOSIS — I10 ESSENTIAL HYPERTENSION: Chronic | ICD-10-CM

## 2021-02-08 DIAGNOSIS — M05.741 RHEUMATOID ARTHRITIS INVOLVING BOTH HANDS WITH POSITIVE RHEUMATOID FACTOR (HCC): ICD-10-CM

## 2021-02-08 DIAGNOSIS — J45.41 MODERATE PERSISTENT ASTHMA WITH ACUTE EXACERBATION: ICD-10-CM

## 2021-02-08 DIAGNOSIS — M05.742 RHEUMATOID ARTHRITIS INVOLVING BOTH HANDS WITH POSITIVE RHEUMATOID FACTOR (HCC): ICD-10-CM

## 2021-02-08 DIAGNOSIS — G43.919 INTRACTABLE MIGRAINE WITHOUT STATUS MIGRAINOSUS, UNSPECIFIED MIGRAINE TYPE: ICD-10-CM

## 2021-02-08 LAB
CREATININE URINE POCT: 100
HBA1C MFR BLD: 5.8 %
MICROALBUMIN/CREAT 24H UR: 10 MG/G{CREAT}
MICROALBUMIN/CREAT UR-RTO: <30

## 2021-02-08 PROCEDURE — 2022F DILAT RTA XM EVC RTNOPTHY: CPT | Performed by: NURSE PRACTITIONER

## 2021-02-08 PROCEDURE — 82044 UR ALBUMIN SEMIQUANTITATIVE: CPT | Performed by: NURSE PRACTITIONER

## 2021-02-08 PROCEDURE — 3017F COLORECTAL CA SCREEN DOC REV: CPT | Performed by: NURSE PRACTITIONER

## 2021-02-08 PROCEDURE — G8427 DOCREV CUR MEDS BY ELIG CLIN: HCPCS | Performed by: NURSE PRACTITIONER

## 2021-02-08 PROCEDURE — 99214 OFFICE O/P EST MOD 30 MIN: CPT | Performed by: NURSE PRACTITIONER

## 2021-02-08 PROCEDURE — 3044F HG A1C LEVEL LT 7.0%: CPT | Performed by: NURSE PRACTITIONER

## 2021-02-08 PROCEDURE — 1036F TOBACCO NON-USER: CPT | Performed by: NURSE PRACTITIONER

## 2021-02-08 PROCEDURE — G8420 CALC BMI NORM PARAMETERS: HCPCS | Performed by: NURSE PRACTITIONER

## 2021-02-08 PROCEDURE — 83036 HEMOGLOBIN GLYCOSYLATED A1C: CPT | Performed by: NURSE PRACTITIONER

## 2021-02-08 PROCEDURE — 90471 IMMUNIZATION ADMIN: CPT | Performed by: NURSE PRACTITIONER

## 2021-02-08 PROCEDURE — 90686 IIV4 VACC NO PRSV 0.5 ML IM: CPT | Performed by: NURSE PRACTITIONER

## 2021-02-08 PROCEDURE — G8482 FLU IMMUNIZE ORDER/ADMIN: HCPCS | Performed by: NURSE PRACTITIONER

## 2021-02-08 RX ORDER — ACETAMINOPHEN 500 MG
500 TABLET ORAL EVERY 8 HOURS PRN
Qty: 90 TABLET | Refills: 2 | Status: SHIPPED
Start: 2021-02-08 | End: 2022-01-18 | Stop reason: SDUPTHER

## 2021-02-08 RX ORDER — ACYCLOVIR 400 MG/1
400 TABLET ORAL 2 TIMES DAILY
Qty: 20 TABLET | Refills: 0 | Status: SHIPPED
Start: 2021-02-08 | End: 2022-10-11 | Stop reason: SDUPTHER

## 2021-02-08 RX ORDER — ALBUTEROL SULFATE 90 UG/1
2 AEROSOL, METERED RESPIRATORY (INHALATION) EVERY 6 HOURS PRN
Qty: 1 INHALER | Refills: 3 | Status: SHIPPED
Start: 2021-02-08 | End: 2021-10-18 | Stop reason: SDUPTHER

## 2021-02-08 ASSESSMENT — ENCOUNTER SYMPTOMS
NAUSEA: 0
DIARRHEA: 0
EYE PAIN: 0
VOMITING: 0
CHEST TIGHTNESS: 0
CONSTIPATION: 0
SINUS PAIN: 0
SHORTNESS OF BREATH: 0
COLOR CHANGE: 0
COUGH: 0
WHEEZING: 0

## 2021-02-08 ASSESSMENT — PATIENT HEALTH QUESTIONNAIRE - PHQ9
SUM OF ALL RESPONSES TO PHQ QUESTIONS 1-9: 2
SUM OF ALL RESPONSES TO PHQ QUESTIONS 1-9: 2
1. LITTLE INTEREST OR PLEASURE IN DOING THINGS: 1
SUM OF ALL RESPONSES TO PHQ QUESTIONS 1-9: 2
2. FEELING DOWN, DEPRESSED OR HOPELESS: 1

## 2021-02-08 NOTE — PROGRESS NOTES
2021    Gayle Winn (:  1955) is a 60 y.o. female, here for a     Chief Complaint   Patient presents with    3 Month Follow-Up    Heartburn     acid reflux, back pain flareup / noticed weight loss because of not be hungry.(last weight 122lb on 20)  / weight loss could be due to not eating as many fried foods    Fatigue     Started eating more beans and potato and does feel better     She has been safe with COVID, trying to stay away from people & stores. She continues to change her diet & eat healthy. Patient Active Problem List   Diagnosis    DDD (degenerative disc disease), cervical    Cervical radiculopathy    Chronic pain syndrome    Essential hypertension    Mixed hyperlipidemia    Neuropathy    Lumbar radiculopathy    Lumbar disc disorder    Chronic bilateral low back pain with bilateral sciatica    PRATIK (generalized anxiety disorder)    Allergy    Allergic rhinitis due to animal hair and dander    Dental caries    Impaired glucose tolerance    Carpal tunnel syndrome    Splenic infarct    Rheumatoid arthritis involving both hands with positive rheumatoid factor (HCC)    Moderate persistent asthma with acute exacerbation    Type 2 diabetes mellitus with hyperglycemia, without long-term current use of insulin ([facility])    Perennial allergic rhinitis    Intractable migraine without status migrainosus       Review of Systems   Constitutional: Negative for activity change, appetite change, chills and fever. HENT: Negative for congestion, ear pain, sinus pain and sneezing. Eyes: Negative for pain and visual disturbance. Respiratory: Negative for cough, chest tightness, shortness of breath and wheezing. Cardiovascular: Negative for chest pain, palpitations and leg swelling. Gastrointestinal: Negative for constipation, diarrhea, nausea and vomiting. Endocrine: Negative for cold intolerance, heat intolerance and polyuria.    Genitourinary: Negative for decreased urine volume, difficulty urinating, menstrual problem, pelvic pain, vaginal bleeding and vaginal discharge. Musculoskeletal: Negative for arthralgias and myalgias. Skin: Negative for color change, rash and wound. Neurological: Negative for dizziness, light-headedness and headaches. Hematological: Negative for adenopathy. Does not bruise/bleed easily. Psychiatric/Behavioral: Negative for agitation, decreased concentration, sleep disturbance and suicidal ideas. The patient is not nervous/anxious. Prior to Visit Medications    Medication Sig Taking? Authorizing Provider   albuterol sulfate  (90 Base) MCG/ACT inhaler Inhale 2 puffs into the lungs every 6 hours as needed for Wheezing Rescue inhaler Yes Nick June, APRN - CNP   acyclovir (ZOVIRAX) 400 MG tablet Take 1 tablet by mouth 2 times daily for 10 days Yes Nick June, APRN - CNP   SITagliptin (JANUVIA) 50 MG tablet Take 1 tablet by mouth daily Yes Nick June, APRN - CNP   acetaminophen (APAP EXTRA STRENGTH) 500 MG tablet Take 1 tablet by mouth every 8 hours as needed for Pain Yes Nick June, APRN - CNP   amLODIPine (NORVASC) 10 MG tablet Take 1 tablet by mouth daily Yes Nick June, APRN - CINDY   methylPREDNISolone (MEDROL DOSEPACK) 4 MG tablet Take by mouth. Yes Pavan Roman., APRN - CNP   cyclobenzaprine (FLEXERIL) 5 MG tablet Take 1 tablet by mouth 3 times daily as needed for Muscle spasms Yes Pavan Roman., APRN - CNP   Ertugliflozin L-PyroglutamicAc (STEGLATRO) 5 MG TABS Take 5 mg by mouth daily Yes Nick June, APRN - CNP   gabapentin (NEURONTIN) 300 MG capsule Take 1 capsule by mouth 3 times daily.  Yes Historical Provider, MD   glipiZIDE (GLUCOTROL) 5 MG tablet Take 1 tablet by mouth daily Yes Nick June, KAIT - CNP   atorvastatin (LIPITOR) 40 MG tablet Take 1 tablet by mouth daily Yes Nick June, APRN - CNP   polyethylene glycol (GLYCOLAX) powder Take 17 g by mouth daily Yes Historical Provider, MD   ipratropium-albuterol (DUONEB) 0.5-2.5 (3) MG/3ML SOLN nebulizer solution Inhale 1 vial into the lungs every 4 hours Instructed to take am of procedure Yes Historical Provider, MD   azelastine (ASTELIN) 0.1 % nasal spray 2 sprays by Nasal route nightly Use in each nostril as directed Yes Emiliano Mccauley MD   pantoprazole (PROTONIX) 20 MG tablet Take 1 tablet by mouth daily Yes Marixa Pa MD   EPINEPHrine (EPIPEN) 0.3 MG/0.3ML SOAJ injection Use as directed for allergic reaction Yes Piero Hernandez MD   sodium chloride (ALTAMIST SPRAY) 0.65 % nasal spray 1 spray by Nasal route as needed for Congestion Yes Piero Hernandez MD        Allergies   Allergen Reactions    Adhesive Tape Rash     bruising    Nsaids      Irritates IBS    Penicillins Hives       Past Medical History:   Diagnosis Date    Arthritis     Asthma     controlled     Depression     Diabetes mellitus ([facility] [address]     Fibromyalgia     GERD (gastroesophageal reflux disease)     Hepatitis C 2016    treated     Hyperlipidemia     Hypertension     Overactive bladder     Spleen injury     hospitalized 11/2019 - resolved as of 12-20-19        Past Surgical History:   Procedure Laterality Date    ANESTHESIA NERVE BLOCK N/A 3/26/2019    L4-5 EPIDURAL STEROID INJECTION performed by Darryl Pardo DO at [facility] Right 6/5/2019    RIGHT ENDOSCOPIC CARPAL TUNNEL RELEASE performed by Paulino Yu MD at [facility]      ENDOSCOPY, COLON, DIAGNOSTIC      EPIDURAL STEROID INJECTION N/A 5/16/2019    L4-5 EPIDURAL STEROID INJECTION performed by Darryl Pardo DO at [address]  03/26/2019    with sedation    OVARY REMOVAL      1981 left     PAIN MANAGEMENT PROCEDURE N/A 12/26/2019    L4, L5 EPIDURAL STEROID INJECTION ([id number]) performed by Darryl Pardo DO at [facility] Right 6/5/2019    RIGHT ULNAR NERVE DECOMPRESSION AT ELBOW WITH POSSIBLE NERVE TRANSPOSITION performed by Judge Gerardo MD at [address] ARTHROSCOPY Right        Social History     Socioeconomic History    Marital status: Single     Spouse name: Not on file    Number of children: 11    Years of education: Not on file    Highest education level: Some college, no degree   Occupational History    Not on file   Social Needs    Financial resource strain: Very hard    Food insecurity     Worry: Never true     Inability: Never true    Transportation needs     Medical: No     Non-medical: No   Tobacco Use    Smoking status: Former Smoker     Packs/day: 0.25     Years: 45.00     Pack years: 11.25     Types: Cigarettes     Quit date: 2020     Years since quittin.4    Smokeless tobacco: Never Used   Substance and Sexual Activity    Alcohol use: Yes     Comment: socially    Drug use: Yes     Types: Marijuana     Comment: holds medical card-Vaping    Sexual activity: Yes     Partners: Male     Birth control/protection: Injection   Lifestyle    Physical activity     Days per week: 0 days     Minutes per session: 0 min    Stress: Very much   Relationships    Social connections     Talks on phone: Never     Gets together: More than three times a week     Attends Faith service: Never     Active member of club or organization: No     Attends meetings of clubs or organizations: Never     Relationship status:     Intimate partner violence     Fear of current or ex partner: Not on file     Emotionally abused: Not on file     Physically abused: Not on file     Forced sexual activity: Not on file   Other Topics Concern    Not on file   Social History Narrative    -Referral to SW for financial strain and stress,depression, anxiety    -Smoking cessation brochure being mailed out    -Pt utilizes 3 food pantries    -Receives transportation from [employer] for OV        Family History   Problem Relation Age of Onset    Diabetes Mother  Cancer Mother     Alcohol Abuse Father      ADVANCE DIRECTIVE: N, <no information>    Vitals:    02/08/21 1045   BP: 110/78   Pulse: 66   Resp: 18   Temp: 96.6 °F (35.9 °C)   TempSrc: Temporal   SpO2: 98%   Weight: 114 lb (51.7 kg)   Height: 5' 2.5\" (1.588 m)     Estimated body mass index is 20.52 kg/m² as calculated from the following:    Height as of this encounter: 5' 2.5\" (1.588 m). Weight as of this encounter: 114 lb (51.7 kg). Physical Exam  Vitals signs and nursing note reviewed. Constitutional:       General: She is not in acute distress. Appearance: Normal appearance. She is well-developed and normal weight. She is not ill-appearing, toxic-appearing or diaphoretic. HENT:      Head: Normocephalic and atraumatic. Right Ear: Tympanic membrane, ear canal and external ear normal. There is no impacted cerumen. Left Ear: Tympanic membrane, ear canal and external ear normal. There is no impacted cerumen. Ears:      Comments: ENT: canals clear, TMs intact and pearly gray, nasal turbinates erythematous and boggy, pharynx shows erythema and post nasal drip       Nose: Rhinorrhea present. No congestion. Mouth/Throat:      Mouth: Mucous membranes are moist.      Pharynx: Oropharynx is clear. No oropharyngeal exudate or posterior oropharyngeal erythema. Eyes:      Extraocular Movements: Extraocular movements intact. Conjunctiva/sclera: Conjunctivae normal.      Pupils: Pupils are equal, round, and reactive to light. Neck:      Musculoskeletal: Normal range of motion and neck supple. Thyroid: No thyromegaly. Cardiovascular:      Rate and Rhythm: Normal rate and regular rhythm. Pulses: Normal pulses. Heart sounds: Normal heart sounds. No murmur. Pulmonary:      Effort: Pulmonary effort is normal. No respiratory distress. Breath sounds: Normal breath sounds. No wheezing. Abdominal:      General: Bowel sounds are normal. There is no distension. Palpations: Abdomen is soft. Tenderness: There is no abdominal tenderness. There is no guarding or rebound. Genitourinary:     Vagina: Normal.      Comments: Deferred. Musculoskeletal: Normal range of motion. Lymphadenopathy:      Cervical: No cervical adenopathy. Skin:     General: Skin is warm and dry. Capillary Refill: Capillary refill takes less than 2 seconds. Findings: No bruising, erythema or rash. Neurological:      General: No focal deficit present. Mental Status: She is alert and oriented to person, place, and time. Mental status is at baseline. Cranial Nerves: No cranial nerve deficit. Sensory: No sensory deficit. Motor: No weakness. Coordination: Coordination normal.      Gait: Gait normal.   Psychiatric:         Mood and Affect: Mood normal.         Behavior: Behavior normal.         Thought Content: Thought content normal.         Judgment: Judgment normal.         No flowsheet data found.     Lab Results   Component Value Date    CHOLFAST 228 01/24/2019    TRIGLYCFAST 129 01/24/2019    HDL 95 01/24/2019    LDLCALC 107 01/24/2019    GLUCOSE 166 06/04/2020    LABA1C 5.8 02/08/2021    LABA1C 6.1 09/29/2020    LABA1C 6.7 06/04/2020       The 10-year ASCVD risk score (Brianne Stroud., et al., 2013) is: 13.1%    Values used to calculate the score:      Age: 60 years      Sex: Female      Is Non- : Yes      Diabetic: Yes      Tobacco smoker: No      Systolic Blood Pressure: 157 mmHg      Is BP treated: Yes      HDL Cholesterol: 95 mg/dL      Total Cholesterol: 228 mg/dL    Immunization History   Administered Date(s) Administered    Influenza, Quadv, IM, PF (6 mo and older Fluzone, Flulaval, Fluarix, and 3 yrs and older Afluria) 02/06/2019, 12/18/2019, 02/08/2021    Pneumococcal Polysaccharide ([id number]) 04/25/2019    Tdap (Boostrix, Adacel) 04/25/2019       Health Maintenance   Topic Date Due    Diabetic foot exam  09/05/1966    Diabetic retinal exam  09/05/1966    Shingles Vaccine (1 of 2) 09/05/2006    Lipid screen  01/24/2020    Potassium monitoring  11/06/2020    Creatinine monitoring  11/06/2020    Breast cancer screen  04/15/2021    A1C test (Diabetic or Prediabetic)  02/08/2022    Diabetic microalbuminuria test  02/08/2022    Cervical cancer screen  03/15/2022    DTaP/Tdap/Td vaccine (2 - Td) 04/25/2029    Colon cancer screen colonoscopy  05/09/2029    Flu vaccine  Completed    Pneumococcal 0-64 years Vaccine  Completed    Hepatitis C screen  Completed    HIV screen  Completed    Hepatitis A vaccine  Aged Out    Hib vaccine  Aged Out    Meningococcal (ACWY) vaccine  Aged Out       ASSESSMENT/PLAN:    HCC:   No acute findings today meriting change in management plan. - Health Maintenance reviewed today & counseled patient as appropriate. 1. Type 2 diabetes mellitus with hyperglycemia, without long-term current use of insulin (HCC)  - stable  -     POCT glycosylated hemoglobin (Hb A1C)  -     SITagliptin (JANUVIA) 50 MG tablet; Take 1 tablet by mouth daily, Disp-30 tablet, R-2Normal  -     POCT Microabuminuria  - The patient is asked to make an attempt to improve diet and exercise patterns to aid in medical management of this problem. - The current medical regimen is effective;  continue present plan and medications. 2. Perennial allergic rhinitis/ Moderate persistent asthma with acute exacerbation  -    Stable, intermittent SOB   - albuterol sulfate  (90 Base) MCG/ACT inhaler; Inhale 2 puffs into the lungs every 6 hours as needed for Wheezing Rescue inhaler, Disp-1 Inhaler, R-3Normal    3. Intractable migraine without status migrainosus, unspecified migraine type   - (stable)  -     acetaminophen (APAP EXTRA STRENGTH) 500 MG tablet; Take 1 tablet by mouth every 8 hours as needed for Pain, Disp-90 tablet, R-2Normal    4.  Rheumatoid arthritis involving both hands with positive rheumatoid factor [facility])  Assessment & Plan:  The current medical regimen is effective;  continue present plan and medications. - Takes Acetaminophen PRN    5. Essential hypertension  - stable  -     CBC Auto Differential; Future  -     Comprehensive Metabolic Panel; Future  -     Lipid Panel; Future  -     TSH without Reflex; Future    7. Flu vaccine need  -     INFLUENZA, QUADV, 3 YRS AND OLDER, IM PF, PREFILL SYR OR SDV, 0.5ML (AFLURIA QUADV, PF)    8. Vitamin D deficiency  -     Vitamin D 25 Hydroxy; Future      Return in about 3 months (around 5/8/2021). An electronic signature was used to authenticate this note.     --KAIT Tellez - CNP on 2/10/2021 at 1:33 PM

## 2021-04-07 ENCOUNTER — IMMUNIZATION (OUTPATIENT)
Dept: PRIMARY CARE CLINIC | Age: 66
End: 2021-04-07
Payer: MEDICAID

## 2021-04-07 ENCOUNTER — OFFICE VISIT (OUTPATIENT)
Dept: FAMILY MEDICINE CLINIC | Age: 66
End: 2021-04-07
Payer: MEDICAID

## 2021-04-07 VITALS
DIASTOLIC BLOOD PRESSURE: 68 MMHG | HEIGHT: 62 IN | WEIGHT: 114 LBS | BODY MASS INDEX: 20.98 KG/M2 | SYSTOLIC BLOOD PRESSURE: 118 MMHG | HEART RATE: 75 BPM | OXYGEN SATURATION: 98 %

## 2021-04-07 DIAGNOSIS — M47.22 OSTEOARTHRITIS OF SPINE WITH RADICULOPATHY, CERVICAL REGION: Primary | ICD-10-CM

## 2021-04-07 DIAGNOSIS — I10 ESSENTIAL HYPERTENSION: Chronic | ICD-10-CM

## 2021-04-07 DIAGNOSIS — E11.9 TYPE 2 DIABETES MELLITUS WITHOUT COMPLICATION, WITH LONG-TERM CURRENT USE OF INSULIN (HCC): ICD-10-CM

## 2021-04-07 DIAGNOSIS — E55.9 VITAMIN D DEFICIENCY: ICD-10-CM

## 2021-04-07 DIAGNOSIS — G89.29 CHRONIC RIGHT-SIDED LOW BACK PAIN WITH RIGHT-SIDED SCIATICA: ICD-10-CM

## 2021-04-07 DIAGNOSIS — M54.41 CHRONIC RIGHT-SIDED LOW BACK PAIN WITH RIGHT-SIDED SCIATICA: ICD-10-CM

## 2021-04-07 DIAGNOSIS — I10 ESSENTIAL HYPERTENSION: ICD-10-CM

## 2021-04-07 DIAGNOSIS — Z79.4 TYPE 2 DIABETES MELLITUS WITHOUT COMPLICATION, WITH LONG-TERM CURRENT USE OF INSULIN (HCC): ICD-10-CM

## 2021-04-07 LAB
ALBUMIN SERPL-MCNC: 4.6 G/DL (ref 3.5–5.2)
ALP BLD-CCNC: 74 U/L (ref 35–104)
ALT SERPL-CCNC: 13 U/L (ref 0–32)
ANION GAP SERPL CALCULATED.3IONS-SCNC: 10 MMOL/L (ref 7–16)
AST SERPL-CCNC: 19 U/L (ref 0–31)
BASOPHILS ABSOLUTE: 0.03 E9/L (ref 0–0.2)
BASOPHILS RELATIVE PERCENT: 0.5 % (ref 0–2)
BILIRUB SERPL-MCNC: 0.5 MG/DL (ref 0–1.2)
BUN BLDV-MCNC: 12 MG/DL (ref 8–23)
CALCIUM SERPL-MCNC: 10 MG/DL (ref 8.6–10.2)
CHLORIDE BLD-SCNC: 108 MMOL/L (ref 98–107)
CHOLESTEROL, TOTAL: 198 MG/DL (ref 0–199)
CO2: 24 MMOL/L (ref 22–29)
CREAT SERPL-MCNC: 0.7 MG/DL (ref 0.5–1)
EOSINOPHILS ABSOLUTE: 0.11 E9/L (ref 0.05–0.5)
EOSINOPHILS RELATIVE PERCENT: 2 % (ref 0–6)
GFR AFRICAN AMERICAN: >60
GFR NON-AFRICAN AMERICAN: >60 ML/MIN/1.73
GLUCOSE BLD-MCNC: 85 MG/DL (ref 74–99)
HCT VFR BLD CALC: 47 % (ref 34–48)
HDLC SERPL-MCNC: 85 MG/DL
HEMOGLOBIN: 15.6 G/DL (ref 11.5–15.5)
IMMATURE GRANULOCYTES #: 0.02 E9/L
IMMATURE GRANULOCYTES %: 0.4 % (ref 0–5)
LDL CHOLESTEROL CALCULATED: 97 MG/DL (ref 0–99)
LYMPHOCYTES ABSOLUTE: 2.47 E9/L (ref 1.5–4)
LYMPHOCYTES RELATIVE PERCENT: 44.1 % (ref 20–42)
MCH RBC QN AUTO: 31.1 PG (ref 26–35)
MCHC RBC AUTO-ENTMCNC: 33.2 % (ref 32–34.5)
MCV RBC AUTO: 93.8 FL (ref 80–99.9)
MONOCYTES ABSOLUTE: 0.48 E9/L (ref 0.1–0.95)
MONOCYTES RELATIVE PERCENT: 8.6 % (ref 2–12)
NEUTROPHILS ABSOLUTE: 2.49 E9/L (ref 1.8–7.3)
NEUTROPHILS RELATIVE PERCENT: 44.4 % (ref 43–80)
PDW BLD-RTO: 14.6 FL (ref 11.5–15)
PLATELET # BLD: 219 E9/L (ref 130–450)
PMV BLD AUTO: 11.2 FL (ref 7–12)
POTASSIUM SERPL-SCNC: 4.3 MMOL/L (ref 3.5–5)
RBC # BLD: 5.01 E12/L (ref 3.5–5.5)
SODIUM BLD-SCNC: 142 MMOL/L (ref 132–146)
TOTAL PROTEIN: 7.7 G/DL (ref 6.4–8.3)
TRIGL SERPL-MCNC: 79 MG/DL (ref 0–149)
TSH SERPL DL<=0.05 MIU/L-ACNC: 0.9 UIU/ML (ref 0.27–4.2)
VITAMIN D 25-HYDROXY: 36 NG/ML (ref 30–100)
VLDLC SERPL CALC-MCNC: 16 MG/DL
WBC # BLD: 5.6 E9/L (ref 4.5–11.5)

## 2021-04-07 PROCEDURE — 2022F DILAT RTA XM EVC RTNOPTHY: CPT | Performed by: NURSE PRACTITIONER

## 2021-04-07 PROCEDURE — 0011A COVID-19, MODERNA VACCINE 100MCG/0.5ML DOSE: CPT | Performed by: NURSE PRACTITIONER

## 2021-04-07 PROCEDURE — 1036F TOBACCO NON-USER: CPT | Performed by: NURSE PRACTITIONER

## 2021-04-07 PROCEDURE — 99214 OFFICE O/P EST MOD 30 MIN: CPT | Performed by: NURSE PRACTITIONER

## 2021-04-07 PROCEDURE — 3017F COLORECTAL CA SCREEN DOC REV: CPT | Performed by: NURSE PRACTITIONER

## 2021-04-07 PROCEDURE — G8427 DOCREV CUR MEDS BY ELIG CLIN: HCPCS | Performed by: NURSE PRACTITIONER

## 2021-04-07 PROCEDURE — 91301 COVID-19, MODERNA VACCINE 100MCG/0.5ML DOSE: CPT | Performed by: NURSE PRACTITIONER

## 2021-04-07 PROCEDURE — 3044F HG A1C LEVEL LT 7.0%: CPT | Performed by: NURSE PRACTITIONER

## 2021-04-07 PROCEDURE — G8420 CALC BMI NORM PARAMETERS: HCPCS | Performed by: NURSE PRACTITIONER

## 2021-04-07 RX ORDER — AMLODIPINE BESYLATE 10 MG/1
10 TABLET ORAL DAILY
Qty: 90 TABLET | Refills: 1 | Status: SHIPPED
Start: 2021-04-07 | End: 2022-01-18 | Stop reason: SDUPTHER

## 2021-04-07 RX ORDER — CYCLOBENZAPRINE HCL 5 MG
5 TABLET ORAL NIGHTLY PRN
Qty: 30 TABLET | Refills: 2 | Status: SHIPPED
Start: 2021-04-07 | End: 2021-10-18 | Stop reason: SDUPTHER

## 2021-04-07 RX ORDER — ERTUGLIFLOZIN 5 MG/1
5 TABLET, FILM COATED ORAL DAILY
Qty: 90 TABLET | Refills: 1 | Status: SHIPPED
Start: 2021-04-07 | End: 2021-05-05

## 2021-04-07 RX ORDER — GABAPENTIN 300 MG/1
300 CAPSULE ORAL NIGHTLY
Qty: 30 CAPSULE | Refills: 2 | Status: SHIPPED
Start: 2021-04-07 | End: 2021-12-03

## 2021-04-07 ASSESSMENT — ENCOUNTER SYMPTOMS
BACK PAIN: 1
WHEEZING: 0
SINUS PAIN: 0
VOMITING: 0
COLOR CHANGE: 0
CHEST TIGHTNESS: 0
NAUSEA: 0
SHORTNESS OF BREATH: 0
DIARRHEA: 0
EYE PAIN: 0
CONSTIPATION: 0
COUGH: 0

## 2021-04-07 NOTE — PROGRESS NOTES
2021    Rubén Rosario (:  1955) is a 60 y.o. female, here for a     Chief Complaint   Patient presents with    Back Pain     lower back and cervical, causing left arm numbness, started about 1 month ago      Has had imaging completed with Prabhu Jacobsen DO in August, found to have. .. Alignment of the vertebral bodies appears to be near-anatomic   No fracture or foreign body is identified. The disc spaces demonstrate moderately severe C5-C6 and C6-C7   degenerative narrowing. There are prominent anterior osteophytes present in the lower cervical   spine. .   There is no significant loss of the vertebral body height   There are mild degenerative changes involving the facets. She has gone to physical therapy and states that she has worse pain from the pool room. Has had several epidurals with slight improvement. Has not had gabapention or Flexeril in sometime. ASSESSMENT/PLAN:    Controlled Substance Monitoring:    Acute and Chronic Pain Monitoring:   RX Monitoring 2021   Periodic Controlled Substance Monitoring No signs of potential drug abuse or diversion identified. 1. Osteoarthritis of spine with radiculopathy, cervical region & 2. Chronic right-sided low back pain with right-sided sciatica  -     cyclobenzaprine (FLEXERIL) 5 MG tablet; Take 1 tablet by mouth nightly as needed for Muscle spasms, Disp-30 tablet, R-2Normal  - Schedule appointment with Dr. Palmira Lawton    3. Essential hypertension  -     amLODIPine (NORVASC) 10 MG tablet; Take 1 tablet by mouth daily, Disp-90 tablet, R-1Normal  - RTO in 1 month for scheduled appointment  - Lab work drawn today while in office    4.  Type 2 diabetes mellitus without complication, with long-term current use of insulin (HCC)  -     Ertugliflozin L-PyroglutamicAc (STEGLATRO) 5 MG TABS; Take 5 mg by mouth daily, Disp-90 tablet, R-1Normal  - The patient is asked to make an attempt to improve diet and exercise patterns to aid in medical management of this problem. - Lab work drawn today in office  - Last a1c was 2/8, draw at next appointment    [facility]   No acute findings today meriting change in management plan. Health Maintenance   Topic Date Due    Diabetic foot exam  Never done    Diabetic retinal exam  Never done    Shingles Vaccine (1 of 2) Never done    Lipid screen  01/24/2020    Potassium monitoring  11/06/2020    Creatinine monitoring  11/06/2020    Breast cancer screen  04/15/2021    COVID-19 Vaccine (2 - Moderna 2-dose series) 05/05/2021    A1C test (Diabetic or Prediabetic)  02/08/2022    Diabetic microalbuminuria test  02/08/2022    Cervical cancer screen  03/15/2022    DTaP/Tdap/Td vaccine (2 - Td) 04/25/2029    Colon cancer screen colonoscopy  05/09/2029    Flu vaccine  Completed    Pneumococcal 0-64 years Vaccine  Completed    Hepatitis C screen  Completed    HIV screen  Completed    Hepatitis A vaccine  Aged Out    Hib vaccine  Aged Out    Meningococcal (ACWY) vaccine  Aged Out     - Health Maintenance reviewed today & counseled patient as appropriate.     Patient Active Problem List   Diagnosis    DDD (degenerative disc disease), cervical    Cervical radiculopathy    Chronic pain syndrome    Essential hypertension    Mixed hyperlipidemia    Neuropathy    Lumbar radiculopathy    Lumbar disc disorder    Chronic bilateral low back pain with bilateral sciatica    PRATIK (generalized anxiety disorder)    Allergy    Allergic rhinitis due to animal hair and dander    Dental caries    Impaired glucose tolerance    Carpal tunnel syndrome    Splenic infarct    Rheumatoid arthritis involving both hands with positive rheumatoid factor (HCC)    Moderate persistent asthma with acute exacerbation    Type 2 diabetes mellitus with hyperglycemia, without long-term current use of insulin (HCC)    Perennial allergic rhinitis    Intractable migraine without status migrainosus       Vitals:    04/07/21 1031   BP: 118/68   Pulse: 75   SpO2: 98%   Weight: 114 lb (51.7 kg)   Height: 5' 2\" (1.575 m)     Estimated body mass index is 20.85 kg/m² as calculated from the following:    Height as of this encounter: 5' 2\" (1.575 m). Weight as of this encounter: 114 lb (51.7 kg). Review of Systems   Constitutional: Negative for activity change, appetite change, chills and fever. HENT: Negative for congestion, ear pain, sinus pain and sneezing. Eyes: Negative for pain and visual disturbance. Respiratory: Negative for cough, chest tightness, shortness of breath and wheezing. Cardiovascular: Negative for chest pain, palpitations and leg swelling. Gastrointestinal: Negative for constipation, diarrhea, nausea and vomiting. Endocrine: Negative for cold intolerance, heat intolerance and polyuria. Genitourinary: Negative for difficulty urinating. Musculoskeletal: Positive for back pain and neck pain. Negative for arthralgias and myalgias. Skin: Negative for color change and rash. Neurological: Positive for numbness (Tingling left arm). Negative for dizziness, light-headedness and headaches. Hematological: Negative for adenopathy. Does not bruise/bleed easily. Psychiatric/Behavioral: Negative for agitation, decreased concentration, sleep disturbance and suicidal ideas. The patient is not nervous/anxious. Physical Exam  Vitals signs and nursing note reviewed. Constitutional:       General: She is not in acute distress. Appearance: Normal appearance. She is well-developed and normal weight. She is not ill-appearing, toxic-appearing or diaphoretic. HENT:      Head: Normocephalic and atraumatic. Right Ear: Tympanic membrane, ear canal and external ear normal. There is no impacted cerumen. Left Ear: Tympanic membrane, ear canal and external ear normal. There is no impacted cerumen.       Ears:      Comments: ENT: canals clear, TMs intact and pearly gray, nasal turbinates erythematous and boggy, pharynx shows erythema and post nasal drip       Nose: No congestion or rhinorrhea. Mouth/Throat:      Mouth: Mucous membranes are moist.      Pharynx: Oropharynx is clear. No oropharyngeal exudate or posterior oropharyngeal erythema. Eyes:      Extraocular Movements: Extraocular movements intact. Conjunctiva/sclera: Conjunctivae normal.      Pupils: Pupils are equal, round, and reactive to light. Neck:      Musculoskeletal: Normal range of motion and neck supple. Thyroid: No thyromegaly. Cardiovascular:      Rate and Rhythm: Normal rate and regular rhythm. Pulses: Normal pulses. Heart sounds: Normal heart sounds. No murmur. Pulmonary:      Effort: Pulmonary effort is normal. No respiratory distress. Breath sounds: Normal breath sounds. No wheezing. Abdominal:      General: Bowel sounds are normal. There is no distension. Palpations: Abdomen is soft. Tenderness: There is no abdominal tenderness. There is no guarding or rebound. Genitourinary:     Vagina: Normal.      Comments: Deferred. Musculoskeletal:         General: Tenderness (cerivcal & left shoulder ) present. Lymphadenopathy:      Cervical: No cervical adenopathy. Skin:     General: Skin is warm and dry. Capillary Refill: Capillary refill takes less than 2 seconds. Findings: No bruising, erythema or rash. Neurological:      General: No focal deficit present. Mental Status: She is alert and oriented to person, place, and time. Mental status is at baseline. Cranial Nerves: No cranial nerve deficit. Sensory: No sensory deficit. Motor: No weakness. Coordination: Coordination normal.      Gait: Gait normal.   Psychiatric:         Mood and Affect: Mood normal.         Behavior: Behavior normal.         Thought Content: Thought content normal.         Judgment: Judgment normal.       Prior to Visit Medications    Medication Sig Taking?  Authorizing Provider   gabapentin (NEURONTIN) 300 MG capsule Take 1 capsule by mouth nightly for 90 days. Yes KAIT Rowe CNP   cyclobenzaprine (FLEXERIL) 5 MG tablet Take 1 tablet by mouth nightly as needed for Muscle spasms Yes KAIT Rowe CNP   amLODIPine (NORVASC) 10 MG tablet Take 1 tablet by mouth daily Yes KAIT Rowe CNP   Ertugliflozin L-PyroglutamicAc (STEGLATRO) 5 MG TABS Take 5 mg by mouth daily Yes KAIT Rowe CNP   albuterol sulfate  (90 Base) MCG/ACT inhaler Inhale 2 puffs into the lungs every 6 hours as needed for Wheezing Rescue inhaler Yes KAIT Rowe CNP   acyclovir (ZOVIRAX) 400 MG tablet Take 1 tablet by mouth 2 times daily for 10 days Yes KAIT Rowe CNP   SITagliptin (JANUVIA) 50 MG tablet Take 1 tablet by mouth daily Yes KAIT Rowe CNP   acetaminophen (APAP EXTRA STRENGTH) 500 MG tablet Take 1 tablet by mouth every 8 hours as needed for Pain Yes KAIT Rowe CNP   atorvastatin (LIPITOR) 40 MG tablet Take 1 tablet by mouth daily Yes KAIT Rowe CNP   polyethylene glycol (GLYCOLAX) powder Take 17 g by mouth daily Yes Historical Provider, MD   ipratropium-albuterol (DUONEB) 0.5-2.5 (3) MG/3ML SOLN nebulizer solution Inhale 1 vial into the lungs every 4 hours Instructed to take am of procedure Yes Historical Provider, MD   pantoprazole (PROTONIX) 20 MG tablet Take 1 tablet by mouth daily Yes Joe Beaulieu MD   EPINEPHrine (EPIPEN) 0.3 MG/0.3ML SOAJ injection Use as directed for allergic reaction Yes Trudi Dumont MD   sodium chloride (ALTAMIST SPRAY) 0.65 % nasal spray 1 spray by Nasal route as needed for Congestion Yes Trudi Dumont MD   methylPREDNISolone (MEDROL DOSEPACK) 4 MG tablet Take by mouth.   Patient not taking: Reported on 4/7/2021  KAIT Tejada CNP   glipiZIDE (GLUCOTROL) 5 MG tablet Take 1 tablet by mouth daily  Patient not taking: Reported on 4/7/2021  Tasia Lu APRN - CNP        Allergies

## 2021-04-22 ENCOUNTER — TELEPHONE (OUTPATIENT)
Dept: PRIMARY CARE CLINIC | Age: 66
End: 2021-04-22

## 2021-05-05 ENCOUNTER — IMMUNIZATION (OUTPATIENT)
Dept: PRIMARY CARE CLINIC | Age: 66
End: 2021-05-05
Payer: MEDICAID

## 2021-05-05 DIAGNOSIS — E11.65 TYPE 2 DIABETES MELLITUS WITH HYPERGLYCEMIA, WITHOUT LONG-TERM CURRENT USE OF INSULIN (HCC): ICD-10-CM

## 2021-05-05 PROCEDURE — 91301 COVID-19, MODERNA VACCINE 100MCG/0.5ML DOSE: CPT | Performed by: NURSE PRACTITIONER

## 2021-05-05 PROCEDURE — 0012A COVID-19, MODERNA VACCINE 100MCG/0.5ML DOSE: CPT | Performed by: NURSE PRACTITIONER

## 2021-07-15 NOTE — PROGRESS NOTES
OCCUPATIONAL THERAPY PROGRESS     Date:  2019   Initial Evaluation Date: 2019     Patient Name:  Dakotah Ferris                    :  1955     Restrictions/Precautions:  None noted, low fall risk  Diagnosis:  R CTR arthroscopic, R Ulnar transposition                                                         Insurance/Certification information:  Medicaid  Referring Practitioner:  Dr. Jayesh Morgan  Date of Surgery/Injury: sx 2019   Plan of care signed (Y/N):  N  Visit# / total visits:  - 12     Pain Level: 2-3/10, aching- wrist pillar pain only- no elbow pain reported    Subjective:  Pt states,   \" I was able to roll forks at work and it hurt real bad the next day but then I did it again and it was ok. \"     Objective:  Updated POC completed on 19  INTERVENTION: COMPLETED: SPECIFICS/COMMENTS:   Modality:     fluido x 15 min   Us 3.3/.8 x 5 min- 3.3 mhz, 100% . 8 to thenar and hypothenar areas   AROM:          Tendon gliding x 3 sets x 10 reps   Nerve gliding for ulnar/med nerve     AAROM:               PROM/Stretching:     Elbow,wrist & digits all planes x    Scar massage with thumb, wrist ext stretch     Scar Mass/Edema Control:     Soft tissue mobilization & elbow / wrist joint mobs  R elbow/wrist hands on. Therapist utilized massager for assistance with edema and scar management   Desensitization  Rolling & pushing four square ball on tabletop. .. 3 min ea scar area. Strengthenin# dowel vish x Elbow ext- wrist flex, wrist ext then back into elbow flex. 5 sec hold ea position   Putty soft pinch/grasp/roll/pulls x x10 gross grasp, pinches,    Yellow power web ex's added  Grasps, grasps & pulls, wrist /digit flexor stretches   Red  therabar ex's x Rolling and ringing. Other:      Kinesio tape   2 web strips placed nga crossed over volar wrist at scar site for lymphatic & circulatory correction technique. Pt educated on wear time and how to remove if skin irritation occurs.
hx of passive SI about 10 yrs ago, did not seek treatment at the time

## 2021-08-12 DIAGNOSIS — E78.2 MIXED HYPERLIPIDEMIA: ICD-10-CM

## 2021-08-13 RX ORDER — ATORVASTATIN CALCIUM 40 MG/1
TABLET, FILM COATED ORAL
Qty: 90 TABLET | Refills: 1 | Status: SHIPPED
Start: 2021-08-13 | End: 2022-06-20

## 2021-10-04 ENCOUNTER — OFFICE VISIT (OUTPATIENT)
Dept: OBGYN | Age: 66
End: 2021-10-04
Payer: COMMERCIAL

## 2021-10-04 ENCOUNTER — HOSPITAL ENCOUNTER (OUTPATIENT)
Age: 66
Discharge: HOME | End: 2021-10-04
Payer: COMMERCIAL

## 2021-10-04 VITALS
HEIGHT: 62 IN | DIASTOLIC BLOOD PRESSURE: 71 MMHG | WEIGHT: 111 LBS | BODY MASS INDEX: 20.43 KG/M2 | SYSTOLIC BLOOD PRESSURE: 123 MMHG | HEART RATE: 77 BPM

## 2021-10-04 DIAGNOSIS — N95.1 MENOPAUSAL STATE: ICD-10-CM

## 2021-10-04 DIAGNOSIS — Z80.3 FAMILY HISTORY OF MALIGNANT NEOPLASM OF BREAST IN RELATIVE DIAGNOSED WHEN YOUNGER THAN 45 YEARS OF AGE: ICD-10-CM

## 2021-10-04 DIAGNOSIS — Z01.419 WOMEN'S ANNUAL ROUTINE GYNECOLOGICAL EXAMINATION: Primary | ICD-10-CM

## 2021-10-04 DIAGNOSIS — Z12.31 ENCOUNTER FOR SCREENING MAMMOGRAM FOR BREAST CANCER: ICD-10-CM

## 2021-10-04 PROCEDURE — 99387 INIT PM E/M NEW PAT 65+ YRS: CPT | Performed by: OBSTETRICS & GYNECOLOGY

## 2021-10-04 PROCEDURE — 99213 OFFICE O/P EST LOW 20 MIN: CPT | Performed by: OBSTETRICS & GYNECOLOGY

## 2021-10-04 RX ORDER — AMITRIPTYLINE HYDROCHLORIDE 50 MG/1
TABLET, FILM COATED ORAL
COMMUNITY
Start: 2021-06-30 | End: 2022-10-11 | Stop reason: SDUPTHER

## 2021-10-04 NOTE — PROGRESS NOTES
Patient alert and pleasant with no complaints   Here today for annual GYN exam  Pelvic exam, pap smear obtained, labeled  and hand delivered to lab. BRACA test kit with orders given to patient and advised to go to lab to have blood work drawn. Discharge instructions have been discussed with the patient. Patient advised to call our office with any questions or concerns. Voiced understanding.

## 2021-10-04 NOTE — PROGRESS NOTES
HISTORY OF PRESENT ILLNESS:    73 y.o. female  [id number] presents for her annual exam.     No LMP recorded. Patient is postmenopausal.  Periods are: n/a menopausal at age 47  Contraception: n/a  Most recent pap smear:  normal  History of abnormal pap smears: pt reports multiple abnormal pap smears, denies procedures to cervix.   Most recent mammogram: 2019 normal  History of abnormal mammogram: none  Most recent colonoscopy:  normal  Dexa scan: none  Changes to health since last visit: n./a  Complaints: None    Pt has 27 grandchildren    Past Medical History:   Past Medical History:   Diagnosis Date    Arthritis     Asthma     controlled     Depression     Diabetes mellitus ([facility] [address]     Fibromyalgia     GERD (gastroesophageal reflux disease)     Hepatitis C 2016    treated     Hyperlipidemia     Hypertension     IBS (irritable bowel syndrome)     Overactive bladder     Spleen injury     hospitalized 2019 - resolved as of 19                                              OB History    Para Term  AB Living   5       2 5   SAB TAB Ectopic Molar Multiple Live Births   2         5      # Outcome Date GA Lbr Israel/2nd Weight Sex Delivery Anes PTL Lv   5             4             3             2 SAB            1 SAB               Obstetric Comments   5 live births   2 miscarriages          Past Surgical History:   Past Surgical History:   Procedure Laterality Date    ANESTHESIA NERVE BLOCK N/A 3/26/2019    L4-5 EPIDURAL STEROID INJECTION performed by Carolee Bird DO at [facility] Right 2019    RIGHT ENDOSCOPIC CARPAL TUNNEL RELEASE performed by Nakul Suresh MD at [place] [address]      ENDOSCOPY, COLON, DIAGNOSTIC      EPIDURAL STEROID INJECTION N/A 2019    L4-5 EPIDURAL STEROID INJECTION performed by Carolee Bird DO at [address]  2019    with sedation    OVARY REMOVAL      1981 left     PAIN MANAGEMENT PROCEDURE N/A 12/26/2019    L4, L5 EPIDURAL STEROID INJECTION ([id number]) performed by Vandana Sy DO at [facility] Right 6/5/2019    RIGHT ULNAR NERVE DECOMPRESSION AT ELBOW WITH POSSIBLE NERVE TRANSPOSITION performed by Donna Tomlinson MD at [address] [place] [address] ARTHROSCOPY Right         Allergies: Adhesive tape, Nsaids, and Penicillins     Medications:   Current Outpatient Medications   Medication Sig Dispense Refill    atorvastatin (LIPITOR) 40 MG tablet TAKE 1 TABLET BY MOUTH ONE TIME A DAY 90 tablet 1    SITagliptin (JANUVIA) 100 MG tablet Take 1 tablet by mouth daily 30 tablet 2    gabapentin (NEURONTIN) 300 MG capsule Take 1 capsule by mouth nightly for 90 days. 30 capsule 2    cyclobenzaprine (FLEXERIL) 5 MG tablet Take 1 tablet by mouth nightly as needed for Muscle spasms 30 tablet 2    amLODIPine (NORVASC) 10 MG tablet Take 1 tablet by mouth daily 90 tablet 1    albuterol sulfate  (90 Base) MCG/ACT inhaler Inhale 2 puffs into the lungs every 6 hours as needed for Wheezing Rescue inhaler 1 Inhaler 3    polyethylene glycol (GLYCOLAX) powder Take 17 g by mouth daily      ipratropium-albuterol (DUONEB) 0.5-2.5 (3) MG/3ML SOLN nebulizer solution Inhale 1 vial into the lungs every 4 hours Instructed to take am of procedure      pantoprazole (PROTONIX) 20 MG tablet Take 1 tablet by mouth daily 30 tablet 2    sodium chloride (ALTAMIST SPRAY) 0.65 % nasal spray 1 spray by Nasal route as needed for Congestion 1 Bottle 3    amitriptyline (ELAVIL) 50 MG tablet       acyclovir (ZOVIRAX) 400 MG tablet Take 1 tablet by mouth 2 times daily for 10 days 20 tablet 0    acetaminophen (APAP EXTRA STRENGTH) 500 MG tablet Take 1 tablet by mouth every 8 hours as needed for Pain 90 tablet 2    methylPREDNISolone (MEDROL DOSEPACK) 4 MG tablet Take by mouth.  (Patient not taking: Reported on 4/7/2021) 1 kit 0    EPINEPHrine

## 2021-10-07 LAB
HPV SAMPLE: NORMAL
HPV TYPE 16: NOT DETECTED
HPV TYPE 18: NOT DETECTED
HPV, HIGH RISK OTHER: NOT DETECTED
INTERPRETATION: NORMAL
SOURCE: NORMAL

## 2021-10-11 DIAGNOSIS — B37.31 VULVOVAGINAL CANDIDIASIS: Primary | ICD-10-CM

## 2021-10-15 ENCOUNTER — HOSPITAL ENCOUNTER (OUTPATIENT)
Dept: GENERAL RADIOLOGY | Age: 66
Discharge: HOME | End: 2021-10-17
Payer: COMMERCIAL

## 2021-10-15 ENCOUNTER — HOSPITAL ENCOUNTER (OUTPATIENT)
Age: 66
End: 2021-10-15
Payer: COMMERCIAL

## 2021-10-15 ENCOUNTER — HOSPITAL ENCOUNTER (OUTPATIENT)
Age: 66
Discharge: HOME | End: 2021-10-15
Payer: COMMERCIAL

## 2021-10-15 VITALS — BODY MASS INDEX: 20.43 KG/M2 | HEIGHT: 62 IN | WEIGHT: 111 LBS

## 2021-10-15 DIAGNOSIS — N95.1 MENOPAUSAL STATE: ICD-10-CM

## 2021-10-15 DIAGNOSIS — Z01.419 WOMEN'S ANNUAL ROUTINE GYNECOLOGICAL EXAMINATION: ICD-10-CM

## 2021-10-15 DIAGNOSIS — Z12.31 ENCOUNTER FOR SCREENING MAMMOGRAM FOR BREAST CANCER: ICD-10-CM

## 2021-10-15 LAB
Lab: NORMAL
REPORT: NORMAL
THIS TEST SENT TO: NORMAL

## 2021-10-15 PROCEDURE — 77080 DXA BONE DENSITY AXIAL: CPT

## 2021-10-15 PROCEDURE — 77063 BREAST TOMOSYNTHESIS BI: CPT

## 2021-10-18 ENCOUNTER — TELEPHONE (OUTPATIENT)
Dept: GENERAL RADIOLOGY | Age: 66
End: 2021-10-18

## 2021-10-18 ENCOUNTER — OFFICE VISIT (OUTPATIENT)
Dept: FAMILY MEDICINE CLINIC | Age: 66
End: 2021-10-18
Payer: COMMERCIAL

## 2021-10-18 VITALS
SYSTOLIC BLOOD PRESSURE: 116 MMHG | TEMPERATURE: 96.9 F | OXYGEN SATURATION: 97 % | RESPIRATION RATE: 18 BRPM | HEIGHT: 63 IN | BODY MASS INDEX: 19.49 KG/M2 | WEIGHT: 110 LBS | HEART RATE: 72 BPM | DIASTOLIC BLOOD PRESSURE: 78 MMHG

## 2021-10-18 DIAGNOSIS — M50.30 DDD (DEGENERATIVE DISC DISEASE), CERVICAL: ICD-10-CM

## 2021-10-18 DIAGNOSIS — G89.29 CHRONIC BILATERAL LOW BACK PAIN WITH BILATERAL SCIATICA: ICD-10-CM

## 2021-10-18 DIAGNOSIS — J45.41 MODERATE PERSISTENT ASTHMA WITH ACUTE EXACERBATION: ICD-10-CM

## 2021-10-18 DIAGNOSIS — M54.41 CHRONIC RIGHT-SIDED LOW BACK PAIN WITH RIGHT-SIDED SCIATICA: Primary | ICD-10-CM

## 2021-10-18 DIAGNOSIS — M54.16 LUMBAR RADICULOPATHY: ICD-10-CM

## 2021-10-18 DIAGNOSIS — G89.4 CHRONIC PAIN SYNDROME: ICD-10-CM

## 2021-10-18 DIAGNOSIS — M54.41 CHRONIC RIGHT-SIDED LOW BACK PAIN WITH RIGHT-SIDED SCIATICA: ICD-10-CM

## 2021-10-18 DIAGNOSIS — M54.42 CHRONIC BILATERAL LOW BACK PAIN WITH BILATERAL SCIATICA: ICD-10-CM

## 2021-10-18 DIAGNOSIS — G89.29 CHRONIC RIGHT-SIDED LOW BACK PAIN WITH RIGHT-SIDED SCIATICA: Primary | ICD-10-CM

## 2021-10-18 DIAGNOSIS — E11.9 TYPE 2 DIABETES MELLITUS WITHOUT COMPLICATION, UNSPECIFIED WHETHER LONG TERM INSULIN USE (HCC): Primary | ICD-10-CM

## 2021-10-18 DIAGNOSIS — M54.41 CHRONIC BILATERAL LOW BACK PAIN WITH BILATERAL SCIATICA: ICD-10-CM

## 2021-10-18 DIAGNOSIS — R92.2 INCONCLUSIVE MAMMOGRAM: ICD-10-CM

## 2021-10-18 DIAGNOSIS — R92.8 ABNORMAL MAMMOGRAM: Primary | ICD-10-CM

## 2021-10-18 DIAGNOSIS — J01.91 ACUTE RECURRENT SINUSITIS, UNSPECIFIED LOCATION: ICD-10-CM

## 2021-10-18 DIAGNOSIS — G89.29 CHRONIC RIGHT-SIDED LOW BACK PAIN WITH RIGHT-SIDED SCIATICA: ICD-10-CM

## 2021-10-18 DIAGNOSIS — N63.10 BREAST MASS, RIGHT: Primary | ICD-10-CM

## 2021-10-18 DIAGNOSIS — J30.89 PERENNIAL ALLERGIC RHINITIS: ICD-10-CM

## 2021-10-18 DIAGNOSIS — Z23 FLU VACCINE NEED: ICD-10-CM

## 2021-10-18 LAB — HBA1C MFR BLD: 6.1 %

## 2021-10-18 PROCEDURE — 99214 OFFICE O/P EST MOD 30 MIN: CPT | Performed by: STUDENT IN AN ORGANIZED HEALTH CARE EDUCATION/TRAINING PROGRAM

## 2021-10-18 PROCEDURE — 3017F COLORECTAL CA SCREEN DOC REV: CPT | Performed by: STUDENT IN AN ORGANIZED HEALTH CARE EDUCATION/TRAINING PROGRAM

## 2021-10-18 PROCEDURE — 83036 HEMOGLOBIN GLYCOSYLATED A1C: CPT | Performed by: STUDENT IN AN ORGANIZED HEALTH CARE EDUCATION/TRAINING PROGRAM

## 2021-10-18 PROCEDURE — 1090F PRES/ABSN URINE INCON ASSESS: CPT | Performed by: STUDENT IN AN ORGANIZED HEALTH CARE EDUCATION/TRAINING PROGRAM

## 2021-10-18 PROCEDURE — G8420 CALC BMI NORM PARAMETERS: HCPCS | Performed by: STUDENT IN AN ORGANIZED HEALTH CARE EDUCATION/TRAINING PROGRAM

## 2021-10-18 PROCEDURE — 2022F DILAT RTA XM EVC RTNOPTHY: CPT | Performed by: STUDENT IN AN ORGANIZED HEALTH CARE EDUCATION/TRAINING PROGRAM

## 2021-10-18 PROCEDURE — G8427 DOCREV CUR MEDS BY ELIG CLIN: HCPCS | Performed by: STUDENT IN AN ORGANIZED HEALTH CARE EDUCATION/TRAINING PROGRAM

## 2021-10-18 PROCEDURE — 4040F PNEUMOC VAC/ADMIN/RCVD: CPT | Performed by: STUDENT IN AN ORGANIZED HEALTH CARE EDUCATION/TRAINING PROGRAM

## 2021-10-18 PROCEDURE — 1123F ACP DISCUSS/DSCN MKR DOCD: CPT | Performed by: STUDENT IN AN ORGANIZED HEALTH CARE EDUCATION/TRAINING PROGRAM

## 2021-10-18 PROCEDURE — 90694 VACC AIIV4 NO PRSRV 0.5ML IM: CPT | Performed by: STUDENT IN AN ORGANIZED HEALTH CARE EDUCATION/TRAINING PROGRAM

## 2021-10-18 PROCEDURE — G0008 ADMIN INFLUENZA VIRUS VAC: HCPCS | Performed by: STUDENT IN AN ORGANIZED HEALTH CARE EDUCATION/TRAINING PROGRAM

## 2021-10-18 PROCEDURE — G8484 FLU IMMUNIZE NO ADMIN: HCPCS | Performed by: STUDENT IN AN ORGANIZED HEALTH CARE EDUCATION/TRAINING PROGRAM

## 2021-10-18 PROCEDURE — 1036F TOBACCO NON-USER: CPT | Performed by: STUDENT IN AN ORGANIZED HEALTH CARE EDUCATION/TRAINING PROGRAM

## 2021-10-18 PROCEDURE — G8399 PT W/DXA RESULTS DOCUMENT: HCPCS | Performed by: STUDENT IN AN ORGANIZED HEALTH CARE EDUCATION/TRAINING PROGRAM

## 2021-10-18 PROCEDURE — 3044F HG A1C LEVEL LT 7.0%: CPT | Performed by: STUDENT IN AN ORGANIZED HEALTH CARE EDUCATION/TRAINING PROGRAM

## 2021-10-18 RX ORDER — AZITHROMYCIN 250 MG/1
250 TABLET, FILM COATED ORAL SEE ADMIN INSTRUCTIONS
Qty: 6 TABLET | Refills: 0 | Status: SHIPPED | OUTPATIENT
Start: 2021-10-18 | End: 2021-10-23

## 2021-10-18 RX ORDER — METHYLPREDNISOLONE 4 MG/1
TABLET ORAL
Qty: 1 KIT | Refills: 0 | Status: SHIPPED
Start: 2021-10-18 | End: 2021-11-09 | Stop reason: ALTCHOICE

## 2021-10-18 RX ORDER — IPRATROPIUM BROMIDE AND ALBUTEROL SULFATE 2.5; .5 MG/3ML; MG/3ML
1 SOLUTION RESPIRATORY (INHALATION) EVERY 4 HOURS
Qty: 360 ML | Refills: 3 | Status: SHIPPED | OUTPATIENT
Start: 2021-10-18

## 2021-10-18 RX ORDER — ALBUTEROL SULFATE 90 UG/1
2 AEROSOL, METERED RESPIRATORY (INHALATION) EVERY 6 HOURS PRN
Qty: 1 EACH | Refills: 3 | Status: SHIPPED
Start: 2021-10-18 | End: 2022-10-11 | Stop reason: SDUPTHER

## 2021-10-18 RX ORDER — CYCLOBENZAPRINE HCL 5 MG
5 TABLET ORAL NIGHTLY PRN
Qty: 30 TABLET | Refills: 2 | Status: SHIPPED
Start: 2021-10-18 | End: 2022-10-11 | Stop reason: SDUPTHER

## 2021-10-18 SDOH — ECONOMIC STABILITY: FOOD INSECURITY: WITHIN THE PAST 12 MONTHS, THE FOOD YOU BOUGHT JUST DIDN'T LAST AND YOU DIDN'T HAVE MONEY TO GET MORE.: NEVER TRUE

## 2021-10-18 SDOH — ECONOMIC STABILITY: FOOD INSECURITY: WITHIN THE PAST 12 MONTHS, YOU WORRIED THAT YOUR FOOD WOULD RUN OUT BEFORE YOU GOT MONEY TO BUY MORE.: NEVER TRUE

## 2021-10-18 ASSESSMENT — ENCOUNTER SYMPTOMS
SORE THROAT: 1
SINUS PRESSURE: 0
CONSTIPATION: 0
COUGH: 1
SHORTNESS OF BREATH: 0
EYE REDNESS: 0
CHEST TIGHTNESS: 1
DIARRHEA: 0
EYE PAIN: 0
VOMITING: 0
BACK PAIN: 1
NAUSEA: 0
ABDOMINAL PAIN: 0
SINUS PAIN: 0
RHINORRHEA: 1

## 2021-10-18 ASSESSMENT — SOCIAL DETERMINANTS OF HEALTH (SDOH): HOW HARD IS IT FOR YOU TO PAY FOR THE VERY BASICS LIKE FOOD, HOUSING, MEDICAL CARE, AND HEATING?: NOT HARD AT ALL

## 2021-10-18 NOTE — PROGRESS NOTES
10/18/2021    Jelani Shin (:  1955) is a 73 y.o. female, here for evaluation of the following medical concerns:    HPI  Chief Complaint   Patient presents with    New Patient     needing new script for Shingle vaccine, wants to see about Medrol dosepak, bone spurs in neck and spine    Diabetes    Back Pain     needing referral to pain mgmt and spine specialist   patient is a 73year old female here today to establish care. She has a PMH of HTN hyperlipidemia diabetes allergies arthritis asthma IBS depression/fibromyalgia and GERD and overactive bladder. She has allergies to NSAIDs PCNs and adhesive tape. DM2:   Patient is here to fu regarding DM2. Patient is  controlled. Taking all medications and tolerating well. Fasting sugars are running n/a does not check. Patient is not taking ASA and Ace Inhibitor/ARB. Patient is  on appropriately-dosed statin. LDL is  at goal.  BP is  controlled. does not hypoglycemic episodes. Patient does not see Podiatry regularly. Saw an Eye Dr she will see one in november. Patient is aware that it is necessary to see an Eye Dr yearly. Patient does not smoke. Most recent labs reviewed with patient. Patient does not have complaints or concerns today. Lab Results   Component Value Date    LABA1C 6.1 10/18/2021       Lab Results   Component Value Date    [address] 2021        Hypertension:  Patient is here for follow up chronic hypertension. This is  generally controlled on current medication regimen. Takes meds as directed and tolerates them well. Most recent labs reviewed with patient and are not remarkable. No symptoms from htn standpoint per ROS. Patient is  compliant with lifestyle modifications. Patient does not smoke. Comorbid conditions include DM, hyperlipidemia. Patient has chest tightness runny nose cough throat is clogged her eyes are swollen and water. No fevers chills sweats nausea vomiting diarrhea.  Just congested    Review of Systems   Constitutional: Negative for chills, fatigue and fever. HENT: Positive for congestion, postnasal drip, rhinorrhea and sore throat. Negative for ear pain, sinus pressure and sinus pain. Eyes: Negative for pain and redness. Respiratory: Positive for cough and chest tightness. Negative for shortness of breath. Cardiovascular: Negative for chest pain. Gastrointestinal: Negative for abdominal pain, constipation, diarrhea, nausea and vomiting. Genitourinary: Negative for difficulty urinating and dysuria. Musculoskeletal: Positive for back pain and neck pain. Negative for myalgias. Skin: Negative for rash. Neurological: Positive for headaches. Negative for dizziness, light-headedness and numbness. Psychiatric/Behavioral: The patient is not nervous/anxious. Prior to Visit Medications    Medication Sig Taking? Authorizing Provider   albuterol sulfate  (90 Base) MCG/ACT inhaler Inhale 2 puffs into the lungs every 6 hours as needed for Wheezing Rescue inhaler Yes Amanda Torres DO   ipratropium-albuterol (DUONEB) 0.5-2.5 (3) MG/3ML SOLN nebulizer solution Inhale 3 mLs into the lungs every 4 hours Instructed to take am of procedure Yes Amanda Torres DO   cyclobenzaprine (FLEXERIL) 5 MG tablet Take 1 tablet by mouth nightly as needed for Muscle spasms Yes Amanda Torres DO   azithromycin (ZITHROMAX) 250 MG tablet Take 1 tablet by mouth See Admin Instructions for 5 days 500mg on day 1 followed by 250mg on days 2 - 5 Yes Amanda Torres DO   methylPREDNISolone (MEDROL DOSEPACK) 4 MG tablet Take by mouth.  Yes Amanda Torres DO   terconazole (TERAZOL 7) 0.4 % vaginal cream Place vaginally nightly for 7 nights Yes Edgar Alves, DO   amitriptyline (ELAVIL) 50 MG tablet  Yes Historical Provider, MD   atorvastatin (LIPITOR) 40 MG tablet TAKE 1 TABLET BY MOUTH ONE TIME A DAY Yes KAIT Willams - CINDY   SITagliptin (JANUVIA) 100 MG tablet Take 1 tablet by mouth daily Yes Radha Esquivel Dinorah, APRN - CNP   gabapentin (NEURONTIN) 300 MG capsule Take 1 capsule by mouth nightly for 90 days.  Yes KAIT Moraes CNP   amLODIPine (NORVASC) 10 MG tablet Take 1 tablet by mouth daily Yes KAIT Moraes CNP   acyclovir (ZOVIRAX) 400 MG tablet Take 1 tablet by mouth 2 times daily for 10 days Yes KAIT Moraes CNP   acetaminophen (APAP EXTRA STRENGTH) 500 MG tablet Take 1 tablet by mouth every 8 hours as needed for Pain Yes KAIT Moraes CNP   polyethylene glycol (GLYCOLAX) powder Take 17 g by mouth daily Yes Historical Provider, MD   pantoprazole (PROTONIX) 20 MG tablet Take 1 tablet by mouth daily Yes Michelle Wild MD   EPINEPHrine (EPIPEN) 0.3 MG/0.3ML SOAJ injection Use as directed for allergic reaction Yes Torri Whyte MD   sodium chloride (ALTAMIST SPRAY) 0.65 % nasal spray 1 spray by Nasal route as needed for Congestion Yes Torri Whyte MD        Allergies   Allergen Reactions    Adhesive Tape Rash     bruising    Nsaids      Irritates IBS    Penicillins Hives       Past Medical History:   Diagnosis Date    Arthritis     Asthma     controlled     Depression     Diabetes mellitus ([facility] [address]     Fibromyalgia     GERD (gastroesophageal reflux disease)     Hepatitis C 2016    treated     Hyperlipidemia     Hypertension     IBS (irritable bowel syndrome)     Overactive bladder     Spleen injury     hospitalized 11/2019 - resolved as of 12-20-19        Past Surgical History:   Procedure Laterality Date    ANESTHESIA NERVE BLOCK N/A 3/26/2019    L4-5 EPIDURAL STEROID INJECTION performed by Aida Pedroza DO at [facility] Right 6/5/2019    RIGHT ENDOSCOPIC CARPAL TUNNEL RELEASE performed by Vinny Rivas MD at [place] [id number]      ENDOSCOPY, COLON, DIAGNOSTIC      EPIDURAL STEROID INJECTION N/A 5/16/2019    L4-5 EPIDURAL STEROID INJECTION performed by [facility] DO at [facility] HYSTERECTOMY      NERVE BLOCK  2019    with sedation    OVARY REMOVAL      1981 left     PAIN MANAGEMENT PROCEDURE N/A 2019    L4, L5 EPIDURAL STEROID INJECTION ([id number]) performed by Maddy Cortez DO at [facility] Right 2019    RIGHT ULNAR NERVE DECOMPRESSION AT ELBOW WITH POSSIBLE NERVE TRANSPOSITION performed by Servando Pereyra MD at [address] [place] [address] ARTHROSCOPY Right        Social History     Socioeconomic History    Marital status: Single     Spouse name: Not on file    Number of children: 5    Years of education: Not on file    Highest education level: Some college, no degree   Occupational History    Not on file   Tobacco Use    Smoking status: Former Smoker     Packs/day: 0.25     Years: 45.00     Pack years: 11.25     Types: Cigarettes     Quit date: 2020     Years since quittin.1    Smokeless tobacco: Never Used   Vaping Use    Vaping Use: Some days    Substances: Never, THC   Substance and Sexual Activity    Alcohol use: Yes     Comment: rare    Drug use: Yes     Types: Marijuana     Comment: holds medical card-Vaping    Sexual activity: Yes     Partners: Male   Other Topics Concern    Not on file   Social History Narrative    -Referral to  for financial strain and stress,depression, anxiety    -Smoking cessation brochure being mailed out    -Pt utilizes 3 food pantries    -[employer] transportation from [email or web address] for [address] [place] [address]     Social Determinants of Health     Financial Resource Strain: Low Risk     Difficulty of Paying Living Expenses: Not hard at all   Food Insecurity: No Food Insecurity    Worried About [address] [facility] in the Last Year: Never true    Wilfredo of Food in the Last Year: Never true   Transportation Needs:     Lack of Transportation (Medical):      Lack of Transportation (Non-Medical):    Physical Activity:     Days of Exercise per Week:     Minutes of Exercise per Session:    Stress:     Feeling of Stress :    Social Connections:     Frequency of Communication with Friends and Family:     Frequency of Social Gatherings with Friends and Family:     Attends Christian Services:     Active Member of Clubs or Organizations:     Attends Club or Organization Meetings:     Marital Status:    Intimate Partner Violence:     Fear of Current or Ex-Partner:     Emotionally Abused:     Physically Abused:     Sexually Abused:         Family History   Problem Relation Age of Onset    Diabetes Mother     Cancer Mother     Alcohol Abuse Father     Breast Cancer Maternal Aunt 28    Breast Cancer Paternal Aunt            Vitals:    10/18/21 1106   BP: 116/78   Pulse: 72   Resp: 18   Temp: 96.9 °F (36.1 °C)   TempSrc: Infrared   SpO2: 97%   Weight: 110 lb (49.9 kg)   Height: 5' 2.5\" (1.588 m)     Estimated body mass index is 19.8 kg/m² as calculated from the following:    Height as of this encounter: 5' 2.5\" (1.588 m). Weight as of this encounter: 110 lb (49.9 kg).     Most Recent Labs  CBC  Lab Results   Component Value Date    WBC 5.6 04/07/2021    WBC 10.8 11/05/2019    WBC 10.6 11/04/2019    RBC 5.01 04/07/2021    RBC 4.48 11/05/2019    RBC 4.53 11/04/2019    HGB 15.6 04/07/2021    HGB 13.3 11/05/2019    HGB 13.4 11/04/2019    HCT 47.0 04/07/2021    HCT 39.6 11/05/2019    HCT 41.2 11/04/2019    MCV 93.8 04/07/2021    MCV 88.4 11/05/2019    MCV 90.9 11/04/2019     04/07/2021     11/05/2019     11/04/2019      CMP  Lab Results   Component Value Date     04/07/2021     11/06/2019     11/05/2019    K 4.3 04/07/2021    K 3.3 11/06/2019    K 3.0 11/05/2019    K 3.2 11/04/2019    K 3.9 11/03/2019     04/07/2021     11/06/2019    CL 98 11/05/2019    CO2 24 04/07/2021    CO2 23 11/06/2019    CO2 22 11/05/2019    ANIONGAP 10 04/07/2021    ANIONGAP 13 11/06/2019    ANIONGAP 17 11/05/2019    GLUCOSE 85 04/07/2021    GLUCOSE 166 06/04/2020    GLUCOSE 88 11/06/2019    BUN 12 04/07/2021    BUN 6 11/06/2019    BUN 5 11/05/2019    CREATININE 0.7 04/07/2021    CREATININE 0.6 11/06/2019    CREATININE 0.6 11/05/2019    LABGLOM >60 04/07/2021    LABGLOM >60 11/06/2019    LABGLOM >60 11/05/2019    GFRAA >60 04/07/2021    GFRAA >60 11/06/2019    GFRAA >60 11/05/2019    CALCIUM 10.0 04/07/2021    CALCIUM 8.6 11/06/2019    CALCIUM 9.1 11/05/2019    PROT 7.7 04/07/2021    PROT 6.4 11/06/2019    PROT 7.0 11/05/2019    LABALBU 4.6 04/07/2021    LABALBU 3.1 11/06/2019    LABALBU 3.9 11/05/2019    BILITOT 0.5 04/07/2021    BILITOT 1.0 11/06/2019    BILITOT 1.4 11/05/2019    ALKPHOS 74 04/07/2021    ALKPHOS 63 11/06/2019    ALKPHOS 67 11/05/2019    AST 19 04/07/2021    AST 12 11/06/2019    AST 13 11/05/2019    ALT 13 04/07/2021    ALT 7 11/06/2019    ALT 8 11/05/2019     A1C  Lab Results   Component Value Date    LABA1C 6.1 10/18/2021    LABA1C 5.8 02/08/2021    LABA1C 6.1 09/29/2020     TSH  Lab Results   Component Value Date    TSH 0.900 04/07/2021    TSH 1.560 08/08/2019    TSH 3.540 01/24/2019     LIPID  Lab Results   Component Value Date    CHOL 198 04/07/2021    HDL 85 04/07/2021    HDL 95 01/24/2019    LDLCALC 97 04/07/2021    LDLCALC 107 01/24/2019    TRIG 79 04/07/2021     VITAMIN D  Lab Results   Component Value Date    VITD25 36 04/07/2021     MAGNESIUM  Lab Results   Component Value Date    MG 2.0 11/04/2019      HEPATITIS C  Lab Results   Component Value Date    HCVABI REACTIVE 04/25/2019     UA  Lab Results   Component Value Date    COLORU Yellow 11/03/2019    COLORU Yellow 06/19/2019    COLORU Yellow 05/26/2019    CLARITYU Clear 11/03/2019    CLARITYU Clear 06/19/2019    CLARITYU Clear 05/26/2019    GLUCOSEU 500 11/03/2019    GLUCOSEU >=1000 06/19/2019    GLUCOSEU >=1000 05/26/2019    BILIRUBINUR SMALL 11/03/2019    BILIRUBINUR Negative 06/19/2019    BILIRUBINUR Negative 05/26/2019    KETUA >=80 11/03/2019    KETUA TRACE 06/19/2019    KETUA Negative 05/26/2019 SPECGRAV >=1.030 11/03/2019    SPECGRAV 1.015 06/19/2019    SPECGRAV <=1.005 05/26/2019    BLOODU TRACE-INTACT 11/03/2019    BLOODU Negative 06/19/2019    BLOODU Negative 05/26/2019    PHUR 5.5 11/03/2019    PHUR 5.5 06/19/2019    PHUR 5.0 05/26/2019    PROTEINU TRACE 11/03/2019    PROTEINU Negative 06/19/2019    PROTEINU Negative 05/26/2019    UROBILINOGEN 0.2 11/03/2019    UROBILINOGEN 0.2 06/19/2019    UROBILINOGEN 0.2 05/26/2019    NITRU Negative 11/03/2019    NITRU Negative 06/19/2019    NITRU Negative 05/26/2019    LEUKOCYTESUR Negative 11/03/2019    LEUKOCYTESUR Negative 06/19/2019    LEUKOCYTESUR TRACE 05/26/2019     Urine Micro/Albumin Ratio  Lab Results   Component Value Date    MALBCR <30 02/08/2021    MALBCR <30 MG 07/16/2019        Physical Exam  Vitals and nursing note reviewed. Constitutional:       Appearance: Normal appearance. HENT:      Head: Normocephalic and atraumatic. Right Ear: Tympanic membrane, ear canal and external ear normal.      Left Ear: Tympanic membrane, ear canal and external ear normal.      Nose: Nose normal.      Mouth/Throat:      Mouth: Mucous membranes are moist.      Pharynx: Oropharynx is clear. Eyes:      Extraocular Movements: Extraocular movements intact. Conjunctiva/sclera: Conjunctivae normal.      Pupils: Pupils are equal, round, and reactive to light. Cardiovascular:      Rate and Rhythm: Normal rate and regular rhythm. Pulses: Normal pulses. Heart sounds: Normal heart sounds. Pulmonary:      Effort: Pulmonary effort is normal.      Breath sounds: Normal breath sounds. Abdominal:      General: Bowel sounds are normal.      Palpations: Abdomen is soft. Musculoskeletal:         General: Normal range of motion. Cervical back: Normal range of motion and neck supple. Skin:     General: Skin is warm and dry. Capillary Refill: Capillary refill takes less than 2 seconds. Neurological:      General: No focal deficit present. Mental Status: She is alert and oriented to person, place, and time. Psychiatric:         Mood and Affect: Mood normal.         Behavior: Behavior normal.         Thought Content: Thought content normal.         ASSESSMENT/PLAN:  Radha Morse was seen today for new patient, diabetes and back pain. Diagnoses and all orders for this visit:    Type 2 diabetes mellitus without complication, unspecified whether long term insulin use (HCC)  -     POCT glycosylated hemoglobin (Hb A1C)  -     Nirali Trammell DPM, PodDarryl gannon ([place])    Perennial allergic rhinitis  -     albuterol sulfate  (90 Base) MCG/ACT inhaler; Inhale 2 puffs into the lungs every 6 hours as needed for Wheezing Rescue inhaler    Moderate persistent asthma with acute exacerbation  -     albuterol sulfate  (90 Base) MCG/ACT inhaler; Inhale 2 puffs into the lungs every 6 hours as needed for Wheezing Rescue inhaler  -     ipratropium-albuterol (DUONEB) 0.5-2.5 (3) MG/3ML SOLN nebulizer solution; Inhale 3 mLs into the lungs every 4 hours Instructed to take am of procedure    Chronic right-sided low back pain with right-sided sciatica  -     cyclobenzaprine (FLEXERIL) 5 MG tablet; Take 1 tablet by mouth nightly as needed for Muscle spasms  -     [facility]    Lumbar radiculopathy  -     [facility]    Chronic bilateral low back pain with bilateral sciatica  -     [address]    DDD (degenerative disc disease), cervical  -     [facility]    Chronic pain syndrome  -     [facility]    Acute recurrent sinusitis, unspecified location  -     azithromycin (ZITHROMAX) 250 MG tablet;  Take 1 tablet by mouth See Admin Instructions for 5 days 500mg on day 1 followed by 250mg on days 2 - 5  -     methylPREDNISolone (MEDROL DOSEPACK) 4 MG tablet; Take by mouth. Flu vaccine need  -     INFLUENZA, QUADV, ADJUVANTED, 65 YRS =, IM, PF, PREFILL SYR, 0.5ML (FLUAD)           Educational materials and/or home exercises printed for patient's review and were included in patient instructions on his/her After Visit Summary and given to patient at the end of visit. Counseled regarding above diagnosis, including possible risks and complications,  especially if left uncontrolled. Counseled regarding the possible side effects, risks, benefits and alternatives to treatment; patient and/or guardian verbalizes understanding, agrees, feels comfortable with and wishes to proceed with above treatment plan. Advised patient to call with any new medication issues, and read all Rx info from pharmacy to assure aware of all possible risks and side effects of medication before taking. Reviewed age and gender appropriate health screening exams and vaccinations. Advised patient regarding importance of keeping up with recommended health maintenance and to schedule as soon as possible if overdue, as this is important in assessing for undiagnosed pathology, especially cancer, as well as protecting against potentially harmful/life threatening disease. Patient and/or guardian verbalizes understanding and agrees with above counseling, assessment and plan. All questions answered. Return in about 3 months (around 1/18/2022) for diabetes. An  electronic signature was used to authenticate this note.     --Roger Wallace, DO on 10/18/2021 at 12:22 PM

## 2021-10-18 NOTE — TELEPHONE ENCOUNTER
Call to patient in reference to her mammogram performed at [facility] on October 15, 2021. Instructed patient that the radiologist has recommended some additional breast imaging, in order to make a final determination/result. Verbalizes understanding and is agreeable to proceed.        Zakia Diallo, RN, BSN, [address]

## 2021-10-19 ENCOUNTER — TELEPHONE (OUTPATIENT)
Dept: ADMINISTRATIVE | Age: 66
End: 2021-10-19

## 2021-10-19 NOTE — TELEPHONE ENCOUNTER
Spoke with Patient in regards to new referral. Patient stated she had seen Dr. Kyung Pittman in the past and was advised there was nothing else she could do for her, and sent her to Pain Management. Referral is written for [facility]. Patient hesitant to make new appointment but will if Dr. Kuyng Pittman  can help her.      Please advise

## 2021-10-19 NOTE — TELEPHONE ENCOUNTER
Called and spoke with the patient and informed her of below message. Patient is aware and voiced understanding.

## 2021-10-19 NOTE — TELEPHONE ENCOUNTER
Was referred to Pain clinic yesterday as well. Has been their patient and should follow up with them. No need for PM&R as she is past the point of PM&R when seeing pain management.

## 2021-11-08 ENCOUNTER — HOSPITAL ENCOUNTER (OUTPATIENT)
Dept: GENERAL RADIOLOGY | Age: 66
Discharge: HOME | End: 2021-11-10
Payer: COMMERCIAL

## 2021-11-08 DIAGNOSIS — R92.8 ABNORMAL MAMMOGRAM: ICD-10-CM

## 2021-11-08 PROCEDURE — 76642 ULTRASOUND BREAST LIMITED: CPT

## 2021-11-09 ENCOUNTER — NURSE TRIAGE (OUTPATIENT)
Dept: OTHER | Facility: CLINIC | Age: 66
End: 2021-11-09

## 2021-11-09 ENCOUNTER — OFFICE VISIT (OUTPATIENT)
Dept: FAMILY MEDICINE CLINIC | Age: 66
End: 2021-11-09
Payer: COMMERCIAL

## 2021-11-09 VITALS
HEIGHT: 63 IN | SYSTOLIC BLOOD PRESSURE: 126 MMHG | HEART RATE: 78 BPM | RESPIRATION RATE: 18 BRPM | BODY MASS INDEX: 20.2 KG/M2 | OXYGEN SATURATION: 98 % | TEMPERATURE: 97.4 F | DIASTOLIC BLOOD PRESSURE: 74 MMHG | WEIGHT: 114 LBS

## 2021-11-09 DIAGNOSIS — Z20.822 EXPOSURE TO COVID-19 VIRUS: ICD-10-CM

## 2021-11-09 DIAGNOSIS — J01.90 ACUTE NON-RECURRENT SINUSITIS, UNSPECIFIED LOCATION: Primary | ICD-10-CM

## 2021-11-09 DIAGNOSIS — M54.12 CERVICAL RADICULOPATHY: ICD-10-CM

## 2021-11-09 LAB
Lab: NORMAL
PERFORMING INSTRUMENT: NORMAL
QC PASS/FAIL: NORMAL
SARS-COV-2, POC: NORMAL

## 2021-11-09 PROCEDURE — G8484 FLU IMMUNIZE NO ADMIN: HCPCS | Performed by: NURSE PRACTITIONER

## 2021-11-09 PROCEDURE — 1036F TOBACCO NON-USER: CPT | Performed by: NURSE PRACTITIONER

## 2021-11-09 PROCEDURE — 1123F ACP DISCUSS/DSCN MKR DOCD: CPT | Performed by: NURSE PRACTITIONER

## 2021-11-09 PROCEDURE — 4040F PNEUMOC VAC/ADMIN/RCVD: CPT | Performed by: NURSE PRACTITIONER

## 2021-11-09 PROCEDURE — 3017F COLORECTAL CA SCREEN DOC REV: CPT | Performed by: NURSE PRACTITIONER

## 2021-11-09 PROCEDURE — G8427 DOCREV CUR MEDS BY ELIG CLIN: HCPCS | Performed by: NURSE PRACTITIONER

## 2021-11-09 PROCEDURE — 96372 THER/PROPH/DIAG INJ SC/IM: CPT | Performed by: NURSE PRACTITIONER

## 2021-11-09 PROCEDURE — 1090F PRES/ABSN URINE INCON ASSESS: CPT | Performed by: NURSE PRACTITIONER

## 2021-11-09 PROCEDURE — G8399 PT W/DXA RESULTS DOCUMENT: HCPCS | Performed by: NURSE PRACTITIONER

## 2021-11-09 PROCEDURE — 87426 SARSCOV CORONAVIRUS AG IA: CPT | Performed by: NURSE PRACTITIONER

## 2021-11-09 PROCEDURE — 99214 OFFICE O/P EST MOD 30 MIN: CPT | Performed by: NURSE PRACTITIONER

## 2021-11-09 PROCEDURE — G8420 CALC BMI NORM PARAMETERS: HCPCS | Performed by: NURSE PRACTITIONER

## 2021-11-09 RX ORDER — METHYLPREDNISOLONE 4 MG/1
TABLET ORAL
Qty: 1 KIT | Refills: 0 | Status: SHIPPED
Start: 2021-11-09 | End: 2022-03-28

## 2021-11-09 RX ORDER — TRIAMCINOLONE ACETONIDE 40 MG/ML
40 INJECTION, SUSPENSION INTRA-ARTICULAR; INTRAMUSCULAR ONCE
Status: COMPLETED | OUTPATIENT
Start: 2021-11-09 | End: 2021-11-09

## 2021-11-09 RX ORDER — DOXYCYCLINE HYCLATE 100 MG/1
100 CAPSULE ORAL 2 TIMES DAILY
Qty: 20 CAPSULE | Refills: 0 | Status: SHIPPED | OUTPATIENT
Start: 2021-11-09 | End: 2021-11-19

## 2021-11-09 RX ADMIN — TRIAMCINOLONE ACETONIDE 40 MG: 40 INJECTION, SUSPENSION INTRA-ARTICULAR; INTRAMUSCULAR at 13:58

## 2021-11-09 NOTE — PROGRESS NOTES
21  Roman Skelton : 1955 Sex: female  Age 73 y.o. Subjective:  Chief Complaint   Patient presents with    Nasal Congestion    Adenopathy     x 4 days       HPI:   Roman Skelton , 73 y.o. female presents to the clinic for evaluation of sinus congestion x 5 days. The patient also reports swollen lymph nodes and headache. The patient also reports left neck pain radiating down left arm x 3 days. The patient reports a hx of cervical radiculopathy with same symptoms. The patient has taken flexeril and gabapentin for cervical radiculopathy and no treatment for sinus symptoms. The patient reports unchanged symptoms over time. The patient reports COVID-19 ill exposure (family). The patient denies hx of COVID-19 and reports having the vaccines. The patient denies acute loss of taste and smell, cough, sore throat, rash, and fever. The patient also denies chest pain, abdominal pain, shortness of breath, and nausea / vomiting / diarrhea. ROS:   Unless otherwise stated in this report the patient's positive and negative responses for review of systems for constitutional, eyes, ENT, cardiovascular, respiratory, gastrointestinal, neurological, , musculoskeletal, and integument systems and related systems to the presenting problem are either stated in the history of present illness or were not pertinent or were negative for the symptoms and/or complaints related to the presenting medical problem. Positives and pertinent negatives as per HPI. All others reviewed and are negative.       PMH:     Past Medical History:   Diagnosis Date    Arthritis     Asthma     controlled     Depression     Diabetes mellitus ([facility] [address]     Fibromyalgia     GERD (gastroesophageal reflux disease)     Hepatitis C 2016    treated     Hyperlipidemia     Hypertension     IBS (irritable bowel syndrome)     Overactive bladder     Spleen injury     hospitalized 2019 - resolved as of 19        Past Surgical History: Procedure Laterality Date    ANESTHESIA NERVE BLOCK N/A 3/26/2019    L4-5 EPIDURAL STEROID INJECTION performed by Lalit Lee DO at [facility] Right 6/5/2019    RIGHT ENDOSCOPIC CARPAL TUNNEL RELEASE performed by Polo Graf MD at [facility] 1898      ENDOSCOPY, COLON, DIAGNOSTIC      EPIDURAL STEROID INJECTION N/A 5/16/2019    L4-5 EPIDURAL STEROID INJECTION performed by Lalit Lee DO at [facility]  03/26/2019    with sedation    OVARY REMOVAL      1981 left     PAIN MANAGEMENT PROCEDURE N/A 12/26/2019    L4, L5 EPIDURAL STEROID INJECTION ([id number]) performed by Lalit Lee DO at [facility] Right 6/5/2019    RIGHT ULNAR NERVE DECOMPRESSION AT ELBOW WITH POSSIBLE NERVE TRANSPOSITION performed by Polo Graf MD at [facility] ARTHROSCOPY Right        Family History   Problem Relation Age of Onset    Diabetes Mother     Cancer Mother     Alcohol Abuse Father     Breast Cancer Maternal Aunt 28    Breast Cancer Paternal Aunt        Medications:     Current Outpatient Medications:     doxycycline hyclate (VIBRAMYCIN) 100 MG capsule, Take 1 capsule by mouth 2 times daily for 10 days, Disp: 20 capsule, Rfl: 0    methylPREDNISolone (MEDROL DOSEPACK) 4 MG tablet, Take by mouth., Disp: 1 kit, Rfl: 0    albuterol sulfate  (90 Base) MCG/ACT inhaler, Inhale 2 puffs into the lungs every 6 hours as needed for Wheezing Rescue inhaler, Disp: 1 each, Rfl: 3    ipratropium-albuterol (DUONEB) 0.5-2.5 (3) MG/3ML SOLN nebulizer solution, Inhale 3 mLs into the lungs every 4 hours Instructed to take am of procedure, Disp: 360 mL, Rfl: 3    cyclobenzaprine (FLEXERIL) 5 MG tablet, Take 1 tablet by mouth nightly as needed for Muscle spasms, Disp: 30 tablet, Rfl: 2    terconazole (TERAZOL 7) 0.4 % vaginal cream, Place vaginally nightly for 7 nights, Disp: 30 g, Rfl: 0    amitriptyline (ELAVIL) 50 MG tablet, , Disp: , Rfl:     atorvastatin (LIPITOR) 40 MG tablet, TAKE 1 TABLET BY MOUTH ONE TIME A DAY, Disp: 90 tablet, Rfl: 1    SITagliptin (JANUVIA) 100 MG tablet, Take 1 tablet by mouth daily, Disp: 30 tablet, Rfl: 2    amLODIPine (NORVASC) 10 MG tablet, Take 1 tablet by mouth daily, Disp: 90 tablet, Rfl: 1    acetaminophen (APAP EXTRA STRENGTH) 500 MG tablet, Take 1 tablet by mouth every 8 hours as needed for Pain, Disp: 90 tablet, Rfl: 2    polyethylene glycol (GLYCOLAX) powder, Take 17 g by mouth daily, Disp: , Rfl:     pantoprazole (PROTONIX) 20 MG tablet, Take 1 tablet by mouth daily, Disp: 30 tablet, Rfl: 2    EPINEPHrine (EPIPEN) 0.3 MG/0.3ML SOAJ injection, Use as directed for allergic reaction, Disp: 2 each, Rfl: 0    sodium chloride (ALTAMIST SPRAY) 0.65 % nasal spray, 1 spray by Nasal route as needed for Congestion, Disp: 1 Bottle, Rfl: 3    gabapentin (NEURONTIN) 300 MG capsule, Take 1 capsule by mouth nightly for 90 days. , Disp: 30 capsule, Rfl: 2    acyclovir (ZOVIRAX) 400 MG tablet, Take 1 tablet by mouth 2 times daily for 10 days, Disp: 20 tablet, Rfl: 0    Allergies: Allergies   Allergen Reactions    Adhesive Tape Rash     bruising    Nsaids      Irritates IBS    Penicillins Hives       Social History:     Social History     Tobacco Use    Smoking status: Former Smoker     Packs/day: 0.25     Years: 45.00     Pack years: 11.25     Types: Cigarettes     Quit date: 2020     Years since quittin.1    Smokeless tobacco: Never Used   Vaping Use    Vaping Use: Some days    Substances: Never, THC   Substance Use Topics    Alcohol use: Yes     Comment: rare    Drug use: Yes     Types: Marijuana (Weed)     Comment: holds medical card-Vaping       Patient lives at home.     Physical Exam:     Vitals:    21 1313   BP: 126/74   Pulse: 78   Resp: 18   Temp: 97.4 °F (36.3 °C)   TempSrc: Temporal   SpO2: 98%   Weight: 114 lb (51.7 kg)   Height: 5' 2.5\" (1.588 m)       Physical Exam (PE)    Physical Exam  Constitutional:       Appearance: Normal appearance. HENT:      Head: Normocephalic. Right Ear: Tympanic membrane, ear canal and external ear normal.      Left Ear: Tympanic membrane, ear canal and external ear normal.      Nose: Congestion and rhinorrhea present. Right Turbinates: Swollen. Left Turbinates: Swollen. Right Sinus: Maxillary sinus tenderness present. Left Sinus: Maxillary sinus tenderness present. Mouth/Throat:      Mouth: Mucous membranes are moist.      Pharynx: Oropharynx is clear. Eyes:      Pupils: Pupils are equal, round, and reactive to light. Neck:      Comments: Bony tenderness: Negative. Paraspinal tenderness:  Left TTP. Trapezius Tenderness:  Left TTP. Crepitance: None. Step off: None. Tracheal deviation: None. JVD: None. ROM: Limited flexion and extension due to pain, however FROM is noted. Cardiovascular:      Rate and Rhythm: Normal rate and regular rhythm. Pulses: Normal pulses. Radial pulses are 2+ on the right side and 2+ on the left side. Heart sounds: Normal heart sounds. Pulmonary:      Effort: Pulmonary effort is normal.      Breath sounds: Normal breath sounds. Abdominal:      General: Bowel sounds are normal.      Palpations: Abdomen is soft. Musculoskeletal:         General: Normal range of motion. Cervical back: Normal range of motion and neck supple. Lymphadenopathy:      Cervical: No cervical adenopathy. Skin:     General: Skin is warm and dry. Capillary Refill: Capillary refill takes less than 2 seconds. Neurological:      General: No focal deficit present. Mental Status: She is alert and oriented to person, place, and time. Cranial Nerves: No cranial nerve deficit. Sensory: No sensory deficit. Motor: No weakness.    Psychiatric:         Mood and Affect: Mood normal.         Behavior: Behavior normal.          Testing:   (All laboratory and radiology results have been personally reviewed by myself)  Labs:  Results for orders placed or performed in visit on 11/09/21   POCT COVID-19, Antigen   Result Value Ref Range    SARS-COV-2, POC Not-Detected Not Detected    Lot Number [id number]     QC Pass/Fail pass     Performing Instrument BD Veritor        Imaging: All Radiology results interpreted by Radiologist unless otherwise noted. No orders to display       Assessment / Plan:   The patient's vitals, allergies, medications, and past medical history have been reviewed. Rhys Long was seen today for nasal congestion and adenopathy. Diagnoses and all orders for this visit:    Acute non-recurrent sinusitis, unspecified location  -     COVID-19 Ambulatory; Future  -     doxycycline hyclate (VIBRAMYCIN) 100 MG capsule; Take 1 capsule by mouth 2 times daily for 10 days    Cervical radiculopathy  -     triamcinolone acetonide (KENALOG-40) injection 40 mg  -     methylPREDNISolone (MEDROL DOSEPACK) 4 MG tablet; Take by mouth. Exposure to COVID-19 virus  -     POCT COVID-19, Antigen        - Disposition: Home    - Educational material printed for patient's review and were included in patient instructions. After Visit Summary and given to patient at the end of visit. - COVID-19 swab obtained and pending, will call with results once available. Advised cautionary self-quarantine at home per [facility] guidelines. Encouraged oral fluids and rest. Discussed symptomatic treatments with patient today including Zyrtec prn rhinitis and Tylenol prn for fever / pain. Schedule a follow-up with PCP in 2-3 days. Red flag symptoms were discussed with the patient today. The patient is directed to go the ED if symptoms change or worsen. Pt verbalizes understanding and is in agreement with plan of care. All questions answered.     SIGNATURE: Bib Baumann, KAIT-CNP    *NOTE: This report was transcribed using voice recognition software. Every effort was made to ensure accuracy; however, inadvertent computerized transcription errors may be present.

## 2021-11-09 NOTE — TELEPHONE ENCOUNTER
Received call from [facility] at [facility] with [employer] Complaint. Brief description of triage: Swollen lymph nodes, see below    Triage indicates for patient to be seen today or tomorrow in office. Patient told to proceed to nearby walk in/UCC if no appointments available. Care advice provided, patient verbalizes understanding; denies any other questions or concerns; instructed to call back for any new or worsening symptoms. Writer provided warm transfer to [employer] at [facility] for appointment scheduling. Attention Provider: Thank you for allowing me to participate in the care of your patient. The patient was connected to triage in response to information provided to the ECC/PSC. Please do not respond through this encounter as the response is not directed to a shared pool. Reason for Disposition   Very tender to the touch but no fever    Answer Assessment - Initial Assessment Questions  1. LOCATION: \"Where is the swollen node located? \" \"Is the matching node on the other side of the body also swollen? \"   Per patient right underneath her jaw bone, both sides        2. SIZE: \"How big is the node? \" (Inches or centimeters) (or compare to common objects such as pea, bean, marble, golf ball)   Patient states about the size of a big grape        3. ONSET: \"When did the swelling start? \"   Started 3 days ago        4. NECK NODES: \"Is there a sore throat, runny nose or other symptoms of a cold? \"   Nasal congestion, denies others, when she first gets up in the morning, some runny nose and runny eyes        5. GROIN OR ARMPIT NODES: \"Is there a sore, scratch, cut or painful red area on that arm or leg? \"   N/A        6. FEVER: \"Do you have a fever? \" If so, ask: \"What is it, how was it measured, and when did it start? \"   Denies        7. CAUSE: \"What do you think is causing the swollen lymph nodes? \"      Patient doesn't know, says it did happen when she was a child    6.  OTHER SYMPTOMS: \"Do you have any other symptoms? \"  Headache that comes and goes, had some neck pain, and mid back pain (chronic issue) and it bothers her at night. Patient states this is causing numbness in her arms. 9. PREGNANCY: \"Is there any chance you are pregnant? \" \"When was your last menstrual period? \"      N/A    Protocols used: LYMPH NODES - SWOLLEN-ADULT-OH

## 2021-11-09 NOTE — PATIENT INSTRUCTIONS
Patient Education        Pinched Nerve in the Neck: Care Instructions  Your Care Instructions  A pinched nerve in the neck happens when a vertebra or disc in the upper part of your spine is damaged. This damage can happen because of an injury. Or it can just happen with age. The changes caused by the damage may put pressure on a nearby nerve root, pinching it. This causes symptoms such as sharp pain in your neck, shoulder, arm, hand, or back. You may also have tingling or numbness. Sometimes it makes your arm weaker. The symptoms are usually worse when you turn your head or strain your neck. For many people, the symptoms get better over time and finally go away. Early treatment usually includes medicines for pain and swelling. Sometimes physical therapy and special exercises may help. Follow-up care is a key part of your treatment and safety. Be sure to make and go to all appointments, and call your doctor if you are having problems. It's also a good idea to know your test results and keep a list of the medicines you take. How can you care for yourself at home? · Be safe with medicines. Read and follow all instructions on the label. ? If the doctor gave you a prescription medicine for pain, take it as prescribed. ? If you are not taking a prescription pain medicine, ask your doctor if you can take an over-the-counter medicine. · Try using a heating pad on a low or medium setting for 15 to 20 minutes every 2 or 3 hours. Try a warm shower in place of one session with the heating pad. You can also buy single-use heat wraps that last up to 8 hours. · You can also try an ice pack for 10 to 15 minutes every 2 to 3 hours. There isn't strong evidence that either heat or ice will help. But you can try them to see if they help you. · Don't spend too long in one position. Take short breaks to move around and change positions. · Wear a seat belt and shoulder harness when you are in a car.   · Sleep with a pillow under your head and neck that keeps your neck straight. · If you were given a neck brace (cervical collar) to limit neck motion, wear it as instructed for as many days as your doctor tells you to. Do not wear it longer than you were told to. Wearing a brace for too long can lead to neck stiffness and can weaken the neck muscles. · Follow your doctor's instructions for gentle neck-stretching exercises. · Do not smoke. Smoking can slow healing of your discs. If you need help quitting, talk to your doctor about stop-smoking programs and medicines. These can increase your chances of quitting for good. · Avoid strenuous work or exercise until your doctor says it is okay. When should you call for help? Call 911 anytime you think you may need emergency care. For example, call if:    · You are unable to move an arm or a leg at all. Call your doctor now or seek immediate medical care if:    · You have new or worse symptoms in your arms, legs, chest, belly, or buttocks. Symptoms may include:  ? Numbness or tingling. ? Weakness. ? Pain.     · You lose bladder or bowel control. Watch closely for changes in your health, and be sure to contact your doctor if:    · You are not getting better as expected. Where can you learn more? Go to https://Minbox.[employer] Style. org and sign in to your [employer] account. Enter Y835 in the [facility] box to learn more about \"Pinched Nerve in the Neck: Care Instructions. \"     If you do not have an account, please click on the \"Sign Up Now\" link. Current as of: July 1, 2021               Content Version: 13.0  © 7911-9420 [employer]. Care instructions adapted under license by [facility] ([facility]). If you have questions about a medical condition or this instruction, always ask your healthcare professional. [address] any warranty or liability for your use of this information.          Patient Education        Sinusitis: Care Instructions  Your Care Instructions     Sinusitis is an infection of the lining of the sinus cavities in your head. Sinusitis often follows a cold. It causes pain and pressure in your head and face. In most cases, sinusitis gets better on its own in 1 to 2 weeks. But some mild symptoms may last for several weeks. Sometimes antibiotics are needed. Follow-up care is a key part of your treatment and safety. Be sure to make and go to all appointments, and call your doctor if you are having problems. It's also a good idea to know your test results and keep a list of the medicines you take. How can you care for yourself at home? · Take an over-the-counter pain medicine, such as acetaminophen (Tylenol), ibuprofen (Advil, Motrin), or naproxen (Aleve). Read and follow all instructions on the label. · If the doctor prescribed antibiotics, take them as directed. Do not stop taking them just because you feel better. You need to take the full course of antibiotics. · Be careful when taking over-the-counter cold or flu medicines and Tylenol at the same time. Many of these medicines have acetaminophen, which is Tylenol. Read the labels to make sure that you are not taking more than the recommended dose. Too much acetaminophen (Tylenol) can be harmful. · Breathe warm, moist air from a steamy shower, a hot bath, or a sink filled with hot water. Avoid cold, dry air. Using a humidifier in your home may help. Follow the directions for cleaning the machine. · Use saline (saltwater) nasal washes. This can help keep your nasal passages open and wash out mucus and bacteria. You can buy saline nose drops at a grocery store or drugstore. Or you can make your own at home by adding 1 teaspoon of salt and 1 teaspoon of baking soda to 2 cups of distilled water. If you make your own, fill a bulb syringe with the solution, insert the tip into your nostril, and squeeze gently. Bobbi Pacer your nose.   · Put a hot, wet towel or a warm gel pack on your face 3 or 4 times a day for 5 to 10 minutes each time. · Try a decongestant nasal spray like oxymetazoline (Afrin). Do not use it for more than 3 days in a row. Using it for more than 3 days can make your congestion worse. When should you call for help? Call your doctor now or seek immediate medical care if:    · You have new or worse swelling or redness in your face or around your eyes.     · You have a new or higher fever. Watch closely for changes in your health, and be sure to contact your doctor if:    · You have new or worse facial pain.     · The mucus from your nose becomes thicker (like pus) or has new blood in it.     · You are not getting better as expected. Where can you learn more? Go to https://Accelalox.PlayCanvas. org and sign in to your [employer] account. Enter P349 in the LOC&ALL box to learn more about \"Sinusitis: Care Instructions. \"     If you do not have an account, please click on the \"Sign Up Now\" link. Current as of: December 2, 2020               Content Version: 13.0  © 5692-1710 [employer]. Care instructions adapted under license by [facility] ([facility]). If you have questions about a medical condition or this instruction, always ask your healthcare professional. [address] any warranty or liability for your use of this information.

## 2021-11-10 DIAGNOSIS — J01.90 ACUTE NON-RECURRENT SINUSITIS, UNSPECIFIED LOCATION: ICD-10-CM

## 2021-11-11 ENCOUNTER — TELEPHONE (OUTPATIENT)
Dept: FAMILY MEDICINE CLINIC | Age: 66
End: 2021-11-11

## 2021-11-11 DIAGNOSIS — M50.30 DDD (DEGENERATIVE DISC DISEASE), CERVICAL: ICD-10-CM

## 2021-11-11 DIAGNOSIS — M54.16 LUMBAR RADICULOPATHY: ICD-10-CM

## 2021-11-11 DIAGNOSIS — G89.29 CHRONIC RIGHT-SIDED LOW BACK PAIN WITH RIGHT-SIDED SCIATICA: ICD-10-CM

## 2021-11-11 DIAGNOSIS — M54.41 CHRONIC RIGHT-SIDED LOW BACK PAIN WITH RIGHT-SIDED SCIATICA: ICD-10-CM

## 2021-11-11 DIAGNOSIS — M54.41 CHRONIC BILATERAL LOW BACK PAIN WITH BILATERAL SCIATICA: ICD-10-CM

## 2021-11-11 DIAGNOSIS — M54.42 CHRONIC BILATERAL LOW BACK PAIN WITH BILATERAL SCIATICA: ICD-10-CM

## 2021-11-11 DIAGNOSIS — G89.29 CHRONIC BILATERAL LOW BACK PAIN WITH BILATERAL SCIATICA: ICD-10-CM

## 2021-11-11 DIAGNOSIS — M54.12 CERVICAL RADICULOPATHY: Primary | ICD-10-CM

## 2021-11-11 LAB
SARS-COV-2: NOT DETECTED
SOURCE: NORMAL

## 2021-11-11 NOTE — TELEPHONE ENCOUNTER
----- Message from Steve Lobato sent at 11/11/2021 10:54 AM EST -----  Subject: Referral Request    QUESTIONS   Reason for referral request? PT is requesting a referral for orthopedics. She had a previous referral for Dr. Shaquille Thompson but that Dr. Carlota Ivy do what she   needs to have done. She has a new Ortho office that she would like a   referral to. Dr. Sonja Harvey @ [email or web address] Fax# [phone]   Has the physician seen you for this condition before? No   Preferred Specialist (if applicable)? Do you already have an appointment scheduled? No  Additional Information for Provider?   ---------------------------------------------------------------------------  --------------  CALL BACK INFO  What is the best way for the office to contact you? OK to respond with   electronic message via [employer] portal (only for patients who have   registered [employer] account)  Preferred Call Back Phone Number?  [phone]

## 2021-11-24 NOTE — TELEPHONE ENCOUNTER
PATIENT: Kerline Taylor   DATE OF SERVICE: 2021   : 2017 MRN: [id number]       Chief Complaint   Patient presents with   • Ear Pain     RIGHT EAR PAIN , STARTED YESTERDAY . TOOK TYLENOL .        Kerline is a female presents to the [facility] for Chief complaint of right otalgia onset yesterday 2021.  Mother endorses patient has history of asthma and seasonal allergies.  Mother has tried most over-the-counter allergy medication to include Zyrtec, Claritin, and Allegra.  Patient has not had any allergy medications in the last 48 hours.  Mother denies any fever in the last 24 to 48 hours.           MEDICATIONS:  Current Outpatient Medications   Medication Sig   • albuterol (VENTOLIN) (2.5 MG/3ML) 0.083% nebulizer solution Take 3 mLs by nebulization every 4 to 6 hours as needed for Wheezing.   • fluticasone propionate (Flovent HFA) 44 MCG/ACT inhaler Inhale 1 puff into the lungs 2 times daily.   • albuterol 108 (90 Base) MCG/ACT inhaler Inhale 2 puffs into the lungs every 4 hours as needed for Shortness of Breath or Wheezing. Small mask and spacer   • fexofenadine (ALLEGRA) 30 MG/5ML suspension Take 5 mLs by mouth daily.   • mometasone (ELOCON) 0.1 % ointment Apply topically 2 times daily as needed (ECZEMA FLARE, ITCHING).   • hydroCORTisone (CORTIZONE) 2.5 % ointment APPLY TOPICALLY TO THE AFFECTED AREA TWICE DAILY AS NEEDED FOR ITCHING OR RASH( USE ON FACE)   • fluocinolone (SYNALAR) 0.01 % topical solution Apply topically 2 times daily as needed (itching, redness). Apply to scalp twice/day as needed   • diphenhydrAMINE (BENADRYL CHILDRENS ALLERGY) 12.5 MG/5ML liquid Take 2.5 mLs by mouth 4 times daily as needed (runny nose and vomiting).     No current facility-administered medications for this visit.         ALLERGIES:  ALLERGIES:  No Known Allergies    ACTIVE PROBLEM LIST:  Patient Active Problem List   Diagnosis   • Infantile eczema   • Seasonal allergic rhinitis   • Mild persistent asthma  Reason for Disposition   Health Information question, no triage required and triager able to answer question    Answer Assessment - Initial Assessment Questions  1. REASON FOR CALL or QUESTION: \"What is your reason for calling today? \" or \"How can I best help you? \" or \"What question do you have that I can help answer? \"      Was asking if she could be intimate with partner when coming out of quarantine informed pt of CDC guidelines    Protocols used: INFORMATION ONLY CALL - NO TRIAGE-ADULT- without complication   • Encounter for long-term (current) use of medications         PAST MEDICAL HISTORY:  Past Medical History:   Diagnosis Date   • Asthma 3/9/2020   • Eczema    • No known health problems        PAST SURGICAL HISTORY:  Past Surgical History:   Procedure Laterality Date   • No past surgeries         FAMILY HISTORY:  Family History   Problem Relation Age of Onset   • Asthma Maternal Grandmother        SOCIAL HISTORY:  Social History     Tobacco Use   • Smoking status: Never Smoker   • Smokeless tobacco: Never Used   Vaping Use   • Vaping Use: never used   Substance Use Topics   • Alcohol use: Not on file   • Drug use: Not on file         Patient's medications, allergies, immunizations, past medical, surgical, and social history  were reviewed and updated as appropriate.      Review of Systems   HENT: Positive for ear pain.    All other systems reviewed and are negative.    12 pt ROS performed. All systems reviewed and neg except HPI. Refer to HPI.      Objective   Visit Vitals  Pulse 125   Temp 97.6 °F (36.4 °C) (Oral)   Resp 30   Ht 2' 11.43\" (0.9 m)   Wt 13 kg (28 lb 10.6 oz)   SpO2 99%   BMI 16.05 kg/m²     Physical Exam  Vitals and nursing note reviewed.   Constitutional:       General: She is active. She is not in acute distress.     Appearance: Normal appearance. She is well-developed and normal weight. She is not toxic-appearing.   HENT:      Right Ear: Tympanic membrane is erythematous and bulging.      Left Ear: Tympanic membrane, ear canal and external ear normal. There is no impacted cerumen. Tympanic membrane is not erythematous or bulging.      Nose: Nose normal. No congestion or rhinorrhea.      Mouth/Throat:      Mouth: Mucous membranes are moist.      Pharynx: Oropharynx is clear. No oropharyngeal exudate or posterior oropharyngeal erythema.   Eyes:      General: Red reflex is present bilaterally.      Conjunctiva/sclera: Conjunctivae normal.      Pupils: Pupils are equal, round,  and reactive to light.   Cardiovascular:      Rate and Rhythm: Normal rate and regular rhythm.      Pulses: Normal pulses.      Heart sounds: Normal heart sounds. No murmur heard.  No gallop.    Pulmonary:      Effort: Pulmonary effort is normal. No retractions.      Breath sounds: Normal breath sounds. No decreased air movement. No wheezing or rales.   Musculoskeletal:      Cervical back: Normal range of motion and neck supple. No rigidity.   Lymphadenopathy:      Cervical: No cervical adenopathy.   Skin:     General: Skin is warm and dry.      Capillary Refill: Capillary refill takes less than 2 seconds.      Coloration: Skin is not cyanotic, mottled or pale.      Findings: No erythema or rash.   Neurological:      Mental Status: She is alert.         Vitals:    11/24/21 1027   Pulse: 125   Resp: 30   Temp: 97.6 °F (36.4 °C)   TempSrc: Oral   SpO2: 99%   Weight: 13 kg (28 lb 10.6 oz)   Height: 2' 11.43\" (0.9 m)         Assessment   Problem List Items Addressed This Visit     None      Visit Diagnoses     Non-recurrent acute suppurative otitis media of right ear without spontaneous rupture of tympanic membrane    -  Primary    Relevant Medications    cefdinir (OMNICEF) 250 MG/5ML suspension            Plan: The patient presents with apparent otitis media.  There are no evidence for significant complications at this time.  There is no evidence for tympanic membrane rupture.  There is no significant mastoid discomfort to palpation or swelling.  There is no evidence for otitis externa based on external examination. The patient is taking PO well and I feel outpatient antibiotics are warranted at this time.      The patient and parent(s) understand that at this time there is no evidence for a more malignant underlying process, but the patient and parent(s) also understand that early in the process of an illness, an initial workup can be falsely reassuring.  Routine discharge counseling was given to the patient and  parent(s) and they understand that worsening, changing or persistent symptoms should prompt an immediate call or follow up with their primary physician or return for reevaluation.  The importance of appropriate follow up was also discussed with the patient and parent(s). More extensive discharge instructions were given in the patient’s discharge paperwork.    1) Patient discharge instructions, plan, the use of any OTC supportive care measures and Rx medication reviewed/explained.     2) Discussed with patient to follow-up with pcp in the next 3-5 days for re-evaluation. Discussed with patient red flags and ER precautions. Discussed with pt if symptoms worsen or new symptoms develop to please follow-up with the [facility] or seek further evaluation at the nearest Emergency Room.     3) Patient voices understanding of all information given today and has no further questions/concerns at this time.     Instructions provided as documented in the AVS.      Thank you for visiting [facility].

## 2021-12-03 DIAGNOSIS — E11.65 TYPE 2 DIABETES MELLITUS WITH HYPERGLYCEMIA, WITHOUT LONG-TERM CURRENT USE OF INSULIN (HCC): Primary | ICD-10-CM

## 2021-12-03 RX ORDER — GABAPENTIN 300 MG/1
CAPSULE ORAL
Qty: 30 CAPSULE | Refills: 2 | Status: SHIPPED
Start: 2021-12-03 | End: 2022-02-10

## 2021-12-03 NOTE — TELEPHONE ENCOUNTER
Last Appointment:  4/7/2021  Future Appointments   Date Time Provider Ilan Cramer   1/18/2022 10:00 AM Amanda Torres  [address]

## 2021-12-06 RX ORDER — EMPAGLIFLOZIN 10 MG/1
1 TABLET, FILM COATED ORAL DAILY
Qty: 90 TABLET | Refills: 1 | Status: SHIPPED
Start: 2021-12-06 | End: 2022-05-17

## 2021-12-06 NOTE — TELEPHONE ENCOUNTER
Filed prior auth on pt for [employer], they denied request. Wanting pt to try [place] or Margretta Ayla or if there is a medical reason why pt cannot try either of these, please advise

## 2022-01-18 ENCOUNTER — OFFICE VISIT (OUTPATIENT)
Dept: FAMILY MEDICINE CLINIC | Age: 67
End: 2022-01-18
Payer: COMMERCIAL

## 2022-01-18 VITALS
BODY MASS INDEX: 20.55 KG/M2 | SYSTOLIC BLOOD PRESSURE: 124 MMHG | OXYGEN SATURATION: 100 % | RESPIRATION RATE: 16 BRPM | HEART RATE: 99 BPM | DIASTOLIC BLOOD PRESSURE: 84 MMHG | HEIGHT: 63 IN | TEMPERATURE: 97.7 F | WEIGHT: 116 LBS

## 2022-01-18 DIAGNOSIS — J45.41 MODERATE PERSISTENT ASTHMA WITH ACUTE EXACERBATION: ICD-10-CM

## 2022-01-18 DIAGNOSIS — R10.11 RIGHT UPPER QUADRANT ABDOMINAL PAIN: ICD-10-CM

## 2022-01-18 DIAGNOSIS — R59.1 LYMPHADENOPATHY: ICD-10-CM

## 2022-01-18 DIAGNOSIS — M05.741 RHEUMATOID ARTHRITIS INVOLVING BOTH HANDS WITH POSITIVE RHEUMATOID FACTOR (HCC): ICD-10-CM

## 2022-01-18 DIAGNOSIS — G43.919 INTRACTABLE MIGRAINE WITHOUT STATUS MIGRAINOSUS, UNSPECIFIED MIGRAINE TYPE: ICD-10-CM

## 2022-01-18 DIAGNOSIS — I10 ESSENTIAL HYPERTENSION: ICD-10-CM

## 2022-01-18 DIAGNOSIS — M05.742 RHEUMATOID ARTHRITIS INVOLVING BOTH HANDS WITH POSITIVE RHEUMATOID FACTOR (HCC): ICD-10-CM

## 2022-01-18 DIAGNOSIS — E11.65 TYPE 2 DIABETES MELLITUS WITH HYPERGLYCEMIA, WITHOUT LONG-TERM CURRENT USE OF INSULIN (HCC): Primary | ICD-10-CM

## 2022-01-18 LAB — HBA1C MFR BLD: 6.4 %

## 2022-01-18 PROCEDURE — G8399 PT W/DXA RESULTS DOCUMENT: HCPCS | Performed by: STUDENT IN AN ORGANIZED HEALTH CARE EDUCATION/TRAINING PROGRAM

## 2022-01-18 PROCEDURE — 2022F DILAT RTA XM EVC RTNOPTHY: CPT | Performed by: STUDENT IN AN ORGANIZED HEALTH CARE EDUCATION/TRAINING PROGRAM

## 2022-01-18 PROCEDURE — 3044F HG A1C LEVEL LT 7.0%: CPT | Performed by: STUDENT IN AN ORGANIZED HEALTH CARE EDUCATION/TRAINING PROGRAM

## 2022-01-18 PROCEDURE — 3017F COLORECTAL CA SCREEN DOC REV: CPT | Performed by: STUDENT IN AN ORGANIZED HEALTH CARE EDUCATION/TRAINING PROGRAM

## 2022-01-18 PROCEDURE — 1036F TOBACCO NON-USER: CPT | Performed by: STUDENT IN AN ORGANIZED HEALTH CARE EDUCATION/TRAINING PROGRAM

## 2022-01-18 PROCEDURE — 83036 HEMOGLOBIN GLYCOSYLATED A1C: CPT | Performed by: STUDENT IN AN ORGANIZED HEALTH CARE EDUCATION/TRAINING PROGRAM

## 2022-01-18 PROCEDURE — G8484 FLU IMMUNIZE NO ADMIN: HCPCS | Performed by: STUDENT IN AN ORGANIZED HEALTH CARE EDUCATION/TRAINING PROGRAM

## 2022-01-18 PROCEDURE — G8427 DOCREV CUR MEDS BY ELIG CLIN: HCPCS | Performed by: STUDENT IN AN ORGANIZED HEALTH CARE EDUCATION/TRAINING PROGRAM

## 2022-01-18 PROCEDURE — G8420 CALC BMI NORM PARAMETERS: HCPCS | Performed by: STUDENT IN AN ORGANIZED HEALTH CARE EDUCATION/TRAINING PROGRAM

## 2022-01-18 PROCEDURE — 1123F ACP DISCUSS/DSCN MKR DOCD: CPT | Performed by: STUDENT IN AN ORGANIZED HEALTH CARE EDUCATION/TRAINING PROGRAM

## 2022-01-18 PROCEDURE — 4040F PNEUMOC VAC/ADMIN/RCVD: CPT | Performed by: STUDENT IN AN ORGANIZED HEALTH CARE EDUCATION/TRAINING PROGRAM

## 2022-01-18 PROCEDURE — 1090F PRES/ABSN URINE INCON ASSESS: CPT | Performed by: STUDENT IN AN ORGANIZED HEALTH CARE EDUCATION/TRAINING PROGRAM

## 2022-01-18 PROCEDURE — 99214 OFFICE O/P EST MOD 30 MIN: CPT | Performed by: STUDENT IN AN ORGANIZED HEALTH CARE EDUCATION/TRAINING PROGRAM

## 2022-01-18 RX ORDER — AMLODIPINE BESYLATE 10 MG/1
10 TABLET ORAL DAILY
Qty: 90 TABLET | Refills: 1 | Status: SHIPPED
Start: 2022-01-18 | End: 2022-02-10

## 2022-01-18 RX ORDER — BLOOD-GLUCOSE METER
1 KIT MISCELLANEOUS DAILY
Qty: 1 KIT | Refills: 0 | Status: SHIPPED
Start: 2022-01-18 | End: 2022-01-27 | Stop reason: SDUPTHER

## 2022-01-18 RX ORDER — ACETAMINOPHEN 500 MG
500 TABLET ORAL EVERY 8 HOURS PRN
Qty: 90 TABLET | Refills: 2 | Status: SHIPPED | OUTPATIENT
Start: 2022-01-18

## 2022-01-18 RX ORDER — LANCETS 30 GAUGE
1 EACH MISCELLANEOUS DAILY
Qty: 100 EACH | Refills: 3 | Status: SHIPPED | OUTPATIENT
Start: 2022-01-18

## 2022-01-18 RX ORDER — DULOXETIN HYDROCHLORIDE 30 MG/1
CAPSULE, DELAYED RELEASE ORAL
COMMUNITY
Start: 2021-11-16 | End: 2022-10-11 | Stop reason: SDUPTHER

## 2022-01-18 RX ORDER — GLUCOSAMINE HCL/CHONDROITIN SU 500-400 MG
CAPSULE ORAL
Qty: 100 STRIP | Refills: 0 | Status: SHIPPED | OUTPATIENT
Start: 2022-01-18

## 2022-01-18 ASSESSMENT — ENCOUNTER SYMPTOMS
SORE THROAT: 0
CONSTIPATION: 0
EYE PAIN: 0
COUGH: 0
RHINORRHEA: 1
SINUS PAIN: 0
ABDOMINAL PAIN: 1
NAUSEA: 0
SINUS PRESSURE: 0
SHORTNESS OF BREATH: 0
EYE REDNESS: 0
DIARRHEA: 0
BACK PAIN: 0
VOMITING: 1

## 2022-01-18 NOTE — PROGRESS NOTES
2022    Stacey Moran (:  1955) is a 73 y.o. female, here for evaluation of the following medical concerns:    HPI  Chief Complaint   Patient presents with    Diabetes     3 month follow up. She would like a script for a glucometer. She can't find hers    Arm Pain     Left arm. If she leans on her arm, it will spasm.  Adenopathy     Has had for about 2 months.  Other     She had a shingles break out 13 days ago. DM2:   Patient is here to fu regarding DM2. Patient is  controlled. Taking all medications and tolerating well. Fasting sugars are running n/a has not had her machine. Patient is not taking ASA and Ace Inhibitor/ARB. Patient is on appropriately-dosed statin. LDL is  at goal.  BP is  controlled. does not hypoglycemic episodes. Patient does not see Podiatry regularly. Saw an Eye Dr Leslie Heck has not seen anyone recently. Patient is aware that it is necessary to see an Eye Dr yearly. Patient does not smoke. Most recent labs reviewed with patient. Patient does have complaints or concerns today. She has issues with transportation    Lab Results   Component Value Date    LABA1C 6.4 2022       Lab Results   Component Value Date    [address] 97 2021     Patient has been having left arm muscle spasms. The working out has helped a little. She gets spasms when she leans on her left arm. She does not really have any pain. This has been going on since she saw the NP in November. She states they were really bad back then. Flexeril does help with it. The 10 mg helps. She had shingles about 13 days ago. She has a little bit of pain but it has since gone away. She has not had the shingles vaccination. She thinks she needs to get it at this point. She will call her insurance company to see where to get it done at     She is also complaining of cervical lymphadenopathy. She states that she has had it since October or November.  She did see the nurse practitioner here for this and was treated accordingly. She would like to get worked up to make sure it is nothing. She denies any red flag symptoms such as weight loss or night sweats. She also states for the last few days she has had some abdominal pain. However she thinks it may be due to gas. She has had it radiate up into her right upper quadrant. She states that she has been able to eat bread and that has been okay for her. She is not currently on anything for reflux. She states that she has had multiple EGDs and colonoscopies and imaging of her belly and nothing has shown anything. Review of Systems   Constitutional: Negative for chills, fatigue and fever. HENT: Positive for rhinorrhea. Negative for congestion, ear pain, postnasal drip, sinus pressure, sinus pain and sore throat. Eyes: Negative for pain and redness. Respiratory: Negative for cough and shortness of breath. Cardiovascular: Negative for chest pain. Gastrointestinal: Positive for abdominal pain and vomiting (from GERD). Negative for constipation, diarrhea and nausea. Flatulence    Genitourinary: Negative for difficulty urinating and dysuria. Musculoskeletal: Positive for arthralgias (left arm pain). Negative for back pain, myalgias and neck pain. Skin: Positive for rash (had shingles but was 13 days ago). Neurological: Negative for dizziness, light-headedness and numbness. Hematological: Positive for adenopathy. Psychiatric/Behavioral: The patient is not nervous/anxious. Prior to Visit Medications    Medication Sig Taking?  Authorizing Provider   amLODIPine (NORVASC) 10 MG tablet Take 1 tablet by mouth daily Yes Amanda Torres DO   SITagliptin (JANUVIA) 100 MG tablet Take 1 tablet by mouth daily Yes Amanda Torres DO   acetaminophen (APAP EXTRA STRENGTH) 500 MG tablet Take 1 tablet by mouth every 8 hours as needed for Pain Yes Amanda Torres DO   glucose monitoring (FREESTYLE FREEDOM) kit 1 kit by Does not apply route daily Yes Amanda Torres,    blood glucose monitor strips Test 3 times a day & as needed for symptoms of irregular blood glucose. Dispense sufficient amount for indicated testing frequency plus additional to accommodate PRN testing needs.  Yes Amanda Torres DO   Insulin Pen Needle 29G X 12MM MISC 1 each by Does not apply route daily Yes Amanda Torres DO   Lancets MISC 1 each by Does not apply route daily Yes Amanda Torres DO   gabapentin (NEURONTIN) 300 MG capsule TAKE ONE CAPSULE BY MOUTH NIGHTLY FOR 90 DAYS Yes Amanda Torres DO   albuterol sulfate  (90 Base) MCG/ACT inhaler Inhale 2 puffs into the lungs every 6 hours as needed for Wheezing Rescue inhaler Yes Amanda Torres DO   ipratropium-albuterol (DUONEB) 0.5-2.5 (3) MG/3ML SOLN nebulizer solution Inhale 3 mLs into the lungs every 4 hours Instructed to take am of procedure Yes Amanda Torres DO   cyclobenzaprine (FLEXERIL) 5 MG tablet Take 1 tablet by mouth nightly as needed for Muscle spasms Yes Amanda Torres DO   amitriptyline (ELAVIL) 50 MG tablet  Yes Historical Provider, MD   atorvastatin (LIPITOR) 40 MG tablet TAKE 1 TABLET BY MOUTH ONE TIME A DAY Yes KAIT Rowe CNP   polyethylene glycol (GLYCOLAX) powder Take 17 g by mouth daily Yes Historical Provider, MD   pantoprazole (PROTONIX) 20 MG tablet Take 1 tablet by mouth daily Yes Joe Beaulieu MD   EPINEPHrine (EPIPEN) 0.3 MG/0.3ML SOAJ injection Use as directed for allergic reaction Yes Trudi Dumont MD   sodium chloride (ALTAMIST SPRAY) 0.65 % nasal spray 1 spray by Nasal route as needed for Congestion Yes Trudi Dumont MD   DULoxetine (CYMBALTA) 30 MG extended release capsule take 1 capsule by mouth once daily  Historical Provider, MD   empagliflozin (JARDIANCE) 10 MG tablet Take 1 tablet by mouth daily  Patient not taking: Reported on 1/18/2022  Maudine Lui, DO   Ertugliflozin L-PyroglutamicAc 5 MG TABS Take 1 tablet daily  Patient not taking: Reported on 1/18/2022  Maudine Lui, DO   methylPREDNISolone (MEDROL DOSEPACK) 4 MG tablet Take by mouth.   Patient not taking: Reported on 1/18/2022  KAIT Washburn - CNP   terconazole (TERAZOL 7) 0.4 % vaginal cream Place vaginally nightly for 7 nights  Patient not taking: Reported on 1/18/2022  Patrice Vences,    acyclovir (ZOVIRAX) 400 MG tablet Take 1 tablet by mouth 2 times daily for 10 days  KAIT Garcia - CINDY        Allergies   Allergen Reactions    Adhesive Tape Rash     bruising    Nsaids      Irritates IBS    Penicillins Hives       Past Medical History:   Diagnosis Date    Arthritis     Asthma     controlled     Depression     Diabetes mellitus ([facility] [address]     Fibromyalgia     GERD (gastroesophageal reflux disease)     Hepatitis C 2016    treated     Hyperlipidemia     Hypertension     IBS (irritable bowel syndrome)     Overactive bladder     Spleen injury     hospitalized 11/2019 - resolved as of 12-20-19        Past Surgical History:   Procedure Laterality Date    ANESTHESIA NERVE BLOCK N/A 3/26/2019    L4-5 EPIDURAL STEROID INJECTION performed by Isabel Melendrez DO at [facility] Right 6/5/2019    RIGHT ENDOSCOPIC CARPAL TUNNEL RELEASE performed by Darin Juarez MD at [place] [address]      ENDOSCOPY, COLON, DIAGNOSTIC      EPIDURAL STEROID INJECTION N/A 5/16/2019    L4-5 EPIDURAL STEROID INJECTION performed by Isabel Melendrez DO at [address] [place] [address]  03/26/2019    with sedation   [address] left     PAIN MANAGEMENT PROCEDURE N/A 12/26/2019    L4, L5 EPIDURAL STEROID INJECTION ([id number]) performed by Isabel Melendrez DO at [facility] Right 6/5/2019    RIGHT ULNAR NERVE DECOMPRESSION AT ELBOW WITH POSSIBLE NERVE TRANSPOSITION performed by Darin Juarez MD at [facility] ARTHROSCOPY Right        Social History     Socioeconomic History    Marital status: Single     Spouse name: Not on file    Number of children: 11    Years of education: Not on file    Highest education level: Some college, no degree   Occupational History    Not on file   Tobacco Use    Smoking status: Former Smoker     Packs/day: 0.25     Years: 45.00     Pack years: 11.25     Types: Cigarettes     Quit date: 2020     Years since quittin.3    Smokeless tobacco: Never Used   Vaping Use    Vaping Use: Some days    Substances: Never, THC   Substance and Sexual Activity    Alcohol use: Yes     Comment: rare    Drug use: Yes     Types: Marijuana Gelene Chasten)     Comment: holds medical card-Vaping    Sexual activity: Yes     Partners: Male   Other Topics Concern    Not on file   Social History Narrative    -Referral to  for financial strain and stress,depression, anxiety    -Smoking cessation brochure being mailed out    -Pt utilizes 3 food pantries    -[employer] transportation from [address] [place] [address] for [facility]     Social Determinants of Health     Financial Resource Strain: Low Risk     Difficulty of Paying Living Expenses: Not hard at all   Food Insecurity: No Food Insecurity    Worried About [facility] in the Last Year: Never true    [facility] in the Last Year: Never true   Transportation Needs:     Lack of Transportation (Medical): Not on file    Lack of Transportation (Non-Medical):  Not on file   Physical Activity:     Days of Exercise per Week: Not on file    Minutes of Exercise per Session: Not on file   Stress:     Feeling of Stress : Not on file   Social Connections:     Frequency of Communication with Friends and Family: Not on file    Frequency of Social Gatherings with Friends and Family: Not on file    Attends Quaker Services: Not on file    Active Member of Clubs or Organizations: Not on file    Attends Club or Organization Meetings: Not on file    Marital Status: Not on file   Intimate Partner Violence:     Fear of Current or Ex-Partner: Not on 11/06/2019    CREATININE 0.6 11/05/2019    LABGLOM >60 04/07/2021    LABGLOM >60 11/06/2019    LABGLOM >60 11/05/2019    GFRAA >60 04/07/2021    GFRAA >60 11/06/2019    GFRAA >60 11/05/2019    CALCIUM 10.0 04/07/2021    CALCIUM 8.6 11/06/2019    CALCIUM 9.1 11/05/2019    PROT 7.7 04/07/2021    PROT 6.4 11/06/2019    PROT 7.0 11/05/2019    LABALBU 4.6 04/07/2021    LABALBU 3.1 11/06/2019    LABALBU 3.9 11/05/2019    BILITOT 0.5 04/07/2021    BILITOT 1.0 11/06/2019    BILITOT 1.4 11/05/2019    ALKPHOS 74 04/07/2021    ALKPHOS 63 11/06/2019    ALKPHOS 67 11/05/2019    AST 19 04/07/2021    AST 12 11/06/2019    AST 13 11/05/2019    ALT 13 04/07/2021    ALT 7 11/06/2019    ALT 8 11/05/2019     A1C  Lab Results   Component Value Date    LABA1C 6.4 01/18/2022    LABA1C 6.1 10/18/2021    LABA1C 5.8 02/08/2021     TSH  Lab Results   Component Value Date    TSH 0.900 04/07/2021    TSH 1.560 08/08/2019    TSH 3.540 01/24/2019     LIPID  Lab Results   Component Value Date    CHOL 198 04/07/2021    HDL 85 04/07/2021    HDL 95 01/24/2019    LDLCALC 97 04/07/2021    LDLCALC 107 01/24/2019    TRIG 79 04/07/2021     VITAMIN D  Lab Results   Component Value Date    VITD25 36 04/07/2021     MAGNESIUM  Lab Results   Component Value Date    MG 2.0 11/04/2019       HEPATITIS C  Lab Results   Component Value Date    HCVABI REACTIVE 04/25/2019     UA  Lab Results   Component Value Date    COLORU Yellow 11/03/2019    COLORU Yellow 06/19/2019    COLORU Yellow 05/26/2019    CLARITYU Clear 11/03/2019    CLARITYU Clear 06/19/2019    CLARITYU Clear 05/26/2019    GLUCOSEU 500 11/03/2019    GLUCOSEU >=1000 06/19/2019    GLUCOSEU >=1000 05/26/2019    BILIRUBINUR SMALL 11/03/2019    BILIRUBINUR Negative 06/19/2019    BILIRUBINUR Negative 05/26/2019    KETUA >=80 11/03/2019    KETUA TRACE 06/19/2019    KETUA Negative 05/26/2019    SPECGRAV >=1.030 11/03/2019    SPECGRAV 1.015 06/19/2019    SPECGRAV <=1.005 05/26/2019    BLOODU TRACE-INTACT 11/03/2019 BLOODU Negative 06/19/2019    BLOODU Negative 05/26/2019    PHUR 5.5 11/03/2019    PHUR 5.5 06/19/2019    PHUR 5.0 05/26/2019    PROTEINU TRACE 11/03/2019    PROTEINU Negative 06/19/2019    PROTEINU Negative 05/26/2019    UROBILINOGEN 0.2 11/03/2019    UROBILINOGEN 0.2 06/19/2019    UROBILINOGEN 0.2 05/26/2019    NITRU Negative 11/03/2019    NITRU Negative 06/19/2019    NITRU Negative 05/26/2019    LEUKOCYTESUR Negative 11/03/2019    LEUKOCYTESUR Negative 06/19/2019    LEUKOCYTESUR TRACE 05/26/2019     Urine Micro/Albumin Ratio  Lab Results   Component Value Date    MALBCR <30 02/08/2021    MALBCR <30 MG 07/16/2019        Physical Exam  Vitals and nursing note reviewed. Constitutional:       Appearance: Normal appearance. She is normal weight. HENT:      Head: Normocephalic and atraumatic. Right Ear: Tympanic membrane, ear canal and external ear normal.      Left Ear: Tympanic membrane, ear canal and external ear normal.      Nose: Nose normal.      Mouth/Throat:      Mouth: Mucous membranes are moist.      Pharynx: Oropharynx is clear. Eyes:      Extraocular Movements: Extraocular movements intact. Conjunctiva/sclera: Conjunctivae normal.      Pupils: Pupils are equal, round, and reactive to light. Cardiovascular:      Rate and Rhythm: Normal rate and regular rhythm. Pulses: Normal pulses. Heart sounds: Normal heart sounds. Pulmonary:      Effort: Pulmonary effort is normal.      Breath sounds: Normal breath sounds. Abdominal:      General: Bowel sounds are normal.      Palpations: Abdomen is soft. Musculoskeletal:         General: Normal range of motion. Cervical back: Normal range of motion and neck supple. Skin:     General: Skin is warm and dry. Capillary Refill: Capillary refill takes less than 2 seconds. Neurological:      General: No focal deficit present. Mental Status: She is alert and oriented to person, place, and time.    Psychiatric:         Mood and Affect: Mood normal.         Behavior: Behavior normal.         Thought Content: Thought content normal.         ASSESSMENT/PLAN:  Rabia Ny was seen today for diabetes, arm pain, adenopathy and other. Diagnoses and all orders for this visit:    Type 2 diabetes mellitus with hyperglycemia, without long-term current use of insulin (HCC)  -     POCT glycosylated hemoglobin (Hb A1C)  -     SITagliptin (JANUVIA) 100 MG tablet; Take 1 tablet by mouth daily  -     glucose monitoring (FREESTYLE FREEDOM) kit; 1 kit by Does not apply route daily  -     blood glucose monitor strips; Test 3 times a day & as needed for symptoms of irregular blood glucose. Dispense sufficient amount for indicated testing frequency plus additional to accommodate PRN testing needs. -     Insulin Pen Needle 29G X 12MM MISC; 1 each by Does not apply route daily  -     Lancets MISC; 1 each by Does not apply route daily  -     Dread Tamayo, DPM, PodiatryJaime (GLORIA)    Essential hypertension  -     amLODIPine (NORVASC) 10 MG tablet; Take 1 tablet by mouth daily    Intractable migraine without status migrainosus, unspecified migraine type  -     acetaminophen (APAP EXTRA STRENGTH) 500 MG tablet; Take 1 tablet by mouth every 8 hours as needed for Pain    Right upper quadrant abdominal pain  -     US ABDOMEN COMPLETE; Future    Rheumatoid arthritis involving both hands with positive rheumatoid factor (HCC)    Moderate persistent asthma with acute exacerbation    Lymphadenopathy  -     US SOFT TISSUE LIMITED AREA; Future         As above. Call or go to the nearest ED immediately if symptoms worsen or persist.  Educational materials and/or home exercises printed for patient's review and were included in patient instructions on his/her After Visit Summary and given to patient at the end of visit. Counseled regarding above diagnosis, including possible risks and complications,  especially if left uncontrolled.      Counseled regarding the possible side effects, risks, benefits and alternatives to treatment; patient and/or guardian verbalizes understanding, agrees, feels comfortable with and wishes to proceed with above treatment plan. Advised patient to call with any new medication issues, and read all Rx info from pharmacy to assure aware of all possible risks and side effects of medication before taking. Reviewed age and gender appropriate health screening exams and vaccinations. Advised patient regarding importance of keeping up with recommended health maintenance and to schedule as soon as possible if overdue, as this is important in assessing for undiagnosed pathology, especially cancer, as well as protecting against potentially harmful/life threatening disease. Patient and/or guardian verbalizes understanding and agrees with above counseling, assessment and plan. All questions answered. Return in about 3 months (around 4/18/2022) for diabetes. An  electronic signature was used to authenticate this note.     --Tamara Mckeon,  on 1/18/2022 at 10:55 AM

## 2022-01-20 ENCOUNTER — TELEPHONE (OUTPATIENT)
Dept: FAMILY MEDICINE CLINIC | Age: 67
End: 2022-01-20

## 2022-01-20 NOTE — TELEPHONE ENCOUNTER
Unsure what she is referring to. I ordered a soft tissue US and described where the lymph nodes were.  The cervical region refers to the neck

## 2022-01-20 NOTE — TELEPHONE ENCOUNTER
I spoke with patient and informed her that the cervical region refers to the neck per your instructions.

## 2022-01-20 NOTE — TELEPHONE ENCOUNTER
Anahy Ho called today to ask why her ultrasound is for her cervical region when she mentioned issues with her lymph nodes in her neck.  Please call patient and explain she is very concerned and confused. [phone]

## 2022-01-27 ENCOUNTER — PATIENT MESSAGE (OUTPATIENT)
Dept: FAMILY MEDICINE CLINIC | Age: 67
End: 2022-01-27

## 2022-01-27 DIAGNOSIS — E11.65 TYPE 2 DIABETES MELLITUS WITH HYPERGLYCEMIA, WITHOUT LONG-TERM CURRENT USE OF INSULIN (HCC): Primary | ICD-10-CM

## 2022-01-27 DIAGNOSIS — E11.65 TYPE 2 DIABETES MELLITUS WITH HYPERGLYCEMIA, WITHOUT LONG-TERM CURRENT USE OF INSULIN (HCC): ICD-10-CM

## 2022-01-27 RX ORDER — BLOOD-GLUCOSE METER
1 KIT MISCELLANEOUS DAILY
Qty: 1 EACH | Refills: 0 | Status: SHIPPED | OUTPATIENT
Start: 2022-01-27

## 2022-01-27 RX ORDER — BLOOD-GLUCOSE METER
1 KIT MISCELLANEOUS DAILY
Qty: 1 KIT | Refills: 0 | Status: SHIPPED
Start: 2022-01-27 | End: 2022-03-28

## 2022-01-27 NOTE — TELEPHONE ENCOUNTER
I will send it in again but unsure if this is the right thing to send.  May need to ask the pharmacy what they want ordered to be approved by insurance

## 2022-01-27 NOTE — TELEPHONE ENCOUNTER
From: Irina Sims  To: Dr. Alexi Flores: 1/27/2022 11:41 AM EST  Subject: Newton Jacobsen (1955)    Would you please resend the Free Style Testing kit to [facility]. My insurance doesn't cover the kit. The phone no. for [employer] is [phone]. Thanks!

## 2022-02-03 ENCOUNTER — HOSPITAL ENCOUNTER (OUTPATIENT)
Dept: ULTRASOUND IMAGING | Age: 67
Discharge: HOME | End: 2022-02-05
Payer: COMMERCIAL

## 2022-02-03 DIAGNOSIS — R59.1 LYMPHADENOPATHY: ICD-10-CM

## 2022-02-03 DIAGNOSIS — R10.11 RIGHT UPPER QUADRANT ABDOMINAL PAIN: ICD-10-CM

## 2022-02-03 PROCEDURE — 76999 ECHO EXAMINATION PROCEDURE: CPT

## 2022-02-03 PROCEDURE — 76705 ECHO EXAM OF ABDOMEN: CPT

## 2022-02-08 ENCOUNTER — TELEPHONE (OUTPATIENT)
Dept: OBGYN | Age: 67
End: 2022-02-08

## 2022-02-08 DIAGNOSIS — I10 ESSENTIAL HYPERTENSION: ICD-10-CM

## 2022-02-09 RX ORDER — FLUCONAZOLE 150 MG/1
150 TABLET ORAL DAILY
Qty: 1 TABLET | Refills: 1 | Status: SHIPPED | OUTPATIENT
Start: 2022-02-09 | End: 2022-02-10

## 2022-02-10 RX ORDER — AMLODIPINE BESYLATE 10 MG/1
TABLET ORAL
Qty: 90 TABLET | Refills: 1 | Status: SHIPPED
Start: 2022-02-10 | End: 2022-10-11 | Stop reason: SDUPTHER

## 2022-02-10 RX ORDER — GABAPENTIN 300 MG/1
CAPSULE ORAL
Qty: 30 CAPSULE | Refills: 2 | Status: SHIPPED
Start: 2022-02-10 | End: 2022-10-11 | Stop reason: SDUPTHER

## 2022-02-10 NOTE — TELEPHONE ENCOUNTER
Last Appointment:  4/7/2021  Future Appointments   Date Time Provider Ilan Cramer   3/17/2022  1:00 PM [address] [facility] LAB RM 2 [facility]   4/18/2022 11:00 AM Amanda Torres DO [facility]      Amlodipine was refilled 1/18/22 for #90/1

## 2022-03-07 ENCOUNTER — APPOINTMENT (OUTPATIENT)
Dept: GENERAL RADIOLOGY | Age: 67
End: 2022-03-07
Payer: COMMERCIAL

## 2022-03-07 ENCOUNTER — APPOINTMENT (OUTPATIENT)
Dept: CT IMAGING | Age: 67
End: 2022-03-07
Payer: COMMERCIAL

## 2022-03-07 ENCOUNTER — HOSPITAL ENCOUNTER (EMERGENCY)
Age: 67
Discharge: HOME | End: 2022-03-08
Attending: EMERGENCY MEDICINE
Payer: COMMERCIAL

## 2022-03-07 DIAGNOSIS — K59.00 CONSTIPATION, UNSPECIFIED CONSTIPATION TYPE: ICD-10-CM

## 2022-03-07 DIAGNOSIS — R10.10 PAIN OF UPPER ABDOMEN: Primary | ICD-10-CM

## 2022-03-07 LAB
ALBUMIN SERPL-MCNC: 4.8 G/DL (ref 3.5–5.2)
ALP BLD-CCNC: 87 U/L (ref 35–104)
ALT SERPL-CCNC: 13 U/L (ref 0–32)
ANION GAP SERPL CALCULATED.3IONS-SCNC: 13 MMOL/L (ref 7–16)
AST SERPL-CCNC: 15 U/L (ref 0–31)
BASOPHILS ABSOLUTE: 0.04 E9/L (ref 0–0.2)
BASOPHILS RELATIVE PERCENT: 0.5 % (ref 0–2)
BILIRUB SERPL-MCNC: 0.7 MG/DL (ref 0–1.2)
BUN BLDV-MCNC: 10 MG/DL (ref 6–23)
CALCIUM SERPL-MCNC: 10.3 MG/DL (ref 8.6–10.2)
CHLORIDE BLD-SCNC: 99 MMOL/L (ref 98–107)
CO2: 26 MMOL/L (ref 22–29)
CREAT SERPL-MCNC: 0.7 MG/DL (ref 0.5–1)
EKG ATRIAL RATE: 73 BPM
EKG P AXIS: 83 DEGREES
EKG P-R INTERVAL: 168 MS
EKG Q-T INTERVAL: 390 MS
EKG QRS DURATION: 78 MS
EKG QTC CALCULATION (BAZETT): 429 MS
EKG R AXIS: 50 DEGREES
EKG T AXIS: 81 DEGREES
EKG VENTRICULAR RATE: 73 BPM
EOSINOPHILS ABSOLUTE: 0.08 E9/L (ref 0.05–0.5)
EOSINOPHILS RELATIVE PERCENT: 1 % (ref 0–6)
GFR AFRICAN AMERICAN: >60
GFR NON-AFRICAN AMERICAN: >60 ML/MIN/1.73
GLUCOSE BLD-MCNC: 132 MG/DL (ref 74–99)
HCT VFR BLD CALC: 47.5 % (ref 34–48)
HEMOGLOBIN: 16.3 G/DL (ref 11.5–15.5)
IMMATURE GRANULOCYTES #: 0.02 E9/L
IMMATURE GRANULOCYTES %: 0.3 % (ref 0–5)
LIPASE: 47 U/L (ref 13–60)
LYMPHOCYTES ABSOLUTE: 2.17 E9/L (ref 1.5–4)
LYMPHOCYTES RELATIVE PERCENT: 28.4 % (ref 20–42)
MCH RBC QN AUTO: 31.5 PG (ref 26–35)
MCHC RBC AUTO-ENTMCNC: 34.3 % (ref 32–34.5)
MCV RBC AUTO: 91.9 FL (ref 80–99.9)
MONOCYTES ABSOLUTE: 0.52 E9/L (ref 0.1–0.95)
MONOCYTES RELATIVE PERCENT: 6.8 % (ref 2–12)
NEUTROPHILS ABSOLUTE: 4.8 E9/L (ref 1.8–7.3)
NEUTROPHILS RELATIVE PERCENT: 63 % (ref 43–80)
PDW BLD-RTO: 13.4 FL (ref 11.5–15)
PLATELET # BLD: 225 E9/L (ref 130–450)
PMV BLD AUTO: 10.7 FL (ref 7–12)
POTASSIUM REFLEX MAGNESIUM: 3.6 MMOL/L (ref 3.5–5)
RBC # BLD: 5.17 E12/L (ref 3.5–5.5)
SODIUM BLD-SCNC: 138 MMOL/L (ref 132–146)
TOTAL PROTEIN: 8.4 G/DL (ref 6.4–8.3)
TROPONIN, HIGH SENSITIVITY: <6 NG/L (ref 0–9)
WBC # BLD: 7.6 E9/L (ref 4.5–11.5)

## 2022-03-07 PROCEDURE — 36415 COLL VENOUS BLD VENIPUNCTURE: CPT

## 2022-03-07 PROCEDURE — 83605 ASSAY OF LACTIC ACID: CPT

## 2022-03-07 PROCEDURE — 96374 THER/PROPH/DIAG INJ IV PUSH: CPT

## 2022-03-07 PROCEDURE — 74177 CT ABD & PELVIS W/CONTRAST: CPT

## 2022-03-07 PROCEDURE — C9113 INJ PANTOPRAZOLE SODIUM, VIA: HCPCS | Performed by: PHYSICIAN ASSISTANT

## 2022-03-07 PROCEDURE — 84484 ASSAY OF TROPONIN QUANT: CPT

## 2022-03-07 PROCEDURE — 96375 TX/PRO/DX INJ NEW DRUG ADDON: CPT

## 2022-03-07 PROCEDURE — 85025 COMPLETE CBC W/AUTO DIFF WBC: CPT

## 2022-03-07 PROCEDURE — 2580000003 HC RX 258: Performed by: PHYSICIAN ASSISTANT

## 2022-03-07 PROCEDURE — 6360000002 HC RX W HCPCS: Performed by: PHYSICIAN ASSISTANT

## 2022-03-07 PROCEDURE — 93005 ELECTROCARDIOGRAM TRACING: CPT | Performed by: PHYSICIAN ASSISTANT

## 2022-03-07 PROCEDURE — 81001 URINALYSIS AUTO W/SCOPE: CPT

## 2022-03-07 PROCEDURE — 80053 COMPREHEN METABOLIC PANEL: CPT

## 2022-03-07 PROCEDURE — 6360000004 HC RX CONTRAST MEDICATION: Performed by: RADIOLOGY

## 2022-03-07 PROCEDURE — 99285 EMERGENCY DEPT VISIT HI MDM: CPT

## 2022-03-07 PROCEDURE — 71046 X-RAY EXAM CHEST 2 VIEWS: CPT

## 2022-03-07 PROCEDURE — 83690 ASSAY OF LIPASE: CPT

## 2022-03-07 RX ORDER — PANTOPRAZOLE SODIUM 40 MG/10ML
40 INJECTION, POWDER, LYOPHILIZED, FOR SOLUTION INTRAVENOUS ONCE
Status: COMPLETED | OUTPATIENT
Start: 2022-03-07 | End: 2022-03-07

## 2022-03-07 RX ORDER — ONDANSETRON 2 MG/ML
4 INJECTION INTRAMUSCULAR; INTRAVENOUS ONCE
Status: COMPLETED | OUTPATIENT
Start: 2022-03-07 | End: 2022-03-07

## 2022-03-07 RX ORDER — 0.9 % SODIUM CHLORIDE 0.9 %
1000 INTRAVENOUS SOLUTION INTRAVENOUS ONCE
Status: COMPLETED | OUTPATIENT
Start: 2022-03-07 | End: 2022-03-07

## 2022-03-07 RX ADMIN — IOPAMIDOL 90 ML: 755 INJECTION, SOLUTION INTRAVENOUS at 22:09

## 2022-03-07 RX ADMIN — PANTOPRAZOLE SODIUM 40 MG: 40 INJECTION, POWDER, FOR SOLUTION INTRAVENOUS at 21:39

## 2022-03-07 RX ADMIN — SODIUM CHLORIDE 1000 ML: 9 INJECTION, SOLUTION INTRAVENOUS at 21:35

## 2022-03-07 RX ADMIN — ONDANSETRON 4 MG: 2 INJECTION INTRAMUSCULAR; INTRAVENOUS at 21:38

## 2022-03-07 ASSESSMENT — PAIN SCALES - GENERAL: PAINLEVEL_OUTOF10: 7

## 2022-03-07 ASSESSMENT — PAIN DESCRIPTION - PAIN TYPE: TYPE: ACUTE PAIN

## 2022-03-07 ASSESSMENT — PAIN DESCRIPTION - DESCRIPTORS: DESCRIPTORS: CRAMPING;CONSTANT

## 2022-03-07 ASSESSMENT — PAIN - FUNCTIONAL ASSESSMENT: PAIN_FUNCTIONAL_ASSESSMENT: 0-10

## 2022-03-07 ASSESSMENT — PAIN DESCRIPTION - LOCATION: LOCATION: ABDOMEN

## 2022-03-07 NOTE — ED NOTES
Department of Emergency Medicine  FIRST PROVIDER TRIAGE NOTE          3/7/22  1:54 PM EST    Date of Encounter: No admission date for patient encounter. MRN: [id number]      HPI: Steve Cannon is a 73 y.o. female who presents to the ED for Abdominal Pain (Pt has been having issue with gerd, constipation, chest pain, back pain for 1 week)  Patient reports that she takes pantoprazole daily for her GERD and she has been taking it accordingly. Patient reports that her symptoms first started after eating some beans approximately 1 week prior. Patient reports that she has vomited daily but denies any bright red or coffee-ground emesis. Patient reports that her stomach pain is sharp around her back. ROS: Negative for cp, sob, fever, diarrhea, headache or dizziness. PE: Gen Appearance/Constitutional: alert  CV: regular rate  Pulm: CTA bilat  GI: tender to palpation over epigastric region. Initial Plan of Care: All treatment areas with department are currently occupied. Plan to order/Initiate the following while awaiting opening in ED: labs, EKG, imaging studies, antiemetics and IV fluids. Labs:  No results found for this visit on 03/07/22. Imaging: All Radiology results interpreted by Radiologist unless otherwise noted.   No orders to display     ED Course    Medications - No data to display     -------------------------  Patient brought to treatment area for further evaluation  Electronically signed by KENTRELL Pineda   DD: 3/7/22       Rickey Pineda  03/07/22 [id number]

## 2022-03-08 VITALS
SYSTOLIC BLOOD PRESSURE: 134 MMHG | HEART RATE: 79 BPM | OXYGEN SATURATION: 100 % | TEMPERATURE: 99 F | RESPIRATION RATE: 14 BRPM | DIASTOLIC BLOOD PRESSURE: 87 MMHG

## 2022-03-08 LAB
BACTERIA: ABNORMAL /HPF
BILIRUBIN URINE: NEGATIVE
BLOOD, URINE: NEGATIVE
CLARITY: CLEAR
COLOR: YELLOW
EPITHELIAL CELLS, UA: ABNORMAL /HPF
GLUCOSE URINE: 250 MG/DL
KETONES, URINE: 15 MG/DL
LACTIC ACID: 0.4 MMOL/L (ref 0.5–2.2)
LEUKOCYTE ESTERASE, URINE: NEGATIVE
NITRITE, URINE: NEGATIVE
PH UA: 5.5 (ref 5–9)
PROTEIN UA: NEGATIVE MG/DL
RBC UA: ABNORMAL /HPF (ref 0–2)
SPECIFIC GRAVITY UA: <=1.005 (ref 1–1.03)
UROBILINOGEN, URINE: 0.2 E.U./DL
WBC UA: ABNORMAL /HPF (ref 0–5)

## 2022-03-08 PROCEDURE — 6370000000 HC RX 637 (ALT 250 FOR IP): Performed by: EMERGENCY MEDICINE

## 2022-03-08 PROCEDURE — 6370000000 HC RX 637 (ALT 250 FOR IP): Performed by: PHYSICIAN ASSISTANT

## 2022-03-08 RX ORDER — ONDANSETRON 4 MG/1
4 TABLET, ORALLY DISINTEGRATING ORAL EVERY 8 HOURS PRN
Qty: 10 TABLET | Refills: 0 | Status: SHIPPED | OUTPATIENT
Start: 2022-03-08 | End: 2022-03-11

## 2022-03-08 RX ADMIN — MAGNESIUM CITRATE 296 ML: 1.75 LIQUID ORAL at 01:05

## 2022-03-08 RX ADMIN — LIDOCAINE HYDROCHLORIDE: 20 SOLUTION ORAL; TOPICAL at 00:04

## 2022-03-08 NOTE — ED PROVIDER NOTES
HPI:  3/7/22, Time: 9:07 PM JOHN Lion is a 73 y.o. female presenting to the ED for abdominal pain, beginning since last week ago. The complaint has been persistent, moderate in severity, and worsened by nothing. Patient reporting abdominal pain with nausea vomiting. Patient reporting no black or tarry stools she reports hard stools. Patient reporting no vomiting today. She reports the pain is epigastric radiating to her flank bilaterally she reports a history of cholecystectomy. Patient reports that this stems from eating beans last week and she actually started eating the soup ate the brown beings which she states do not always agree with her. Patient reporting no leg pain or swelling. Patient does have history of otitis. Patient reporting no weakness    ROS:   Pertinent positives and negatives are stated within HPI, all other systems reviewed and are negative.  --------------------------------------------- PAST HISTORY ---------------------------------------------  Past Medical History:  has a past medical history of Arthritis, Asthma, Depression, Diabetes mellitus ([address] Fibromyalgia, GERD (gastroesophageal reflux disease), Hepatitis C, Hyperlipidemia, Hypertension, IBS (irritable bowel syndrome), Overactive bladder, and Spleen injury. Past Surgical History:  has a past surgical history that includes Ovary removal; Dilation & curettage; Shoulder arthroscopy (Right); Nerve Block (03/26/2019); Anesthesia Nerve Block (N/A, 3/26/2019); epidural steroid injection (N/A, 5/16/2019); Colonoscopy; Endoscopy, colon, diagnostic; Carpal tunnel release (Right, 6/5/2019); pr repair major peripheral nerve (Right, 6/5/2019); Pain management procedure (N/A, 12/26/2019); and Hysterectomy. Social History:  reports that she quit smoking about 17 months ago. Her smoking use included cigarettes. She has a 11.25 pack-year smoking history.  She has never used smokeless tobacco. She reports current alcohol use. She reports current drug use. Drug: Marijuana Alexsandra Burgess). Family History: family history includes Alcohol Abuse in her father; Breast Cancer in her paternal aunt; Breast Cancer (age of onset: 28) in her maternal aunt; Cancer in her mother; Diabetes in her mother. The patients home medications have been reviewed. Allergies: Adhesive tape, Nsaids, and Penicillins    ---------------------------------------------------PHYSICAL EXAM--------------------------------------    Constitutional/General: Alert and oriented x3, well appearing, non toxic in NAD  Head: Normocephalic and atraumatic  Eyes: PERRL, EOMI  Mouth: Oropharynx clear, handling secretions, no trismus  Neck: Supple, full ROM, non tender to palpation in the midline, no stridor, no crepitus, no meningeal signs  Pulmonary: Lungs clear to auscultation bilaterally, no wheezes, rales, or rhonchi. Not in respiratory distress  Cardiovascular:  Regular rate. Regular rhythm. No murmurs, gallops, or rubs. 2+ distal pulses  Chest: no chest wall tenderness  Abdomen: Soft. Tender upper Non distended. +BS. No rebound, guarding, or rigidity. No pulsatile masses appreciated. Hemoccult negative no impaction  Musculoskeletal: Moves all extremities x 4. Warm and well perfused, no clubbing, cyanosis, or edema. Capillary refill <3 seconds  Skin: warm and dry. No rashes. Neurologic: GCS 15, CN 2-12 grossly intact, no focal deficits, symmetric strength 5/5 in the upper and lower extremities bilaterally  Psych: Normal Affect    -------------------------------------------------- RESULTS -------------------------------------------------  I have personally reviewed all laboratory and imaging results for this patient. Results are listed below.      LABS:  Results for orders placed or performed during the hospital encounter of 03/07/22   CBC with Auto Differential   Result Value Ref Range    WBC 7.6 4.5 - 11.5 E9/L    RBC 5.17 3.50 - 5.50 E12/L    Hemoglobin 16.3 (H) 11.5 - 15.5 g/dL    Hematocrit 47.5 34.0 - 48.0 %    MCV 91.9 80.0 - 99.9 fL    MCH 31.5 26.0 - 35.0 pg    MCHC 34.3 32.0 - 34.5 %    RDW 13.4 11.5 - 15.0 fL    Platelets 203 524 - 168 E9/L    MPV 10.7 7.0 - 12.0 fL    Neutrophils % 63.0 43.0 - 80.0 %    Immature Granulocytes % 0.3 0.0 - 5.0 %    Lymphocytes % 28.4 20.0 - 42.0 %    Monocytes % 6.8 2.0 - 12.0 %    Eosinophils % 1.0 0.0 - 6.0 %    Basophils % 0.5 0.0 - 2.0 %    Neutrophils Absolute 4.80 1.80 - 7.30 E9/L    Immature Granulocytes # 0.02 E9/L    Lymphocytes Absolute 2.17 1.50 - 4.00 E9/L    Monocytes Absolute 0.52 0.10 - 0.95 E9/L    Eosinophils Absolute 0.08 0.05 - 0.50 E9/L    Basophils Absolute 0.04 0.00 - 0.20 E9/L   Comprehensive Metabolic Panel w/ Reflex to MG   Result Value Ref Range    Sodium 138 132 - 146 mmol/L    Potassium reflex Magnesium 3.6 3.5 - 5.0 mmol/L    Chloride 99 98 - 107 mmol/L    CO2 26 22 - 29 mmol/L    Anion Gap 13 7 - 16 mmol/L    Glucose 132 (H) 74 - 99 mg/dL    BUN 10 6 - 23 mg/dL    CREATININE 0.7 0.5 - 1.0 mg/dL    GFR Non-African American >60 >=60 mL/min/1.73    GFR African American >60     Calcium 10.3 (H) 8.6 - 10.2 mg/dL    Total Protein 8.4 (H) 6.4 - 8.3 g/dL    Albumin 4.8 3.5 - 5.2 g/dL    Total Bilirubin 0.7 0.0 - 1.2 mg/dL    Alkaline Phosphatase 87 35 - 104 U/L    ALT 13 0 - 32 U/L    AST 15 0 - 31 U/L   Troponin   Result Value Ref Range    Troponin, High Sensitivity <6 0 - 9 ng/L   Urinalysis with Microscopic   Result Value Ref Range    Color, UA Yellow Straw/Yellow    Clarity, UA Clear Clear    Glucose, Ur 250 (A) Negative mg/dL    Bilirubin Urine Negative Negative    Ketones, Urine 15 (A) Negative mg/dL    Specific Gravity, UA <=1.005 1.005 - 1.030    Blood, Urine Negative Negative    pH, UA 5.5 5.0 - 9.0    Protein, UA Negative Negative mg/dL    Urobilinogen, Urine 0.2 <2.0 E.U./dL    Nitrite, Urine Negative Negative    Leukocyte Esterase, Urine Negative Negative    WBC, UA 0-1 0 - 5 /HPF    RBC, UA 0-1 0 - 2 /HPF Epithelial Cells, UA RARE /HPF    Bacteria, UA NONE SEEN None Seen /HPF   Lactic Acid   Result Value Ref Range    Lactic Acid 0.4 (L) 0.5 - 2.2 mmol/L   Lipase   Result Value Ref Range    Lipase 47 13 - 60 U/L   EKG 12 Lead   Result Value Ref Range    Ventricular Rate 73 BPM    Atrial Rate 73 BPM    P-R Interval 168 ms    QRS Duration 78 ms    Q-T Interval 390 ms    QTc Calculation (Bazett) 429 ms    P Axis 83 degrees    R Axis 50 degrees    T Axis 81 degrees       RADIOLOGY:  Interpreted by Radiologist.  CT ABDOMEN PELVIS W IV CONTRAST Additional Contrast? None   Final Result   Diffuse colonic fecal retention. Diffuse urinary bladder wall thickening. XR CHEST (2 VW)   Final Result   No radiographic evidence of acute cardiopulmonary disease. EKG:  This EKG is signed and interpreted by me. Rate: 73  Rhythm: Sinus  Interpretation: non-specific EKG  Comparison: stable as compared to patient's most recent EKG        ------------------------- NURSING NOTES AND VITALS REVIEWED ---------------------------   The nursing notes within the ED encounter and vital signs as below have been reviewed by myself. BP (!) 139/111   Pulse 117   Temp 99 °F (37.2 °C) (Oral)   Resp 16   SpO2 97%   Oxygen Saturation Interpretation: Normal    The patients available past medical records and past encounters were reviewed.         ------------------------------ ED COURSE/MEDICAL DECISION MAKING----------------------  Medications   magnesium citrate solution 296 mL (has no administration in time range)   0.9 % sodium chloride bolus (0 mLs IntraVENous Stopped 3/7/22 2351)   ondansetron (ZOFRAN) injection 4 mg (4 mg IntraVENous Given 3/7/22 2138)   pantoprazole (PROTONIX) injection 40 mg (40 mg IntraVENous Given 3/7/22 2139)   iopamidol (ISOVUE-370) 76 % injection 90 mL (90 mLs IntraVENous Given 3/7/22 2209)   aluminum & magnesium hydroxide-simethicone (MAALOX) 30 mL, lidocaine viscous hcl (XYLOCAINE) 5 mL (GI COCKTAIL) ( Oral Given 3/8/22 0004)             Medical Decision Making:    Patient present here because of abdominal pain for the past week. Patient reporting some nausea vomiting. Patient reports feeling constipated and having hard stools. Patient reporting no appetite. Patient reporting feeling weak. Patient reports pains in her upper abdomen radiating outwards. Patient reporting no black or tarry stools labs noted reviewed Hemoccult was done and negative. There is no impaction on exam.  Patient has been trying over-the-counter medications for constipation. Patient plan will be to medicate plan will be to discharge. Re-Evaluations:             Patient reevaluated made aware of findings. Patient was able to eat crackers. She was able to drink juice. Patient reports she ate some Jell-O did not agree with her. Patient made aware results and plan. Patient will be discharged home patient was ordered magnesium citrate bottle here. Patient will be given Zofran for home she was to return if symptoms worsen or persist patient aware of this and comfortable plan. Consultations:                 Critical Care: This patient's ED course included: a personal history and physicial eaxmination    This patient has remained hemodynamically stable during their ED course. Counseling: The emergency provider has spoken with the patient and discussed todays results, in addition to providing specific details for the plan of care and counseling regarding the diagnosis and prognosis. Questions are answered at this time and they are agreeable with the plan.       --------------------------------- IMPRESSION AND DISPOSITION ---------------------------------    IMPRESSION  1. Pain of upper abdomen    2. Constipation, unspecified constipation type        DISPOSITION  Disposition: Discharge to home  Patient condition is stable        NOTE: This report was transcribed using voice recognition software.  Every effort was made to ensure accuracy; however, inadvertent computerized transcription errors may be present          Joe Beaulieu MD  03/07/22 Mone Jason MD  03/08/22 [id number]

## 2022-03-22 ENCOUNTER — APPOINTMENT (OUTPATIENT)
Dept: ULTRASOUND IMAGING | Age: 67
End: 2022-03-22
Payer: COMMERCIAL

## 2022-03-22 ENCOUNTER — HOSPITAL ENCOUNTER (EMERGENCY)
Age: 67
Discharge: HOME | End: 2022-03-22
Attending: EMERGENCY MEDICINE
Payer: COMMERCIAL

## 2022-03-22 VITALS
HEIGHT: 63 IN | TEMPERATURE: 32.2 F | SYSTOLIC BLOOD PRESSURE: 142 MMHG | DIASTOLIC BLOOD PRESSURE: 89 MMHG | BODY MASS INDEX: 20.88 KG/M2 | OXYGEN SATURATION: 99 % | HEART RATE: 73 BPM | RESPIRATION RATE: 18 BRPM

## 2022-03-22 DIAGNOSIS — R10.13 ABDOMINAL PAIN, EPIGASTRIC: Primary | ICD-10-CM

## 2022-03-22 LAB
ALBUMIN SERPL-MCNC: 4.5 G/DL (ref 3.5–5.2)
ALP BLD-CCNC: 80 U/L (ref 35–104)
ALT SERPL-CCNC: 9 U/L (ref 0–32)
ANION GAP SERPL CALCULATED.3IONS-SCNC: 11 MMOL/L (ref 7–16)
AST SERPL-CCNC: 13 U/L (ref 0–31)
BACTERIA: ABNORMAL /HPF
BASOPHILS ABSOLUTE: 0.03 E9/L (ref 0–0.2)
BASOPHILS RELATIVE PERCENT: 0.4 % (ref 0–2)
BILIRUB SERPL-MCNC: 0.6 MG/DL (ref 0–1.2)
BILIRUBIN URINE: ABNORMAL
BLOOD, URINE: NEGATIVE
BUN BLDV-MCNC: 10 MG/DL (ref 6–23)
CALCIUM SERPL-MCNC: 10.2 MG/DL (ref 8.6–10.2)
CHLORIDE BLD-SCNC: 100 MMOL/L (ref 98–107)
CLARITY: CLEAR
CO2: 26 MMOL/L (ref 22–29)
COLOR: YELLOW
CREAT SERPL-MCNC: 0.7 MG/DL (ref 0.5–1)
EOSINOPHILS ABSOLUTE: 0.09 E9/L (ref 0.05–0.5)
EOSINOPHILS RELATIVE PERCENT: 1.2 % (ref 0–6)
EPITHELIAL CELLS, UA: ABNORMAL /HPF
GFR AFRICAN AMERICAN: >60
GFR NON-AFRICAN AMERICAN: >60 ML/MIN/1.73
GLUCOSE BLD-MCNC: 172 MG/DL (ref 74–99)
GLUCOSE URINE: 100 MG/DL
HCT VFR BLD CALC: 46.1 % (ref 34–48)
HEMOGLOBIN: 15.3 G/DL (ref 11.5–15.5)
IMMATURE GRANULOCYTES #: 0.02 E9/L
IMMATURE GRANULOCYTES %: 0.3 % (ref 0–5)
KETONES, URINE: ABNORMAL MG/DL
LACTIC ACID: 1.1 MMOL/L (ref 0.5–2.2)
LEUKOCYTE ESTERASE, URINE: NEGATIVE
LIPASE: 53 U/L (ref 13–60)
LYMPHOCYTES ABSOLUTE: 2.15 E9/L (ref 1.5–4)
LYMPHOCYTES RELATIVE PERCENT: 29.3 % (ref 20–42)
MCH RBC QN AUTO: 31 PG (ref 26–35)
MCHC RBC AUTO-ENTMCNC: 33.2 % (ref 32–34.5)
MCV RBC AUTO: 93.3 FL (ref 80–99.9)
MONOCYTES ABSOLUTE: 0.55 E9/L (ref 0.1–0.95)
MONOCYTES RELATIVE PERCENT: 7.5 % (ref 2–12)
NEUTROPHILS ABSOLUTE: 4.49 E9/L (ref 1.8–7.3)
NEUTROPHILS RELATIVE PERCENT: 61.3 % (ref 43–80)
NITRITE, URINE: NEGATIVE
PDW BLD-RTO: 13.3 FL (ref 11.5–15)
PH UA: 5 (ref 5–9)
PLATELET # BLD: 261 E9/L (ref 130–450)
PMV BLD AUTO: 10.2 FL (ref 7–12)
POTASSIUM SERPL-SCNC: 3.9 MMOL/L (ref 3.5–5)
PROTEIN UA: 100 MG/DL
RBC # BLD: 4.94 E12/L (ref 3.5–5.5)
RBC UA: ABNORMAL /HPF (ref 0–2)
SODIUM BLD-SCNC: 137 MMOL/L (ref 132–146)
SPECIFIC GRAVITY UA: >=1.03 (ref 1–1.03)
TOTAL PROTEIN: 8 G/DL (ref 6.4–8.3)
UROBILINOGEN, URINE: 0.2 E.U./DL
WBC # BLD: 7.3 E9/L (ref 4.5–11.5)
WBC UA: ABNORMAL /HPF (ref 0–5)

## 2022-03-22 PROCEDURE — 83690 ASSAY OF LIPASE: CPT

## 2022-03-22 PROCEDURE — 2580000003 HC RX 258: Performed by: EMERGENCY MEDICINE

## 2022-03-22 PROCEDURE — 83605 ASSAY OF LACTIC ACID: CPT

## 2022-03-22 PROCEDURE — 36415 COLL VENOUS BLD VENIPUNCTURE: CPT

## 2022-03-22 PROCEDURE — 99285 EMERGENCY DEPT VISIT HI MDM: CPT

## 2022-03-22 PROCEDURE — 96374 THER/PROPH/DIAG INJ IV PUSH: CPT

## 2022-03-22 PROCEDURE — 6370000000 HC RX 637 (ALT 250 FOR IP): Performed by: EMERGENCY MEDICINE

## 2022-03-22 PROCEDURE — 76705 ECHO EXAM OF ABDOMEN: CPT

## 2022-03-22 PROCEDURE — 81001 URINALYSIS AUTO W/SCOPE: CPT

## 2022-03-22 PROCEDURE — 6370000000 HC RX 637 (ALT 250 FOR IP): Performed by: PHYSICIAN ASSISTANT

## 2022-03-22 PROCEDURE — 2500000003 HC RX 250 WO HCPCS: Performed by: EMERGENCY MEDICINE

## 2022-03-22 PROCEDURE — 93005 ELECTROCARDIOGRAM TRACING: CPT | Performed by: EMERGENCY MEDICINE

## 2022-03-22 PROCEDURE — 80053 COMPREHEN METABOLIC PANEL: CPT

## 2022-03-22 PROCEDURE — 85025 COMPLETE CBC W/AUTO DIFF WBC: CPT

## 2022-03-22 RX ORDER — FAMOTIDINE 20 MG/1
20 TABLET, FILM COATED ORAL 2 TIMES DAILY PRN
Qty: 60 TABLET | Refills: 0 | Status: SHIPPED | OUTPATIENT
Start: 2022-03-22

## 2022-03-22 RX ORDER — ONDANSETRON 4 MG/1
4 TABLET, ORALLY DISINTEGRATING ORAL ONCE
Status: COMPLETED | OUTPATIENT
Start: 2022-03-22 | End: 2022-03-22

## 2022-03-22 RX ORDER — PANTOPRAZOLE SODIUM 40 MG/1
40 TABLET, DELAYED RELEASE ORAL
Qty: 14 TABLET | Refills: 0 | Status: SHIPPED | OUTPATIENT
Start: 2022-03-22 | End: 2022-05-17

## 2022-03-22 RX ORDER — ACETAMINOPHEN 325 MG/1
650 TABLET ORAL ONCE
Status: COMPLETED | OUTPATIENT
Start: 2022-03-22 | End: 2022-03-22

## 2022-03-22 RX ORDER — 0.9 % SODIUM CHLORIDE 0.9 %
1000 INTRAVENOUS SOLUTION INTRAVENOUS ONCE
Status: COMPLETED | OUTPATIENT
Start: 2022-03-22 | End: 2022-03-22

## 2022-03-22 RX ORDER — ONDANSETRON 8 MG/1
8 TABLET, ORALLY DISINTEGRATING ORAL EVERY 8 HOURS PRN
Qty: 12 TABLET | Refills: 0 | Status: SHIPPED | OUTPATIENT
Start: 2022-03-22 | End: 2022-09-13 | Stop reason: ALTCHOICE

## 2022-03-22 RX ORDER — SUCRALFATE 1 G/1
1 TABLET ORAL 4 TIMES DAILY
Qty: 56 TABLET | Refills: 0 | Status: SHIPPED | OUTPATIENT
Start: 2022-03-22 | End: 2022-05-17

## 2022-03-22 RX ADMIN — LIDOCAINE HYDROCHLORIDE: 20 SOLUTION ORAL; TOPICAL at 21:19

## 2022-03-22 RX ADMIN — SODIUM CHLORIDE 1000 ML: 9 INJECTION, SOLUTION INTRAVENOUS at 22:30

## 2022-03-22 RX ADMIN — ACETAMINOPHEN 650 MG: 325 TABLET ORAL at 15:34

## 2022-03-22 RX ADMIN — SODIUM CHLORIDE, PRESERVATIVE FREE 20 MG: 5 INJECTION INTRAVENOUS at 21:24

## 2022-03-22 RX ADMIN — ONDANSETRON 4 MG: 4 TABLET, ORALLY DISINTEGRATING ORAL at 13:09

## 2022-03-22 ASSESSMENT — PAIN DESCRIPTION - PAIN TYPE: TYPE: ACUTE PAIN

## 2022-03-22 ASSESSMENT — PAIN SCALES - GENERAL
PAINLEVEL_OUTOF10: 6
PAINLEVEL_OUTOF10: 8

## 2022-03-22 ASSESSMENT — PAIN DESCRIPTION - LOCATION
LOCATION: ABDOMEN;FLANK
LOCATION: ABDOMEN

## 2022-03-22 ASSESSMENT — PAIN DESCRIPTION - ORIENTATION: ORIENTATION: RIGHT

## 2022-03-22 ASSESSMENT — PAIN DESCRIPTION - DESCRIPTORS: DESCRIPTORS: ACHING

## 2022-03-22 NOTE — ED NOTES
Department of Emergency Medicine  FIRST PROVIDER TRIAGE NOTE             [facility]           3/22/22  12:36 PM EDT    Date of Encounter: 3/22/22   MRN: [id number]      HPI: Fiona Pang is a 73 y.o. female who presents to the ED for No chief complaint on file. Patient is a 41-year-old female that is presenting with persistent abdominal pain. Patient was seen last week and states that she still having pain that is now in her right upper quadrant with associated emesis. Patient states her last colonoscopy was 2 years ago as well as current endoscopy which was found to be negative. ROS: Negative for cp, sob, back pain, fever or cough. PE: Gen Appearance/Constitutional: alert  HEENT: NC/NT. PERRLA,  Airway patent. Neck: supple     Initial Plan of Care: All treatment areas with department are currently occupied. Plan to order/Initiate the following while awaiting opening in ED: labs and imaging studies.   Initiate Treatment-Testing, Proceed toTreatment Area When Bed Available for ED Attending/MLP to Continue Care    Electronically signed by Van Bobo PA-C   DD: 3/22/22         Van Bobo PA-C  03/22/22 [id number]

## 2022-03-23 ENCOUNTER — TELEPHONE (OUTPATIENT)
Dept: FAMILY MEDICINE CLINIC | Age: 67
End: 2022-03-23

## 2022-03-23 RX ORDER — PRENATAL VIT 91/IRON/FOLIC/DHA 28-975-200
COMBINATION PACKAGE (EA) ORAL 2 TIMES DAILY
COMMUNITY
End: 2022-05-17

## 2022-03-23 RX ORDER — LATANOPROST 50 UG/ML
SOLUTION/ DROPS OPHTHALMIC
COMMUNITY
Start: 2022-03-15

## 2022-03-23 RX ORDER — NITROGLYCERIN 2 %
OINTMENT (GRAM) TRANSDERMAL
COMMUNITY
Start: 2022-02-28

## 2022-03-23 NOTE — TELEPHONE ENCOUNTER
Pt requesting medications to be added to medication list:  Nitroglycerin ointment 2%%, Ciclopirox, Tervinafine Hydrochloride 1%, and Lantanoprost.  Medications were entered

## 2022-03-23 NOTE — ED PROVIDER NOTES
HPI:  3/22/22,   Time: 8:44 PM EDT       Bertin Irby is a 73 y.o. female presenting to the ED for abdominal pain, beginning 1 week ago. The complaint has been intermittent, moderate in severity, and worsened by nothing. The patient states she has been having abdominal pain for the last week. She states that she was seen recently for similar symptoms. She states at that time she was told that she was constipated. She states she has been taking the medications and has been having normal bowel movements but continues to have the pain intermittently. She describes a sharp stabbing pain in epigastric and right upper quadrant. She states that it is worse after eating or drinking. She states she is having some nausea as well as vomiting that is nonbilious and nonbloody. No fevers or chills. No chest pain shortness of breath. No numbness tingling. No back pain. Review of Systems:   Pertinent positives and negatives are stated within HPI, all other systems reviewed and are negative.          --------------------------------------------- PAST HISTORY ---------------------------------------------  Past Medical History:  has a past medical history of Arthritis, Asthma, Depression, Diabetes mellitus ([address] Fibromyalgia, GERD (gastroesophageal reflux disease), Hepatitis C, Hyperlipidemia, Hypertension, IBS (irritable bowel syndrome), Overactive bladder, and Spleen injury. Past Surgical History:  has a past surgical history that includes Ovary removal; Dilation & curettage; Shoulder arthroscopy (Right); Nerve Block (03/26/2019); Anesthesia Nerve Block (N/A, 3/26/2019); epidural steroid injection (N/A, 5/16/2019); Colonoscopy; Endoscopy, colon, diagnostic; Carpal tunnel release (Right, 6/5/2019); pr repair major peripheral nerve (Right, 6/5/2019); Pain management procedure (N/A, 12/26/2019); and Hysterectomy. Social History:  reports that she quit smoking about 18 months ago.  Her smoking use included cigarettes. She has a 11.25 pack-year smoking history. She has never used smokeless tobacco. She reports current alcohol use. She reports current drug use. Drug: Marijuana Jordan Quinones). Family History: family history includes Alcohol Abuse in her father; Breast Cancer in her paternal aunt; Breast Cancer (age of onset: 28) in her maternal aunt; Cancer in her mother; Diabetes in her mother. The patients home medications have been reviewed. Allergies: Adhesive tape, Nsaids, and Penicillins        ---------------------------------------------------PHYSICAL EXAM--------------------------------------    Constitutional/General: Alert and oriented x3, well appearing, non toxic in NAD  Head: Normocephalic and atraumatic  Eyes: PERRL, EOMI, conjunctive normal, sclera non icteric  Mouth: Oropharynx clear, handling secretions, no trismus, no asymmetry of the posterior oropharynx or uvular edema  Neck: Supple, full ROM, non tender to palpation in the midline, no stridor, no crepitus, no meningeal signs  Respiratory: Lungs clear to auscultation bilaterally, no wheezes, rales, or rhonchi. Not in respiratory distress  Cardiovascular:  Regular rate. Regular rhythm. No murmurs, gallops, or rubs. 2+ distal pulses  GI:  Abdomen Soft, tenderness palpation in the right upper quadrant, Non distended. +BS. No organomegaly, no palpable masses,  No rebound, guarding, or rigidity. Musculoskeletal: Moves all extremities x 4. Warm and well perfused, no clubbing, cyanosis, or edema. Capillary refill <3 seconds  Integument: skin warm and dry. No rashes. Neurologic: GCS 15, no focal deficits  Psychiatric: Normal Affect    -------------------------------------------------- RESULTS -------------------------------------------------  I have personally reviewed all laboratory and imaging results for this patient. Results are listed below.      LABS:  Results for orders placed or performed during the hospital encounter of 03/22/22 Comprehensive Metabolic Panel   Result Value Ref Range    Sodium 137 132 - 146 mmol/L    Potassium 3.9 3.5 - 5.0 mmol/L    Chloride 100 98 - 107 mmol/L    CO2 26 22 - 29 mmol/L    Anion Gap 11 7 - 16 mmol/L    Glucose 172 (H) 74 - 99 mg/dL    BUN 10 6 - 23 mg/dL    CREATININE 0.7 0.5 - 1.0 mg/dL    GFR Non-African American >60 >=60 mL/min/1.73    GFR African American >60     Calcium 10.2 8.6 - 10.2 mg/dL    Total Protein 8.0 6.4 - 8.3 g/dL    Albumin 4.5 3.5 - 5.2 g/dL    Total Bilirubin 0.6 0.0 - 1.2 mg/dL    Alkaline Phosphatase 80 35 - 104 U/L    ALT 9 0 - 32 U/L    AST 13 0 - 31 U/L   CBC with Auto Differential   Result Value Ref Range    WBC 7.3 4.5 - 11.5 E9/L    RBC 4.94 3.50 - 5.50 E12/L    Hemoglobin 15.3 11.5 - 15.5 g/dL    Hematocrit 46.1 34.0 - 48.0 %    MCV 93.3 80.0 - 99.9 fL    MCH 31.0 26.0 - 35.0 pg    MCHC 33.2 32.0 - 34.5 %    RDW 13.3 11.5 - 15.0 fL    Platelets 336 103 - 369 E9/L    MPV 10.2 7.0 - 12.0 fL    Neutrophils % 61.3 43.0 - 80.0 %    Immature Granulocytes % 0.3 0.0 - 5.0 %    Lymphocytes % 29.3 20.0 - 42.0 %    Monocytes % 7.5 2.0 - 12.0 %    Eosinophils % 1.2 0.0 - 6.0 %    Basophils % 0.4 0.0 - 2.0 %    Neutrophils Absolute 4.49 1.80 - 7.30 E9/L    Immature Granulocytes # 0.02 E9/L    Lymphocytes Absolute 2.15 1.50 - 4.00 E9/L    Monocytes Absolute 0.55 0.10 - 0.95 E9/L    Eosinophils Absolute 0.09 0.05 - 0.50 E9/L    Basophils Absolute 0.03 0.00 - 0.20 E9/L   Lipase   Result Value Ref Range    Lipase 53 13 - 60 U/L   Urinalysis with Microscopic   Result Value Ref Range    Color, UA Yellow Straw/Yellow    Clarity, UA Clear Clear    Glucose, Ur 100 (A) Negative mg/dL    Bilirubin Urine SMALL (A) Negative    Ketones, Urine TRACE (A) Negative mg/dL    Specific Gravity, UA >=1.030 1.005 - 1.030    Blood, Urine Negative Negative    pH, UA 5.0 5.0 - 9.0    Protein,  (A) Negative mg/dL    Urobilinogen, Urine 0.2 <2.0 E.U./dL    Nitrite, Urine Negative Negative    Leukocyte Esterase, Urine Negative Negative    WBC, UA 0-1 0 - 5 /HPF    RBC, UA 0-1 0 - 2 /HPF    Epithelial Cells, UA RARE /HPF    Bacteria, UA RARE (A) None Seen /HPF   Lactic Acid   Result Value Ref Range    Lactic Acid 1.1 0.5 - 2.2 mmol/L   EKG 12 Lead   Result Value Ref Range    Ventricular Rate 68 BPM    Atrial Rate 68 BPM    P-R Interval 180 ms    QRS Duration 76 ms    Q-T Interval 412 ms    QTc Calculation (Bazett) 438 ms    P Axis 73 degrees    R Axis 8 degrees    T Axis 60 degrees       RADIOLOGY:  Interpreted by Radiologist.  US GALLBLADDER RUQ   Final Result   Sonographically normal gallbladder and common bile duct. EKG:  This EKG is signed and interpreted by the EP. EKG shows normal sinus rhythm with sinus arrhythmia at 68 bpm.  Possible left atrial enlargement. Normal axis. No STEMI.    ------------------------- NURSING NOTES AND VITALS REVIEWED ---------------------------   The nursing notes within the ED encounter and vital signs as below have been reviewed by myself. BP (!) 146/109   Pulse 73   Temp 98.1 °F (36.7 °C)   Resp 18   Ht 5' 2.5\" (1.588 m)   SpO2 99%   BMI 20.88 kg/m²   Oxygen Saturation Interpretation: Normal    The patients available past medical records and past encounters were reviewed. ------------------------------ ED COURSE/MEDICAL DECISION MAKING----------------------  Medications   0.9 % sodium chloride bolus (has no administration in time range)   ondansetron (ZOFRAN-ODT) disintegrating tablet 4 mg (4 mg Oral Given 3/22/22 1309)   acetaminophen (TYLENOL) tablet 650 mg (650 mg Oral Given 3/22/22 1534)   aluminum & magnesium hydroxide-simethicone (MAALOX) 30 mL, lidocaine viscous hcl (XYLOCAINE) 5 mL (GI COCKTAIL) ( Oral Given 3/22/22 2119)   famotidine (PEPCID) 20 mg in sodium chloride (PF) 10 mL injection (20 mg IntraVENous Given 3/22/22 2124)         ED COURSE:       Medical Decision Making:    Is a 41-year-old female presented to the ED for abdominal pain. Patient underwent laboratory work-up which showed a normal CBC. Normal chemistry except for glucose of 172. Normal lipase. Urinalysis shows no signs of infection. Lactic acid normal.  EKG showed no ischemic findings. Right upper quadrant ultrasound shows a normal gallbladder. Patient given a GI cocktail and Pepcid on reevaluation patient is feeling much better. Patient's abdomen otherwise soft and nontender. Patient's recent CT reviewed. Patient symptoms likely from gastritis versus peptic ulcer disease. Patient will be treated supportively. She will follow-up with her PCP for possible outpatient EGD. ACS less likely. Strict return precautions given. Patient agrees with plan. I, Dr. Lior Child, am the primary provider for this encounter    This patient's ED course included: a personal history and physicial examination, re-evaluation prior to disposition, multiple bedside re-evaluations, IV medications and cardiac monitoring    This patient has remained hemodynamically stable during their ED course. Re-Evaluations:             Re-evaluation. Patients symptoms are improving      Counseling: The emergency provider has spoken with the patient and discussed todays results, in addition to providing specific details for the plan of care and counseling regarding the diagnosis and prognosis. Questions are answered at this time and they are agreeable with the plan.       --------------------------------- IMPRESSION AND DISPOSITION ---------------------------------    IMPRESSION  1. Abdominal pain, epigastric        DISPOSITION  Disposition: Discharge to home  Patient condition is stable    NOTE: This report was transcribed using voice recognition software.  Every effort was made to ensure accuracy; however, inadvertent computerized transcription errors may be present        Alphonso Duncan DO  03/22/22 [id number]

## 2022-03-25 LAB
EKG ATRIAL RATE: 68 BPM
EKG P AXIS: 73 DEGREES
EKG P-R INTERVAL: 180 MS
EKG Q-T INTERVAL: 412 MS
EKG QRS DURATION: 76 MS
EKG QTC CALCULATION (BAZETT): 438 MS
EKG R AXIS: 8 DEGREES
EKG T AXIS: 60 DEGREES
EKG VENTRICULAR RATE: 68 BPM

## 2022-03-28 ENCOUNTER — OFFICE VISIT (OUTPATIENT)
Dept: FAMILY MEDICINE CLINIC | Age: 67
End: 2022-03-28
Payer: COMMERCIAL

## 2022-03-28 VITALS
DIASTOLIC BLOOD PRESSURE: 78 MMHG | TEMPERATURE: 98 F | OXYGEN SATURATION: 99 % | BODY MASS INDEX: 20.73 KG/M2 | SYSTOLIC BLOOD PRESSURE: 110 MMHG | HEIGHT: 63 IN | HEART RATE: 88 BPM | WEIGHT: 117 LBS

## 2022-03-28 DIAGNOSIS — R10.11 RIGHT UPPER QUADRANT ABDOMINAL PAIN: Primary | ICD-10-CM

## 2022-03-28 PROCEDURE — G8427 DOCREV CUR MEDS BY ELIG CLIN: HCPCS | Performed by: STUDENT IN AN ORGANIZED HEALTH CARE EDUCATION/TRAINING PROGRAM

## 2022-03-28 PROCEDURE — 1090F PRES/ABSN URINE INCON ASSESS: CPT | Performed by: STUDENT IN AN ORGANIZED HEALTH CARE EDUCATION/TRAINING PROGRAM

## 2022-03-28 PROCEDURE — 99213 OFFICE O/P EST LOW 20 MIN: CPT | Performed by: STUDENT IN AN ORGANIZED HEALTH CARE EDUCATION/TRAINING PROGRAM

## 2022-03-28 PROCEDURE — 3017F COLORECTAL CA SCREEN DOC REV: CPT | Performed by: STUDENT IN AN ORGANIZED HEALTH CARE EDUCATION/TRAINING PROGRAM

## 2022-03-28 PROCEDURE — G8399 PT W/DXA RESULTS DOCUMENT: HCPCS | Performed by: STUDENT IN AN ORGANIZED HEALTH CARE EDUCATION/TRAINING PROGRAM

## 2022-03-28 PROCEDURE — G8420 CALC BMI NORM PARAMETERS: HCPCS | Performed by: STUDENT IN AN ORGANIZED HEALTH CARE EDUCATION/TRAINING PROGRAM

## 2022-03-28 PROCEDURE — 1036F TOBACCO NON-USER: CPT | Performed by: STUDENT IN AN ORGANIZED HEALTH CARE EDUCATION/TRAINING PROGRAM

## 2022-03-28 PROCEDURE — G8484 FLU IMMUNIZE NO ADMIN: HCPCS | Performed by: STUDENT IN AN ORGANIZED HEALTH CARE EDUCATION/TRAINING PROGRAM

## 2022-03-28 PROCEDURE — 4040F PNEUMOC VAC/ADMIN/RCVD: CPT | Performed by: STUDENT IN AN ORGANIZED HEALTH CARE EDUCATION/TRAINING PROGRAM

## 2022-03-28 PROCEDURE — 1123F ACP DISCUSS/DSCN MKR DOCD: CPT | Performed by: STUDENT IN AN ORGANIZED HEALTH CARE EDUCATION/TRAINING PROGRAM

## 2022-03-28 ASSESSMENT — ENCOUNTER SYMPTOMS
SORE THROAT: 0
COUGH: 0
VOMITING: 0
SHORTNESS OF BREATH: 0
RHINORRHEA: 0
EYE REDNESS: 0
CONSTIPATION: 0
SINUS PAIN: 0
DIARRHEA: 0
EYE PAIN: 0
ABDOMINAL PAIN: 0
NAUSEA: 0
BACK PAIN: 0
SINUS PRESSURE: 0

## 2022-03-28 ASSESSMENT — PATIENT HEALTH QUESTIONNAIRE - PHQ9
SUM OF ALL RESPONSES TO PHQ QUESTIONS 1-9: 0
SUM OF ALL RESPONSES TO PHQ QUESTIONS 1-9: 0
SUM OF ALL RESPONSES TO PHQ9 QUESTIONS 1 & 2: 0
SUM OF ALL RESPONSES TO PHQ QUESTIONS 1-9: 0
2. FEELING DOWN, DEPRESSED OR HOPELESS: 0
1. LITTLE INTEREST OR PLEASURE IN DOING THINGS: 0
SUM OF ALL RESPONSES TO PHQ QUESTIONS 1-9: 0

## 2022-03-28 NOTE — PROGRESS NOTES
3/28/2022    [facility]  Chief Complaint   Patient presents with    Follow-Up from Hospital     impacted bowel        Fiona Pang is a 73 y.o. female who  has a past medical history of Arthritis, Asthma, Depression, Diabetes mellitus ([address] Fibromyalgia, GERD (gastroesophageal reflux disease), Hepatitis C, Hyperlipidemia, Hypertension, IBS (irritable bowel syndrome), Overactive bladder, and Spleen injury. She was seen in the ER for her abdominal pain. She was given magnesium citrate in the hospital and she felt a lot better. She believes all the constipation was causing her pain the last time I saw her as well. We did discuss increasing fiber. She states she used to eat Raisin Bran everyday which she really likes. She states she will start doing this again to help avoid this in the future. US was normal in the ER. No evidence for gallstones or cholecystitis. EKG normal. She was given zofran tylenol maalox pepcid as well in the ER. Patient denies CP, SOB, nausea, vomiting, diarrhea, fevers chills sweats, abdominal pain, numbness tingling, dizziness, lightheadedness, back pain, ear pain, eye pain, nasal congestion, headaches, blurry vision. Review of Systems   Constitutional: Negative for chills, fatigue and fever. HENT: Negative for congestion, ear pain, postnasal drip, rhinorrhea, sinus pressure, sinus pain and sore throat. Eyes: Negative for pain and redness. Respiratory: Negative for cough and shortness of breath. Cardiovascular: Negative for chest pain. Gastrointestinal: Negative for abdominal pain, constipation, diarrhea, nausea and vomiting. Genitourinary: Negative for difficulty urinating and dysuria. Musculoskeletal: Negative for back pain, myalgias and neck pain. Skin: Negative for rash. Neurological: Negative for dizziness, light-headedness and numbness. Psychiatric/Behavioral: The patient is not nervous/anxious. Prior to Visit Medications    Medication Sig Taking? Authorizing Provider   NITRO-BID 2 % ointment APPLY 1 INCH TRANSDERNMALLY ON RIGHT FOOT twice a day Yes Historical Provider, MD   latanoprost (XALATAN) 0.005 % ophthalmic solution place 1 drop into both eyes at bedtime Yes Historical Provider, MD   ciclopirox (LOPROX) 0.77 % cream Apply topically 2 times daily Apply topically 2 times daily. Yes Historical Provider, MD   terbinafine (LAMISIL) 1 % cream Apply topically 2 times daily Apply topically 2 times daily. Yes Historical Provider, MD   sucralfate (CARAFATE) 1 GM tablet Take 1 tablet by mouth 4 times daily for 14 days Yes Sheldon Eaton,    pantoprazole (PROTONIX) 40 MG tablet Take 1 tablet by mouth daily (with breakfast) for 14 days Yes Sheldon Eaton DO   famotidine (PEPCID) 20 MG tablet Take 1 tablet by mouth 2 times daily as needed (acid reflux) Yes Ander Franklin, DO   ondansetron (ZOFRAN ODT) 8 MG TBDP disintegrating tablet Place 1 tablet under the tongue every 8 hours as needed for Nausea Yes Ander Franklin,    gabapentin (NEURONTIN) 300 MG capsule TAKE ONE CAPSULE BY MOUTH NIGHTLY FOR 90 DAYS Yes Amanda Torres DO   amLODIPine (NORVASC) 10 MG tablet TAKE 1 TABLET BY MOUTH ONE TIME A DAY Yes Amanda Torres DO   Blood Glucose Monitoring Suppl (FREESTYLE LITE) SUSAN 1 Device by Does not apply route daily Yes Amanda Torres DO   DULoxetine (CYMBALTA) 30 MG extended release capsule take 1 capsule by mouth once daily Yes Historical Provider, MD   SITagliptin (JANUVIA) 100 MG tablet Take 1 tablet by mouth daily Yes Amanda Torres DO   acetaminophen (APAP EXTRA STRENGTH) 500 MG tablet Take 1 tablet by mouth every 8 hours as needed for Pain Yes Amanda Torres DO   blood glucose monitor strips Test 3 times a day & as needed for symptoms of irregular blood glucose. Dispense sufficient amount for indicated testing frequency plus additional to accommodate PRN testing needs.  Yes Amanda Torres DO   Lancets MISC 1 each by Does not apply route daily Yes Amanda Torres DO   albuterol sulfate  (90 Base) MCG/ACT inhaler Inhale 2 puffs into the lungs every 6 hours as needed for Wheezing Rescue inhaler Yes Amanda Torres DO   ipratropium-albuterol (DUONEB) 0.5-2.5 (3) MG/3ML SOLN nebulizer solution Inhale 3 mLs into the lungs every 4 hours Instructed to take am of procedure Yes Amanda Torres DO   cyclobenzaprine (FLEXERIL) 5 MG tablet Take 1 tablet by mouth nightly as needed for Muscle spasms Yes Amanda Torres DO   terconazole (TERAZOL 7) 0.4 % vaginal cream Place vaginally nightly for 7 nights Yes Daisy Metcalf DO   amitriptyline (ELAVIL) 50 MG tablet  Yes Historical Provider, MD   atorvastatin (LIPITOR) 40 MG tablet TAKE 1 TABLET BY MOUTH ONE TIME A DAY Yes KAIT Lam CNP   acyclovir (ZOVIRAX) 400 MG tablet Take 1 tablet by mouth 2 times daily for 10 days Yes KAIT Lam CNP   polyethylene glycol (GLYCOLAX) powder Take 17 g by mouth daily Yes Historical Provider, MD   EPINEPHrine (EPIPEN) 0.3 MG/0.3ML SOAJ injection Use as directed for allergic reaction Yes Dragan Spears MD   sodium chloride (ALTAMIST SPRAY) 0.65 % nasal spray 1 spray by Nasal route as needed for Congestion Yes Dragan Spears MD   empagliflozin (JARDIANCE) 10 MG tablet Take 1 tablet by mouth daily  Patient not taking: Reported on 1/18/2022  Delgado Fisher DO   Ertugliflozin L-PyroglutamicAc 5 MG TABS Take 1 tablet daily  Patient not taking: Reported on 1/18/2022  Amanda Torres DO        Allergies   Allergen Reactions    Adhesive Tape Rash     bruising    Nsaids      Irritates IBS    Penicillins Hives       Past Medical History:   Diagnosis Date    Arthritis     Asthma     controlled     Depression     Diabetes mellitus ([facility] [address]     Fibromyalgia     GERD (gastroesophageal reflux disease)     Hepatitis C 2016    treated     Hyperlipidemia     Hypertension     IBS (irritable bowel syndrome)     Overactive bladder     Spleen injury hospitalized 2019 - resolved as of 19        Past Surgical History:   Procedure Laterality Date    ANESTHESIA NERVE BLOCK N/A 3/26/2019    L4-5 EPIDURAL STEROID INJECTION performed by Renny Fall DO at [facility] Right 2019    RIGHT ENDOSCOPIC CARPAL TUNNEL RELEASE performed by Jaime Cruz MD at [facility] 1898      ENDOSCOPY, COLON, DIAGNOSTIC      EPIDURAL STEROID INJECTION N/A 2019    L4-5 EPIDURAL STEROID INJECTION performed by Renny Fall DO at [address] [facility] [address]  2019    with sedation   [address] left     PAIN MANAGEMENT PROCEDURE N/A 2019    L4, L5 EPIDURAL STEROID INJECTION ([id number]) performed by Renny Fall DO at [facility] Right 2019    RIGHT ULNAR NERVE DECOMPRESSION AT ELBOW WITH POSSIBLE NERVE TRANSPOSITION performed by Jaime Cruz MD at [facility] ARTHROSCOPY Right        Social History     Socioeconomic History    Marital status: Single     Spouse name: Not on file    Number of children: 5    Years of education: Not on file    Highest education level: Some college, no degree   Occupational History    Not on file   Tobacco Use    Smoking status: Former Smoker     Packs/day: 0.25     Years: 45.00     Pack years: 11.25     Types: Cigarettes     Quit date: 2020     Years since quittin.5    Smokeless tobacco: Never Used   Vaping Use    Vaping Use: Some days    Substances: Never, THC   Substance and Sexual Activity    Alcohol use: Yes     Comment: rare    Drug use: Yes     Types: Marijuana Gelene Chasten)     Comment: holds medical card-Vaping    Sexual activity: Yes     Partners: Male   Other Topics Concern    Not on file   Social History Narrative    -Referral to  for financial strain and stress,depression, anxiety    -Smoking cessation brochure being mailed out    -Pt utilizes 3 food pantries    -Receives transportation from [address] [place] [address] [facility]     Social Determinants of Health     Financial Resource Strain: Low Risk     Difficulty of Paying Living Expenses: Not hard at all   Food Insecurity: No Food Insecurity    Worried About Running Out of Food in the Last Year: Never true    [address] in the Last Year: Never true   Transportation Needs:     Lack of Transportation (Medical): Not on file    Lack of Transportation (Non-Medical): Not on file   Physical Activity:     Days of Exercise per Week: Not on file    Minutes of Exercise per Session: Not on file   Stress:     Feeling of Stress : Not on file   Social Connections:     Frequency of Communication with Friends and Family: Not on file    Frequency of Social Gatherings with Friends and Family: Not on file    Attends [employer] Services: Not on file    Active Member of [address] [place] [address] or Organizations: Not on file    Attends Club or Organization Meetings: Not on file    Marital Status: Not on file   Intimate Partner Violence:     Fear of Current or Ex-Partner: Not on file    Emotionally Abused: Not on file    Physically Abused: Not on file    Sexually Abused: Not on file   Housing Stability:     Unable to Pay for Housing in the Last Year: Not on file    Number of [place] in the Last Year: Not on file    Unstable Housing in the Last Year: Not on file        Family History   Problem Relation Age of Onset    Diabetes Mother     Cancer Mother     Alcohol Abuse Father     Breast Cancer Maternal Aunt 28    Breast Cancer Paternal Aunt            Vitals:    03/28/22 1608   BP: 110/78   Pulse: 88   Temp: 98 °F (36.7 °C)   SpO2: 99%   Weight: 117 lb (53.1 kg)   Height: 5' 2.5\" (1.588 m)     Estimated body mass index is 21.06 kg/m² as calculated from the following:    Height as of this encounter: 5' 2.5\" (1.588 m). Weight as of this encounter: 117 lb (53.1 kg).     Most Recent Labs  CBC  Lab Results   Component Value Date    WBC 7.3 03/22/2022    WBC 7.6 03/07/2022    WBC 5.6 04/07/2021    RBC 4.94 03/22/2022    RBC 5.17 03/07/2022    RBC 5.01 04/07/2021    HGB 15.3 03/22/2022    HGB 16.3 03/07/2022    HGB 15.6 04/07/2021    HCT 46.1 03/22/2022    HCT 47.5 03/07/2022    HCT 47.0 04/07/2021    MCV 93.3 03/22/2022    MCV 91.9 03/07/2022    MCV 93.8 04/07/2021     03/22/2022     03/07/2022     04/07/2021      CMP  Lab Results   Component Value Date     03/22/2022     03/07/2022     04/07/2021    K 3.9 03/22/2022    K 3.6 03/07/2022    K 4.3 04/07/2021    K 3.3 11/06/2019    K 3.2 11/04/2019    K 3.9 11/03/2019     03/22/2022    CL 99 03/07/2022     04/07/2021    CO2 26 03/22/2022    CO2 26 03/07/2022    CO2 24 04/07/2021    ANIONGAP 11 03/22/2022    ANIONGAP 13 03/07/2022    ANIONGAP 10 04/07/2021    GLUCOSE 172 03/22/2022    GLUCOSE 132 03/07/2022    GLUCOSE 85 04/07/2021    BUN 10 03/22/2022    BUN 10 03/07/2022    BUN 12 04/07/2021    CREATININE 0.7 03/22/2022    CREATININE 0.7 03/07/2022    CREATININE 0.7 04/07/2021    LABGLOM >60 03/22/2022    LABGLOM >60 03/07/2022    LABGLOM >60 04/07/2021    GFRAA >60 03/22/2022    GFRAA >60 03/07/2022    GFRAA >60 04/07/2021    CALCIUM 10.2 03/22/2022    CALCIUM 10.3 03/07/2022    CALCIUM 10.0 04/07/2021    PROT 8.0 03/22/2022    PROT 8.4 03/07/2022    PROT 7.7 04/07/2021    LABALBU 4.5 03/22/2022    LABALBU 4.8 03/07/2022    LABALBU 4.6 04/07/2021    BILITOT 0.6 03/22/2022    BILITOT 0.7 03/07/2022    BILITOT 0.5 04/07/2021    ALKPHOS 80 03/22/2022    ALKPHOS 87 03/07/2022    ALKPHOS 74 04/07/2021    AST 13 03/22/2022    AST 15 03/07/2022    AST 19 04/07/2021    ALT 9 03/22/2022    ALT 13 03/07/2022    ALT 13 04/07/2021     A1C  Lab Results   Component Value Date    LABA1C 6.4 01/18/2022    LABA1C 6.1 10/18/2021    LABA1C 5.8 02/08/2021     TSH  Lab Results   Component Value Date    TSH 0.900 04/07/2021    TSH 1.560 08/08/2019    TSH 3.540 01/24/2019     LIPID  Lab Results   Component Value Date    CHOL 198 04/07/2021    HDL 85 04/07/2021    HDL 95 01/24/2019    LDLCALC 97 04/07/2021    LDLCALC 107 01/24/2019    TRIG 79 04/07/2021     VITAMIN D  Lab Results   Component Value Date    VITD25 36 04/07/2021     MAGNESIUM  Lab Results   Component Value Date    MG 2.0 11/04/2019       HEPATITIS C  Lab Results   Component Value Date    HCVABI REACTIVE 04/25/2019     UA  Lab Results   Component Value Date    COLORU Yellow 03/22/2022    COLORU Yellow 03/07/2022    COLORU Yellow 11/03/2019    CLARITYU Clear 03/22/2022    CLARITYU Clear 03/07/2022    CLARITYU Clear 11/03/2019    GLUCOSEU 100 03/22/2022    GLUCOSEU 250 03/07/2022    GLUCOSEU 500 11/03/2019    BILIRUBINUR SMALL 03/22/2022    BILIRUBINUR Negative 03/07/2022    BILIRUBINUR SMALL 11/03/2019    KETUA TRACE 03/22/2022    KETUA 15 03/07/2022    KETUA >=80 11/03/2019    SPECGRAV >=1.030 03/22/2022    SPECGRAV <=1.005 03/07/2022    SPECGRAV >=1.030 11/03/2019    BLOODU Negative 03/22/2022    BLOODU Negative 03/07/2022    BLOODU TRACE-INTACT 11/03/2019    PHUR 5.0 03/22/2022    PHUR 5.5 03/07/2022    PHUR 5.5 11/03/2019    PROTEINU 100 03/22/2022    PROTEINU Negative 03/07/2022    PROTEINU TRACE 11/03/2019    UROBILINOGEN 0.2 03/22/2022    UROBILINOGEN 0.2 03/07/2022    UROBILINOGEN 0.2 11/03/2019    NITRU Negative 03/22/2022    NITRU Negative 03/07/2022    NITRU Negative 11/03/2019    LEUKOCYTESUR Negative 03/22/2022    LEUKOCYTESUR Negative 03/07/2022    LEUKOCYTESUR Negative 11/03/2019     Urine Micro/Albumin Ratio  Lab Results   Component Value Date    MALBCR <30 02/08/2021    MALBCR <30 MG 07/16/2019        Physical Exam  Vitals and nursing note reviewed. Constitutional:       Appearance: Normal appearance. HENT:      Head: Normocephalic and atraumatic. Right Ear: External ear normal.      Left Ear: External ear normal.   Eyes:      Extraocular Movements: Extraocular movements intact. Conjunctiva/sclera: Conjunctivae normal.      Pupils: Pupils are equal, round, and reactive to light. Cardiovascular:      Rate and Rhythm: Normal rate and regular rhythm. Pulses: Normal pulses. Heart sounds: Normal heart sounds. Pulmonary:      Effort: Pulmonary effort is normal.      Breath sounds: Normal breath sounds. Abdominal:      General: Bowel sounds are normal.      Palpations: Abdomen is soft. Musculoskeletal:         General: Normal range of motion. Cervical back: Normal range of motion and neck supple. Skin:     General: Skin is warm and dry. Capillary Refill: Capillary refill takes less than 2 seconds. Neurological:      General: No focal deficit present. Mental Status: She is alert and oriented to person, place, and time. Psychiatric:         Mood and Affect: Mood normal.         Behavior: Behavior normal.         Thought Content: Thought content normal.         ASSESSMENT/PLAN:  [facility] was seen today for follow-up from hospital.    Diagnoses and all orders for this visit:    Right upper quadrant abdominal pain     abdominal pain and constipation resolved at this time  Patient is to increase her fiber intake     As above. Call or go to the nearest ED immediately if symptoms worsen or persist.  Educational materials and/or home exercises printed for patient's review and were included in patient instructions on his/her After Visit Summary and given to patient at the end of visit. Counseled regarding above diagnosis, including possible risks and complications,  especially if left uncontrolled. Counseled regarding the possible side effects, risks, benefits and alternatives to treatment; patient and/or guardian verbalizes understanding, agrees, feels comfortable with and wishes to proceed with above treatment plan.      Advised patient to call with any new medication issues, and read all Rx info from pharmacy to assure aware of all possible risks and side effects of medication before taking. Reviewed age and gender appropriate health screening exams and vaccinations. Advised patient regarding importance of keeping up with recommended health maintenance and to schedule as soon as possible if overdue, as this is important in assessing for undiagnosed pathology, especially cancer, as well as protecting against potentially harmful/life threatening disease. Patient and/or guardian verbalizes understanding and agrees with above counseling, assessment and plan. All questions answered. Return if symptoms worsen or fail to improve. An  electronic signature was used to authenticate this note. --Fabián Rodriguez DO on 3/28/2022 at 4:40 PM    This document was prepared at least partially through the use of voice recognition software. Although effort is taken to assure the accuracy of this document, it is possible that grammatical, syntax,  or spelling errors may occur.

## 2022-05-17 ENCOUNTER — OFFICE VISIT (OUTPATIENT)
Dept: FAMILY MEDICINE CLINIC | Age: 67
End: 2022-05-17
Payer: COMMERCIAL

## 2022-05-17 VITALS
OXYGEN SATURATION: 99 % | WEIGHT: 121 LBS | BODY MASS INDEX: 21.44 KG/M2 | SYSTOLIC BLOOD PRESSURE: 100 MMHG | DIASTOLIC BLOOD PRESSURE: 60 MMHG | HEART RATE: 60 BPM | TEMPERATURE: 97.7 F | RESPIRATION RATE: 16 BRPM | HEIGHT: 63 IN

## 2022-05-17 DIAGNOSIS — Z91.09 ENVIRONMENTAL ALLERGIES: ICD-10-CM

## 2022-05-17 DIAGNOSIS — J30.81 ALLERGIC RHINITIS DUE TO ANIMAL HAIR AND DANDER: Chronic | ICD-10-CM

## 2022-05-17 DIAGNOSIS — Z00.00 INITIAL MEDICARE ANNUAL WELLNESS VISIT: ICD-10-CM

## 2022-05-17 DIAGNOSIS — E11.65 TYPE 2 DIABETES MELLITUS WITH HYPERGLYCEMIA, WITHOUT LONG-TERM CURRENT USE OF INSULIN (HCC): Primary | ICD-10-CM

## 2022-05-17 DIAGNOSIS — J30.89 PERENNIAL ALLERGIC RHINITIS: ICD-10-CM

## 2022-05-17 DIAGNOSIS — E78.2 MIXED HYPERLIPIDEMIA: ICD-10-CM

## 2022-05-17 DIAGNOSIS — E11.65 TYPE 2 DIABETES MELLITUS WITH HYPERGLYCEMIA, WITHOUT LONG-TERM CURRENT USE OF INSULIN (HCC): ICD-10-CM

## 2022-05-17 DIAGNOSIS — I10 ESSENTIAL HYPERTENSION: ICD-10-CM

## 2022-05-17 LAB
CREATININE URINE: 171 MG/DL (ref 29–226)
HBA1C MFR BLD: 6.2 %
MICROALBUMIN UR-MCNC: <12 MG/L
MICROALBUMIN/CREAT UR-RTO: ABNORMAL (ref 0–30)

## 2022-05-17 PROCEDURE — 3044F HG A1C LEVEL LT 7.0%: CPT | Performed by: STUDENT IN AN ORGANIZED HEALTH CARE EDUCATION/TRAINING PROGRAM

## 2022-05-17 PROCEDURE — 3017F COLORECTAL CA SCREEN DOC REV: CPT | Performed by: STUDENT IN AN ORGANIZED HEALTH CARE EDUCATION/TRAINING PROGRAM

## 2022-05-17 PROCEDURE — 1123F ACP DISCUSS/DSCN MKR DOCD: CPT | Performed by: STUDENT IN AN ORGANIZED HEALTH CARE EDUCATION/TRAINING PROGRAM

## 2022-05-17 PROCEDURE — 83036 HEMOGLOBIN GLYCOSYLATED A1C: CPT | Performed by: STUDENT IN AN ORGANIZED HEALTH CARE EDUCATION/TRAINING PROGRAM

## 2022-05-17 PROCEDURE — G0438 PPPS, INITIAL VISIT: HCPCS | Performed by: STUDENT IN AN ORGANIZED HEALTH CARE EDUCATION/TRAINING PROGRAM

## 2022-05-17 PROCEDURE — 4040F PNEUMOC VAC/ADMIN/RCVD: CPT | Performed by: STUDENT IN AN ORGANIZED HEALTH CARE EDUCATION/TRAINING PROGRAM

## 2022-05-17 RX ORDER — FLUTICASONE PROPIONATE 50 MCG
2 SPRAY, SUSPENSION (ML) NASAL DAILY
Qty: 48 G | Refills: 1 | Status: SHIPPED | OUTPATIENT
Start: 2022-05-17

## 2022-05-17 ASSESSMENT — PATIENT HEALTH QUESTIONNAIRE - PHQ9
SUM OF ALL RESPONSES TO PHQ QUESTIONS 1-9: 0
SUM OF ALL RESPONSES TO PHQ QUESTIONS 1-9: 0
1. LITTLE INTEREST OR PLEASURE IN DOING THINGS: 0
SUM OF ALL RESPONSES TO PHQ9 QUESTIONS 1 & 2: 0
SUM OF ALL RESPONSES TO PHQ QUESTIONS 1-9: 0
2. FEELING DOWN, DEPRESSED OR HOPELESS: 0
SUM OF ALL RESPONSES TO PHQ QUESTIONS 1-9: 0

## 2022-05-17 ASSESSMENT — LIFESTYLE VARIABLES
HOW MANY STANDARD DRINKS CONTAINING ALCOHOL DO YOU HAVE ON A TYPICAL DAY: 1 OR 2
HOW OFTEN DO YOU HAVE A DRINK CONTAINING ALCOHOL: MONTHLY OR LESS

## 2022-05-17 NOTE — PATIENT INSTRUCTIONS
Personalized Preventive Plan for Mona Wright - 5/17/2022  Medicare offers a range of preventive health benefits. Some of the tests and screenings are paid in full while other may be subject to a deductible, co-insurance, and/or copay. Some of these benefits include a comprehensive review of your medical history including lifestyle, illnesses that may run in your family, and various assessments and screenings as appropriate. After reviewing your medical record and screening and assessments performed today your provider may have ordered immunizations, labs, imaging, and/or referrals for you. A list of these orders (if applicable) as well as your Preventive Care list are included within your After Visit Summary for your review. Other Preventive Recommendations:    · A preventive eye exam performed by an eye specialist is recommended every 1-2 years to screen for glaucoma; cataracts, macular degeneration, and other eye disorders. · A preventive dental visit is recommended every 6 months. · Try to get at least 150 minutes of exercise per week or 10,000 steps per day on a pedometer . · Order or download the FREE \"Exercise & Physical Activity: Your Everyday Guide\" from [employer]. Call 0-539.679.6978 or search [employer] online. · You need 2362-6758 mg of calcium and 8955-6608 IU of vitamin D per day. It is possible to meet your calcium requirement with diet alone, but a vitamin D supplement is usually necessary to meet this goal.  · When exposed to the sun, use a sunscreen that protects against both UVA and UVB radiation with an SPF of 30 or greater. Reapply every 2 to 3 hours or after sweating, drying off with a towel, or swimming. · Always wear a seat belt when traveling in a car. Always wear a helmet when riding a bicycle or motorcycle.

## 2022-05-17 NOTE — PROGRESS NOTES
Medicare Annual Wellness Visit    Lili Merchant is here for Medicare AWV, Diabetes (Due for A1C), and Edema (Occasionally has swelling in her face and swollen glands. )    Assessment & Plan   Type 2 diabetes mellitus with hyperglycemia, without long-term current use of insulin ([facility]ly [facility])  -     POCT glycosylated hemoglobin (Hb A1C)  -     TSH; Future  -     Microalbumin / Creatinine Urine Ratio; Future  Initial Medicare annual wellness visit  Environmental allergies  -     fluticasone (FLONASE) 50 MCG/ACT nasal spray; 2 sprays by Each Nostril route daily, Disp-48 g, R-1Normal  -     [facility]  Perennial allergic rhinitis  -     [facility]  Allergic rhinitis due to animal hair and dander  -     [facility]  Essential hypertension  -     CBC with Auto Differential; Future  -     Comprehensive Metabolic Panel; Future  Mixed hyperlipidemia  -     Lipid Panel; Future      Recommendations for Preventive Services Due: see orders and patient instructions/AVS.  Recommended screening schedule for the next 5-10 years is provided to the patient in written form: see Patient Instructions/AVS.     Return for Medicare Annual Wellness Visit in 1 year. Subjective   The following acute and/or chronic problems were also addressed today:  DM2:   Patient is here to fu regarding DM2. Patient is  controlled. Taking all medications and tolerating well. Patient is not taking ASA and Ace Inhibitor/ARB. Patient is  on appropriately-dosed statin. LDL is  at goal.  BP is  controlled. does not hypoglycemic episodes. Patient does not see Podiatry regularly. Patient is aware that it is necessary to see an Eye Dr yearly. Patient does not smoke. Most recent labs reviewed with patient. Patient does have complaints or concerns today.       Lab Results   Component Value Date    LABA1C 6.2 05/17/2022       Lab Results   Component Value Date    [address] 97 04/07/2021      Patient also has always had issues with allergies. Her last doctor did retire. She would like a referral to another allergist.     Patient's complete Health Risk Assessment and screening values have been reviewed and are found in Flowsheets. The following problems were reviewed today and where indicated follow up appointments were made and/or referrals ordered.     Positive Risk Factor Screenings with Interventions:         Tobacco Use:     Tobacco Use: Medium Risk    Smoking Tobacco Use: Former Smoker    Smokeless Tobacco Use: Never Used     E-Cigarettes/Vaping Use     Questions Responses    E-Cigarette/Vaping Use Current Some Day User    Start Date     Passive Exposure     Quit Date     Counseling Given     Comments         Substance Use - Tobacco Interventions:  patient quit smoking     Drug Use Screening:    DAST-10 Score Interpretation:  1-2: Low level - Monitor, re-assess at a later date; 3-5: Moderate level - Further Investigation; 6-8: Substantial level - Intensive Assessment; 9-10: Severe level - Intensive Assessment    Substance Use - Drug Use Interventions:  patient doing well         General Health and ACP:  General  In general, how would you say your health is?: (!) Poor  In the past 7 days, have you experienced any of the following: New or Increased Pain, New or Increased Fatigue, Loneliness, Social Isolation, Stress or Anger?: (!) Yes  Select all that apply: (!) New or Increased Pain  Do you get the social and emotional support that you need?: Yes  Do you have a Living Will?: Yes    Advance Directives     Power of  Living Will ACP-Advance Directive ACP-Power of     Not on File Not on File Not on File Not on File      General Health Risk Interventions:  · Poor self-assessment of health status: A1c is getting under control, will refer to allergist  · Pain issues: home exercises provided  · Stress: patient declines any further evaluation/treatment for this issue  · Anger: patient was able to get help from her sister with her anger and outbursts. her sister helped her with some good techniques to help her calm herself down such as counting to 10     Hearing/Vision:  Do you or your family notice any trouble with your hearing that hasn't been managed with hearing aids?: (!) Yes  Do you have difficulty driving, watching TV, or doing any of your daily activities because of your eyesight?: No  Have you had an eye exam within the past year?: Yes  No exam data present    Hearing/Vision Interventions:  · Hearing concerns:  patient declines any further evaluation/treatment for hearing issues patient has hearing aids            Objective   Vitals:    05/17/22 1449   BP: 100/60   Pulse: 60   Resp: 16   Temp: 97.7 °F (36.5 °C)   TempSrc: Temporal   SpO2: 99%   Weight: 121 lb (54.9 kg)   Height: 5' 2.5\" (1.588 m)      Body mass index is 21.78 kg/m². Physical Exam  Vitals and nursing note reviewed. Constitutional:       Appearance: Normal appearance. HENT:      Head: Normocephalic and atraumatic. Right Ear: External ear normal.      Left Ear: External ear normal.   Eyes:      Extraocular Movements: Extraocular movements intact. Conjunctiva/sclera: Conjunctivae normal.      Pupils: Pupils are equal, round, and reactive to light. Cardiovascular:      Rate and Rhythm: Normal rate and regular rhythm. Pulses: Normal pulses. Heart sounds: Normal heart sounds. Pulmonary:      Effort: Pulmonary effort is normal.      Breath sounds: Normal breath sounds. Abdominal:      General: Bowel sounds are normal.      Palpations: Abdomen is soft. Musculoskeletal:         General: Normal range of motion. Cervical back: Normal range of motion and neck supple. Skin:     General: Skin is warm and dry. Capillary Refill: Capillary refill takes less than 2 seconds. Neurological:      General: No focal deficit present. Mental Status: She is alert and oriented to person, place, and time.    Psychiatric:         Mood and Affect: Mood normal.         Behavior: Behavior normal.         Thought Content: Thought content normal.           Allergies   Allergen Reactions    Adhesive Tape Rash     bruising    Nsaids      Irritates IBS    Penicillins Hives     Prior to Visit Medications    Medication Sig Taking? Authorizing Provider   fluticasone (FLONASE) 50 MCG/ACT nasal spray 2 sprays by Each Nostril route daily Yes Amanda Torres DO   latanoprost (XALATAN) 0.005 % ophthalmic solution place 1 drop into both eyes at bedtime Yes Historical Provider, MD   sucralfate (CARAFATE) 1 GM tablet Take 1 tablet by mouth 4 times daily for 14 days Yes Emaline Beverage, DO   pantoprazole (PROTONIX) 40 MG tablet Take 1 tablet by mouth daily (with breakfast) for 14 days Yes Emaline Beverage, DO   famotidine (PEPCID) 20 MG tablet Take 1 tablet by mouth 2 times daily as needed (acid reflux) Yes Ander Franklin,    ondansetron (ZOFRAN ODT) 8 MG TBDP disintegrating tablet Place 1 tablet under the tongue every 8 hours as needed for Nausea Yes Ander Franklin DO   gabapentin (NEURONTIN) 300 MG capsule TAKE ONE CAPSULE BY MOUTH NIGHTLY FOR 90 DAYS Yes Amanda Torres DO   amLODIPine (NORVASC) 10 MG tablet TAKE 1 TABLET BY MOUTH ONE TIME A DAY Yes Amanda Torres DO   Blood Glucose Monitoring Suppl (FREESTYLE LITE) SUSAN 1 Device by Does not apply route daily Yes Amanda Torres DO   DULoxetine (CYMBALTA) 30 MG extended release capsule take 1 capsule by mouth once daily Yes Historical Provider, MD   SITagliptin (JANUVIA) 100 MG tablet Take 1 tablet by mouth daily Yes Amanda Torres DO   acetaminophen (APAP EXTRA STRENGTH) 500 MG tablet Take 1 tablet by mouth every 8 hours as needed for Pain Yes Amanda Torres DO   blood glucose monitor strips Test 3 times a day & as needed for symptoms of irregular blood glucose. Dispense sufficient amount for indicated testing frequency plus additional to accommodate PRN testing needs.  Yes Amanda DO Brian   Lancets MISC 1 each by Does not apply route daily Yes Amanda Torres DO   albuterol sulfate  (90 Base) MCG/ACT inhaler Inhale 2 puffs into the lungs every 6 hours as needed for Wheezing Rescue inhaler Yes Amanda Torres DO   ipratropium-albuterol (DUONEB) 0.5-2.5 (3) MG/3ML SOLN nebulizer solution Inhale 3 mLs into the lungs every 4 hours Instructed to take am of procedure Yes Amanda Torres DO   cyclobenzaprine (FLEXERIL) 5 MG tablet Take 1 tablet by mouth nightly as needed for Muscle spasms Yes Amanda Torres DO   terconazole (TERAZOL 7) 0.4 % vaginal cream Place vaginally nightly for 7 nights Yes Fabiola Sol, DO   amitriptyline (ELAVIL) 50 MG tablet  Yes Historical Provider, MD   atorvastatin (LIPITOR) 40 MG tablet TAKE 1 TABLET BY MOUTH ONE TIME A DAY Yes KAIT Lara CNP   polyethylene glycol (GLYCOLAX) powder Take 17 g by mouth daily Yes Historical Provider, MD   EPINEPHrine (EPIPEN) 0.3 MG/0.3ML SOAJ injection Use as directed for allergic reaction Yes Romina Hernandez MD   NITRO-BID 2 % ointment APPLY 1 INCH TRANSDERNMALLY ON RIGHT FOOT twice a day  Patient not taking: Reported on 5/17/2022  Historical Provider, MD   acyclovir (ZOVIRAX) 400 MG tablet Take 1 tablet by mouth 2 times daily for 10 days  KAIT Lara CNP       CareTeam (Including outside providers/suppliers regularly involved in providing care):   Patient Care Team:  Niurka Dumont DO as PCP - General (Family Medicine)  Niurka Dumont DO as PCP - REHABILITATION [facility] Empaneled Provider     Reviewed and updated this visit:  Tobacco  Allergies  Meds  Problems  Med Hx  Surg Hx  Soc Hx  Fam Hx

## 2022-06-09 DIAGNOSIS — J30.81 ALLERGIC RHINITIS DUE TO ANIMAL HAIR AND DANDER: ICD-10-CM

## 2022-06-09 DIAGNOSIS — J30.89 PERENNIAL ALLERGIC RHINITIS: ICD-10-CM

## 2022-06-09 DIAGNOSIS — Z91.09 ENVIRONMENTAL ALLERGIES: Primary | ICD-10-CM

## 2022-06-16 ENCOUNTER — HOSPITAL ENCOUNTER (OUTPATIENT)
Dept: INTERVENTIONAL RADIOLOGY/VASCULAR | Age: 67
Discharge: HOME | End: 2022-06-18
Payer: COMMERCIAL

## 2022-06-16 DIAGNOSIS — E11.51 TYPE II DIABETES MELLITUS WITH PERIPHERAL CIRCULATORY DISORDER (HCC): ICD-10-CM

## 2022-06-16 PROCEDURE — 93923 UPR/LXTR ART STDY 3+ LVLS: CPT

## 2022-06-16 PROCEDURE — 93923 UPR/LXTR ART STDY 3+ LVLS: CPT | Performed by: SURGERY

## 2022-06-20 DIAGNOSIS — E78.2 MIXED HYPERLIPIDEMIA: ICD-10-CM

## 2022-06-20 RX ORDER — ATORVASTATIN CALCIUM 40 MG/1
TABLET, FILM COATED ORAL
Qty: 90 TABLET | Refills: 1 | Status: SHIPPED
Start: 2022-06-20 | End: 2022-10-11 | Stop reason: SDUPTHER

## 2022-06-20 NOTE — TELEPHONE ENCOUNTER
Last Appointment:  4/7/2021  Future Appointments   Date Time Provider Ilan Cramer   7/5/2022  7:45 AM Noel Landaverde, [address] [facility] ENT [facility]

## 2022-06-30 DIAGNOSIS — E11.65 TYPE 2 DIABETES MELLITUS WITH HYPERGLYCEMIA, WITHOUT LONG-TERM CURRENT USE OF INSULIN (HCC): ICD-10-CM

## 2022-06-30 NOTE — TELEPHONE ENCOUNTER
Last Appointment:  5/17/2022  Future Appointments   Date Time Provider Ilan Cramer   7/5/2022  7:45 AM Luz Elena Edwards, [address] [place] [address] ENT [facility]

## 2022-07-05 ENCOUNTER — OFFICE VISIT (OUTPATIENT)
Dept: ENT CLINIC | Age: 67
End: 2022-07-05
Payer: COMMERCIAL

## 2022-07-05 VITALS
HEIGHT: 63 IN | BODY MASS INDEX: 21.09 KG/M2 | SYSTOLIC BLOOD PRESSURE: 158 MMHG | HEART RATE: 68 BPM | DIASTOLIC BLOOD PRESSURE: 92 MMHG | WEIGHT: 119 LBS

## 2022-07-05 DIAGNOSIS — J30.2 SEASONAL ALLERGIC RHINITIS, UNSPECIFIED TRIGGER: Primary | ICD-10-CM

## 2022-07-05 DIAGNOSIS — R09.81 NASAL CONGESTION: ICD-10-CM

## 2022-07-05 DIAGNOSIS — R09.82 PND (POST-NASAL DRIP): ICD-10-CM

## 2022-07-05 PROCEDURE — 99204 OFFICE O/P NEW MOD 45 MIN: CPT | Performed by: OTOLARYNGOLOGY

## 2022-07-05 PROCEDURE — 1036F TOBACCO NON-USER: CPT | Performed by: OTOLARYNGOLOGY

## 2022-07-05 PROCEDURE — 3017F COLORECTAL CA SCREEN DOC REV: CPT | Performed by: OTOLARYNGOLOGY

## 2022-07-05 PROCEDURE — 1090F PRES/ABSN URINE INCON ASSESS: CPT | Performed by: OTOLARYNGOLOGY

## 2022-07-05 PROCEDURE — G8427 DOCREV CUR MEDS BY ELIG CLIN: HCPCS | Performed by: OTOLARYNGOLOGY

## 2022-07-05 PROCEDURE — G8420 CALC BMI NORM PARAMETERS: HCPCS | Performed by: OTOLARYNGOLOGY

## 2022-07-05 PROCEDURE — 1123F ACP DISCUSS/DSCN MKR DOCD: CPT | Performed by: OTOLARYNGOLOGY

## 2022-07-05 PROCEDURE — G8399 PT W/DXA RESULTS DOCUMENT: HCPCS | Performed by: OTOLARYNGOLOGY

## 2022-07-05 RX ORDER — MONTELUKAST SODIUM 10 MG/1
10 TABLET ORAL DAILY
Qty: 30 TABLET | Refills: 3 | Status: SHIPPED | OUTPATIENT
Start: 2022-07-05

## 2022-07-05 ASSESSMENT — ENCOUNTER SYMPTOMS
VOICE CHANGE: 1
COUGH: 0
SORE THROAT: 0
RHINORRHEA: 1
SHORTNESS OF BREATH: 0
VOMITING: 0

## 2022-07-05 NOTE — PATIENT INSTRUCTIONS
Please use flonase nasal spray 2 sprays once a day consistently. Please try azelastine nasal spray-- 1 spray each nostril at bedtime. Please start use of plain nasal saline 2 sprays each nostril 2-3 times daily. Please use this 15 minutes prior to medicated nasal sprays to avoid rinsing out medicated nasal sprays. When using the Prescription Nasal Spray use your right hand to spray into the left nostril and the left hand to spray into the right nostril. Do Not Sniff after spraying. This must be used daily to get improvement of symptoms which takes at least 2-3 weeks to see any results. May take up to six weeks to get the best improvement.

## 2022-07-05 NOTE — PROGRESS NOTES
[address] [facility] Otolaryngology  Dr. Baylee Burgess. DENNY David Ms.Ed. New Consult       Patient Name:  Wander Tamayo  :  1955     CHIEF C/O:    Chief Complaint   Patient presents with   Andrew Davis Other     NP enviornmental allergies       HISTORY OBTAINED FROM:  patient    HISTORY OF PRESENT ILLNESS:       Concha Rob is a 73y.o. year old female, here today for allergies since spring time. Nasal congestion and drainage, swelling in neck. Has tired benadryl, Claritin-d, flonase, astelin and mucinex. Stopped use of astelin due to causing sneezing fits. Has been on multiple z pack from pcp. Patient having breathing issues and gravely voice since start of symptoms. Previous smoker stopped last . Also has complaint of itchy ears. Allergy testing 3+ years ago. Various environmental allergies dust being the worse.        Past Medical History:   Diagnosis Date    Arthritis     Asthma     controlled     Depression     Diabetes mellitus ([facility] [address]     Fibromyalgia     GERD (gastroesophageal reflux disease)     Hepatitis C     treated     Hyperlipidemia     Hypertension     IBS (irritable bowel syndrome)     Overactive bladder     Spleen injury     hospitalized 2019 - resolved as of 19      Past Surgical History:   Procedure Laterality Date    ANESTHESIA NERVE BLOCK N/A 3/26/2019    L4-5 EPIDURAL STEROID INJECTION performed by Leopold Penner, DO at [facility] Right 2019    RIGHT ENDOSCOPIC CARPAL TUNNEL RELEASE performed by Myriam Basilio MD at [facility] 1898      ENDOSCOPY, COLON, DIAGNOSTIC      EPIDURAL STEROID INJECTION N/A 2019    L4-5 EPIDURAL STEROID INJECTION performed by Leopold Penner, DO at [address] [address]  2019    with sedation    OVARY REMOVAL       left     PAIN MANAGEMENT PROCEDURE N/A 2019    L4, L5 EPIDURAL STEROID INJECTION ([id number]) performed by Urban Mejia Otis Dry, DO at [facility] PERIPHERAL NERVE Right 6/5/2019    RIGHT ULNAR NERVE DECOMPRESSION AT ELBOW WITH POSSIBLE NERVE TRANSPOSITION performed by Tom Ruiz MD at [address] [place], operated by [facility] ARTHROSCOPY Right        Current Outpatient Medications:     SITagliptin (JANUVIA) 100 MG tablet, Take 1 tablet by mouth daily, Disp: 90 tablet, Rfl: 1    atorvastatin (LIPITOR) 40 MG tablet, take 1 tablet by mouth once daily, Disp: 90 tablet, Rfl: 1    fluticasone (FLONASE) 50 MCG/ACT nasal spray, 2 sprays by Each Nostril route daily, Disp: 48 g, Rfl: 1    NITRO-BID 2 % ointment, APPLY 1 INCH TRANSDERNMALLY ON RIGHT FOOT twice a day, Disp: , Rfl:     latanoprost (XALATAN) 0.005 % ophthalmic solution, place 1 drop into both eyes at bedtime, Disp: , Rfl:     famotidine (PEPCID) 20 MG tablet, Take 1 tablet by mouth 2 times daily as needed (acid reflux), Disp: 60 tablet, Rfl: 0    ondansetron (ZOFRAN ODT) 8 MG TBDP disintegrating tablet, Place 1 tablet under the tongue every 8 hours as needed for Nausea, Disp: 12 tablet, Rfl: 0    amLODIPine (NORVASC) 10 MG tablet, TAKE 1 TABLET BY MOUTH ONE TIME A DAY, Disp: 90 tablet, Rfl: 1    Blood Glucose Monitoring Suppl (FREESTYLE LITE) SUSAN, 1 Device by Does not apply route daily, Disp: 1 each, Rfl: 0    DULoxetine (CYMBALTA) 30 MG extended release capsule, take 1 capsule by mouth once daily, Disp: , Rfl:     acetaminophen (APAP EXTRA STRENGTH) 500 MG tablet, Take 1 tablet by mouth every 8 hours as needed for Pain, Disp: 90 tablet, Rfl: 2    blood glucose monitor strips, Test 3 times a day & as needed for symptoms of irregular blood glucose. Dispense sufficient amount for indicated testing frequency plus additional to accommodate PRN testing needs. , Disp: 100 strip, Rfl: 0    Lancets MISC, 1 each by Does not apply route daily, Disp: 100 each, Rfl: 3    albuterol sulfate  (90 Base) MCG/ACT inhaler, Inhale 2 puffs into the lungs every 6 hours as needed for Wheezing Rescue inhaler, Disp: 1 each, Rfl: 3    ipratropium-albuterol (DUONEB) 0.5-2.5 (3) MG/3ML SOLN nebulizer solution, Inhale 3 mLs into the lungs every 4 hours Instructed to take am of procedure, Disp: 360 mL, Rfl: 3    cyclobenzaprine (FLEXERIL) 5 MG tablet, Take 1 tablet by mouth nightly as needed for Muscle spasms, Disp: 30 tablet, Rfl: 2    terconazole (TERAZOL 7) 0.4 % vaginal cream, Place vaginally nightly for 7 nights, Disp: 30 g, Rfl: 0    amitriptyline (ELAVIL) 50 MG tablet, , Disp: , Rfl:     polyethylene glycol (GLYCOLAX) powder, Take 17 g by mouth daily, Disp: , Rfl:     EPINEPHrine (EPIPEN) 0.3 MG/0.3ML SOAJ injection, Use as directed for allergic reaction, Disp: 2 each, Rfl: 0    sucralfate (CARAFATE) 1 GM tablet, Take 1 tablet by mouth 4 times daily for 14 days, Disp: 56 tablet, Rfl: 0    pantoprazole (PROTONIX) 40 MG tablet, Take 1 tablet by mouth daily (with breakfast) for 14 days, Disp: 14 tablet, Rfl: 0    gabapentin (NEURONTIN) 300 MG capsule, TAKE ONE CAPSULE BY MOUTH NIGHTLY FOR 90 DAYS, Disp: 30 capsule, Rfl: 2    acyclovir (ZOVIRAX) 400 MG tablet, Take 1 tablet by mouth 2 times daily for 10 days, Disp: 20 tablet, Rfl: 0  Adhesive tape, Nsaids, and Penicillins  Social History     Tobacco Use    Smoking status: Former Smoker     Packs/day: 0.25     Years: 45.00     Pack years: 11.25     Types: Cigarettes     Quit date: 2020     Years since quittin.8    Smokeless tobacco: Never Used   Vaping Use    Vaping Use: Some days    Substances: Never, THC   Substance Use Topics    Alcohol use: Yes     Comment: rare    Drug use: Yes     Types: Marijuana Maia Zhu)     Comment: holds medical card-Vaping     Family History   Problem Relation Age of Onset    Diabetes Mother     Cancer Mother     Alcohol Abuse Father     Breast Cancer Maternal Aunt 28    Breast Cancer Paternal Aunt        Review of Systems   Constitutional: Negative for chills and fever.    HENT: Positive for back: Normal range of motion. Lymphadenopathy:      Cervical: No cervical adenopathy. Skin:     General: Skin is warm. Findings: No erythema. Neurological:      Mental Status: She is alert. Cranial Nerves: No cranial nerve deficit. IMPRESSION/PLAN:  astelin 1 sp at bedtime  flonase 2sp daily  singulair  Follow up 6 wks poss scope if no better      Dr. Margarita Leung Otolaryngology/Facial Plastic Surgery Residency  Associate Clinical Professor:  Bishnu Tillman, [facility]

## 2022-08-26 ENCOUNTER — TELEPHONE (OUTPATIENT)
Dept: ENT CLINIC | Age: 67
End: 2022-08-26

## 2022-08-26 RX ORDER — AZELASTINE 1 MG/ML
2 SPRAY, METERED NASAL 2 TIMES DAILY
Qty: 30 ML | Refills: 3 | Status: SHIPPED | OUTPATIENT
Start: 2022-08-26

## 2022-09-12 ENCOUNTER — TELEPHONE (OUTPATIENT)
Dept: VASCULAR SURGERY | Age: 67
End: 2022-09-12

## 2022-09-13 ENCOUNTER — OFFICE VISIT (OUTPATIENT)
Dept: VASCULAR SURGERY | Age: 67
End: 2022-09-13
Payer: MEDICARE

## 2022-09-13 VITALS — WEIGHT: 110 LBS | HEIGHT: 63 IN | BODY MASS INDEX: 19.49 KG/M2

## 2022-09-13 DIAGNOSIS — I73.00 RAYNAUD'S DISEASE WITHOUT GANGRENE: Primary | ICD-10-CM

## 2022-09-13 PROCEDURE — 1123F ACP DISCUSS/DSCN MKR DOCD: CPT | Performed by: SURGERY

## 2022-09-13 PROCEDURE — 99203 OFFICE O/P NEW LOW 30 MIN: CPT | Performed by: SURGERY

## 2022-09-13 NOTE — PROGRESS NOTES
Vascular Surgery Outpatient Consultation      Chief Complaint   Patient presents with    Consultation     New pt. Rt. Big toe blister       Reason for Consult: Diminished blood flow to the feet    Requesting Physician:  Dr. Eileen Khoury:                The patient is a 78 y.o. female who is referred for evaluation of diminished blood flow to her feet. The patient is accompanied by her . She is a former smoker who has been experiencing discoloration to toes and her feet. She states that it happens when it is cold. She denies ulcerations. She does complain of some numbness in her toes. She underwent arterial studies that suggested diminished flow to the digits. She denies claudication, ulcers, or rest pain. She has a history of lumbar disc disease and has undergone previous injections.     Past Medical History:        Diagnosis Date    Arthritis     Asthma     controlled     Depression     Diabetes mellitus ([facility] [address]     Fibromyalgia     GERD (gastroesophageal reflux disease)     Hepatitis C 2016    treated     Hyperlipidemia     Hypertension     IBS (irritable bowel syndrome)     Overactive bladder     Spleen injury     hospitalized 11/2019 - resolved as of 12-20-19      Past Surgical History:        Procedure Laterality Date    ANESTHESIA NERVE BLOCK N/A 3/26/2019    L4-5 EPIDURAL STEROID INJECTION performed by Mihaela Russell DO at [facility] Right 6/5/2019    RIGHT ENDOSCOPIC CARPAL TUNNEL RELEASE performed by Hanna Weinberg MD at [address], DIAGNOSTIC      EPIDURAL STEROID INJECTION N/A 5/16/2019    L4-5 EPIDURAL STEROID INJECTION performed by Mihaela Russell DO at [facility] ([address] [place] [address])      NERVE BLOCK  03/26/2019    with sedation    OVARY REMOVAL      1981 left     PAIN MANAGEMENT PROCEDURE N/A 12/26/2019    L4, L5 EPIDURAL STEROID INJECTION ([id number]) performed by Luis Hawk DO at [address] Right 6/5/2019    RIGHT ULNAR NERVE DECOMPRESSION AT ELBOW WITH POSSIBLE NERVE TRANSPOSITION performed by Yessi Parmar MD at [address] ARTHROSCOPY Right      Current Medications:   Prior to Admission medications    Medication Sig Start Date End Date Taking? Authorizing Provider   azelastine (ASTELIN) 0.1 % nasal spray 2 sprays by Nasal route 2 times daily Use in each nostril as directed 8/26/22  Yes Gonzalo David DO   montelukast (SINGULAIR) 10 MG tablet Take 1 tablet by mouth daily 7/5/22  Yes April David DO   SITagliptin (JANUVIA) 100 MG tablet Take 1 tablet by mouth daily 6/30/22  Yes Jeremy Nath DO   atorvastatin (LIPITOR) 40 MG tablet take 1 tablet by mouth once daily 6/20/22  Yes Amanda Torres DO   fluticasone (FLONASE) 50 MCG/ACT nasal spray 2 sprays by Each Nostril route daily 5/17/22  Yes Amanda Torres DO   NITRO-BID 2 % ointment APPLY 1 INCH TRANSDERNMALLY ON RIGHT FOOT twice a day 2/28/22  Yes Historical Provider, MD   latanoprost (XALATAN) 0.005 % ophthalmic solution place 1 drop into both eyes at bedtime 3/15/22  Yes Historical Provider, MD   famotidine (PEPCID) 20 MG tablet Take 1 tablet by mouth 2 times daily as needed (acid reflux) 3/22/22  Yes Ander Franklin,    amLODIPine (NORVASC) 10 MG tablet TAKE 1 TABLET BY MOUTH ONE TIME A DAY 2/10/22  Yes Amanda Torres DO   Blood Glucose Monitoring Suppl (FREESTYLE LITE) SUSAN 1 Device by Does not apply route daily 1/27/22  Yes Amanda Torres DO   DULoxetine (CYMBALTA) 30 MG extended release capsule take 1 capsule by mouth once daily 11/16/21  Yes Historical Provider, MD   acetaminophen (APAP EXTRA STRENGTH) 500 MG tablet Take 1 tablet by mouth every 8 hours as needed for Pain 1/18/22  Yes Amanda Torres DO   blood glucose monitor strips Test 3 times a day & as needed for symptoms of irregular blood glucose.  Dispense sufficient amount for indicated testing frequency plus additional to accommodate PRN testing needs.  1/18/22  Yes Amanda Torres DO   Lancets MISC 1 each by Does not apply route daily 1/18/22  Yes Amanda Torres DO   albuterol sulfate  (90 Base) MCG/ACT inhaler Inhale 2 puffs into the lungs every 6 hours as needed for Wheezing Rescue inhaler 10/18/21  Yes Amanda Torres DO   ipratropium-albuterol (DUONEB) 0.5-2.5 (3) MG/3ML SOLN nebulizer solution Inhale 3 mLs into the lungs every 4 hours Instructed to take am of procedure 10/18/21  Yes Amanda Torres DO   cyclobenzaprine (FLEXERIL) 5 MG tablet Take 1 tablet by mouth nightly as needed for Muscle spasms 10/18/21  Yes Amanda Torres DO   terconazole (TERAZOL 7) 0.4 % vaginal cream Place vaginally nightly for 7 nights 10/11/21  Yes Selma Gilford, DO   amitriptyline (ELAVIL) 50 MG tablet  6/30/21  Yes Historical Provider, MD   polyethylene glycol (GLYCOLAX) powder Take 17 g by mouth daily   Yes Historical Provider, MD   EPINEPHrine (EPIPEN) 0.3 MG/0.3ML SOAJ injection Use as directed for allergic reaction 4/25/19  Yes Wendi Pickard MD   sucralfate (CARAFATE) 1 GM tablet Take 1 tablet by mouth 4 times daily for 14 days 3/22/22 5/17/22  Tata Sarah DO   pantoprazole (PROTONIX) 40 MG tablet Take 1 tablet by mouth daily (with breakfast) for 14 days 3/22/22 5/17/22  Tata Sarah, DO   gabapentin (NEURONTIN) 300 MG capsule TAKE ONE CAPSULE BY MOUTH NIGHTLY FOR 90 DAYS 2/10/22 5/17/22  Amanda Torres DO   acyclovir (ZOVIRAX) 400 MG tablet Take 1 tablet by mouth 2 times daily for 10 days 2/8/21 3/28/22  KAIT Pearson - CNP     Allergies:  Adhesive tape, Nsaids, and Penicillins    Social History     Socioeconomic History    Marital status: Single     Spouse name: Not on file    Number of children: 5    Years of education: Not on file    Highest education level: Some college, no degree   Occupational History    Not on file   Tobacco Use    Smoking status: Former     Packs/day: 0.25     Years: 45.00     Pack years: 11.25     Types: Cigarettes     Quit date: 2020     Years since quittin.0    Smokeless tobacco: Never   Vaping Use    Vaping Use: Some days    Substances: Never, THC   Substance and Sexual Activity    Alcohol use: Yes     Comment: rare    Drug use: Not Currently     Types: Marijuana Darryle Pimple)     Comment: holds medical card-Vaping    Sexual activity: Yes     Partners: Male   Other Topics Concern    Not on file   Social History Narrative    -Referral to  for financial strain and stress,depression, anxiety    -Smoking cessation brochure being mailed out    -Pt utilizes 3 food pantries    -[employer] [employer] transportation from [employer] for [address] [facility]     Social Determinants of Health     Financial Resource Strain: Low Risk     Difficulty of Paying Living Expenses: Not hard at all   Food Insecurity: No Food Insecurity    Worried About Running Out of Food in the Last Year: Never true    Wilfredo of Food in the Last Year: Never true   Transportation Needs: Not on file   Physical Activity: Insufficiently Active    Days of Exercise per Week: 3 days    Minutes of Exercise per Session: 20 min   Stress: Not on file   Social Connections: Not on file   Intimate Partner Violence: Not on file   Housing Stability: Not on file        Family History   Problem Relation Age of Onset    Diabetes Mother     Cancer Mother     Alcohol Abuse Father     Breast Cancer Maternal Aunt 32    Breast Cancer Paternal Aunt        REVIEW OF SYSTEMS (New symptoms):    Eyes:      Blurred vision:  No [x]/Yes []               Diplopia:   No [x]/Yes []               Vision loss:       No [x]/Yes []   Ears, nose, throat:             Hearing loss:    No [x]/Yes []      Vertigo:   No [x]/Yes []                       Swallowing problem:  No [x]/Yes []               Nose bleeds:   No [x]/Yes []      Voice hoarseness:  No [x]/Yes []  Respiratory:             Cough:   No [x]/Yes []      Pleuritic chest

## 2022-09-19 ENCOUNTER — TELEPHONE (OUTPATIENT)
Dept: ADMINISTRATIVE | Age: 67
End: 2022-09-19

## 2022-09-19 NOTE — TELEPHONE ENCOUNTER
Patient tonsils are very swollen and she is getting headaches. States her son tested positive for Covid last night. She did not take a test as she thinks her symptoms are from her tonsils and allergies. Patient really wants to see Dony Baltazar. Please advise. Patient is going to call PCP.

## 2022-09-20 ENCOUNTER — HOSPITAL ENCOUNTER (EMERGENCY)
Age: 67
Discharge: HOME | End: 2022-09-20
Payer: MEDICARE

## 2022-09-20 ENCOUNTER — TELEPHONE (OUTPATIENT)
Dept: FAMILY MEDICINE CLINIC | Age: 67
End: 2022-09-20

## 2022-09-20 VITALS
TEMPERATURE: 98.3 F | HEART RATE: 70 BPM | SYSTOLIC BLOOD PRESSURE: 149 MMHG | WEIGHT: 100 LBS | RESPIRATION RATE: 16 BRPM | HEIGHT: 63 IN | OXYGEN SATURATION: 99 % | BODY MASS INDEX: 17.72 KG/M2 | DIASTOLIC BLOOD PRESSURE: 91 MMHG

## 2022-09-20 DIAGNOSIS — R09.81 SINUS CONGESTION: Primary | ICD-10-CM

## 2022-09-20 LAB
SARS-COV-2, NAAT: NOT DETECTED
STREP GRP A PCR: NEGATIVE

## 2022-09-20 PROCEDURE — 87880 STREP A ASSAY W/OPTIC: CPT

## 2022-09-20 PROCEDURE — 6360000002 HC RX W HCPCS: Performed by: NURSE PRACTITIONER

## 2022-09-20 PROCEDURE — 99283 EMERGENCY DEPT VISIT LOW MDM: CPT

## 2022-09-20 PROCEDURE — 87635 SARS-COV-2 COVID-19 AMP PRB: CPT

## 2022-09-20 PROCEDURE — 6370000000 HC RX 637 (ALT 250 FOR IP): Performed by: NURSE PRACTITIONER

## 2022-09-20 RX ORDER — ACETAMINOPHEN 325 MG/1
650 TABLET ORAL ONCE
Status: COMPLETED | OUTPATIENT
Start: 2022-09-20 | End: 2022-09-20

## 2022-09-20 RX ORDER — DEXAMETHASONE 4 MG/1
12 TABLET ORAL ONCE
Status: COMPLETED | OUTPATIENT
Start: 2022-09-20 | End: 2022-09-20

## 2022-09-20 RX ADMIN — ACETAMINOPHEN 650 MG: 325 TABLET ORAL at 16:53

## 2022-09-20 RX ADMIN — DEXAMETHASONE 12 MG: 4 TABLET ORAL at 15:26

## 2022-09-20 ASSESSMENT — PAIN SCALES - GENERAL
PAINLEVEL_OUTOF10: 8
PAINLEVEL_OUTOF10: 5

## 2022-09-20 ASSESSMENT — PAIN DESCRIPTION - ORIENTATION: ORIENTATION: LEFT

## 2022-09-20 ASSESSMENT — PAIN - FUNCTIONAL ASSESSMENT: PAIN_FUNCTIONAL_ASSESSMENT: 0-10

## 2022-09-20 ASSESSMENT — PAIN DESCRIPTION - LOCATION: LOCATION: FACE

## 2022-09-20 ASSESSMENT — PAIN DESCRIPTION - DESCRIPTORS: DESCRIPTORS: ACHING

## 2022-09-20 NOTE — TELEPHONE ENCOUNTER
Patient called with continued complaints of facial swelling and increased head pain. Patient directed to the ED.

## 2022-09-20 NOTE — TELEPHONE ENCOUNTER
Patient to follow-up with PCP, definitely would recommend a COVID test prior to any intervention or being seen by a physician.

## 2022-09-20 NOTE — TELEPHONE ENCOUNTER
Please advise. Plan: Labs to be done in 3 1/2 weeks CBC, hepatic function,cholesterol triglycerides Detail Level: Detailed Continue Regimen: Bactrim ds take one twice daily\\nIsotretinoin 40mg one twice daily

## 2022-09-20 NOTE — ED PROVIDER NOTES
[address]  Department of Emergency Medicine   ED  Encounter Note  Admit Date/RoomTime: 2022  2:44 PM  ED Room: [facility]    NAME: Griselda Reno  : 1955  MRN: [id number]     Chief Complaint:  Facial Pain (Left sided facial swelling x 2 days. States son is + for Covid. +cough. +sneezing. +congestion. Had [address] on \"glands\" in neck x 3 months ago. -respiratory distress. -difficulty swallowing.)    History of Present Illness        Griselda Reno is a 78 y.o. old female who presents to the emergency department by private vehicle, for non-traumatic left sided face pain which has been ongoing for almost a year prior to arrival.  No injury. Denies fevers, chills, nausea, vomiting, diarrhea. Able to control secretions and tolerate PO intake. NO difficulty breathing. Since onset the symptoms have been unchanged. She has had an ultrasound performed in January and she said she has not had appropriate follow up due to taking care of a family member. She said she went to ENT and was prescribed Singulair. She has not followed up since. No swelling appreciated on exam.     ROS   Pertinent positives and negatives are stated within HPI, all other systems reviewed and are negative. Past Medical History:  has a past medical history of Arthritis, Asthma, Depression, Diabetes mellitus ([address] Fibromyalgia, GERD (gastroesophageal reflux disease), Hepatitis C, Hyperlipidemia, Hypertension, IBS (irritable bowel syndrome), Overactive bladder, and Spleen injury. Surgical History:  has a past surgical history that includes Ovary removal; Dilation & curettage; Shoulder arthroscopy (Right); Nerve Block (2019); Anesthesia Nerve Block (N/A, 3/26/2019); epidural steroid injection (N/A, 2019); Colonoscopy; Endoscopy, colon, diagnostic; Carpal tunnel release (Right, 2019); pr repair major peripheral nerve (Right, 2019);  Pain management procedure (N/A, 2019); and Hysterectomy. Social History:  reports that she quit smoking about 2 years ago. Her smoking use included cigarettes. She has a 11.25 pack-year smoking history. She has never used smokeless tobacco. She reports current alcohol use. She reports that she does not currently use drugs after having used the following drugs: Marijuana Eve Jarvis). Family History: family history includes Alcohol Abuse in her father; Breast Cancer in her paternal aunt; Breast Cancer (age of onset: 28) in her maternal aunt; Cancer in her mother; Diabetes in her mother. Allergies: Adhesive tape, Nsaids, and Penicillins    Physical Exam   Oxygen Saturation Interpretation: Normal.        ED Triage Vitals   BP Temp Temp Source Heart Rate Resp SpO2 Height Weight   09/20/22 1441 09/20/22 1407 09/20/22 1407 09/20/22 1407 09/20/22 1441 09/20/22 1407 09/20/22 1441 09/20/22 1441   (!) 149/91 98.3 °F (36.8 °C) Oral 70 16 99 % 5' 2.5\" (1.588 m) 100 lb (45.4 kg)       Constitutional:  Alert. Development consistent with age. Head:  Atraumatic, without temporal or scalp tenderness. Eyes:  PERRL, EOMI. No periorbital ecchymoses. Conjunctiva: normal.  Ears:  External ears without lesions. Face:        Periorbital:  bilateral no swelling, ecchymosis, tendeness. Zygoma:  bilateral no swelling, tendeness or deformity. Maxilla:  Bilateral no swelling, tendeness or deformity. Mandible:  bilateral no swelling, tendeness or deformity. Facial Skin:  normal exam; no erythema, swelling or tenderness and no erythema, rash or swelling noted. Sinuses:  no bilateral maxillary sinus tenderness. no bilateral frontal sinus tenderness. Nose:  There is no deformity and no swelling, pain or deformity present. Skin: normal.    Intranasal:   Blood: no. .        Abrasion: no.        Laceration: no.         Throat:  Pharynx without lesions. Airway patient. Neck:  Supple. Slightly tender to left side during palpation. No palpable mass appreciated. Symmetrical neck. Chest:  Symmetrical without visible rash or tenderness. Respiratory:  Clear to auscultation and breath sounds equal. No cough, no SOB. CV:  Regular rate and rhythm, normal heart sounds, without pathological murmurs. Integument:  No abrasions, ecchymoses, or lacerations unless noted elsewhere. Musculoskeletal:  No tenderness or swelling. Normal, painless range of motion. No neurovascular deficit. Lymphatic: no lymphadenopathy noted  Neurological:  Orientation age-appropriate. Motor functions intact. Lab / Imaging Results   (All laboratory and radiology results have been personally reviewed by myself)  Labs:  Results for orders placed or performed during the hospital encounter of 09/20/22   COVID-19, Rapid    Specimen: Nasopharyngeal Swab   Result Value Ref Range    SARS-CoV-2, NAAT Not Detected Not Detected   Strep Screen Group A Throat    Specimen: Throat   Result Value Ref Range    Strep Grp A PCR Negative Negative     Imaging: All Radiology results interpreted by Radiologist unless otherwise noted. No orders to display     ED Course / Medical Decision Making     Medications   dexamethasone (DECADRON) tablet 12 mg (12 mg Oral Given 9/20/22 1526)   acetaminophen (TYLENOL) tablet 650 mg (650 mg Oral Given 9/20/22 1653)        Consult(s):   None    Procedure(s):      MDM:   Patient arrived with c/o left neck swelling. There was no swelling or palpable mass noted on exam.  She is able to control her secretions and has no difficulty swallowing. She has been evaluated by ENT, had a neck soft tissue u/s performed without any concerns. She said she f/u with ENT and was prescribed Singular. She is not toxic appearing. Medicated her with Decadron, Tylenol, and swabbed for Strep and COVID. Strep/COVID negative. I encouraged her to f/u with ENT and her PCP this week for further management on an outpatient basis.  At time of discharge, tolerated PO intake, controlling secretions, airway patent. Plan of Care/Counseling:  Nurse Practitioner on duty reviewed today's visit with the patient in addition to providing specific details for the plan of care and counseling regarding the diagnosis and prognosis. Questions are answered at this time and are agreeable with the plan. Assessment      1. Sinus congestion      Plan   Discharged home. Patient condition is stable    New Medications     Discharge Medication List as of 9/20/2022  4:45 PM        Electronically signed by KAIT Sen CNP   DD: 9/20/22  **This report was transcribed using voice recognition software. Every effort was made to ensure accuracy; however, inadvertent computerized transcription errors may be present.   END OF ED PROVIDER NOTE      KAIT Sen CNP  09/20/22 0655

## 2022-09-22 ENCOUNTER — TELEPHONE (OUTPATIENT)
Dept: ENT CLINIC | Age: 67
End: 2022-09-22

## 2022-09-22 NOTE — TELEPHONE ENCOUNTER
ED 9/20/22 Sinus congestion; facial pain. Patient requesting to be seen in next 2 days per discharge instruction.  Please advise patient [phone]

## 2022-09-24 ENCOUNTER — HOSPITAL ENCOUNTER (EMERGENCY)
Age: 67
Discharge: HOME | End: 2022-09-24
Payer: MEDICARE

## 2022-09-24 VITALS
BODY MASS INDEX: 18.4 KG/M2 | DIASTOLIC BLOOD PRESSURE: 97 MMHG | TEMPERATURE: 98.2 F | WEIGHT: 100 LBS | OXYGEN SATURATION: 100 % | SYSTOLIC BLOOD PRESSURE: 143 MMHG | HEART RATE: 83 BPM | RESPIRATION RATE: 19 BRPM | HEIGHT: 62 IN

## 2022-09-24 DIAGNOSIS — J01.00 ACUTE MAXILLARY SINUSITIS, RECURRENCE NOT SPECIFIED: ICD-10-CM

## 2022-09-24 DIAGNOSIS — K04.7 DENTAL INFECTION: Primary | ICD-10-CM

## 2022-09-24 PROCEDURE — 6370000000 HC RX 637 (ALT 250 FOR IP): Performed by: PHYSICIAN ASSISTANT

## 2022-09-24 PROCEDURE — 99283 EMERGENCY DEPT VISIT LOW MDM: CPT

## 2022-09-24 RX ORDER — CLINDAMYCIN HYDROCHLORIDE 300 MG/1
300 CAPSULE ORAL 3 TIMES DAILY
Qty: 30 CAPSULE | Refills: 0 | Status: SHIPPED | OUTPATIENT
Start: 2022-09-24 | End: 2022-10-04

## 2022-09-24 RX ORDER — ACETAMINOPHEN 500 MG
1000 TABLET ORAL ONCE
Status: COMPLETED | OUTPATIENT
Start: 2022-09-24 | End: 2022-09-24

## 2022-09-24 RX ORDER — CLINDAMYCIN HYDROCHLORIDE 150 MG/1
300 CAPSULE ORAL ONCE
Status: COMPLETED | OUTPATIENT
Start: 2022-09-24 | End: 2022-09-24

## 2022-09-24 RX ADMIN — CLINDAMYCIN HYDROCHLORIDE 300 MG: 150 CAPSULE ORAL at 16:55

## 2022-09-24 RX ADMIN — ACETAMINOPHEN 1000 MG: 500 TABLET ORAL at 16:56

## 2022-09-24 ASSESSMENT — PAIN DESCRIPTION - LOCATION
LOCATION: FACE;JAW;NECK
LOCATION: FACE;JAW;NECK

## 2022-09-24 ASSESSMENT — PAIN - FUNCTIONAL ASSESSMENT: PAIN_FUNCTIONAL_ASSESSMENT: ACTIVITIES ARE NOT PREVENTED

## 2022-09-24 ASSESSMENT — PAIN DESCRIPTION - ONSET: ONSET: ON-GOING

## 2022-09-24 ASSESSMENT — PAIN DESCRIPTION - ORIENTATION
ORIENTATION: LEFT
ORIENTATION: LEFT

## 2022-09-24 ASSESSMENT — PAIN SCALES - GENERAL
PAINLEVEL_OUTOF10: 8
PAINLEVEL_OUTOF10: 5

## 2022-09-24 ASSESSMENT — PAIN DESCRIPTION - PAIN TYPE: TYPE: CHRONIC PAIN

## 2022-09-24 ASSESSMENT — PAIN DESCRIPTION - FREQUENCY: FREQUENCY: CONTINUOUS

## 2022-09-24 ASSESSMENT — PAIN DESCRIPTION - DESCRIPTORS
DESCRIPTORS: ACHING
DESCRIPTORS: ACHING

## 2022-09-24 NOTE — ED PROVIDER NOTES
[address]  Department of Emergency Medicine   ED  Encounter Note  Admit Date/RoomTime: 2022  3:56 PM  ED Room: [place] [address]      NAME: Willian Tolliver  : 1955  MRN: [id number]     Chief Complaint:  Facial Swelling (Was seen here 3 days ago for swollen glands now having increased swelling, neck pain, and HA has appt with ent )    History of Present Illness      Willian Tolliver is a 78 y.o. old female who presents to the emergency department by private vehicle, for sudden onset of bilateral sore throat pain (\"swollen glands\"), which occured over a week prior to arrival, for which she was seen in the ED setting on 22, treated with steroids and recommended to follow up with her ENT doctor for dx: sinus congestion . Associated Signs & Symptoms: Left-sided facial pain and swelling which has worsened over the past 3 days as well as bilateral ear pain. Since onset the symptoms have been gradually worsening. She is drinking moderate amounts of fluids. There has been no chest pain, shortness of breath, fever, chills, nausea, vomiting, diarrhea, lightheadedness or syncopal episodes. Patient states that she did make an appointment with her ENT doctor, Dr. Yenifer Johnson, but that the follow-up appointment is not until 2022 she would like evaluation at this time as her symptoms are worsening. Patient states that the swelling over her left side of her face is worsened with eating and drinking. She reports that it was alleviated slightly by the Tylenol and steroid she was given on her visit on 2022. Exposed To: Streptococcus: no.                              Infectious Mononucleosis:  unknown. Symptoms:  Pain:  Yes.                             Muffled Voice:  No.                            Hoarse:  No.                            Difficulty with Secretions:  No.    Centor Score (MODIFIED) For Strep Pharyngitis:    Age 3-14 Years   no (0)     Age >45 Years   YES  -1     Exudate or Swelling on Tonsils   no (0)     Tender/Swollen Anterior Cervical Nodes   yes (1)     Fever? (T > 38°C, 100.4°F)   no (0)     Absence of Cough   yes (1)   TOTAL POINTS   1   Score of Zero = Probability of Strep Pharyngitis: 1% - 2.5%. ,   No further testing nor antibiotics. Score of 1 = Probability of Strep Pharyngitis: 5% - 10%. ,   No further testing nor antibiotics. Score of 2 = Probability of Strep Phar: 11% - 17%. Culture/test all, ATB's only for positive culture results. Score of 3 = Probability of Strep Phar: 28% - 35%. Culture/test all, ATB's only for positive culture results  Score of 4 or 5 = Probability of Strep Pharyngitis: 51% - 53%. Treat empirically with antibiotics. ROS   Pertinent positives and negatives are stated within HPI, all other systems reviewed and are negative. Past Medical History:  has a past medical history of Arthritis, Asthma, Depression, Diabetes mellitus ([address] Fibromyalgia, GERD (gastroesophageal reflux disease), Hepatitis C, Hyperlipidemia, Hypertension, IBS (irritable bowel syndrome), Overactive bladder, and Spleen injury. Surgical History:  has a past surgical history that includes Ovary removal; Dilation & curettage; Shoulder arthroscopy (Right); Nerve Block (03/26/2019); Anesthesia Nerve Block (N/A, 3/26/2019); epidural steroid injection (N/A, 5/16/2019); Colonoscopy; Endoscopy, colon, diagnostic; Carpal tunnel release (Right, 6/5/2019); pr repair major peripheral nerve (Right, 6/5/2019); Pain management procedure (N/A, 12/26/2019); and Hysterectomy. Social History:  reports that she quit smoking about 2 years ago. Her smoking use included cigarettes. She has a 11.25 pack-year smoking history. She has never used smokeless tobacco. She reports current alcohol use. She reports that she does not currently use drugs after having used the following drugs: Marijuana Alphonso Jock).     Family History: family history includes Alcohol noted.  Neurological:  Oriented. Motor functions intact. Lab / Imaging Results   (All laboratory and radiology results have been personally reviewed by myself)  Labs:  No results found for this visit on 09/24/22. Imaging: All Radiology results interpreted by Radiologist unless otherwise noted. No orders to display       ED Course / Medical Decision Making     Medications   clindamycin (CLEOCIN) capsule 300 mg (300 mg Oral Given 9/24/22 5815)   acetaminophen (TYLENOL) tablet 1,000 mg (1,000 mg Oral Given 9/24/22 5416)        Consult(s):   None    Procedure(s):   none    MDM:   Based on low suspicion for Strep as per history/physical findings, Rapid Strep/Throat Culture testing was not done  Pharyngitis is unlikely to be Strep. Based on the patient's reported symptoms and physical exam findings, as noted above, patient most likely is having anterior cervical lymph node swelling and tenderness secondary to dental infection based on significance of swelling and gumline tenderness as noted above. There is no drainable abscess and therefore incision and drainage was considered but was not performed as it is not applicable at this time. Patient does have mucoid effusions behind bilateral TMs which also is a sign of sinusitis which is appropriate for antibiotic initiation at this time. Allergies reviewed and patient does report that she gets \"hives\" with all penicillin medications. Therefore, clindamycin was chosen as antibiotic of choice as it does offer a fair amount of coverage for bacterial sinusitis other than staph as well as the fact that I am more concerned with the dental infection present than that of the sinusitis at this time as the patient has an appropriate ENT follow-up on 9/28/2022 at which antibiotic regimen can be altered should she fail to improve by this point. Patient was given first dose of antibiotics with an ED setting and Tylenol both which he tolerated well.   Patient was recommended

## 2022-09-28 ENCOUNTER — OFFICE VISIT (OUTPATIENT)
Dept: ENT CLINIC | Age: 67
End: 2022-09-28
Payer: MEDICARE

## 2022-09-28 ENCOUNTER — TELEPHONE (OUTPATIENT)
Dept: FAMILY MEDICINE CLINIC | Age: 67
End: 2022-09-28

## 2022-09-28 VITALS
HEART RATE: 90 BPM | SYSTOLIC BLOOD PRESSURE: 113 MMHG | BODY MASS INDEX: 21.71 KG/M2 | HEIGHT: 62 IN | WEIGHT: 118 LBS | DIASTOLIC BLOOD PRESSURE: 78 MMHG

## 2022-09-28 DIAGNOSIS — L02.01 FACIAL ABSCESS: Primary | ICD-10-CM

## 2022-09-28 PROCEDURE — 99213 OFFICE O/P EST LOW 20 MIN: CPT | Performed by: OTOLARYNGOLOGY

## 2022-09-28 PROCEDURE — 1123F ACP DISCUSS/DSCN MKR DOCD: CPT | Performed by: OTOLARYNGOLOGY

## 2022-09-28 RX ORDER — SENNOSIDES 8.6 MG
650 CAPSULE ORAL EVERY 8 HOURS PRN
COMMUNITY

## 2022-09-28 RX ORDER — METHYLPREDNISOLONE 4 MG/1
4 TABLET ORAL SEE ADMIN INSTRUCTIONS
Qty: 1 KIT | Refills: 0 | Status: SHIPPED | OUTPATIENT
Start: 2022-09-28 | End: 2022-10-04

## 2022-09-28 ASSESSMENT — ENCOUNTER SYMPTOMS
VOICE CHANGE: 0
COUGH: 0
RHINORRHEA: 1
VOMITING: 0
FACIAL SWELLING: 1
SHORTNESS OF BREATH: 0
TROUBLE SWALLOWING: 0
SINUS PAIN: 1
SORE THROAT: 0

## 2022-09-28 NOTE — TELEPHONE ENCOUNTER
Refill was sent to pharmacy on 6/30/22 for #90 and 1 refill. She should have medication until the end of the year. Tried to call patient but voicemail is full.

## 2022-09-28 NOTE — PROGRESS NOTES
[facility] Otolaryngology  Dr. Caryn Carroll. [place] DENNY Paz Ms.Ed        Patient Name:  Mathis Nyhan  :  1955     CHIEF C/O:    Chief Complaint   Patient presents with    Follow-up     ED f/u sinus/facial pain, abcess, singulare/flonase/nasal saline did not help,        HISTORY OBTAINED FROM:  patient    HISTORY OF PRESENT ILLNESS:       Gama Lai is a 78y.o. year old female, here today for follow up of ED follow up for sinus complaint with facial pain 8 days ago. Was given steroids that day and sent home and referred to dentist. Was seen yesterday and had a dental abscess drained and placed on clindamycin for dental infection and acute maxillary sinusitis.        Past Medical History:   Diagnosis Date    Arthritis     Asthma     controlled     Depression     Diabetes mellitus ([facility] [address]     Fibromyalgia     GERD (gastroesophageal reflux disease)     Hepatitis C     treated     Hyperlipidemia     Hypertension     IBS (irritable bowel syndrome)     Overactive bladder     Spleen injury     hospitalized 2019 - resolved as of 19      Past Surgical History:   Procedure Laterality Date    ANESTHESIA NERVE BLOCK N/A 3/26/2019    L4-5 EPIDURAL STEROID INJECTION performed by Clemente French DO at [facility] Right 2019    RIGHT ENDOSCOPIC CARPAL TUNNEL RELEASE performed by Jacqueline Conde MD at [address], DIAGNOSTIC      EPIDURAL STEROID INJECTION N/A 2019    L4-5 EPIDURAL STEROID INJECTION performed by Clemente French DO at [address] ([facility])      NERVE BLOCK  2019    with sedation    OVARY REMOVAL      1981 left     PAIN MANAGEMENT PROCEDURE N/A 2019    L4, L5 EPIDURAL STEROID INJECTION ([id number]) performed by Clemente French DO at [address] Right 2019    RIGHT ULNAR NERVE DECOMPRESSION  [facility] performed by hours as needed for Wheezing Rescue inhaler, Disp: 1 each, Rfl: 3    ipratropium-albuterol (DUONEB) 0.5-2.5 (3) MG/3ML SOLN nebulizer solution, Inhale 3 mLs into the lungs every 4 hours Instructed to take am of procedure, Disp: 360 mL, Rfl: 3    cyclobenzaprine (FLEXERIL) 5 MG tablet, Take 1 tablet by mouth nightly as needed for Muscle spasms, Disp: 30 tablet, Rfl: 2    terconazole (TERAZOL 7) 0.4 % vaginal cream, Place vaginally nightly for 7 nights, Disp: 30 g, Rfl: 0    amitriptyline (ELAVIL) 50 MG tablet, , Disp: , Rfl:     polyethylene glycol (GLYCOLAX) powder, Take 17 g by mouth daily, Disp: , Rfl:     EPINEPHrine (EPIPEN) 0.3 MG/0.3ML SOAJ injection, Use as directed for allergic reaction, Disp: 2 each, Rfl: 0    sucralfate (CARAFATE) 1 GM tablet, Take 1 tablet by mouth 4 times daily for 14 days, Disp: 56 tablet, Rfl: 0    pantoprazole (PROTONIX) 40 MG tablet, Take 1 tablet by mouth daily (with breakfast) for 14 days, Disp: 14 tablet, Rfl: 0    gabapentin (NEURONTIN) 300 MG capsule, TAKE ONE CAPSULE BY MOUTH NIGHTLY FOR 90 DAYS, Disp: 30 capsule, Rfl: 2    acetaminophen (APAP EXTRA STRENGTH) 500 MG tablet, Take 1 tablet by mouth every 8 hours as needed for Pain (Patient not taking: Reported on 2022), Disp: 90 tablet, Rfl: 2    acyclovir (ZOVIRAX) 400 MG tablet, Take 1 tablet by mouth 2 times daily for 10 days, Disp: 20 tablet, Rfl: 0  Adhesive tape, Nsaids, and Penicillins  Social History     Tobacco Use    Smoking status: Former     Packs/day: 0.25     Years: 45.00     Pack years: 11.25     Types: Cigarettes     Quit date: 2020     Years since quittin.0    Smokeless tobacco: Never   Vaping Use    Vaping Use: Some days    Substances: Never, THC   Substance Use Topics    Alcohol use: Yes     Comment: rare    Drug use: Not Currently     Types: Marijuana Abe Caitlin)     Comment: holds medical card-Vaping     Family History   Problem Relation Age of Onset    Diabetes Mother     Cancer Mother     Alcohol Abuse Father     Breast Cancer Maternal Aunt 28    Breast Cancer Paternal Aunt        Review of Systems   Constitutional:  Negative for chills and fever. HENT:  Positive for congestion, ear pain, facial swelling, rhinorrhea and sinus pain. Negative for ear discharge, hearing loss, sore throat, trouble swallowing and voice change. Respiratory:  Negative for cough and shortness of breath. Cardiovascular:  Negative for chest pain and palpitations. Gastrointestinal:  Negative for vomiting. Skin:  Negative for rash. Allergic/Immunologic: Negative for environmental allergies. Neurological:  Negative for dizziness and headaches. Hematological:  Does not bruise/bleed easily. All other systems reviewed and are negative. /78   Pulse 90   Ht 5' 2\" (1.575 m)   Wt 118 lb (53.5 kg)   LMP  (LMP Unknown)   BMI 21.58 kg/m²   Physical Exam  Vitals and nursing note reviewed. Constitutional:       Appearance: She is well-developed. HENT:      Head: Normocephalic and atraumatic. No contusion, masses or laceration. Jaw: Tenderness and swelling present. Right Ear: Tympanic membrane and ear canal normal. There is no impacted cerumen. Left Ear: Tympanic membrane and ear canal normal. There is no impacted cerumen. Nose: Congestion and rhinorrhea present. Rhinorrhea is clear. Right Nostril: No epistaxis. Left Nostril: No epistaxis. Right Turbinates: Not swollen. Left Turbinates: Not swollen. Mouth/Throat:      Dentition: Dental abscesses present. Eyes:      Pupils: Pupils are equal, round, and reactive to light. Neck:      Thyroid: No thyromegaly. Trachea: No tracheal deviation. Cardiovascular:      Rate and Rhythm: Normal rate. Pulmonary:      Effort: Pulmonary effort is normal. No respiratory distress. Musculoskeletal:         General: Normal range of motion. Cervical back: Normal range of motion.    Lymphadenopathy:      Cervical: No cervical adenopathy. Skin:     General: Skin is warm. Findings: No erythema. Neurological:      Mental Status: She is alert. Cranial Nerves: No cranial nerve deficit. IMPRESSION/PLAN:  Patient seen exam for history of chronic pansinusitis with associated facial swelling on the right, concern for possible dental extension into the sinusitis, patient will complete antibiotic, steroid course with follow-up CT scan for chronic pansinusitis with failure of multiple medical therapies and concern for possible extension. Dr. Cielo Jorge.  Otolaryngology Facial Plastic Surgery  :  [address] [place] [address] Otolaryngology/Facial Plastic Surgery Residency  Associate Clinical Professor:  Sherryle Pence, [facility]

## 2022-09-28 NOTE — TELEPHONE ENCOUNTER
----- Message from Junior Traore sent at 9/28/2022 11:45 AM EDT -----  Subject: Refill Request    QUESTIONS  Name of Medication? Other - Januvia  Patient-reported dosage and instructions? 100mg, once daily  How many days do you have left? 0  Preferred Pharmacy? [facility] phone number (if available)? [phone]  ---------------------------------------------------------------------------  --------------  CALL BACK INFO  What is the best way for the office to contact you? OK to leave message on   voicemail  Preferred Call Back Phone Number? [phone]  ---------------------------------------------------------------------------  --------------  SCRIPT ANSWERS  Relationship to Patient?  Self

## 2022-10-03 ENCOUNTER — TELEPHONE (OUTPATIENT)
Dept: ENT CLINIC | Age: 67
End: 2022-10-03

## 2022-10-03 DIAGNOSIS — Z01.812 PRE-PROCEDURE LAB EXAM: Primary | ICD-10-CM

## 2022-10-04 ENCOUNTER — TELEPHONE (OUTPATIENT)
Dept: PHARMACY | Facility: CLINIC | Age: 67
End: 2022-10-04

## 2022-10-04 NOTE — LETTER
[place]  [address], [address]        [address] [place] [address]           10/05/22     Dear Raymond Mejía,    We tried to reach you recently regarding your Januvia, but were unable to reach you on the telephone. We have on file that you are currently taking Januvia. If you are no longer taking or taking differently, please call us at the number below so that we can discuss this and update your medication profile. Medication is ready for  at [address]. It appears that this medication has not been filled at proper times. We are worried you might be missing doses or not taking it as directed. It is important that you take your medications regularly and try not to miss a single dose. Some ways to help you remember to take and refill your medications are to:  · Use a pill box, set an alarm, and/or keep your medication near something that you do every day  · Fill a 3-month supply of your prescription at a time to save you time and trips to the pharmacy - if you would like assistance in switching your prescriptions to a 3-month supply, please contact us.    · Ask your pharmacy if they participate in [facility]", a program where you can  all of your medications on the same day  · Ask your pharmacy if you can be set up with automatic refill, where they will automatically refill your prescription when it is due and let you know it's ready to     Sincerely,   Kevan 2   Phone: toll free [phone]

## 2022-10-04 NOTE — TELEPHONE ENCOUNTER
[facility] CLINICAL PHARMACY: ADHERENCE REVIEW  Identified care gap per [place]: fills at [facility]: Diabetes adherence    Last Visit: 5/17/22    [address] Records claims through 9/26/22; YTD Saulo Angulo =  68%; Potential Fail Date: 10/7/22 ):   JANUVIA TAB 100MG due to refill 9/25/22 for 30 day supply. Per [facility]:   Noris Mckoy TAB 100MG last picked up on 8/26/22 for 30 day supply. Multiple refills remaining. Billed through [facility]. Pharmacy have ready for  already for 30 d/s. Lab Results   Component Value Date    LABA1C 6.2 05/17/2022    LABA1C 6.4 01/18/2022    LABA1C 6.1 10/18/2021     NOTE A1c <9%        PLAN  The following are interventions that have been identified:   - Patient overdue refilling Januvia and active on home medication list.     Unable to leave message      Need to remind patient to  Januvia from pharmacy. Sending Mychart.        Future Appointments   Date Time Provider Ilan Cramer   10/12/2022  9:00 AM Parmjit Franco DO [address]   Direct: [phone]  Phone: toll free [phone]         For Pharmacy [address] [place] [address] in place:  No  Gap Closed?: No   Time Spent (min): 15

## 2022-10-05 ENCOUNTER — PATIENT MESSAGE (OUTPATIENT)
Dept: ENT CLINIC | Age: 67
End: 2022-10-05

## 2022-10-07 ENCOUNTER — TELEPHONE (OUTPATIENT)
Dept: ENT CLINIC | Age: 67
End: 2022-10-07

## 2022-10-11 ENCOUNTER — HOSPITAL ENCOUNTER (OUTPATIENT)
Age: 67
Discharge: HOME | End: 2022-10-11
Payer: MEDICARE

## 2022-10-11 DIAGNOSIS — E78.2 MIXED HYPERLIPIDEMIA: ICD-10-CM

## 2022-10-11 DIAGNOSIS — E11.65 TYPE 2 DIABETES MELLITUS WITH HYPERGLYCEMIA, WITHOUT LONG-TERM CURRENT USE OF INSULIN (HCC): ICD-10-CM

## 2022-10-11 DIAGNOSIS — I10 ESSENTIAL HYPERTENSION: ICD-10-CM

## 2022-10-11 DIAGNOSIS — J45.41 MODERATE PERSISTENT ASTHMA WITH ACUTE EXACERBATION: ICD-10-CM

## 2022-10-11 DIAGNOSIS — J30.89 PERENNIAL ALLERGIC RHINITIS: ICD-10-CM

## 2022-10-11 DIAGNOSIS — Z01.812 PRE-PROCEDURE LAB EXAM: ICD-10-CM

## 2022-10-11 DIAGNOSIS — M54.41 CHRONIC RIGHT-SIDED LOW BACK PAIN WITH RIGHT-SIDED SCIATICA: ICD-10-CM

## 2022-10-11 DIAGNOSIS — G89.29 CHRONIC RIGHT-SIDED LOW BACK PAIN WITH RIGHT-SIDED SCIATICA: ICD-10-CM

## 2022-10-11 LAB
BUN BLDV-MCNC: 8 MG/DL (ref 6–23)
CREAT SERPL-MCNC: 0.7 MG/DL (ref 0.5–1)
GFR AFRICAN AMERICAN: >60
GFR NON-AFRICAN AMERICAN: >60 ML/MIN/1.73

## 2022-10-11 PROCEDURE — 84520 ASSAY OF UREA NITROGEN: CPT

## 2022-10-11 PROCEDURE — 82565 ASSAY OF CREATININE: CPT

## 2022-10-11 PROCEDURE — 36415 COLL VENOUS BLD VENIPUNCTURE: CPT

## 2022-10-11 RX ORDER — AMLODIPINE BESYLATE 10 MG/1
TABLET ORAL
Qty: 90 TABLET | Refills: 1 | Status: SHIPPED | OUTPATIENT
Start: 2022-10-11

## 2022-10-11 RX ORDER — CYCLOBENZAPRINE HCL 5 MG
5 TABLET ORAL NIGHTLY PRN
Qty: 30 TABLET | Refills: 2 | Status: SHIPPED | OUTPATIENT
Start: 2022-10-11

## 2022-10-11 RX ORDER — ATORVASTATIN CALCIUM 40 MG/1
TABLET, FILM COATED ORAL
Qty: 90 TABLET | Refills: 1 | Status: SHIPPED | OUTPATIENT
Start: 2022-10-11

## 2022-10-11 RX ORDER — AMITRIPTYLINE HYDROCHLORIDE 50 MG/1
50 TABLET, FILM COATED ORAL NIGHTLY
Qty: 30 TABLET | Refills: 3 | Status: SHIPPED | OUTPATIENT
Start: 2022-10-11

## 2022-10-11 RX ORDER — ACYCLOVIR 400 MG/1
400 TABLET ORAL 2 TIMES DAILY
Qty: 20 TABLET | Refills: 0 | Status: SHIPPED | OUTPATIENT
Start: 2022-10-11 | End: 2022-10-21

## 2022-10-11 RX ORDER — ALBUTEROL SULFATE 90 UG/1
2 AEROSOL, METERED RESPIRATORY (INHALATION) EVERY 6 HOURS PRN
Qty: 1 EACH | Refills: 3 | Status: SHIPPED | OUTPATIENT
Start: 2022-10-11

## 2022-10-11 RX ORDER — GABAPENTIN 300 MG/1
CAPSULE ORAL
Qty: 30 CAPSULE | Refills: 2 | Status: SHIPPED | OUTPATIENT
Start: 2022-10-11 | End: 2022-11-11

## 2022-10-11 RX ORDER — DULOXETIN HYDROCHLORIDE 30 MG/1
CAPSULE, DELAYED RELEASE ORAL
Qty: 30 CAPSULE | Refills: 3 | Status: SHIPPED | OUTPATIENT
Start: 2022-10-11

## 2022-10-18 ENCOUNTER — HOSPITAL ENCOUNTER (OUTPATIENT)
Dept: CT IMAGING | Age: 67
Discharge: HOME | End: 2022-10-20
Payer: MEDICARE

## 2022-10-18 ENCOUNTER — TELEPHONE (OUTPATIENT)
Dept: PHARMACY | Facility: CLINIC | Age: 67
End: 2022-10-18

## 2022-10-18 DIAGNOSIS — L02.01 FACIAL ABSCESS: ICD-10-CM

## 2022-10-18 PROCEDURE — 70491 CT SOFT TISSUE NECK W/DYE: CPT

## 2022-10-18 PROCEDURE — 6360000004 HC RX CONTRAST MEDICATION: Performed by: RADIOLOGY

## 2022-10-18 RX ORDER — SODIUM CHLORIDE 0.9 % (FLUSH) 0.9 %
10 SYRINGE (ML) INJECTION
Status: ACTIVE | OUTPATIENT
Start: 2022-10-18 | End: 2022-10-19

## 2022-10-18 RX ADMIN — IOPAMIDOL 75 ML: 755 INJECTION, SOLUTION INTRAVENOUS at 10:27

## 2022-10-18 NOTE — TELEPHONE ENCOUNTER
[facility] CLINICAL PHARMACY: STATIN THERAPY REVIEW  Identified statin use in persons with diabetes care gap per [place]. Patient not found in Outcomes MTM    ASSESSMENT    STATIN GAP IDENTIFIED    Per chart review, patient is prescribed atorvastatin 40mg daily    Per Reconciled Dispense Report:   Atorvastatin 40mg last filled on 22 for a 30 day supply; SI tablet once daily; last picked up on 22. Confirmed with [facility] that patient has a 90 day supply atorvastatin prescription ready for . Lab Results   Component Value Date    CHOL 198 2021    TRIG 79 2021    HDL 85 2021    LDLCALC 97 2021     ALT   Date Value Ref Range Status   2022 9 0 - 32 U/L Final     AST   Date Value Ref Range Status   2022 13 0 - 31 U/L Final     Comment:     Specimen is slightly Hemolyzed. Result may be artificially increased. The 10-year ASCVD risk score (Madhav KRUEGER, et al., 2019) is: 15.4%    Values used to calculate the score:      Age: 78 years      Sex: Female      Is Non- : Yes      Diabetic: Yes      Tobacco smoker: No      Systolic Blood Pressure: 834 mmHg      Is BP treated: Yes      HDL Cholesterol: 85 mg/dL      Total Cholesterol: 198 mg/dL     Hyperlipidemia Goal: Patient has a 10-yr ASCVD risk of >7.5% with DM and is therefore a candidate for high-intensity statin therapy based on updated guidelines.  ADA Guidelines Age:   >/= 36years old:   No history of ASCVD or 10-year ASCVD risk < 20% - moderate-intensity statin is recommended. History of ASCVD or 10-year ASCVD risk > 20% - high-intensity statin is recommended. PLAN  The following are interventions that have been identified:   - Patient overdue refilling atorvastatin 40mg and active on home medication list.     Called and spoke with Tania Montgomery.  Patient has a 90 day supply of her statin medication ready along with several other prescriptions. Attempted to reach patient, LVM. Called to inform patient that she had a prescription for atorvastatin ready at [facility] pharmacy along with several other prescriptions. Also called to discuss if there were any issues or barriers in adherence to her statin. Attempting to reach patient to review. Left message asking for return call.     Future Appointments   Date Time Provider Ilan Cramer   10/18/2022 10:30 AM [facility] CT SCAN 3 SEYZ CT [facility] Radiol[facility]   10/24/2022  2:30 PM Kevin Moore DO [facility]   10/27/2022  9:30 AM Radha Wolfe  [facility], PharmD, [address] [place] [address] Resident  Kevan 2  Department, toll free: [phone], option 1

## 2022-10-19 NOTE — TELEPHONE ENCOUNTER
Second attempt made to contact patient. Left voice message to return call to [phone], option 1. MyChart.     Ronni Burgess, PharmD, [facility] Resident  [facility]  Department, toll free: [phone], option 1    For [facility] in place:  No  Recommendation Provided To: Patient/Caregiver: 1 via [employer] Message  Intervention Detail: Adherence Monitorin  Gap Closed?: No   Time Spent (min): 20

## 2022-10-24 ENCOUNTER — OFFICE VISIT (OUTPATIENT)
Dept: ENT CLINIC | Age: 67
End: 2022-10-24
Payer: MEDICARE

## 2022-10-24 VITALS
DIASTOLIC BLOOD PRESSURE: 86 MMHG | BODY MASS INDEX: 21.71 KG/M2 | HEART RATE: 72 BPM | SYSTOLIC BLOOD PRESSURE: 123 MMHG | WEIGHT: 118 LBS | HEIGHT: 62 IN

## 2022-10-24 DIAGNOSIS — R09.81 NASAL CONGESTION: Primary | ICD-10-CM

## 2022-10-24 DIAGNOSIS — J30.2 SEASONAL ALLERGIC RHINITIS, UNSPECIFIED TRIGGER: ICD-10-CM

## 2022-10-24 PROCEDURE — 99213 OFFICE O/P EST LOW 20 MIN: CPT | Performed by: OTOLARYNGOLOGY

## 2022-10-24 PROCEDURE — 1123F ACP DISCUSS/DSCN MKR DOCD: CPT | Performed by: OTOLARYNGOLOGY

## 2022-10-24 PROCEDURE — 3078F DIAST BP <80 MM HG: CPT | Performed by: OTOLARYNGOLOGY

## 2022-10-24 PROCEDURE — 3074F SYST BP LT 130 MM HG: CPT | Performed by: OTOLARYNGOLOGY

## 2022-10-24 RX ORDER — FLUTICASONE PROPIONATE 50 MCG
2 SPRAY, SUSPENSION (ML) NASAL DAILY
Qty: 16 G | Refills: 5 | Status: SHIPPED | OUTPATIENT
Start: 2022-10-24

## 2022-10-24 ASSESSMENT — ENCOUNTER SYMPTOMS
SINUS PAIN: 1
SHORTNESS OF BREATH: 0
RHINORRHEA: 1
FACIAL SWELLING: 1
TROUBLE SWALLOWING: 0
COUGH: 0
VOICE CHANGE: 0
VOMITING: 0
SORE THROAT: 0

## 2022-10-24 NOTE — PROGRESS NOTES
[facility]  Dr. Trice Lou. Sofi Castañeda. Ms.Ed        Patient Name:  Alexandria Martinze  :  1955     CHIEF C/O:    Chief Complaint   Patient presents with    Results     2 wk f/u ct scan results       HISTORY OBTAINED FROM:  patient    HISTORY OF PRESENT ILLNESS:       Clint Pa is a 78y.o. year old female, here today for follow up of ED follow up for sinus complaint with facial pain 8 days ago. Was given steroids that day and sent home and referred to dentist. Was seen yesterday and had a dental abscess drained and placed on clindamycin for dental infection and acute maxillary sinusitis.        Past Medical History:   Diagnosis Date    Arthritis     Asthma     controlled     Depression     Diabetes mellitus ([facility] [address]     Fibromyalgia     GERD (gastroesophageal reflux disease)     Hepatitis C     treated     Hyperlipidemia     Hypertension     IBS (irritable bowel syndrome)     Overactive bladder     Spleen injury     hospitalized 2019 - resolved as of 19      Past Surgical History:   Procedure Laterality Date    ANESTHESIA NERVE BLOCK N/A 3/26/2019    L4-5 EPIDURAL STEROID INJECTION performed by Maddy Reilly DO at [facility] Right 2019    RIGHT ENDOSCOPIC CARPAL TUNNEL RELEASE performed by Terry Moses MD at [address], DIAGNOSTIC      EPIDURAL STEROID INJECTION N/A 2019    L4-5 EPIDURAL STEROID INJECTION performed by Maddy Reilly DO at [facility] ([address] [place] [address])      NERVE BLOCK  2019    with sedation    OVARY REMOVAL      1981 left     PAIN MANAGEMENT PROCEDURE N/A 2019    L4, L5 EPIDURAL STEROID INJECTION ([id number]) performed by Maddy Reilly DO at [facility] Right 2019    RIGHT ULNAR NERVE DECOMPRESSION AT ELBOW WITH POSSIBLE NERVE TRANSPOSITION performed by Terry Moses MD at [place] [address] Right        Current Outpatient Medications:      SITagliptin (JANUVIA) 100 MG tablet, Take 1 tablet by mouth daily, Disp: 90 tablet, Rfl: 1    gabapentin (NEURONTIN) 300 MG capsule, TAKE ONE CAPSULE BY MOUTH NIGHTLY FOR 90 DAYS, Disp: 30 capsule, Rfl: 2    albuterol sulfate HFA (PROVENTIL;VENTOLIN;PROAIR) 108 (90 Base) MCG/ACT inhaler, Inhale 2 puffs into the lungs every 6 hours as needed for Wheezing Rescue inhaler, Disp: 1 each, Rfl: 3    DULoxetine (CYMBALTA) 30 MG extended release capsule, take 1 capsule by mouth once daily, Disp: 30 capsule, Rfl: 3    cyclobenzaprine (FLEXERIL) 5 MG tablet, Take 1 tablet by mouth nightly as needed for Muscle spasms, Disp: 30 tablet, Rfl: 2    atorvastatin (LIPITOR) 40 MG tablet, take 1 tablet by mouth once daily, Disp: 90 tablet, Rfl: 1    amLODIPine (NORVASC) 10 MG tablet, TAKE 1 TABLET BY MOUTH ONE TIME A DAY, Disp: 90 tablet, Rfl: 1    amitriptyline (ELAVIL) 50 MG tablet, Take 1 tablet by mouth nightly, Disp: 30 tablet, Rfl: 3    acetaminophen (TYLENOL) 650 MG extended release tablet, Take 650 mg by mouth every 8 hours as needed for Pain, Disp: , Rfl:     azelastine (ASTELIN) 0.1 % nasal spray, 2 sprays by Nasal route 2 times daily Use in each nostril as directed, Disp: 30 mL, Rfl: 3    montelukast (SINGULAIR) 10 MG tablet, Take 1 tablet by mouth daily, Disp: 30 tablet, Rfl: 3    fluticasone (FLONASE) 50 MCG/ACT nasal spray, 2 sprays by Each Nostril route daily, Disp: 48 g, Rfl: 1    NITRO-BID 2 % ointment, APPLY 1 INCH TRANSDERNMALLY ON RIGHT FOOT twice a day, Disp: , Rfl:     latanoprost (XALATAN) 0.005 % ophthalmic solution, place 1 drop into both eyes at bedtime, Disp: , Rfl:     Blood Glucose Monitoring Suppl (FREESTYLE LITE) SUSAN, 1 Device by Does not apply route daily, Disp: 1 each, Rfl: 0    acetaminophen (APAP EXTRA STRENGTH) 500 MG tablet, Take 1 tablet by mouth every 8 hours as needed for Pain, Disp: 90 tablet, Rfl: 2    blood glucose monitor strips, Test 3 times a day & as needed for symptoms of irregular blood glucose. Dispense sufficient amount for indicated testing frequency plus additional to accommodate PRN testing needs. , Disp: 100 strip, Rfl: 0    Lancets MISC, 1 each by Does not apply route daily, Disp: 100 each, Rfl: 3    ipratropium-albuterol (DUONEB) 0.5-2.5 (3) MG/3ML SOLN nebulizer solution, Inhale 3 mLs into the lungs every 4 hours Instructed to take am of procedure, Disp: 360 mL, Rfl: 3    terconazole (TERAZOL 7) 0.4 % vaginal cream, Place vaginally nightly for 7 nights, Disp: 30 g, Rfl: 0    polyethylene glycol (GLYCOLAX) powder, Take 17 g by mouth daily, Disp: , Rfl:     EPINEPHrine (EPIPEN) 0.3 MG/0.3ML SOAJ injection, Use as directed for allergic reaction, Disp: 2 each, Rfl: 0    acyclovir (ZOVIRAX) 400 MG tablet, Take 1 tablet by mouth 2 times daily for 10 days, Disp: 20 tablet, Rfl: 0    sucralfate (CARAFATE) 1 GM tablet, Take 1 tablet by mouth 4 times daily for 14 days, Disp: 56 tablet, Rfl: 0    pantoprazole (PROTONIX) 40 MG tablet, Take 1 tablet by mouth daily (with breakfast) for 14 days, Disp: 14 tablet, Rfl: 0    famotidine (PEPCID) 20 MG tablet, Take 1 tablet by mouth 2 times daily as needed (acid reflux), Disp: 60 tablet, Rfl: 0  Adhesive tape, Nsaids, and Penicillins  Social History     Tobacco Use    Smoking status: Former     Packs/day: 0.25     Years: 45.00     Pack years: 11.25     Types: Cigarettes     Quit date: 2020     Years since quittin.1    Smokeless tobacco: Never   Vaping Use    Vaping Use: Some days    Substances: Never, THC   Substance Use Topics    Alcohol use: Yes     Comment: rare    Drug use: Not Currently     Types: Marijuana Stephanie Uziel)     Comment: holds medical card-Vaping     Family History   Problem Relation Age of Onset    Diabetes Mother     Cancer Mother     Alcohol Abuse Father     Breast Cancer Maternal Aunt 28    Breast Cancer Paternal Aunt        Review of Systems   Constitutional:  Negative for chills and fever.   HENT:  Positive for congestion, ear pain, facial swelling, rhinorrhea and sinus pain. Negative for ear discharge, hearing loss, sore throat, trouble swallowing and voice change. Respiratory:  Negative for cough and shortness of breath. Cardiovascular:  Negative for chest pain and palpitations. Gastrointestinal:  Negative for vomiting. Skin:  Negative for rash. Allergic/Immunologic: Negative for environmental allergies. Neurological:  Negative for dizziness and headaches. Hematological:  Does not bruise/bleed easily. All other systems reviewed and are negative. /86   Pulse 72   Ht 5' 2\" (1.575 m)   Wt 118 lb (53.5 kg)   LMP  (LMP Unknown)   BMI 21.58 kg/m²   Physical Exam  Vitals and nursing note reviewed. Constitutional:       Appearance: She is well-developed. HENT:      Head: Normocephalic and atraumatic. No contusion, masses or laceration. Jaw: Tenderness and swelling present. Right Ear: Tympanic membrane and ear canal normal. There is no impacted cerumen. Left Ear: Tympanic membrane and ear canal normal. There is no impacted cerumen. Nose: Congestion and rhinorrhea present. Rhinorrhea is clear. Right Nostril: No epistaxis. Left Nostril: No epistaxis. Right Turbinates: Not swollen. Left Turbinates: Not swollen. Mouth/Throat:     Eyes:      Pupils: Pupils are equal, round, and reactive to light. Neck:      Thyroid: No thyromegaly. Trachea: No tracheal deviation. Cardiovascular:      Rate and Rhythm: Normal rate. Pulmonary:      Effort: Pulmonary effort is normal. No respiratory distress. Musculoskeletal:         General: Normal range of motion. Cervical back: Normal range of motion. Lymphadenopathy:      Cervical: No cervical adenopathy. Skin:     General: Skin is warm. Findings: No erythema. Neurological:      Mental Status: She is alert. Cranial Nerves: No cranial nerve deficit. IMPRESSION/PLAN:  Patient seen exam for history of chronic pansinusitis with associated facial swelling on the right, concern for possible dental extension into the sinusitis, patient underwent CT sinus which was negative for chronic inflammation or dental extension. Continue daily Flonase 2 sprays each side once daily and nasal saline. Smoking cessation advised given that this can affect the sinuses as well. Follow up as needed. Electronically signed by Ike Elizabeth DO on 10/24/2022 at 2:38 PM    Dr. Kylah Torres.  Otolaryngology Facial Plastic Surgery  :  [facility] Otolaryngology/Facial Plastic Surgery Residency  Associate Clinical Professor:  Patt Whitt, [facility]  1955      I have discussed the case, including pertinent history and exam findings with the resident. I have seen and examined the patient and the key elements of the encounter have been performed by me. I agree with the assessment, plan and orders as documented by the resident. Patient here for follow up of medical problems. Remainder of medical problems as per resident note.       [facility], DO  11/17/22

## 2022-11-07 ENCOUNTER — OFFICE VISIT (OUTPATIENT)
Dept: FAMILY MEDICINE CLINIC | Age: 67
End: 2022-11-07
Payer: MEDICARE

## 2022-11-07 VITALS
OXYGEN SATURATION: 99 % | SYSTOLIC BLOOD PRESSURE: 110 MMHG | WEIGHT: 115.56 LBS | BODY MASS INDEX: 21.27 KG/M2 | DIASTOLIC BLOOD PRESSURE: 68 MMHG | HEIGHT: 62 IN | TEMPERATURE: 97.8 F | HEART RATE: 81 BPM | RESPIRATION RATE: 18 BRPM

## 2022-11-07 DIAGNOSIS — E11.65 TYPE 2 DIABETES MELLITUS WITH HYPERGLYCEMIA, WITHOUT LONG-TERM CURRENT USE OF INSULIN (HCC): Primary | ICD-10-CM

## 2022-11-07 DIAGNOSIS — I10 ESSENTIAL HYPERTENSION: ICD-10-CM

## 2022-11-07 DIAGNOSIS — J30.81 ALLERGIC RHINITIS DUE TO ANIMAL HAIR AND DANDER: ICD-10-CM

## 2022-11-07 DIAGNOSIS — Z91.09 ENVIRONMENTAL ALLERGIES: ICD-10-CM

## 2022-11-07 DIAGNOSIS — J30.89 PERENNIAL ALLERGIC RHINITIS: ICD-10-CM

## 2022-11-07 LAB — HBA1C MFR BLD: 6.3 %

## 2022-11-07 PROCEDURE — 3044F HG A1C LEVEL LT 7.0%: CPT | Performed by: STUDENT IN AN ORGANIZED HEALTH CARE EDUCATION/TRAINING PROGRAM

## 2022-11-07 PROCEDURE — 1123F ACP DISCUSS/DSCN MKR DOCD: CPT | Performed by: STUDENT IN AN ORGANIZED HEALTH CARE EDUCATION/TRAINING PROGRAM

## 2022-11-07 PROCEDURE — 99214 OFFICE O/P EST MOD 30 MIN: CPT | Performed by: STUDENT IN AN ORGANIZED HEALTH CARE EDUCATION/TRAINING PROGRAM

## 2022-11-07 PROCEDURE — 3078F DIAST BP <80 MM HG: CPT | Performed by: STUDENT IN AN ORGANIZED HEALTH CARE EDUCATION/TRAINING PROGRAM

## 2022-11-07 PROCEDURE — 83036 HEMOGLOBIN GLYCOSYLATED A1C: CPT | Performed by: STUDENT IN AN ORGANIZED HEALTH CARE EDUCATION/TRAINING PROGRAM

## 2022-11-07 PROCEDURE — 3074F SYST BP LT 130 MM HG: CPT | Performed by: STUDENT IN AN ORGANIZED HEALTH CARE EDUCATION/TRAINING PROGRAM

## 2022-11-07 SDOH — ECONOMIC STABILITY: FOOD INSECURITY: WITHIN THE PAST 12 MONTHS, THE FOOD YOU BOUGHT JUST DIDN'T LAST AND YOU DIDN'T HAVE MONEY TO GET MORE.: NEVER TRUE

## 2022-11-07 SDOH — ECONOMIC STABILITY: FOOD INSECURITY: WITHIN THE PAST 12 MONTHS, YOU WORRIED THAT YOUR FOOD WOULD RUN OUT BEFORE YOU GOT MONEY TO BUY MORE.: NEVER TRUE

## 2022-11-07 ASSESSMENT — ENCOUNTER SYMPTOMS
EYE PAIN: 0
VOMITING: 0
SINUS PRESSURE: 0
NAUSEA: 0
DIARRHEA: 0
SORE THROAT: 0
SHORTNESS OF BREATH: 0
COUGH: 0
EYE REDNESS: 0
CONSTIPATION: 0
ABDOMINAL PAIN: 0
BACK PAIN: 0
RHINORRHEA: 1
SINUS PAIN: 0

## 2022-11-07 ASSESSMENT — SOCIAL DETERMINANTS OF HEALTH (SDOH): HOW HARD IS IT FOR YOU TO PAY FOR THE VERY BASICS LIKE FOOD, HOUSING, MEDICAL CARE, AND HEATING?: NOT HARD AT ALL

## 2022-11-07 NOTE — PROGRESS NOTES
11/7/2022    HPI  Chief Complaint   Patient presents with    Follow-up    Hypertension    Diabetes       Doron Heath is a 78 y.o. female who  has a past medical history of Arthritis, Asthma, Depression, Diabetes mellitus ([address] Fibromyalgia, GERD (gastroesophageal reflux disease), Hepatitis C, Hyperlipidemia, Hypertension, IBS (irritable bowel syndrome), Overactive bladder, and Spleen injury. DM2:   Patient is here to fu regarding DM2. Patient is  controlled. Taking all medications and tolerating well. Patient is not taking ASA and Ace Inhibitor/ARB. Patient is  on appropriately-dosed statin. LDL is  at goal.  BP is  controlled. does not hypoglycemic episodes. Patient does not see Podiatry regularly. Patient is aware that it is necessary to see an Eye Dr yearly. Patient does not smoke. Most recent labs reviewed with patient. Patient does have complaints or concerns today. Lab Results   Component Value Date    LABA1C 6.3 11/07/2022       Lab Results   Component Value Date    [facility] 97 04/07/2021         Hypertension:  Patient is here for follow up chronic hypertension. This is  generally controlled on current medication regimen. Takes meds as directed and tolerates them well. Most recent labs reviewed with patient and are remarkable. No symptoms from htn standpoint per ROS. Patient is  compliant with lifestyle modifications. Patient does not smoke. Comorbid conditions include DM. Patient has been having issues with her allergies. She state that she used to get allergy shots but her old doctor does not take her insurance so she has found Dr. Monica Allen who she would like to see for this. She always has congestion runny nose and nasal drip. Review of Systems   Constitutional:  Negative for chills, fatigue and fever. HENT:  Positive for congestion, postnasal drip and rhinorrhea. Negative for ear pain, sinus pressure, sinus pain and sore throat.     Eyes:  Negative for pain and redness. Respiratory:  Negative for cough and shortness of breath. Cardiovascular:  Negative for chest pain. Gastrointestinal:  Negative for abdominal pain, constipation, diarrhea, nausea and vomiting. Genitourinary:  Negative for difficulty urinating and dysuria. Musculoskeletal:  Negative for back pain, myalgias and neck pain. Skin:  Negative for rash. Allergic/Immunologic: Positive for environmental allergies. Neurological:  Negative for dizziness, light-headedness and numbness. Psychiatric/Behavioral:  The patient is not nervous/anxious. Prior to Visit Medications    Medication Sig Taking?  Authorizing Provider   fluticasone (FLONASE) 50 MCG/ACT nasal spray 2 sprays by Each Nostril route daily Yes Luis Antonio Alfaro, DO   SITagliptin (JANUVIA) 100 MG tablet Take 1 tablet by mouth daily Yes Amanda Torres DO   gabapentin (NEURONTIN) 300 MG capsule TAKE ONE CAPSULE BY MOUTH NIGHTLY FOR 90 DAYS Yes Amanda Torres DO   albuterol sulfate HFA (PROVENTIL;VENTOLIN;PROAIR) 108 (90 Base) MCG/ACT inhaler Inhale 2 puffs into the lungs every 6 hours as needed for Wheezing Rescue inhaler Yes Amanda Torres DO   DULoxetine (CYMBALTA) 30 MG extended release capsule take 1 capsule by mouth once daily Yes Amanda Torres DO   cyclobenzaprine (FLEXERIL) 5 MG tablet Take 1 tablet by mouth nightly as needed for Muscle spasms Yes Amanda Torres DO   atorvastatin (LIPITOR) 40 MG tablet take 1 tablet by mouth once daily Yes Amanda Torres DO   amLODIPine (NORVASC) 10 MG tablet TAKE 1 TABLET BY MOUTH ONE TIME A DAY Yes Amanda Torres DO   amitriptyline (ELAVIL) 50 MG tablet Take 1 tablet by mouth nightly Yes Amanda Torres DO   acetaminophen (TYLENOL) 650 MG extended release tablet Take 650 mg by mouth every 8 hours as needed for Pain Yes Historical Provider, MD   azelastine (ASTELIN) 0.1 % nasal spray 2 sprays by Nasal route 2 times daily Use in each nostril as directed Yes Gonzalo David, DO   montelukast (SINGULAIR) 10 MG tablet Take 1 tablet by mouth daily Yes Gonzalo David,    fluticasone (FLONASE) 50 MCG/ACT nasal spray 2 sprays by Each Nostril route daily Yes Amanda Torres DO   NITRO-BID 2 % ointment APPLY 1 INCH TRANSDERNMALLY ON RIGHT FOOT twice a day Yes Historical Provider, MD   latanoprost (XALATAN) 0.005 % ophthalmic solution place 1 drop into both eyes at bedtime Yes Historical Provider, MD   Blood Glucose Monitoring Suppl (FREESTYLE LITE) SUSAN 1 Device by Does not apply route daily Yes Amanda Torres DO   acetaminophen (APAP EXTRA STRENGTH) 500 MG tablet Take 1 tablet by mouth every 8 hours as needed for Pain Yes Amanda Torres DO   blood glucose monitor strips Test 3 times a day & as needed for symptoms of irregular blood glucose. Dispense sufficient amount for indicated testing frequency plus additional to accommodate PRN testing needs.  Yes Amanda Torres DO   Lancets MISC 1 each by Does not apply route daily Yes Amanda Torres DO   ipratropium-albuterol (DUONEB) 0.5-2.5 (3) MG/3ML SOLN nebulizer solution Inhale 3 mLs into the lungs every 4 hours Instructed to take am of procedure Yes Amanda Torres DO   terconazole (TERAZOL 7) 0.4 % vaginal cream Place vaginally nightly for 7 nights Yes Liz Muñoz,    polyethylene glycol (GLYCOLAX) powder Take 17 g by mouth daily Yes Historical Provider, MD   EPINEPHrine (EPIPEN) 0.3 MG/0.3ML SOAJ injection Use as directed for allergic reaction Yes Natasha Coughlin MD   acyclovir (ZOVIRAX) 400 MG tablet Take 1 tablet by mouth 2 times daily for 10 days  Amanda Torres DO   sucralfate (CARAFATE) 1 GM tablet Take 1 tablet by mouth 4 times daily for 14 days  Ander Franklin DO   pantoprazole (PROTONIX) 40 MG tablet Take 1 tablet by mouth daily (with breakfast) for 14 days  Tabby Peters,    famotidine (PEPCID) 20 MG tablet Take 1 tablet by mouth 2 times daily as needed (acid reflux)  Tabby Peters,         Allergies   Allergen Reactions    Adhesive Tape Rash     bruising    Nsaids      Irritates IBS    Penicillins Hives       Past Medical History:   Diagnosis Date    Arthritis     Asthma     controlled     Depression     Diabetes mellitus ([facility] [address]     Fibromyalgia     GERD (gastroesophageal reflux disease)     Hepatitis C 2016    treated     Hyperlipidemia     Hypertension     IBS (irritable bowel syndrome)     Overactive bladder     Spleen injury     hospitalized 2019 - resolved as of 19        Past Surgical History:   Procedure Laterality Date    ANESTHESIA NERVE BLOCK N/A 3/26/2019    L4-5 EPIDURAL STEROID INJECTION performed by Tracey Iglesias DO at [facility] Right 2019    RIGHT ENDOSCOPIC CARPAL TUNNEL RELEASE performed by Carmelita Cornejo MD at [address] COLON, DIAGNOSTIC      EPIDURAL STEROID INJECTION N/A 2019    L4-5 EPIDURAL STEROID INJECTION performed by Tracey Iglesias DO at . [address] ([address] [place] [address])      NERVE BLOCK  2019    with sedation    OVARY REMOVAL      1981 left     PAIN MANAGEMENT PROCEDURE N/A 2019    L4, L5 EPIDURAL STEROID INJECTION ([id number]) performed by Tracey Iglesias DO at [address] Right 2019    RIGHT ULNAR NERVE DECOMPRESSION AT ELBOW WITH POSSIBLE NERVE TRANSPOSITION performed by Carmelita Cornejo MD at [address] ARTHROSCOPY Right        Social History     Socioeconomic History    Marital status: Single     Spouse name: Not on file    Number of children: 5    Years of education: Not on file    Highest education level: Some college, no degree   Occupational History    Not on file   Tobacco Use    Smoking status: Former     Packs/day: 0.25     Years: 45.00     Pack years: 11.25     Types: Cigarettes     Quit date: 2020     Years since quittin.1    Smokeless tobacco: Never   Vaping Use    Vaping Use: Some days Substances: Never, THC   Substance and Sexual Activity    Alcohol use: Yes     Comment: rare    Drug use: Not Currently     Types: Marijuana Mariluz Cutler)     Comment: holds medical card-Vaping    Sexual activity: Yes     Partners: Male   Other Topics Concern    Not on file   Social History Narrative    -Referral to  for financial strain and stress,depression, anxiety    -Smoking cessation brochure being mailed out    -Pt utilizes 3 food pantries    -Receives transportation from [place] for [employer]     Social Determinants of Health     Financial Resource Strain: Low Risk     Difficulty of Paying Living Expenses: Not hard at all   Food Insecurity: No Food Insecurity    Worried About Running Out of Food in the Last Year: Never true    Ran Out of Food in the Last Year: Never true   Transportation Needs: Not on file   Physical Activity: Insufficiently Active    Days of Exercise per Week: 3 days    Minutes of Exercise per Session: 20 min   Stress: Not on file   Social Connections: Not on file   Intimate Partner Violence: Not on file   Housing Stability: Not on file        Family History   Problem Relation Age of Onset    Diabetes Mother     Cancer Mother     Alcohol Abuse Father     Breast Cancer Maternal Aunt 32    Breast Cancer Paternal Aunt            Vitals:    11/07/22 0930   BP: 110/68   Pulse: 81   Resp: 18   Temp: 97.8 °F (36.6 °C)   TempSrc: Temporal   SpO2: 99%   Weight: 115 lb 9 oz (52.4 kg)   Height: 5' 2\" (1.575 m)     Estimated body mass index is 21.14 kg/m² as calculated from the following:    Height as of this encounter: 5' 2\" (1.575 m). Weight as of this encounter: 115 lb 9 oz (52.4 kg).     Most Recent Labs  CBC  Lab Results   Component Value Date/Time    WBC 7.3 03/22/2022 01:02 PM    WBC 7.6 03/07/2022 02:07 PM    WBC 5.6 04/07/2021 12:00 PM    RBC 4.94 03/22/2022 01:02 PM    RBC 5.17 03/07/2022 02:07 PM    RBC 5.01 04/07/2021 12:00 PM    HGB 15.3 03/22/2022 01:02 PM    HGB 16.3 03/07/2022 02:07 PM HGB 15.6 04/07/2021 12:00 PM    HCT 46.1 03/22/2022 01:02 PM    HCT 47.5 03/07/2022 02:07 PM    HCT 47.0 04/07/2021 12:00 PM    MCV 93.3 03/22/2022 01:02 PM    MCV 91.9 03/07/2022 02:07 PM    MCV 93.8 04/07/2021 12:00 PM     03/22/2022 01:02 PM     03/07/2022 02:07 PM     04/07/2021 12:00 PM      CMP  Lab Results   Component Value Date/Time     03/22/2022 01:02 PM     03/07/2022 02:07 PM     04/07/2021 12:00 PM    K 3.9 03/22/2022 01:02 PM    K 3.6 03/07/2022 02:07 PM    K 4.3 04/07/2021 12:00 PM    K 3.3 11/06/2019 04:34 AM    K 3.2 11/04/2019 07:59 AM    K 3.9 11/03/2019 06:50 AM     03/22/2022 01:02 PM    CL 99 03/07/2022 02:07 PM     04/07/2021 12:00 PM    CO2 26 03/22/2022 01:02 PM    CO2 26 03/07/2022 02:07 PM    CO2 24 04/07/2021 12:00 PM    ANIONGAP 11 03/22/2022 01:02 PM    ANIONGAP 13 03/07/2022 02:07 PM    ANIONGAP 10 04/07/2021 12:00 PM    GLUCOSE 172 03/22/2022 01:02 PM    GLUCOSE 132 03/07/2022 02:07 PM    GLUCOSE 85 04/07/2021 12:00 PM    BUN 8 10/11/2022 02:33 PM    BUN 10 03/22/2022 01:02 PM    BUN 10 03/07/2022 02:07 PM    CREATININE 0.7 10/11/2022 02:33 PM    CREATININE 0.7 03/22/2022 01:02 PM    CREATININE 0.7 03/07/2022 02:07 PM    LABGLOM >60 10/11/2022 02:33 PM    LABGLOM >60 03/22/2022 01:02 PM    LABGLOM >60 03/07/2022 02:07 PM    GFRAA >60 10/11/2022 02:33 PM    GFRAA >60 03/22/2022 01:02 PM    GFRAA >60 03/07/2022 02:07 PM    CALCIUM 10.2 03/22/2022 01:02 PM    CALCIUM 10.3 03/07/2022 02:07 PM    CALCIUM 10.0 04/07/2021 12:00 PM    PROT 8.0 03/22/2022 01:02 PM    PROT 8.4 03/07/2022 02:07 PM    PROT 7.7 04/07/2021 12:00 PM    LABALBU 4.5 03/22/2022 01:02 PM    LABALBU 4.8 03/07/2022 02:07 PM    LABALBU 4.6 04/07/2021 12:00 PM    BILITOT 0.6 03/22/2022 01:02 PM    BILITOT 0.7 03/07/2022 02:07 PM    BILITOT 0.5 04/07/2021 12:00 PM    ALKPHOS 80 03/22/2022 01:02 PM    ALKPHOS 87 03/07/2022 02:07 PM    ALKPHOS 74 04/07/2021 12:00 PM    AST 13 03/22/2022 01:02 PM    AST 15 03/07/2022 02:07 PM    AST 19 04/07/2021 12:00 PM    ALT 9 03/22/2022 01:02 PM    ALT 13 03/07/2022 02:07 PM    ALT 13 04/07/2021 12:00 PM     A1C  Lab Results   Component Value Date/Time    LABA1C 6.3 11/07/2022 09:40 AM    LABA1C 6.2 05/17/2022 03:15 PM    LABA1C 6.4 01/18/2022 10:30 AM     TSH  Lab Results   Component Value Date/Time    TSH 0.900 04/07/2021 12:00 PM    TSH 1.560 08/08/2019 10:53 AM    TSH 3.540 01/24/2019 05:16 PM     LIPID  Lab Results   Component Value Date/Time    CHOL 198 04/07/2021 12:00 PM    HDL 85 04/07/2021 12:00 PM    HDL 95 01/24/2019 05:16 PM    LDLCALC 97 04/07/2021 12:00 PM    LDLCALC 107 01/24/2019 05:16 PM    TRIG 79 04/07/2021 12:00 PM     VITAMIN D  Lab Results   Component Value Date/Time    VITD25 36 04/07/2021 12:00 PM     MAGNESIUM  Lab Results   Component Value Date/Time    MG 2.0 11/04/2019 07:59 AM       HEPATITIS C  Lab Results   Component Value Date/Time    HCVABI REACTIVE 04/25/2019 07:19 AM     UA  Lab Results   Component Value Date/Time    COLORU Yellow 03/22/2022 01:02 PM    COLORU Yellow 03/07/2022 11:54 PM    COLORU Yellow 11/03/2019 07:35 AM    CLARITYU Clear 03/22/2022 01:02 PM    CLARITYU Clear 03/07/2022 11:54 PM    CLARITYU Clear 11/03/2019 07:35 AM    GLUCOSEU 100 03/22/2022 01:02 PM    GLUCOSEU 250 03/07/2022 11:54 PM    GLUCOSEU 500 11/03/2019 07:35 AM    BILIRUBINUR SMALL 03/22/2022 01:02 PM    BILIRUBINUR Negative 03/07/2022 11:54 PM    BILIRUBINUR SMALL 11/03/2019 07:35 AM    KETUA TRACE 03/22/2022 01:02 PM    KETUA 15 03/07/2022 11:54 PM    KETUA >=80 11/03/2019 07:35 AM    SPECGRAV >=1.030 03/22/2022 01:02 PM    SPECGRAV <=1.005 03/07/2022 11:54 PM    SPECGRAV >=1.030 11/03/2019 07:35 AM    BLOODU Negative 03/22/2022 01:02 PM    BLOODU Negative 03/07/2022 11:54 PM    BLOODU TRACE-INTACT 11/03/2019 07:35 AM    PHUR 5.0 03/22/2022 01:02 PM    PHUR 5.5 03/07/2022 11:54 PM    PHUR 5.5 11/03/2019 07:35 AM    PROTEINU 100 03/22/2022 01:02 PM    PROTEINU Negative 03/07/2022 11:54 PM    PROTEINU TRACE 11/03/2019 07:35 AM    UROBILINOGEN 0.2 03/22/2022 01:02 PM    UROBILINOGEN 0.2 03/07/2022 11:54 PM    UROBILINOGEN 0.2 11/03/2019 07:35 AM    NITRU Negative 03/22/2022 01:02 PM    NITRU Negative 03/07/2022 11:54 PM    NITRU Negative 11/03/2019 07:35 AM    LEUKOCYTESUR Negative 03/22/2022 01:02 PM    LEUKOCYTESUR Negative 03/07/2022 11:54 PM    LEUKOCYTESUR Negative 11/03/2019 07:35 AM     Urine Micro/Albumin Ratio  Lab Results   Component Value Date/Time    MALBCR - 05/17/2022 12:00 PM    MALBCR <30 02/08/2021 12:28 PM    MALBCR <30 MG 07/16/2019 05:53 PM        Physical Exam  Vitals and nursing note reviewed. Constitutional:       Appearance: Normal appearance. HENT:      Head: Normocephalic and atraumatic. Right Ear: External ear normal.      Left Ear: External ear normal.   Eyes:      Extraocular Movements: Extraocular movements intact. Conjunctiva/sclera: Conjunctivae normal.      Pupils: Pupils are equal, round, and reactive to light. Cardiovascular:      Rate and Rhythm: Normal rate and regular rhythm. Pulses: Normal pulses. Heart sounds: Normal heart sounds. Pulmonary:      Effort: Pulmonary effort is normal.      Breath sounds: Normal breath sounds. Abdominal:      General: Bowel sounds are normal.      Palpations: Abdomen is soft. Musculoskeletal:         General: Normal range of motion. Cervical back: Normal range of motion and neck supple. Skin:     General: Skin is warm and dry. Capillary Refill: Capillary refill takes less than 2 seconds. Neurological:      General: No focal deficit present. Mental Status: She is alert and oriented to person, place, and time. Psychiatric:         Mood and Affect: Mood normal.         Behavior: Behavior normal.         Thought Content:  Thought content normal.       ASSESSMENT/PLAN:  Tonja Olivier was seen today for follow-up, hypertension and diabetes. Diagnoses and all orders for this visit:    Type 2 diabetes mellitus with hyperglycemia, without long-term current use of insulin (HCC)  -     POCT glycosylated hemoglobin (Hb A1C)    Perennial allergic rhinitis  -     External Referral To Allergy    Environmental allergies  -     External Referral To Allergy    Allergic rhinitis due to animal hair and dander  -     External Referral To Allergy    Essential hypertension     A1c and Blood pressure well controlled and at goal  All medications reviewed by myself personally, continue current meds  Diet and exercise were discussed and recommended to the patient. Patient does need refills but needs to call in to let us know the name of her new mail in pharmacy     As above. Call or go to the nearest ED immediately if symptoms worsen or persist.  Educational materials and/or home exercises printed for patient's review and were included in patient instructions on his/her After Visit Summary and given to patient at the end of visit. Counseled regarding above diagnosis, including possible risks and complications,  especially if left uncontrolled. Counseled regarding the possible side effects, risks, benefits and alternatives to treatment; patient and/or guardian verbalizes understanding, agrees, feels comfortable with and wishes to proceed with above treatment plan. Advised patient to call with any new medication issues, and read all Rx info from pharmacy to assure aware of all possible risks and side effects of medication before taking. Reviewed age and gender appropriate health screening exams and vaccinations. Advised patient regarding importance of keeping up with recommended health maintenance and to schedule as soon as possible if overdue, as this is important in assessing for undiagnosed pathology, especially cancer, as well as protecting against potentially harmful/life threatening disease.        Patient and/or guardian verbalizes understanding and agrees with above counseling, assessment and plan. All questions answered. Return in about 3 months (around 2/7/2023) for diabetes, HTN. An  electronic signature was used to authenticate this note. --Arnulfo Romano DO on 11/7/2022 at 9:54 AM    This document was prepared at least partially through the use of voice recognition software. Although effort is taken to assure the accuracy of this document, it is possible that grammatical, syntax,  or spelling errors may occur.

## 2022-12-09 DIAGNOSIS — E11.65 TYPE 2 DIABETES MELLITUS WITH HYPERGLYCEMIA, WITHOUT LONG-TERM CURRENT USE OF INSULIN (HCC): ICD-10-CM

## 2022-12-09 RX ORDER — LANCETS 30 GAUGE
1 EACH MISCELLANEOUS DAILY
Qty: 100 EACH | Refills: 3 | Status: SHIPPED | OUTPATIENT
Start: 2022-12-09

## 2022-12-09 RX ORDER — GLUCOSAMINE HCL/CHONDROITIN SU 500-400 MG
CAPSULE ORAL
Qty: 100 STRIP | Refills: 0 | Status: SHIPPED | OUTPATIENT
Start: 2022-12-09

## 2022-12-09 RX ORDER — BLOOD-GLUCOSE METER
1 KIT MISCELLANEOUS DAILY
Qty: 1 EACH | Refills: 0 | Status: SHIPPED | OUTPATIENT
Start: 2022-12-09

## 2022-12-09 NOTE — TELEPHONE ENCOUNTER
Patient called for refill/states needs new meter  Last seen 11/7/2022  Next appt 2/7/2023  [address]. [facility]

## 2023-02-06 ENCOUNTER — OFFICE VISIT (OUTPATIENT)
Dept: FAMILY MEDICINE CLINIC | Age: 68
End: 2023-02-06
Payer: MEDICARE

## 2023-02-06 VITALS
BODY MASS INDEX: 22.38 KG/M2 | HEART RATE: 67 BPM | SYSTOLIC BLOOD PRESSURE: 118 MMHG | TEMPERATURE: 97.9 F | HEIGHT: 62 IN | DIASTOLIC BLOOD PRESSURE: 78 MMHG | RESPIRATION RATE: 16 BRPM | WEIGHT: 121.6 LBS | OXYGEN SATURATION: 97 %

## 2023-02-06 DIAGNOSIS — I10 ESSENTIAL HYPERTENSION: ICD-10-CM

## 2023-02-06 DIAGNOSIS — J32.9 CHRONIC SINUSITIS, UNSPECIFIED LOCATION: ICD-10-CM

## 2023-02-06 DIAGNOSIS — E11.65 TYPE 2 DIABETES MELLITUS WITH HYPERGLYCEMIA, WITHOUT LONG-TERM CURRENT USE OF INSULIN (HCC): Primary | ICD-10-CM

## 2023-02-06 DIAGNOSIS — G62.9 NEUROPATHY: ICD-10-CM

## 2023-02-06 DIAGNOSIS — Z23 FLU VACCINE NEED: ICD-10-CM

## 2023-02-06 DIAGNOSIS — F41.1 GAD (GENERALIZED ANXIETY DISORDER): ICD-10-CM

## 2023-02-06 DIAGNOSIS — J45.40 MODERATE PERSISTENT ASTHMA WITHOUT COMPLICATION: ICD-10-CM

## 2023-02-06 DIAGNOSIS — G43.919 INTRACTABLE MIGRAINE WITHOUT STATUS MIGRAINOSUS, UNSPECIFIED MIGRAINE TYPE: ICD-10-CM

## 2023-02-06 DIAGNOSIS — J30.89 PERENNIAL ALLERGIC RHINITIS: ICD-10-CM

## 2023-02-06 LAB — HBA1C MFR BLD: 6.3 %

## 2023-02-06 PROCEDURE — 99214 OFFICE O/P EST MOD 30 MIN: CPT | Performed by: INTERNAL MEDICINE

## 2023-02-06 PROCEDURE — 83036 HEMOGLOBIN GLYCOSYLATED A1C: CPT | Performed by: INTERNAL MEDICINE

## 2023-02-06 PROCEDURE — G0008 ADMIN INFLUENZA VIRUS VAC: HCPCS | Performed by: INTERNAL MEDICINE

## 2023-02-06 PROCEDURE — 1123F ACP DISCUSS/DSCN MKR DOCD: CPT | Performed by: INTERNAL MEDICINE

## 2023-02-06 PROCEDURE — 3044F HG A1C LEVEL LT 7.0%: CPT | Performed by: INTERNAL MEDICINE

## 2023-02-06 PROCEDURE — 3078F DIAST BP <80 MM HG: CPT | Performed by: INTERNAL MEDICINE

## 2023-02-06 PROCEDURE — 90694 VACC AIIV4 NO PRSRV 0.5ML IM: CPT | Performed by: INTERNAL MEDICINE

## 2023-02-06 PROCEDURE — 3074F SYST BP LT 130 MM HG: CPT | Performed by: INTERNAL MEDICINE

## 2023-02-06 RX ORDER — CEFDINIR 300 MG/1
300 CAPSULE ORAL 2 TIMES DAILY
Qty: 14 CAPSULE | Refills: 0 | Status: SHIPPED | OUTPATIENT
Start: 2023-02-06 | End: 2023-02-13

## 2023-02-06 RX ORDER — ACETAMINOPHEN 500 MG
500 TABLET ORAL EVERY 8 HOURS PRN
Qty: 270 TABLET | Refills: 1 | Status: SHIPPED | OUTPATIENT
Start: 2023-02-06

## 2023-02-06 RX ORDER — GABAPENTIN 300 MG/1
300 CAPSULE ORAL NIGHTLY
Qty: 90 CAPSULE | Refills: 1 | Status: SHIPPED | OUTPATIENT
Start: 2023-02-06 | End: 2023-05-07

## 2023-02-06 RX ORDER — AMITRIPTYLINE HYDROCHLORIDE 50 MG/1
50 TABLET, FILM COATED ORAL NIGHTLY
Qty: 90 TABLET | Refills: 1 | Status: SHIPPED | OUTPATIENT
Start: 2023-02-06

## 2023-02-06 RX ORDER — LEVOCETIRIZINE DIHYDROCHLORIDE 5 MG/1
5 TABLET, FILM COATED ORAL NIGHTLY
COMMUNITY

## 2023-02-06 RX ORDER — CETIRIZINE HYDROCHLORIDE 10 MG/1
10 TABLET ORAL DAILY
COMMUNITY

## 2023-02-06 RX ORDER — FLUTICASONE PROPIONATE 50 MCG
2 SPRAY, SUSPENSION (ML) NASAL DAILY
Qty: 48 G | Refills: 1 | Status: SHIPPED | OUTPATIENT
Start: 2023-02-06

## 2023-02-06 RX ORDER — MONTELUKAST SODIUM 10 MG/1
10 TABLET ORAL DAILY
Qty: 90 TABLET | Refills: 1 | Status: SHIPPED | OUTPATIENT
Start: 2023-02-06

## 2023-02-06 RX ORDER — IPRATROPIUM BROMIDE AND ALBUTEROL SULFATE 2.5; .5 MG/3ML; MG/3ML
1 SOLUTION RESPIRATORY (INHALATION) EVERY 4 HOURS
Qty: 360 ML | Refills: 1 | Status: SHIPPED | OUTPATIENT
Start: 2023-02-06

## 2023-02-06 RX ORDER — METHYLPREDNISOLONE 4 MG/1
TABLET ORAL
Qty: 1 KIT | Refills: 0 | Status: SHIPPED | OUTPATIENT
Start: 2023-02-06 | End: 2023-02-12

## 2023-02-06 RX ORDER — ALBUTEROL SULFATE 90 UG/1
2 AEROSOL, METERED RESPIRATORY (INHALATION) EVERY 6 HOURS PRN
Qty: 3 EACH | Refills: 1 | Status: SHIPPED | OUTPATIENT
Start: 2023-02-06

## 2023-02-06 SDOH — ECONOMIC STABILITY: FOOD INSECURITY: WITHIN THE PAST 12 MONTHS, YOU WORRIED THAT YOUR FOOD WOULD RUN OUT BEFORE YOU GOT MONEY TO BUY MORE.: NEVER TRUE

## 2023-02-06 SDOH — ECONOMIC STABILITY: FOOD INSECURITY: WITHIN THE PAST 12 MONTHS, THE FOOD YOU BOUGHT JUST DIDN'T LAST AND YOU DIDN'T HAVE MONEY TO GET MORE.: NEVER TRUE

## 2023-02-06 SDOH — ECONOMIC STABILITY: HOUSING INSECURITY
IN THE LAST 12 MONTHS, WAS THERE A TIME WHEN YOU DID NOT HAVE A STEADY PLACE TO SLEEP OR SLEPT IN A SHELTER (INCLUDING NOW)?: NO

## 2023-02-06 SDOH — ECONOMIC STABILITY: INCOME INSECURITY: HOW HARD IS IT FOR YOU TO PAY FOR THE VERY BASICS LIKE FOOD, HOUSING, MEDICAL CARE, AND HEATING?: VERY HARD

## 2023-02-06 ASSESSMENT — PATIENT HEALTH QUESTIONNAIRE - PHQ9
2. FEELING DOWN, DEPRESSED OR HOPELESS: 0
SUM OF ALL RESPONSES TO PHQ QUESTIONS 1-9: 0
SUM OF ALL RESPONSES TO PHQ9 QUESTIONS 1 & 2: 0
1. LITTLE INTEREST OR PLEASURE IN DOING THINGS: 0
SUM OF ALL RESPONSES TO PHQ QUESTIONS 1-9: 0

## 2023-02-06 NOTE — PATIENT INSTRUCTIONS
FINANCIAL RESOURCES    HELP NETWORK OF [facility]:  What they do: Provides 24-hr, 7 days a week access to information on community resources for financial help. [id number]  Phone: [phone] or Meredith Chang:  What they offer: Limited assistance to restore/ prevent utility disconnection. Phone Number: [phone]  Website: [employer]  DEPARTMENT OF JOB AND FAMILY SERVICES:  What they do: [place] works first with temporary cash assistance. [facility] [employer]  Phone: [phone], [phone]  [facility]  Phone: [phone]  [facility]ala Shukla [facility]  Phone: [phone]  Website: jfs.[place].[facility]    MEDICATIONS  Good [facility]  What they offer: [employer] tracks prescription drug prices and provides free drug coupons for discounts on medications. Website: VipAnalysis.is. com  [employer]  What they offer: [employer] offers free information on medications and healthcare cost savings programs including prescription assistance programs, coupons, and discount programs. Helpline: 455.771.2102  Website: PaymentBack.[employer]. org  [employer] Assist  What they offer: Information about free and low-cost medicine programs. Website: https://[place]/  [facility] $4 Prescription Program  What they offer: Prescription Program includes up to a 30-day supply for $4 and a 90-day supply for $10 of some covered generic drugs at commonly prescribed dosages  Website: [employer].de    Need additional resources? Call 211   Find Help https://www. findhelp.org

## 2023-02-06 NOTE — PROGRESS NOTES
[facility] [employer] [facility] PRIMARY CARE  [address] [facility] [address]  Dept: [phone]  Dept Fax: [phone]     NAME: Maru Doherty        :  1955        MRN:  [id number]    Chief Complaint   Patient presents with    New Patient     Previous pcp was Dr. Torres.     Diabetes     3 month follow up.     Chest Congestion     Has had for the past year.     Headache     Gets a headache daily       History of Present Illness  Maru Doherty is a 67 y.o. female who presents today to [facility] care.     6.3% A1c today   Saw an allergist who said she has mucus in her sinuses. Appointment later this month to discuss treatment options.  Taking mucinex now   Woke up choking the other night on mucus in her throat       Review of Systems  Please see HPI above. Comprehensive review of systems negative unless otherwise noted above.    Medical History   Past Medical History:   Diagnosis Date    Arthritis     Asthma     controlled     Depression     Diabetes mellitus (HCC)     Fibromyalgia     GERD (gastroesophageal reflux disease)     Hepatitis C 2016    treated     Hyperlipidemia     Hypertension     IBS (irritable bowel syndrome)     Overactive bladder     Spleen injury     hospitalized 2019 - resolved as of 19        Surgical History   Past Surgical History:   Procedure Laterality Date    ANESTHESIA NERVE BLOCK N/A 3/26/2019    L4-5 EPIDURAL STEROID INJECTION performed by Lizzy Jesus DO at [facility] OR    CARPAL TUNNEL RELEASE Right 2019    RIGHT ENDOSCOPIC CARPAL TUNNEL RELEASE performed by Kulwinder Anthony MD at [facility] OR    COLONOSCOPY      DILATION AND CURETTAGE      ENDOSCOPY, COLON, DIAGNOSTIC      EPIDURAL STEROID INJECTION N/A 2019    L4-5 EPIDURAL STEROID INJECTION performed by Lizzy Jesus DO at [facility] OR    HYSTERECTOMY (CERVIX STATUS UNKNOWN)      NERVE BLOCK  2019    with sedation    OVARY REMOVAL      1981 left     PAIN MANAGEMENT  PROCEDURE N/A 2019    L4, L5 EPIDURAL STEROID INJECTION ([id number]) performed by Jared Horta DO at [address] NRV ARM/LEG XCP SCIATIC W/O TRPOS Right 2019    RIGHT ULNAR NERVE DECOMPRESSION AT ELBOW WITH POSSIBLE NERVE TRANSPOSITION performed by Baylee Bell MD at [address] ARTHROSCOPY Right        Family History  Family History   Problem Relation Age of Onset    Diabetes Mother     Cancer Mother     Alcohol Abuse Father     Breast Cancer Maternal Aunt 28    Breast Cancer Paternal Aunt        Social History  Social History     Tobacco Use    Smoking status: Former     Packs/day: 0.25     Years: 45.00     Pack years: 11.25     Types: Cigarettes     Quit date: 2020     Years since quittin.4    Smokeless tobacco: Never   Substance Use Topics    Alcohol use: Yes     Comment: rare       Home Medications  Current Outpatient Medications   Medication Sig Dispense Refill    fluticasone (FLONASE) 50 MCG/ACT nasal spray 2 sprays by Each Nostril route daily 48 g 1    acetaminophen (APAP EXTRA STRENGTH) 500 MG tablet Take 1 tablet by mouth every 8 hours as needed for Pain 270 tablet 1    albuterol sulfate HFA (PROVENTIL;VENTOLIN;PROAIR) 108 (90 Base) MCG/ACT inhaler Inhale 2 puffs into the lungs every 6 hours as needed for Wheezing Rescue inhaler 3 each 1    amitriptyline (ELAVIL) 50 MG tablet Take 1 tablet by mouth nightly 90 tablet 1    montelukast (SINGULAIR) 10 MG tablet Take 1 tablet by mouth daily 90 tablet 1    ipratropium-albuterol (DUONEB) 0.5-2.5 (3) MG/3ML SOLN nebulizer solution Inhale 3 mLs into the lungs every 4 hours Instructed to take am of procedure 360 mL 1    gabapentin (NEURONTIN) 300 MG capsule Take 1 capsule by mouth at bedtime for 90 days.  TAKE ONE CAPSULE BY MOUTH NIGHTLY FOR 90 DAYS 90 capsule 1    levocetirizine (XYZAL) 5 MG tablet Take 5 mg by mouth nightly      cetirizine (ZYRTEC) 10 MG tablet Take 10 mg by mouth daily      cefdinir (OMNICEF) 300 MG capsule Take 1 capsule by mouth 2 times daily for 7 days 14 capsule 0    methylPREDNISolone (MEDROL DOSEPACK) 4 MG tablet Take by mouth. 1 kit 0    blood glucose monitor strips Test 3 times a day & as needed for symptoms of irregular blood glucose. Dispense sufficient amount for indicated testing frequency plus additional to accommodate PRN testing needs. 100 strip 0    Lancets MISC 1 each by Does not apply route daily 100 each 3    SITagliptin (JANUVIA) 100 MG tablet Take 1 tablet by mouth daily 90 tablet 1    DULoxetine (CYMBALTA) 30 MG extended release capsule take 1 capsule by mouth once daily 30 capsule 3    atorvastatin (LIPITOR) 40 MG tablet take 1 tablet by mouth once daily 90 tablet 1    amLODIPine (NORVASC) 10 MG tablet TAKE 1 TABLET BY MOUTH ONE TIME A DAY 90 tablet 1    azelastine (ASTELIN) 0.1 % nasal spray 2 sprays by Nasal route 2 times daily Use in each nostril as directed 30 mL 3    NITRO-BID 2 % ointment APPLY 1 INCH TRANSDERNMALLY ON RIGHT FOOT twice a day      latanoprost (XALATAN) 0.005 % ophthalmic solution place 1 drop into both eyes at bedtime      polyethylene glycol (GLYCOLAX) powder Take 17 g by mouth daily      EPINEPHrine (EPIPEN) 0.3 MG/0.3ML SOAJ injection Use as directed for allergic reaction 2 each 0     No current facility-administered medications for this visit.         Allergies  Allergies   Allergen Reactions    Metformin And Related      Yeast infection    Adhesive Tape Rash     bruising    Nsaids      Irritates IBS    Penicillins Hives       Objective  Vitals:    02/06/23 1344   BP: 118/78   Pulse: 67   Resp: 16   Temp: 97.9 °F (36.6 °C)   TempSrc: Temporal   SpO2: 97%   Weight: 121 lb 9.6 oz (55.2 kg)   Height: 5' 2\" (1.575 m)        Physical Exam:  General: Awake, alert, and oriented to person, place, time, and purpose, appears stated age and cooperative, No acute distress  Head: Normocephalic, atraumatic, sinus congestion   Eyes: conjunctivae/corneas clear, EOM's intact. Neck: symmetrical, trachea midline  Back: symmetric, ROM normal, No CVA tenderness. Lungs: clear to auscultation bilaterally without wheezes, rales, or rhonchi  Heart: regular rate and rhythm, S1, S2 normal, no murmur, click, rub or gallop  Abdomen: soft, non-tender; bowel sounds normal  Extremities: atraumatic, no cyanosis, no edema  Skin: color, texture, turgor within normal limits  Neurologic: speech appropriate, moves all 4 extremities, normal muscle strength and tone, CN 2-12 grossly intact    Labs  Lab Results   Component Value Date    WBC 7.3 03/22/2022    HGB 15.3 03/22/2022    HCT 46.1 03/22/2022     03/22/2022     03/22/2022    K 3.9 03/22/2022     03/22/2022    CREATININE 0.7 10/11/2022    BUN 8 10/11/2022    CO2 26 03/22/2022    GLUCOSE 172 (H) 03/22/2022    ALT 9 03/22/2022    AST 13 03/22/2022    INR 1.1 11/04/2019     Lab Results   Component Value Date    TSH 0.900 04/07/2021     Lab Results   Component Value Date    TRIG 79 04/07/2021     Lab Results   Component Value Date    HDL 85 04/07/2021    HDL 95 01/24/2019     Lab Results   Component Value Date    LDLCALC 97 04/07/2021    LDLCALC 107 (H) 01/24/2019     Lab Results   Component Value Date    LABA1C 6.3 02/06/2023     Lab Results   Component Value Date    INR 1.1 11/04/2019    PROTIME 12.5 (H) 11/04/2019      *All recent labs were reviewed. Please see electronic chart for a more comprehensive set of labs    Radiology  No results found. Health Maintenance Due   Topic Date Due    Diabetic foot exam  Never done    Diabetic retinal exam  Never done    Shingles vaccine (1 of 2) Never done    COVID-19 Vaccine (3 - Booster for Moderna series) 06/30/2021    Lipids  04/07/2022         Assessment and Plan  Annette Kapadia presents today to establish care. Alejandro Blevins was seen today for new patient, diabetes, chest congestion and headache.     Diagnoses and all orders for this visit:    Type 2 diabetes mellitus with hyperglycemia, without long-term current use of insulin (HCC)  -     POCT glycosylated hemoglobin (Hb A1C)  - stable, continue januvia     Intractable migraine without status migrainosus, unspecified migraine type  -     acetaminophen (APAP EXTRA STRENGTH) 500 MG tablet; Take 1 tablet by mouth every 8 hours as needed for Pain    Perennial allergic rhinitis  -     fluticasone (FLONASE) 50 MCG/ACT nasal spray; 2 sprays by Each Nostril route daily  -     albuterol sulfate HFA (PROVENTIL;VENTOLIN;PROAIR) 108 (90 Base) MCG/ACT inhaler; Inhale 2 puffs into the lungs every 6 hours as needed for Wheezing Rescue inhaler  -     montelukast (SINGULAIR) 10 MG tablet; Take 1 tablet by mouth daily  - follows with an allergist     Moderate persistent asthma without complication  -     albuterol sulfate HFA (PROVENTIL;VENTOLIN;PROAIR) 108 (90 Base) MCG/ACT inhaler; Inhale 2 puffs into the lungs every 6 hours as needed for Wheezing Rescue inhaler  -     montelukast (SINGULAIR) 10 MG tablet; Take 1 tablet by mouth daily  -     ipratropium-albuterol (DUONEB) 0.5-2.5 (3) MG/3ML SOLN nebulizer solution; Inhale 3 mLs into the lungs every 4 hours Instructed to take am of procedure    Chronic sinusitis, unspecified location  -     cefdinir (OMNICEF) 300 MG capsule; Take 1 capsule by mouth 2 times daily for 7 days  -     methylPREDNISolone (MEDROL DOSEPACK) 4 MG tablet; Take by mouth. - will treat her acute symptoms currently, follow with allergist at the end of the month     Essential hypertension  - stable, continue norvasc     Neuropathy  -     gabapentin (NEURONTIN) 300 MG capsule; Take 1 capsule by mouth at bedtime for 90 days. TAKE ONE CAPSULE BY MOUTH NIGHTLY FOR 90 DAYS  - stable, continue medication as above    PRATIK (generalized anxiety disorder)  -     amitriptyline (ELAVIL) 50 MG tablet;  Take 1 tablet by mouth nightly    Flu vaccine need  -     Influenza, FLUAD, (age 72 y+), IM, Preservative Free, 0.5 mL      Educational materials and/or home exercises printed for patient's review and were included in patient instructions on his/her After Visit Summary and given to patient at the end of visit. Counseled regarding above diagnosis, including possible risks and complications, especially if left uncontrolled. Counseled regarding the possible side effects, risks, benefits and alternatives to treatment; patient and/or guardian verbalizes understanding, agrees, feels comfortable with and wishes to proceed with above treatment plan. Advised patient to call Dev Olvera new medication issues, and read all Rx info from pharmacy to assure aware of all possible risks and side effects of medication before taking. Reviewed age and gender appropriate health screening exams and vaccinations. Advised patient regarding importance of keeping up with recommended health maintenance and to schedule as soon as possible if overdue, as this is important in assessing for undiagnosed pathology, especially cancer, as well as protecting against potentially harmful/life threatening disease. Patient verbalizes understanding and agrees with above counseling, assessment and plan. All questions answered.     Sue Caldwell, DO

## 2023-04-26 ENCOUNTER — HOSPITAL ENCOUNTER (OUTPATIENT)
Age: 68
Discharge: HOME | End: 2023-04-26
Payer: MEDICARE

## 2023-04-26 DIAGNOSIS — I10 ESSENTIAL HYPERTENSION: ICD-10-CM

## 2023-04-26 DIAGNOSIS — E78.2 MIXED HYPERLIPIDEMIA: ICD-10-CM

## 2023-04-26 DIAGNOSIS — E11.65 TYPE 2 DIABETES MELLITUS WITH HYPERGLYCEMIA, WITHOUT LONG-TERM CURRENT USE OF INSULIN (HCC): ICD-10-CM

## 2023-04-26 LAB
ALBUMIN SERPL-MCNC: 4.5 G/DL (ref 3.5–5.2)
ALP SERPL-CCNC: 84 U/L (ref 35–104)
ALT SERPL-CCNC: 9 U/L (ref 0–32)
ANION GAP SERPL CALCULATED.3IONS-SCNC: 11 MMOL/L (ref 7–16)
AST SERPL-CCNC: 14 U/L (ref 0–31)
BASOPHILS # BLD: 0.03 E9/L (ref 0–0.2)
BASOPHILS NFR BLD: 0.5 % (ref 0–2)
BILIRUB SERPL-MCNC: 0.6 MG/DL (ref 0–1.2)
BUN SERPL-MCNC: 9 MG/DL (ref 6–23)
CALCIUM SERPL-MCNC: 9.8 MG/DL (ref 8.6–10.2)
CHLORIDE SERPL-SCNC: 102 MMOL/L (ref 98–107)
CHOLESTEROL, TOTAL: 203 MG/DL (ref 0–199)
CO2 SERPL-SCNC: 26 MMOL/L (ref 22–29)
CREAT SERPL-MCNC: 0.7 MG/DL (ref 0.5–1)
EOSINOPHIL # BLD: 0.11 E9/L (ref 0.05–0.5)
EOSINOPHIL NFR BLD: 1.7 % (ref 0–6)
ERYTHROCYTE [DISTWIDTH] IN BLOOD BY AUTOMATED COUNT: 14.5 FL (ref 11.5–15)
GLUCOSE SERPL-MCNC: 108 MG/DL (ref 74–99)
HCT VFR BLD AUTO: 47 % (ref 34–48)
HDLC SERPL-MCNC: 105 MG/DL
HGB BLD-MCNC: 15.3 G/DL (ref 11.5–15.5)
IMM GRANULOCYTES # BLD: 0.02 E9/L
IMM GRANULOCYTES NFR BLD: 0.3 % (ref 0–5)
LDLC SERPL CALC-MCNC: 78 MG/DL (ref 0–99)
LYMPHOCYTES # BLD: 2.38 E9/L (ref 1.5–4)
LYMPHOCYTES NFR BLD: 36.8 % (ref 20–42)
MCH RBC QN AUTO: 30.1 PG (ref 26–35)
MCHC RBC AUTO-ENTMCNC: 32.6 % (ref 32–34.5)
MCV RBC AUTO: 92.3 FL (ref 80–99.9)
MONOCYTES # BLD: 0.35 E9/L (ref 0.1–0.95)
MONOCYTES NFR BLD: 5.4 % (ref 2–12)
NEUTROPHILS # BLD: 3.58 E9/L (ref 1.8–7.3)
NEUTS SEG NFR BLD: 55.3 % (ref 43–80)
PLATELET # BLD AUTO: 242 E9/L (ref 130–450)
PMV BLD AUTO: 10.8 FL (ref 7–12)
POTASSIUM SERPL-SCNC: 4 MMOL/L (ref 3.5–5)
PROT SERPL-MCNC: 7.7 G/DL (ref 6.4–8.3)
RBC # BLD AUTO: 5.09 E12/L (ref 3.5–5.5)
SODIUM SERPL-SCNC: 139 MMOL/L (ref 132–146)
TRIGL SERPL-MCNC: 101 MG/DL (ref 0–149)
TSH SERPL-MCNC: 1.17 UIU/ML (ref 0.27–4.2)
VLDLC SERPL CALC-MCNC: 20 MG/DL
WBC # BLD: 6.5 E9/L (ref 4.5–11.5)

## 2023-04-26 PROCEDURE — 84443 ASSAY THYROID STIM HORMONE: CPT

## 2023-04-26 PROCEDURE — 80053 COMPREHEN METABOLIC PANEL: CPT

## 2023-04-26 PROCEDURE — 80061 LIPID PANEL: CPT

## 2023-04-26 PROCEDURE — 85025 COMPLETE CBC W/AUTO DIFF WBC: CPT

## 2023-04-26 PROCEDURE — 36415 COLL VENOUS BLD VENIPUNCTURE: CPT

## 2023-05-19 LAB — DIABETIC RETINOPATHY: NEGATIVE

## 2023-05-26 NOTE — TELEPHONE ENCOUNTER
Unable to reach office. Pt complaint of yeast infection since yesterday. Itching, discomfort, discharge. Please return call to advise further. Thank you. details…

## 2023-05-30 DIAGNOSIS — I10 ESSENTIAL HYPERTENSION: ICD-10-CM

## 2023-05-30 RX ORDER — AMLODIPINE BESYLATE 10 MG/1
TABLET ORAL
Qty: 90 TABLET | Refills: 1 | Status: SHIPPED | OUTPATIENT
Start: 2023-05-30

## 2023-09-16 ENCOUNTER — APPOINTMENT (OUTPATIENT)
Dept: ULTRASOUND IMAGING | Age: 68
End: 2023-09-16
Payer: MEDICARE

## 2023-09-16 LAB
ALBUMIN SERPL-MCNC: 4.5 G/DL (ref 3.5–5.2)
ALP SERPL-CCNC: 83 U/L (ref 35–104)
ALT SERPL-CCNC: 8 U/L (ref 0–32)
ANION GAP SERPL CALCULATED.3IONS-SCNC: 12 MMOL/L (ref 7–16)
AST SERPL-CCNC: 17 U/L (ref 0–31)
BASOPHILS # BLD: 0.03 K/UL (ref 0–0.2)
BASOPHILS NFR BLD: 0 % (ref 0–2)
BILIRUB SERPL-MCNC: 0.5 MG/DL (ref 0–1.2)
BILIRUB UR QL STRIP: NEGATIVE
BUN SERPL-MCNC: 11 MG/DL (ref 6–23)
CALCIUM SERPL-MCNC: 10.2 MG/DL (ref 8.6–10.2)
CHLORIDE SERPL-SCNC: 100 MMOL/L (ref 98–107)
CLARITY UR: CLEAR
CO2 SERPL-SCNC: 25 MMOL/L (ref 22–29)
COLOR UR: YELLOW
CREAT SERPL-MCNC: 0.6 MG/DL (ref 0.5–1)
EOSINOPHIL # BLD: 0.07 K/UL (ref 0.05–0.5)
EOSINOPHILS RELATIVE PERCENT: 1 % (ref 0–6)
ERYTHROCYTE [DISTWIDTH] IN BLOOD BY AUTOMATED COUNT: 14 % (ref 11.5–15)
GFR SERPL CREATININE-BSD FRML MDRD: >60 ML/MIN/1.73M2
GLUCOSE SERPL-MCNC: 172 MG/DL (ref 74–99)
GLUCOSE UR STRIP-MCNC: NEGATIVE MG/DL
HCT VFR BLD AUTO: 44.3 % (ref 34–48)
HGB BLD-MCNC: 15 G/DL (ref 11.5–15.5)
HGB UR QL STRIP.AUTO: NEGATIVE
IMM GRANULOCYTES # BLD AUTO: <0.03 K/UL (ref 0–0.58)
IMM GRANULOCYTES NFR BLD: 0 % (ref 0–5)
INR PPP: 1.1
KETONES UR STRIP-MCNC: NEGATIVE MG/DL
LEUKOCYTE ESTERASE UR QL STRIP: NEGATIVE
LIPASE SERPL-CCNC: 63 U/L (ref 13–60)
LYMPHOCYTES NFR BLD: 2.24 K/UL (ref 1.5–4)
LYMPHOCYTES RELATIVE PERCENT: 27 % (ref 20–42)
MAGNESIUM SERPL-MCNC: 2.2 MG/DL (ref 1.6–2.6)
MCH RBC QN AUTO: 30.2 PG (ref 26–35)
MCHC RBC AUTO-ENTMCNC: 33.9 G/DL (ref 32–34.5)
MCV RBC AUTO: 89.3 FL (ref 80–99.9)
MONOCYTES NFR BLD: 0.52 K/UL (ref 0.1–0.95)
MONOCYTES NFR BLD: 6 % (ref 2–12)
NEUTROPHILS NFR BLD: 65 % (ref 43–80)
NEUTS SEG NFR BLD: 5.38 K/UL (ref 1.8–7.3)
NITRITE UR QL STRIP: NEGATIVE
PH UR STRIP: 6 [PH] (ref 5–9)
PLATELET # BLD AUTO: 261 K/UL (ref 130–450)
PMV BLD AUTO: 10.6 FL (ref 7–12)
POTASSIUM SERPL-SCNC: 4.4 MMOL/L (ref 3.5–5)
PROT SERPL-MCNC: 8 G/DL (ref 6.4–8.3)
PROT UR STRIP-MCNC: ABNORMAL MG/DL
PROTHROMBIN TIME: 11.6 SEC (ref 9.3–12.4)
RBC # BLD AUTO: 4.96 M/UL (ref 3.5–5.5)
RBC #/AREA URNS HPF: ABNORMAL /HPF
SODIUM SERPL-SCNC: 137 MMOL/L (ref 132–146)
SP GR UR STRIP: >1.03 (ref 1–1.03)
UROBILINOGEN UR STRIP-ACNC: 0.2 EU/DL (ref 0–1)
WBC #/AREA URNS HPF: ABNORMAL /HPF
WBC OTHER # BLD: 8.3 K/UL (ref 4.5–11.5)

## 2023-09-16 PROCEDURE — 83735 ASSAY OF MAGNESIUM: CPT

## 2023-09-16 PROCEDURE — 83690 ASSAY OF LIPASE: CPT

## 2023-09-16 PROCEDURE — 6370000000 HC RX 637 (ALT 250 FOR IP): Performed by: EMERGENCY MEDICINE

## 2023-09-16 PROCEDURE — 85025 COMPLETE CBC W/AUTO DIFF WBC: CPT

## 2023-09-16 PROCEDURE — 76705 ECHO EXAM OF ABDOMEN: CPT

## 2023-09-16 PROCEDURE — 99285 EMERGENCY DEPT VISIT HI MDM: CPT

## 2023-09-16 PROCEDURE — 96374 THER/PROPH/DIAG INJ IV PUSH: CPT

## 2023-09-16 PROCEDURE — 80053 COMPREHEN METABOLIC PANEL: CPT

## 2023-09-16 PROCEDURE — 93005 ELECTROCARDIOGRAM TRACING: CPT | Performed by: EMERGENCY MEDICINE

## 2023-09-16 PROCEDURE — 85610 PROTHROMBIN TIME: CPT

## 2023-09-16 PROCEDURE — 81001 URINALYSIS AUTO W/SCOPE: CPT

## 2023-09-16 RX ADMIN — Medication: at 21:47

## 2023-09-16 ASSESSMENT — PAIN DESCRIPTION - DESCRIPTORS: DESCRIPTORS: CRAMPING

## 2023-09-16 ASSESSMENT — PAIN DESCRIPTION - ORIENTATION: ORIENTATION: RIGHT;LEFT

## 2023-09-16 ASSESSMENT — PAIN - FUNCTIONAL ASSESSMENT: PAIN_FUNCTIONAL_ASSESSMENT: 0-10

## 2023-09-16 ASSESSMENT — PAIN SCALES - GENERAL: PAINLEVEL_OUTOF10: 10

## 2023-09-16 ASSESSMENT — PAIN DESCRIPTION - LOCATION: LOCATION: ABDOMEN

## 2023-09-16 ASSESSMENT — LIFESTYLE VARIABLES: HOW OFTEN DO YOU HAVE A DRINK CONTAINING ALCOHOL: NEVER

## 2023-09-17 ENCOUNTER — HOSPITAL ENCOUNTER (EMERGENCY)
Age: 68
Discharge: HOME | End: 2023-09-17
Attending: EMERGENCY MEDICINE
Payer: MEDICARE

## 2023-09-17 ENCOUNTER — APPOINTMENT (OUTPATIENT)
Dept: CT IMAGING | Age: 68
End: 2023-09-17
Payer: MEDICARE

## 2023-09-17 VITALS
TEMPERATURE: 98 F | BODY MASS INDEX: 19.84 KG/M2 | DIASTOLIC BLOOD PRESSURE: 89 MMHG | OXYGEN SATURATION: 99 % | SYSTOLIC BLOOD PRESSURE: 158 MMHG | WEIGHT: 112 LBS | RESPIRATION RATE: 14 BRPM | HEIGHT: 63 IN | HEART RATE: 62 BPM

## 2023-09-17 DIAGNOSIS — R10.84 GENERALIZED ABDOMINAL PAIN: Primary | ICD-10-CM

## 2023-09-17 PROCEDURE — 74177 CT ABD & PELVIS W/CONTRAST: CPT

## 2023-09-17 PROCEDURE — 6360000004 HC RX CONTRAST MEDICATION: Performed by: RADIOLOGY

## 2023-09-17 PROCEDURE — 6360000002 HC RX W HCPCS: Performed by: EMERGENCY MEDICINE

## 2023-09-17 PROCEDURE — 96374 THER/PROPH/DIAG INJ IV PUSH: CPT

## 2023-09-17 RX ORDER — METOCLOPRAMIDE HYDROCHLORIDE 5 MG/ML
10 INJECTION INTRAMUSCULAR; INTRAVENOUS ONCE
Status: COMPLETED | OUTPATIENT
Start: 2023-09-17 | End: 2023-09-17

## 2023-09-17 RX ORDER — METOCLOPRAMIDE 10 MG/1
10 TABLET ORAL EVERY 6 HOURS PRN
Qty: 20 TABLET | Refills: 0 | Status: SHIPPED | OUTPATIENT
Start: 2023-09-17

## 2023-09-17 RX ORDER — FAMOTIDINE 10 MG
20 TABLET ORAL 2 TIMES DAILY
Qty: 120 TABLET | Refills: 0 | Status: SHIPPED | OUTPATIENT
Start: 2023-09-17 | End: 2023-10-17

## 2023-09-17 RX ADMIN — METOCLOPRAMIDE HYDROCHLORIDE 10 MG: 5 INJECTION INTRAMUSCULAR; INTRAVENOUS at 03:35

## 2023-09-17 RX ADMIN — IOPAMIDOL 75 ML: 755 INJECTION, SOLUTION INTRAVENOUS at 02:23

## 2023-09-17 ASSESSMENT — PAIN DESCRIPTION - ONSET: ONSET: ON-GOING

## 2023-09-17 ASSESSMENT — PAIN DESCRIPTION - DESCRIPTORS: DESCRIPTORS: BURNING

## 2023-09-17 ASSESSMENT — PAIN DESCRIPTION - FREQUENCY: FREQUENCY: CONTINUOUS

## 2023-09-17 ASSESSMENT — PAIN DESCRIPTION - LOCATION: LOCATION: CHEST

## 2023-09-17 ASSESSMENT — PAIN DESCRIPTION - PAIN TYPE: TYPE: ACUTE PAIN

## 2023-09-17 ASSESSMENT — PAIN - FUNCTIONAL ASSESSMENT: PAIN_FUNCTIONAL_ASSESSMENT: NONE - DENIES PAIN

## 2023-09-17 NOTE — ED PROVIDER NOTES
HPI:  9/16/23,   Time: 9:14 PM EDT       Twila Cespedes is a 80 y.o. female presenting to the ED for abd pain, beginning 2 weeks ago. The complaint has been intermittent, moderate in severity, and worsened by eating. Dull achy pain. Worse with food. Felt was constipated so took MiraLAX and mag citrate. Mild improvement. Symptoms returned tonight. No fever/chills/sweats. Nausea without emesis. No chest pain or shortness of breath. Brought in by private vehicle. Review of Systems:   Pertinent positives and negatives are stated within HPI, all other systems reviewed and are negative.          --------------------------------------------- PAST HISTORY ---------------------------------------------  Past Medical History:  has a past medical history of Arthritis, Asthma, Depression, Diabetes mellitus ([address]), Fibromyalgia, GERD (gastroesophageal reflux disease), Hepatitis C, Hyperlipidemia, Hypertension, IBS (irritable bowel syndrome), Overactive bladder, and Spleen injury. Past Surgical History:  has a past surgical history that includes Ovary removal; Dilation & curettage; Shoulder arthroscopy (Right); Nerve Block (03/26/2019); Anesthesia Nerve Block (N/A, 3/26/2019); epidural steroid injection (N/A, 5/16/2019); Colonoscopy; Endoscopy, colon, diagnostic; Carpal tunnel release (Right, 6/5/2019); pr sutr prph nrv arm/leg xcp sciatic w/o trpos (Right, 6/5/2019); Pain management procedure (N/A, 12/26/2019); and Hysterectomy. Social History:  reports that she quit smoking about 3 years ago. Her smoking use included cigarettes. She has a 11.25 pack-year smoking history. She has never used smokeless tobacco. She reports current alcohol use. She reports that she does not currently use drugs after having used the following drugs: Marijuana Marcelo Claudine).     Family History: family history includes Alcohol Abuse in her father; Breast Cancer in her paternal aunt; Breast Cancer (age of onset: 28) in her maternal aunt;

## 2023-09-18 LAB
EKG ATRIAL RATE: 65 BPM
EKG P AXIS: 74 DEGREES
EKG P-R INTERVAL: 170 MS
EKG Q-T INTERVAL: 430 MS
EKG QRS DURATION: 80 MS
EKG QTC CALCULATION (BAZETT): 447 MS
EKG R AXIS: -3 DEGREES
EKG T AXIS: 55 DEGREES
EKG VENTRICULAR RATE: 65 BPM

## 2023-09-18 PROCEDURE — 93010 ELECTROCARDIOGRAM REPORT: CPT | Performed by: INTERNAL MEDICINE

## 2023-10-10 ENCOUNTER — OFFICE VISIT (OUTPATIENT)
Dept: FAMILY MEDICINE CLINIC | Age: 68
End: 2023-10-10
Payer: MEDICARE

## 2023-10-10 VITALS — WEIGHT: 115.8 LBS | HEIGHT: 63 IN | RESPIRATION RATE: 18 BRPM | BODY MASS INDEX: 20.52 KG/M2

## 2023-10-10 DIAGNOSIS — M05.741 RHEUMATOID ARTHRITIS INVOLVING BOTH HANDS WITH POSITIVE RHEUMATOID FACTOR (HCC): ICD-10-CM

## 2023-10-10 DIAGNOSIS — R30.0 DYSURIA: ICD-10-CM

## 2023-10-10 DIAGNOSIS — R10.84 GENERALIZED ABDOMINAL PAIN: ICD-10-CM

## 2023-10-10 DIAGNOSIS — Z12.31 BREAST CANCER SCREENING BY MAMMOGRAM: ICD-10-CM

## 2023-10-10 DIAGNOSIS — Z00.00 MEDICARE ANNUAL WELLNESS VISIT, SUBSEQUENT: Primary | ICD-10-CM

## 2023-10-10 DIAGNOSIS — I10 ESSENTIAL HYPERTENSION: ICD-10-CM

## 2023-10-10 DIAGNOSIS — E11.65 TYPE 2 DIABETES MELLITUS WITH HYPERGLYCEMIA, WITHOUT LONG-TERM CURRENT USE OF INSULIN (HCC): ICD-10-CM

## 2023-10-10 DIAGNOSIS — G62.9 NEUROPATHY: ICD-10-CM

## 2023-10-10 DIAGNOSIS — M05.742 RHEUMATOID ARTHRITIS INVOLVING BOTH HANDS WITH POSITIVE RHEUMATOID FACTOR (HCC): ICD-10-CM

## 2023-10-10 PROBLEM — J30.89 PERENNIAL ALLERGIC RHINITIS: Status: RESOLVED | Noted: 2021-02-08 | Resolved: 2023-10-10

## 2023-10-10 PROBLEM — T78.40XA ALLERGY: Status: RESOLVED | Noted: 2019-04-25 | Resolved: 2023-10-10

## 2023-10-10 PROBLEM — D73.5 SPLENIC INFARCT: Status: RESOLVED | Noted: 2019-11-03 | Resolved: 2023-10-10

## 2023-10-10 PROBLEM — J45.40 MODERATE PERSISTENT ASTHMA WITHOUT COMPLICATION: Status: ACTIVE | Noted: 2020-06-06

## 2023-10-10 PROBLEM — R73.02 IMPAIRED GLUCOSE TOLERANCE: Status: RESOLVED | Noted: 2019-07-16 | Resolved: 2023-10-10

## 2023-10-10 LAB
BILIRUBIN, POC: NORMAL
BLOOD URINE, POC: NEGATIVE
CLARITY, POC: CLEAR
COLOR, POC: NORMAL
CREATININE URINE POCT: 300
GLUCOSE URINE, POC: 100
HBA1C MFR BLD: 6.8 %
KETONES, POC: NEGATIVE
LEUKOCYTE EST, POC: NEGATIVE
MICROALBUMIN/CREAT 24H UR: 80 MG/G{CREAT}
MICROALBUMIN/CREAT UR-RTO: ABNORMAL
NITRITE, POC: NEGATIVE
PH, POC: 6
PROTEIN, POC: 30
SPECIFIC GRAVITY, POC: 1.02
UROBILINOGEN, POC: 0.2

## 2023-10-10 PROCEDURE — 1123F ACP DISCUSS/DSCN MKR DOCD: CPT | Performed by: INTERNAL MEDICINE

## 2023-10-10 PROCEDURE — 3017F COLORECTAL CA SCREEN DOC REV: CPT | Performed by: INTERNAL MEDICINE

## 2023-10-10 PROCEDURE — G0439 PPPS, SUBSEQ VISIT: HCPCS | Performed by: INTERNAL MEDICINE

## 2023-10-10 PROCEDURE — 82044 UR ALBUMIN SEMIQUANTITATIVE: CPT | Performed by: INTERNAL MEDICINE

## 2023-10-10 PROCEDURE — G8484 FLU IMMUNIZE NO ADMIN: HCPCS | Performed by: INTERNAL MEDICINE

## 2023-10-10 PROCEDURE — 83036 HEMOGLOBIN GLYCOSYLATED A1C: CPT | Performed by: INTERNAL MEDICINE

## 2023-10-10 PROCEDURE — 3044F HG A1C LEVEL LT 7.0%: CPT | Performed by: INTERNAL MEDICINE

## 2023-10-10 PROCEDURE — 81002 URINALYSIS NONAUTO W/O SCOPE: CPT | Performed by: INTERNAL MEDICINE

## 2023-10-10 RX ORDER — DICYCLOMINE HYDROCHLORIDE 10 MG/1
1 CAPSULE ORAL 4 TIMES DAILY
COMMUNITY
Start: 2023-09-28

## 2023-10-10 RX ORDER — GABAPENTIN 300 MG/1
300 CAPSULE ORAL NIGHTLY
Qty: 90 CAPSULE | Refills: 3 | Status: SHIPPED | OUTPATIENT
Start: 2023-10-10 | End: 2024-10-04

## 2023-10-10 RX ORDER — SIMETHICONE 180 MG
180 CAPSULE ORAL 3 TIMES DAILY PRN
Qty: 90 CAPSULE | Refills: 3 | Status: SHIPPED | OUTPATIENT
Start: 2023-10-10

## 2023-10-10 RX ORDER — PANTOPRAZOLE SODIUM 40 MG/1
1 TABLET, DELAYED RELEASE ORAL DAILY
COMMUNITY
Start: 2023-09-28

## 2023-10-10 RX ORDER — AMLODIPINE BESYLATE 10 MG/1
TABLET ORAL
Qty: 90 TABLET | Refills: 3 | Status: SHIPPED | OUTPATIENT
Start: 2023-10-10

## 2023-10-10 SDOH — ECONOMIC STABILITY: INCOME INSECURITY: HOW HARD IS IT FOR YOU TO PAY FOR THE VERY BASICS LIKE FOOD, HOUSING, MEDICAL CARE, AND HEATING?: SOMEWHAT HARD

## 2023-10-10 SDOH — ECONOMIC STABILITY: FOOD INSECURITY: WITHIN THE PAST 12 MONTHS, YOU WORRIED THAT YOUR FOOD WOULD RUN OUT BEFORE YOU GOT MONEY TO BUY MORE.: SOMETIMES TRUE

## 2023-10-10 SDOH — ECONOMIC STABILITY: FOOD INSECURITY: WITHIN THE PAST 12 MONTHS, THE FOOD YOU BOUGHT JUST DIDN'T LAST AND YOU DIDN'T HAVE MONEY TO GET MORE.: NEVER TRUE

## 2023-10-10 ASSESSMENT — PATIENT HEALTH QUESTIONNAIRE - PHQ9
SUM OF ALL RESPONSES TO PHQ QUESTIONS 1-9: 0
2. FEELING DOWN, DEPRESSED OR HOPELESS: 3
5. POOR APPETITE OR OVEREATING: 0
4. FEELING TIRED OR HAVING LITTLE ENERGY: 0
SUM OF ALL RESPONSES TO PHQ QUESTIONS 1-9: 6
9. THOUGHTS THAT YOU WOULD BE BETTER OFF DEAD, OR OF HURTING YOURSELF: 0
10. IF YOU CHECKED OFF ANY PROBLEMS, HOW DIFFICULT HAVE THESE PROBLEMS MADE IT FOR YOU TO DO YOUR WORK, TAKE CARE OF THINGS AT HOME, OR GET ALONG WITH OTHER PEOPLE: 0
SUM OF ALL RESPONSES TO PHQ QUESTIONS 1-9: 6
SUM OF ALL RESPONSES TO PHQ QUESTIONS 1-9: 0
SUM OF ALL RESPONSES TO PHQ9 QUESTIONS 1 & 2: 6
3. TROUBLE FALLING OR STAYING ASLEEP: 0
SUM OF ALL RESPONSES TO PHQ QUESTIONS 1-9: 6
7. TROUBLE CONCENTRATING ON THINGS, SUCH AS READING THE NEWSPAPER OR WATCHING TELEVISION: 0
1. LITTLE INTEREST OR PLEASURE IN DOING THINGS: 3
SUM OF ALL RESPONSES TO PHQ QUESTIONS 1-9: 0
SUM OF ALL RESPONSES TO PHQ QUESTIONS 1-9: 6
8. MOVING OR SPEAKING SO SLOWLY THAT OTHER PEOPLE COULD HAVE NOTICED. OR THE OPPOSITE, BEING SO FIGETY OR RESTLESS THAT YOU HAVE BEEN MOVING AROUND A LOT MORE THAN USUAL: 0
SUM OF ALL RESPONSES TO PHQ QUESTIONS 1-9: 0
6. FEELING BAD ABOUT YOURSELF - OR THAT YOU ARE A FAILURE OR HAVE LET YOURSELF OR YOUR FAMILY DOWN: 0

## 2023-10-10 ASSESSMENT — LIFESTYLE VARIABLES
HOW OFTEN DO YOU HAVE A DRINK CONTAINING ALCOHOL: MONTHLY OR LESS
HOW MANY STANDARD DRINKS CONTAINING ALCOHOL DO YOU HAVE ON A TYPICAL DAY: PATIENT DOES NOT DRINK

## 2023-10-10 NOTE — PROGRESS NOTES
Medicare Annual Wellness Visit    Migdalia Palmer is here for Medicare AWV and Abdominal Cramping (Pt is having Endoscopy being done on 10/16/23 by Dr. 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 TAKE ONE CAPSULE BY MOUTH NIGHTLY FOR 90 DAYS, Disp-90 capsule, R-3Normal  Essential hypertension  -     amLODIPine (NORVASC) 10 MG tablet; TAKE 1 TABLET BY MOUTH ONE TIME A DAY, Disp-90 tablet, R-3Normal  - stable, continue medication as above  Generalized abdominal pain  - uncontrolled, following with GI for evaluation and work up   Dysuria  -     POCT Urinalysis no Micro  -     Culture, Urine; Future  - no evidence of infection, will sent for culture to confirm due to her degree of abdominal pain     Recommendations for Preventive Services Due: see orders and patient instructions/AVS.  Recommended screening schedule for the next 5-10 years is provided to the patient in written form: see Patient Instructions/AVS.     Return in 4 months (on 2/10/2024) for Medicare Annual Wellness Visit in 1 year, diabetes. Subjective   The following acute and/or chronic problems were also addressed today:  Abdominal pain is uncontrolled and she is scheduled for an EGD with GI next week     Patient's complete Health Risk Assessment and screening values have been reviewed and are found in Flowsheets.  The following problems were

## 2023-10-10 NOTE — PATIENT INSTRUCTIONS
are vision tests? The four most common vision tests are visual acuity tests, refraction, visual field tests, and color vision tests. Visual acuity (sharpness) tests  These tests are used: To see if you need glasses or contact lenses. To monitor an eye problem. To check an eye injury. Visual acuity tests are done as part of routine exams. You may also have this test when you get your 's license or apply for some types of jobs. Visual field tests  These tests are used: To check for vision loss in any area of your range of vision. To screen for certain eye diseases. To look for nerve damage after a stroke, head injury, or other problem that could reduce blood flow to the brain. Refraction and color tests  A refraction test is done to find the right prescription for glasses and contact lenses. A color vision test is done to check for color blindness. Color vision is often tested as part of a routine exam. You may also have this test when you apply for a job where recognizing different colors is important, such as , electronics, or the [employer]. How are vision tests done? Visual acuity test   You cover one eye at a time. You read aloud from a wall chart across the room. You read aloud from a small card that you hold in your hand. Refraction   You look into a special device. The device puts lenses of different strengths in front of each eye to see how strong your glasses or contact lenses need to be. Visual field tests   Your doctor may have you look through special machines. Or your doctor may simply have you stare straight ahead while they move a finger into and out of your field of vision. Color vision test   You look at pieces of printed test patterns in various colors. You say what number or symbol you see. Your doctor may have you trace the number or symbol using a pointer. How do these tests feel?   There is very little chance of having a problem from this test. If

## 2023-10-11 LAB
CULTURE: NO GROWTH
SPECIMEN DESCRIPTION: NORMAL

## 2023-11-01 ENCOUNTER — HOSPITAL ENCOUNTER (OUTPATIENT)
Dept: MAMMOGRAPHY | Age: 68
Discharge: HOME | End: 2023-11-03

## 2023-11-01 DIAGNOSIS — Z12.31 BREAST CANCER SCREENING BY MAMMOGRAM: ICD-10-CM

## 2023-11-01 DIAGNOSIS — E11.65 TYPE 2 DIABETES MELLITUS WITH HYPERGLYCEMIA, WITHOUT LONG-TERM CURRENT USE OF INSULIN (HCC): ICD-10-CM

## 2023-11-01 NOTE — TELEPHONE ENCOUNTER
Last Appointment:  10/10/2023  Future Appointments   Date Time Provider [address]   2/12/2024 10:00 AM Kimberlee Nunn DO [address] [facility]   10/15/2024 10:00 AM Eduin [facility]

## 2023-11-02 DIAGNOSIS — E11.65 TYPE 2 DIABETES MELLITUS WITH HYPERGLYCEMIA, WITHOUT LONG-TERM CURRENT USE OF INSULIN (HCC): ICD-10-CM

## 2023-11-02 DIAGNOSIS — I10 ESSENTIAL HYPERTENSION: ICD-10-CM

## 2023-11-02 RX ORDER — AMLODIPINE BESYLATE 10 MG/1
TABLET ORAL
Qty: 90 TABLET | Refills: 3 | Status: SHIPPED | OUTPATIENT
Start: 2023-11-02

## 2023-12-04 ENCOUNTER — HOSPITAL ENCOUNTER (OUTPATIENT)
Dept: MAMMOGRAPHY | Age: 68
Discharge: HOME | End: 2023-12-06
Payer: MEDICARE

## 2023-12-04 VITALS — WEIGHT: 115 LBS | BODY MASS INDEX: 20.37 KG/M2

## 2023-12-04 PROCEDURE — 77063 BREAST TOMOSYNTHESIS BI: CPT

## 2023-12-29 NOTE — TELEPHONE ENCOUNTER
Last Appointment:  10/10/2023  Future Appointments   Date Time Provider [address]   2/12/2024 10:00 AM Jacobo Moses DO [address] [facility]   10/15/2024 10:00 AM Eduin [facility]
normal...

## 2024-02-20 ENCOUNTER — HOSPITAL ENCOUNTER (OUTPATIENT)
Age: 69
Discharge: HOME | End: 2024-02-20
Payer: MEDICARE

## 2024-02-20 LAB
25(OH)D3 SERPL-MCNC: 25.9 NG/ML (ref 30–100)
ALBUMIN SERPL-MCNC: 4.8 G/DL (ref 3.5–5.2)
ALP SERPL-CCNC: 81 U/L (ref 35–104)
ALT SERPL-CCNC: 9 U/L (ref 0–32)
ANION GAP SERPL CALCULATED.3IONS-SCNC: 19 MMOL/L (ref 7–16)
AST SERPL-CCNC: 14 U/L (ref 0–31)
BASOPHILS # BLD: 0.03 K/UL (ref 0–0.2)
BASOPHILS NFR BLD: 0 % (ref 0–2)
BILIRUB SERPL-MCNC: 0.6 MG/DL (ref 0–1.2)
BUN SERPL-MCNC: 9 MG/DL (ref 6–23)
CALCIUM SERPL-MCNC: 9.5 MG/DL (ref 8.6–10.2)
CHLORIDE SERPL-SCNC: 102 MMOL/L (ref 98–107)
CHOLEST SERPL-MCNC: 172 MG/DL
CO2 SERPL-SCNC: 19 MMOL/L (ref 22–29)
CREAT SERPL-MCNC: 0.7 MG/DL (ref 0.5–1)
EOSINOPHIL # BLD: 0.04 K/UL (ref 0.05–0.5)
EOSINOPHILS RELATIVE PERCENT: 1 % (ref 0–6)
ERYTHROCYTE [DISTWIDTH] IN BLOOD BY AUTOMATED COUNT: 14.1 % (ref 11.5–15)
GFR SERPL CREATININE-BSD FRML MDRD: >60 ML/MIN/1.73M2
GLUCOSE SERPL-MCNC: 74 MG/DL (ref 74–99)
HCT VFR BLD AUTO: 42.2 % (ref 34–48)
HDLC SERPL-MCNC: 100 MG/DL
HGB BLD-MCNC: 14.5 G/DL (ref 11.5–15.5)
IMM GRANULOCYTES # BLD AUTO: 0.03 K/UL (ref 0–0.58)
IMM GRANULOCYTES NFR BLD: 0 % (ref 0–5)
LDLC SERPL CALC-MCNC: 60 MG/DL
LYMPHOCYTES NFR BLD: 2.24 K/UL (ref 1.5–4)
LYMPHOCYTES RELATIVE PERCENT: 32 % (ref 20–42)
MCH RBC QN AUTO: 31.5 PG (ref 26–35)
MCHC RBC AUTO-ENTMCNC: 34.4 G/DL (ref 32–34.5)
MCV RBC AUTO: 91.7 FL (ref 80–99.9)
MONOCYTES NFR BLD: 0.33 K/UL (ref 0.1–0.95)
MONOCYTES NFR BLD: 5 % (ref 2–12)
NEUTROPHILS NFR BLD: 62 % (ref 43–80)
NEUTS SEG NFR BLD: 4.35 K/UL (ref 1.8–7.3)
PLATELET # BLD AUTO: 174 K/UL (ref 130–450)
PMV BLD AUTO: 10.8 FL (ref 7–12)
POTASSIUM SERPL-SCNC: 3.6 MMOL/L (ref 3.5–5)
PROT SERPL-MCNC: 7.7 G/DL (ref 6.4–8.3)
RBC # BLD AUTO: 4.6 M/UL (ref 3.5–5.5)
SODIUM SERPL-SCNC: 140 MMOL/L (ref 132–146)
TRIGL SERPL-MCNC: 60 MG/DL
TSH SERPL DL<=0.05 MIU/L-ACNC: 0.81 UIU/ML (ref 0.27–4.2)
VLDLC SERPL CALC-MCNC: 12 MG/DL
WBC OTHER # BLD: 7 K/UL (ref 4.5–11.5)

## 2024-02-20 PROCEDURE — 82652 VIT D 1 25-DIHYDROXY: CPT

## 2024-02-20 PROCEDURE — 82570 ASSAY OF URINE CREATININE: CPT

## 2024-02-20 PROCEDURE — 80053 COMPREHEN METABOLIC PANEL: CPT

## 2024-02-20 PROCEDURE — 82306 VITAMIN D 25 HYDROXY: CPT

## 2024-02-20 PROCEDURE — 87389 HIV-1 AG W/HIV-1&-2 AB AG IA: CPT

## 2024-02-20 PROCEDURE — 80061 LIPID PANEL: CPT

## 2024-02-20 PROCEDURE — 84443 ASSAY THYROID STIM HORMONE: CPT

## 2024-02-20 PROCEDURE — 82043 UR ALBUMIN QUANTITATIVE: CPT

## 2024-02-20 PROCEDURE — 85025 COMPLETE CBC W/AUTO DIFF WBC: CPT

## 2024-02-20 PROCEDURE — 86803 HEPATITIS C AB TEST: CPT

## 2024-02-20 PROCEDURE — 36415 COLL VENOUS BLD VENIPUNCTURE: CPT

## 2024-02-21 LAB
CREAT UR-MCNC: 38.7 MG/DL (ref 29–226)
HCV AB SERPL QL IA: REACTIVE
HIV 1+2 AB+HIV1 P24 AG SERPL QL IA: NONREACTIVE
MICROALBUMIN UR-MCNC: <12 MG/L (ref 0–19)
MICROALBUMIN/CREAT UR-RTO: NORMAL MCG/MG CREAT (ref 0–30)

## 2024-02-23 LAB — 1,25(OH)2D3 SERPL-MCNC: 49.4 PG/ML (ref 19.9–79.3)

## 2024-03-12 ENCOUNTER — TRANSCRIBE ORDERS (OUTPATIENT)
Dept: ADMINISTRATIVE | Age: 69
End: 2024-03-12

## 2024-04-22 ENCOUNTER — HOSPITAL ENCOUNTER (OUTPATIENT)
Dept: CT IMAGING | Age: 69
Discharge: HOME | End: 2024-04-24
Payer: MEDICARE

## 2024-04-22 DIAGNOSIS — Z12.2 SCREENING FOR MALIGNANT NEOPLASM OF RESPIRATORY ORGAN: ICD-10-CM

## 2024-04-22 DIAGNOSIS — Z87.891 PERSONAL HISTORY OF TOBACCO USE, PRESENTING HAZARDS TO HEALTH: ICD-10-CM

## 2024-04-22 PROCEDURE — 71271 CT THORAX LUNG CANCER SCR C-: CPT

## 2024-04-23 ENCOUNTER — TELEPHONE (OUTPATIENT)
Dept: CASE MANAGEMENT | Age: 69
End: 2024-04-23

## 2024-04-23 NOTE — TELEPHONE ENCOUNTER
No call, encounter opened to process CT Lung Screening.    CT Lung Screen: 4/22/2024    IMPRESSION:  1. There is no pulmonary infiltrate, mass or suspicious pulmonary nodule.     LUNG RADS:  Lung-RADS 1 - Negative (v2022)     Management:  12 month screening LDCT     RECOMMENDATIONS:  If you would like to register your patient with the [facility] Lung Nodule/Lung  Cancer Screening Program, please contact the Nurse Navigator at  [phone].    Pack years: 11.2    Social History     Tobacco Use  Smoking Status: Former Smoker    Start Date: 1975   Quit Date: 2020   Types: Cigarettes   Packs/Day: 0.25   Years: 45   Pack Years: 11.2   Smokeless Tobacco: Never         Results letter sent to patient via my chart or mailed.     Susannah Solis  Imaging Navigator   [facility]   [phone]

## 2024-06-21 ENCOUNTER — OFFICE VISIT (OUTPATIENT)
Dept: FAMILY MEDICINE CLINIC | Age: 69
End: 2024-06-21

## 2024-06-21 VITALS
WEIGHT: 105.6 LBS | DIASTOLIC BLOOD PRESSURE: 70 MMHG | HEIGHT: 63 IN | TEMPERATURE: 97.8 F | HEART RATE: 83 BPM | SYSTOLIC BLOOD PRESSURE: 118 MMHG | OXYGEN SATURATION: 97 % | BODY MASS INDEX: 18.71 KG/M2

## 2024-06-21 DIAGNOSIS — B02.9 HERPES ZOSTER WITHOUT COMPLICATION: Primary | ICD-10-CM

## 2024-06-21 RX ORDER — ACETAMINOPHEN AND CODEINE PHOSPHATE 300; 30 MG/1; MG/1
TABLET ORAL
COMMUNITY

## 2024-06-21 RX ORDER — DOXYCYCLINE HYCLATE 100 MG/1
TABLET, DELAYED RELEASE ORAL
COMMUNITY
End: 2024-06-21

## 2024-06-21 RX ORDER — ESTRADIOL 0.1 MG/G
CREAM VAGINAL
COMMUNITY

## 2024-06-21 RX ORDER — IBUPROFEN 800 MG/1
TABLET ORAL
COMMUNITY

## 2024-06-21 RX ORDER — CHLORHEXIDINE GLUCONATE ORAL RINSE 1.2 MG/ML
SOLUTION DENTAL
COMMUNITY
End: 2024-06-21

## 2024-06-21 RX ORDER — ACYCLOVIR 400 MG/1
400 TABLET ORAL 2 TIMES DAILY
Qty: 20 TABLET | Refills: 0 | Status: SHIPPED | OUTPATIENT
Start: 2024-06-21 | End: 2024-07-01

## 2024-06-21 RX ORDER — PREDNISONE 10 MG/1
TABLET ORAL
COMMUNITY
Start: 2024-04-22 | End: 2024-06-21

## 2024-06-21 RX ORDER — DAPAGLIFLOZIN 5 MG/1
5 TABLET, FILM COATED ORAL EVERY MORNING
COMMUNITY
Start: 2024-04-19

## 2024-06-21 RX ORDER — LINACLOTIDE 290 UG/1
CAPSULE, GELATIN COATED ORAL
COMMUNITY
Start: 2024-06-14

## 2024-06-21 RX ORDER — CIMETIDINE 300 MG/1
TABLET, FILM COATED ORAL
COMMUNITY

## 2024-06-21 RX ORDER — CEFDINIR 300 MG/1
CAPSULE ORAL
COMMUNITY
End: 2024-06-21

## 2024-06-21 RX ORDER — PSEUDOEPHEDRINE HCL 120 MG/1
TABLET, FILM COATED, EXTENDED RELEASE ORAL
COMMUNITY
Start: 2023-07-11

## 2024-06-21 RX ORDER — GLIPIZIDE 5 MG/1
TABLET ORAL
COMMUNITY

## 2024-06-21 RX ORDER — DOXYCYCLINE HYCLATE 100 MG
TABLET ORAL
COMMUNITY
Start: 2023-07-11 | End: 2024-06-21

## 2024-06-21 RX ORDER — CLINDAMYCIN HYDROCHLORIDE 300 MG/1
CAPSULE ORAL
COMMUNITY
End: 2024-06-21

## 2024-06-21 NOTE — PROGRESS NOTES
Chief Complaint       Rash (Has rash to buttock to underneath the buttock.has a break out every 30 since taking shingerx shot)    History of Present Illness   Source of history provided by:  patient.      Maru Doherty is a 68 y.o. old female presenting to the walk in clinic for evaluation of flareup of suspected shingles over the past couple days.  Patient has had this issues chronically for years and it has been worsening since receiving Zostavax vaccine.  She reports flareup is always in the same region of her right buttock and leg.  She reports lesions present on left superior buttock but reports prior to the rash appearing, pt had some muscular pain at the same area as well as along the dermatome of this leg..  Since onset the symptoms have progressed.  Pt has had similar symptoms in the past.  Pt has had chickenpox in the past. Pt states the area is warm to touch, mildly painful and itchy, and has not noted streaking.  Denies any fever, chills, HA, recent illness, neck stiffness, vomiting, or lethargy.       ROS    Unless otherwise stated in this report or unable to obtain because of the patient's clinical or mental status as evidenced by the medical record, this patients's positive and negative responses for Review of Systems, constitutional, psych, eyes, ENT, cardiovascular, respiratory, gastrointestinal, neurological, genitourinary, musculoskeletal, integument systems and systems related to the presenting problem are either stated in the preceding or were not pertinent or were negative for the symptoms and/or complaints related to the medical problem.    Physical Exam         VS:  /70   Pulse 83   Temp 97.8 °F (36.6 °C) (Temporal)   Ht 1.6 m (5' 3\")   Wt 47.9 kg (105 lb 9.6 oz)   LMP  (LMP Unknown)   SpO2 97%   BMI 18.71 kg/m²    Oxygen Saturation Interpretation: Normal.    Constitutional:  Alert, development consistent with age.  Neck:  Supple.  No lymphadenopathy.  Lungs:  Clear to

## 2024-07-16 ENCOUNTER — OFFICE VISIT (OUTPATIENT)
Dept: OBGYN | Age: 69
End: 2024-07-16
Payer: MEDICARE

## 2024-07-16 VITALS
DIASTOLIC BLOOD PRESSURE: 82 MMHG | RESPIRATION RATE: 16 BRPM | WEIGHT: 107 LBS | BODY MASS INDEX: 19.69 KG/M2 | HEART RATE: 78 BPM | HEIGHT: 62 IN | SYSTOLIC BLOOD PRESSURE: 144 MMHG

## 2024-07-16 DIAGNOSIS — Z01.419 ENCOUNTER FOR GYNECOLOGICAL EXAMINATION: Primary | ICD-10-CM

## 2024-07-16 DIAGNOSIS — Z12.31 SCREENING MAMMOGRAM, ENCOUNTER FOR: ICD-10-CM

## 2024-07-16 DIAGNOSIS — Z20.2 STD EXPOSURE: ICD-10-CM

## 2024-07-16 PROCEDURE — 99397 PER PM REEVAL EST PAT 65+ YR: CPT | Performed by: OBSTETRICS & GYNECOLOGY

## 2024-07-16 PROCEDURE — 3077F SYST BP >= 140 MM HG: CPT | Performed by: OBSTETRICS & GYNECOLOGY

## 2024-07-16 PROCEDURE — 3079F DIAST BP 80-89 MM HG: CPT | Performed by: OBSTETRICS & GYNECOLOGY

## 2024-07-16 NOTE — PROGRESS NOTES
HISTORY OF PRESENT ILLNESS:    68 y.o. female   presents for her annual exam.     No LMP recorded (lmp unknown). Patient is postmenopausal.  Periods are: n/a menopausal at age 53  Contraception: n/a  Most recent pap smear:  normal cytology, neg HPV  History of abnormal pap smears: pt reports multiple abnormal pap smears, denies procedures to cervix.  Most recent mammogram:  normal  History of abnormal mammogram: none  Most recent colonoscopy:  normal  Dexa scan:  normal   Changes to health since last visit: pt states she has shingles vaccine and has been breaking out on her backside since.  Pt losing weight. Drinking protein shakes. Has told her her primary care physician.  Complaints: as above  Pt requests STD screen.   Requests rx for acyclovir.       Past Medical History:   Past Medical History:   Diagnosis Date    Arthritis     Asthma     controlled     Depression     Diabetes mellitus (HCC)     Fibromyalgia     GERD (gastroesophageal reflux disease)     Hepatitis C 2016    treated     Hyperlipidemia     Hypertension     IBS (irritable bowel syndrome)     Overactive bladder     Spleen injury     hospitalized 2019 - resolved as of 19                                              OB History    Para Term  AB Living   7 5 5   2 5   SAB IAB Ectopic Molar Multiple Live Births   2         5      # Outcome Date GA Lbr Israel/2nd Weight Sex Delivery Anes PTL Lv   7 Term            6 Term            5 Term            4 Term            3 Term            2 SAB            1 SAB               Obstetric Comments   5 live births   2 miscarriages          Past Surgical History:   Past Surgical History:   Procedure Laterality Date    ANESTHESIA NERVE BLOCK N/A 2019    L4-5 EPIDURAL STEROID INJECTION performed by Lizzy Jesus DO at [facility] OR    CARPAL TUNNEL RELEASE Right 2019    RIGHT ENDOSCOPIC CARPAL TUNNEL RELEASE performed by Kulwinder Anthony MD at [facility] OR

## 2024-07-17 DIAGNOSIS — Z20.2 STD EXPOSURE: ICD-10-CM

## 2024-07-17 RX ORDER — ACYCLOVIR 400 MG/1
400 TABLET ORAL 3 TIMES DAILY
Qty: 30 TABLET | Refills: 0 | Status: SHIPPED | OUTPATIENT
Start: 2024-07-17 | End: 2024-07-27

## 2024-08-18 ENCOUNTER — APPOINTMENT (OUTPATIENT)
Dept: GENERAL RADIOLOGY | Age: 69
End: 2024-08-18
Payer: MEDICARE

## 2024-08-18 ENCOUNTER — HOSPITAL ENCOUNTER (EMERGENCY)
Age: 69
Discharge: HOME | End: 2024-08-18
Attending: STUDENT IN AN ORGANIZED HEALTH CARE EDUCATION/TRAINING PROGRAM
Payer: MEDICARE

## 2024-08-18 ENCOUNTER — APPOINTMENT (OUTPATIENT)
Dept: CT IMAGING | Age: 69
End: 2024-08-18
Payer: MEDICARE

## 2024-08-18 VITALS
HEART RATE: 60 BPM | DIASTOLIC BLOOD PRESSURE: 73 MMHG | BODY MASS INDEX: 18.4 KG/M2 | RESPIRATION RATE: 20 BRPM | SYSTOLIC BLOOD PRESSURE: 119 MMHG | HEIGHT: 62 IN | OXYGEN SATURATION: 98 % | WEIGHT: 100 LBS | TEMPERATURE: 98.1 F

## 2024-08-18 DIAGNOSIS — K59.00 CONSTIPATION, UNSPECIFIED CONSTIPATION TYPE: ICD-10-CM

## 2024-08-18 DIAGNOSIS — R11.2 NAUSEA AND VOMITING, UNSPECIFIED VOMITING TYPE: ICD-10-CM

## 2024-08-18 DIAGNOSIS — R10.13 ABDOMINAL PAIN, EPIGASTRIC: Primary | ICD-10-CM

## 2024-08-18 LAB
ALBUMIN SERPL-MCNC: 4.6 G/DL (ref 3.5–5.2)
ALP SERPL-CCNC: 97 U/L (ref 35–104)
ALT SERPL-CCNC: 9 U/L (ref 0–32)
ANION GAP SERPL CALCULATED.3IONS-SCNC: 13 MMOL/L (ref 7–16)
AST SERPL-CCNC: 14 U/L (ref 0–31)
BASOPHILS # BLD: 0.04 K/UL (ref 0–0.2)
BASOPHILS NFR BLD: 1 % (ref 0–2)
BILIRUB SERPL-MCNC: 0.6 MG/DL (ref 0–1.2)
BUN SERPL-MCNC: 7 MG/DL (ref 6–23)
CALCIUM SERPL-MCNC: 9.9 MG/DL (ref 8.6–10.2)
CHLORIDE SERPL-SCNC: 100 MMOL/L (ref 98–107)
CO2 SERPL-SCNC: 24 MMOL/L (ref 22–29)
CREAT SERPL-MCNC: 0.6 MG/DL (ref 0.5–1)
EOSINOPHIL # BLD: 0.08 K/UL (ref 0.05–0.5)
EOSINOPHILS RELATIVE PERCENT: 1 % (ref 0–6)
ERYTHROCYTE [DISTWIDTH] IN BLOOD BY AUTOMATED COUNT: 14.2 % (ref 11.5–15)
GFR, ESTIMATED: >90 ML/MIN/1.73M2
GLUCOSE SERPL-MCNC: 196 MG/DL (ref 74–99)
HCT VFR BLD AUTO: 47.7 % (ref 34–48)
HGB BLD-MCNC: 16.4 G/DL (ref 11.5–15.5)
IMM GRANULOCYTES # BLD AUTO: <0.03 K/UL (ref 0–0.58)
IMM GRANULOCYTES NFR BLD: 0 % (ref 0–5)
LACTATE BLDV-SCNC: 1.6 MMOL/L (ref 0.5–1.9)
LIPASE SERPL-CCNC: 59 U/L (ref 13–60)
LYMPHOCYTES NFR BLD: 1.83 K/UL (ref 1.5–4)
LYMPHOCYTES RELATIVE PERCENT: 28 % (ref 20–42)
MCH RBC QN AUTO: 31.2 PG (ref 26–35)
MCHC RBC AUTO-ENTMCNC: 34.4 G/DL (ref 32–34.5)
MCV RBC AUTO: 90.9 FL (ref 80–99.9)
MONOCYTES NFR BLD: 0.41 K/UL (ref 0.1–0.95)
MONOCYTES NFR BLD: 6 % (ref 2–12)
NEUTROPHILS NFR BLD: 64 % (ref 43–80)
NEUTS SEG NFR BLD: 4.13 K/UL (ref 1.8–7.3)
PLATELET # BLD AUTO: 268 K/UL (ref 130–450)
PMV BLD AUTO: 10.2 FL (ref 7–12)
POTASSIUM SERPL-SCNC: 3.9 MMOL/L (ref 3.5–5)
PROT SERPL-MCNC: 8.1 G/DL (ref 6.4–8.3)
RBC # BLD AUTO: 5.25 M/UL (ref 3.5–5.5)
SODIUM SERPL-SCNC: 137 MMOL/L (ref 132–146)
TROPONIN I SERPL HS-MCNC: 8 NG/L (ref 0–9)
WBC OTHER # BLD: 6.5 K/UL (ref 4.5–11.5)

## 2024-08-18 PROCEDURE — 6360000004 HC RX CONTRAST MEDICATION: Performed by: RADIOLOGY

## 2024-08-18 PROCEDURE — 83605 ASSAY OF LACTIC ACID: CPT

## 2024-08-18 PROCEDURE — 84484 ASSAY OF TROPONIN QUANT: CPT

## 2024-08-18 PROCEDURE — 85025 COMPLETE CBC W/AUTO DIFF WBC: CPT

## 2024-08-18 PROCEDURE — 83690 ASSAY OF LIPASE: CPT

## 2024-08-18 PROCEDURE — 96361 HYDRATE IV INFUSION ADD-ON: CPT

## 2024-08-18 PROCEDURE — 71046 X-RAY EXAM CHEST 2 VIEWS: CPT

## 2024-08-18 PROCEDURE — 96372 THER/PROPH/DIAG INJ SC/IM: CPT

## 2024-08-18 PROCEDURE — 80053 COMPREHEN METABOLIC PANEL: CPT

## 2024-08-18 PROCEDURE — 74177 CT ABD & PELVIS W/CONTRAST: CPT

## 2024-08-18 PROCEDURE — 6360000002 HC RX W HCPCS

## 2024-08-18 PROCEDURE — 99285 EMERGENCY DEPT VISIT HI MDM: CPT

## 2024-08-18 PROCEDURE — 2580000003 HC RX 258

## 2024-08-18 PROCEDURE — 96375 TX/PRO/DX INJ NEW DRUG ADDON: CPT

## 2024-08-18 PROCEDURE — 96374 THER/PROPH/DIAG INJ IV PUSH: CPT

## 2024-08-18 RX ORDER — DICYCLOMINE HYDROCHLORIDE 10 MG/ML
20 INJECTION INTRAMUSCULAR ONCE
Status: COMPLETED | OUTPATIENT
Start: 2024-08-18 | End: 2024-08-18

## 2024-08-18 RX ORDER — KETOROLAC TROMETHAMINE 30 MG/ML
15 INJECTION, SOLUTION INTRAMUSCULAR; INTRAVENOUS ONCE
Status: COMPLETED | OUTPATIENT
Start: 2024-08-18 | End: 2024-08-18

## 2024-08-18 RX ORDER — ONDANSETRON 2 MG/ML
4 INJECTION INTRAMUSCULAR; INTRAVENOUS ONCE
Status: COMPLETED | OUTPATIENT
Start: 2024-08-18 | End: 2024-08-18

## 2024-08-18 RX ORDER — ONDANSETRON 4 MG/1
4 TABLET, ORALLY DISINTEGRATING ORAL 3 TIMES DAILY PRN
Qty: 9 TABLET | Refills: 0 | Status: SHIPPED | OUTPATIENT
Start: 2024-08-18 | End: 2024-08-21

## 2024-08-18 RX ORDER — 0.9 % SODIUM CHLORIDE 0.9 %
1000 INTRAVENOUS SOLUTION INTRAVENOUS ONCE
Status: COMPLETED | OUTPATIENT
Start: 2024-08-18 | End: 2024-08-18

## 2024-08-18 RX ADMIN — ONDANSETRON 4 MG: 2 INJECTION INTRAMUSCULAR; INTRAVENOUS at 17:09

## 2024-08-18 RX ADMIN — IOPAMIDOL 75 ML: 755 INJECTION, SOLUTION INTRAVENOUS at 19:48

## 2024-08-18 RX ADMIN — SODIUM CHLORIDE 1000 ML: 9 INJECTION, SOLUTION INTRAVENOUS at 17:10

## 2024-08-18 RX ADMIN — DICYCLOMINE HYDROCHLORIDE 20 MG: 10 INJECTION, SOLUTION INTRAMUSCULAR at 17:09

## 2024-08-18 RX ADMIN — KETOROLAC TROMETHAMINE 15 MG: 30 INJECTION, SOLUTION INTRAMUSCULAR at 21:51

## 2024-08-18 ASSESSMENT — PAIN DESCRIPTION - LOCATION
LOCATION: ABDOMEN

## 2024-08-18 ASSESSMENT — PAIN SCALES - GENERAL
PAINLEVEL_OUTOF10: 10
PAINLEVEL_OUTOF10: 4
PAINLEVEL_OUTOF10: 7

## 2024-08-18 ASSESSMENT — PAIN - FUNCTIONAL ASSESSMENT: PAIN_FUNCTIONAL_ASSESSMENT: NONE - DENIES PAIN

## 2024-08-18 ASSESSMENT — LIFESTYLE VARIABLES
HOW MANY STANDARD DRINKS CONTAINING ALCOHOL DO YOU HAVE ON A TYPICAL DAY: PATIENT DOES NOT DRINK
HOW OFTEN DO YOU HAVE A DRINK CONTAINING ALCOHOL: NEVER

## 2024-08-18 ASSESSMENT — PAIN DESCRIPTION - DESCRIPTORS: DESCRIPTORS: CRAMPING

## 2024-08-18 NOTE — ED PROVIDER NOTES
33    Breast Cancer Paternal Aunt         SOCIAL HISTORY       Social History     Tobacco Use    Smoking status: Former     Current packs/day: 0.00     Average packs/day: 0.3 packs/day for 45.0 years (11.3 ttl pk-yrs)     Types: Cigarettes     Start date: 9/9/1975     Quit date: 9/9/2020     Years since quitting: 3.9     Passive exposure: Current    Smokeless tobacco: Never   Vaping Use    Vaping status: Some Days    Substances: THC, CBD   Substance Use Topics    Alcohol use: Yes     Comment: rare    Drug use: Not Currently     Types: Marijuana (Weed)     Comment: holds medical card-Vaping       SCREENINGS        [place] Coma Scale  Eye Opening: Spontaneous  Best Verbal Response: Oriented  Best Motor Response: Obeys commands  [place] Coma Scale Score: 15                CIWA Assessment  BP: 136/89  Pulse: 68           PHYSICAL EXAM  1 or more Elements     ED Triage Vitals   BP Systolic BP Percentile Diastolic BP Percentile Temp Temp Source Pulse Respirations SpO2   08/18/24 1618 -- -- 08/18/24 1545 08/18/24 1545 08/18/24 1545 08/18/24 1618 08/18/24 1545   134/85   98.1 °F (36.7 °C) Oral 72 20 97 %      Height Weight - Scale         08/18/24 1617 08/18/24 1617         1.575 m (5' 2\") 45.4 kg (100 lb)               Physical Exam  Vitals and nursing note reviewed.   Constitutional:       Appearance: Normal appearance.   HENT:      Head: Normocephalic and atraumatic.      Right Ear: External ear normal.      Left Ear: External ear normal.      Nose: Nose normal.      Mouth/Throat:      Mouth: Mucous membranes are dry.   Eyes:      Extraocular Movements: Extraocular movements intact.      Pupils: Pupils are equal, round, and reactive to light.   Cardiovascular:      Rate and Rhythm: Normal rate and regular rhythm.      Pulses: Normal pulses.      Heart sounds: Normal heart sounds.   Pulmonary:      Effort: Pulmonary effort is normal.      Breath sounds: Normal breath sounds.   Abdominal:      General: Abdomen is flat. 
    1. Abdominal pain, epigastric    2. Nausea and vomiting, unspecified vomiting type    3. Constipation, unspecified constipation type          DISPOSITION/PLAN     DISPOSITION Decision To Discharge 08/18/2024 10:11:24 PM  Condition at Disposition: Stable      PATIENT REFERRED TO:  ABIGAIL Pineda MD  [address] [facility] [address]  [phone]    Schedule an appointment as soon as possible for a visit in 1 day      Matilde Denny, DO  [address] [id number]  [phone]    Schedule an appointment as soon as possible for a visit in 1 day      [facility] Emergency Department  [address] [facility] [address]  [phone]    If new or worsening symptoms      DISCHARGE MEDICATIONS:  Discharge Medication List as of 8/18/2024 10:20 PM        START taking these medications    Details   ondansetron (ZOFRAN-ODT) 4 MG disintegrating tablet Take 1 tablet by mouth 3 times daily as needed for Nausea or Vomiting, Disp-9 tablet, R-0Normal             DISCONTINUED MEDICATIONS:  Discharge Medication List as of 8/18/2024 10:20 PM                 (Please note that portions of this note were completed with a voice recognition program.  Efforts were made to edit the dictations but occasionally words are mis-transcribed.)    KAIT Marino CNP (electronically signed)       Gilda Trinh APRN - CNP  08/19/24 0842

## 2024-08-19 NOTE — DISCHARGE INSTRUCTIONS
You were seen in the emergency room today for your abdominal pain, nausea and vomiting.  Your CT scan appeared to show evidence of significant constipation along with some benign kidney cysts, I would recommend following up with your GI for your constipation.  I would also recommended a bowel cleanout with MiraLAX, you can use 8 scoops of MiraLAX in a 64 ounce Gatorade and drink this over the course of 6 hours.  We also prescribed you nausea medication to go home with, you can use this as needed.  Please return to the emergency department if you having worsening abdominal pain, nausea and vomiting despite using the antiemetics and you cannot keep yourself hydrated at home.  Return to the emergency department if you are having fevers, chills.

## 2024-08-22 ENCOUNTER — APPOINTMENT (OUTPATIENT)
Dept: CT IMAGING | Age: 69
End: 2024-08-22
Payer: MEDICARE

## 2024-08-22 ENCOUNTER — OFFICE VISIT (OUTPATIENT)
Dept: PRIMARY CARE CLINIC | Age: 69
End: 2024-08-22
Payer: MEDICARE

## 2024-08-22 ENCOUNTER — HOSPITAL ENCOUNTER (EMERGENCY)
Age: 69
Discharge: HOME | End: 2024-08-23
Attending: EMERGENCY MEDICINE
Payer: MEDICARE

## 2024-08-22 ENCOUNTER — APPOINTMENT (OUTPATIENT)
Dept: GENERAL RADIOLOGY | Age: 69
End: 2024-08-22
Payer: MEDICARE

## 2024-08-22 ENCOUNTER — APPOINTMENT (OUTPATIENT)
Dept: ULTRASOUND IMAGING | Age: 69
End: 2024-08-22
Payer: MEDICARE

## 2024-08-22 VITALS
BODY MASS INDEX: 18.61 KG/M2 | TEMPERATURE: 97.4 F | DIASTOLIC BLOOD PRESSURE: 97 MMHG | OXYGEN SATURATION: 100 % | HEART RATE: 105 BPM | SYSTOLIC BLOOD PRESSURE: 134 MMHG | HEIGHT: 63 IN | WEIGHT: 105 LBS | RESPIRATION RATE: 16 BRPM

## 2024-08-22 DIAGNOSIS — R10.13 ABDOMINAL PAIN, EPIGASTRIC: Primary | ICD-10-CM

## 2024-08-22 DIAGNOSIS — R91.1 PULMONARY NODULE: ICD-10-CM

## 2024-08-22 DIAGNOSIS — R10.10 PAIN OF UPPER ABDOMEN: Primary | ICD-10-CM

## 2024-08-22 LAB
ALBUMIN SERPL-MCNC: 4.2 G/DL (ref 3.5–5.2)
ALP SERPL-CCNC: 93 U/L (ref 35–104)
ALT SERPL-CCNC: 11 U/L (ref 0–32)
ANION GAP SERPL CALCULATED.3IONS-SCNC: 14 MMOL/L (ref 7–16)
AST SERPL-CCNC: 15 U/L (ref 0–31)
BASOPHILS # BLD: 0.03 K/UL (ref 0–0.2)
BASOPHILS NFR BLD: 1 % (ref 0–2)
BILIRUB SERPL-MCNC: 0.5 MG/DL (ref 0–1.2)
BILIRUB UR QL STRIP: NEGATIVE
BUN SERPL-MCNC: 7 MG/DL (ref 6–23)
CALCIUM SERPL-MCNC: 10.2 MG/DL (ref 8.6–10.2)
CHLORIDE SERPL-SCNC: 99 MMOL/L (ref 98–107)
CLARITY UR: CLEAR
CO2 SERPL-SCNC: 24 MMOL/L (ref 22–29)
COLOR UR: YELLOW
COMMENT: ABNORMAL
CREAT SERPL-MCNC: 0.7 MG/DL (ref 0.5–1)
D-DIMER QUANTITATIVE: 291 NG/ML DDU (ref 0–230)
EOSINOPHIL # BLD: 0.06 K/UL (ref 0.05–0.5)
EOSINOPHILS RELATIVE PERCENT: 1 % (ref 0–6)
ERYTHROCYTE [DISTWIDTH] IN BLOOD BY AUTOMATED COUNT: 14 % (ref 11.5–15)
GFR, ESTIMATED: >90 ML/MIN/1.73M2
GLUCOSE SERPL-MCNC: 163 MG/DL (ref 74–99)
GLUCOSE UR STRIP-MCNC: >=1000 MG/DL
HCT VFR BLD AUTO: 49.8 % (ref 34–48)
HGB BLD-MCNC: 16.5 G/DL (ref 11.5–15.5)
HGB UR QL STRIP.AUTO: NEGATIVE
IMM GRANULOCYTES # BLD AUTO: <0.03 K/UL (ref 0–0.58)
IMM GRANULOCYTES NFR BLD: 0 % (ref 0–5)
KETONES UR STRIP-MCNC: NEGATIVE MG/DL
LACTATE BLDV-SCNC: 1.2 MMOL/L (ref 0.5–2.2)
LEUKOCYTE ESTERASE UR QL STRIP: NEGATIVE
LIPASE SERPL-CCNC: 54 U/L (ref 13–60)
LYMPHOCYTES NFR BLD: 2.03 K/UL (ref 1.5–4)
LYMPHOCYTES RELATIVE PERCENT: 32 % (ref 20–42)
MAGNESIUM SERPL-MCNC: 2.1 MG/DL (ref 1.6–2.6)
MCH RBC QN AUTO: 30 PG (ref 26–35)
MCHC RBC AUTO-ENTMCNC: 33.1 G/DL (ref 32–34.5)
MCV RBC AUTO: 90.5 FL (ref 80–99.9)
MONOCYTES NFR BLD: 0.49 K/UL (ref 0.1–0.95)
MONOCYTES NFR BLD: 8 % (ref 2–12)
NEUTROPHILS NFR BLD: 58 % (ref 43–80)
NEUTS SEG NFR BLD: 3.66 K/UL (ref 1.8–7.3)
NITRITE UR QL STRIP: NEGATIVE
PH UR STRIP: 7.5 [PH] (ref 5–9)
PLATELET # BLD AUTO: 267 K/UL (ref 130–450)
PMV BLD AUTO: 10 FL (ref 7–12)
POTASSIUM SERPL-SCNC: 3.5 MMOL/L (ref 3.5–5)
PROT SERPL-MCNC: 7.4 G/DL (ref 6.4–8.3)
PROT UR STRIP-MCNC: NEGATIVE MG/DL
RBC # BLD AUTO: 5.5 M/UL (ref 3.5–5.5)
SODIUM SERPL-SCNC: 137 MMOL/L (ref 132–146)
SP GR UR STRIP: 1.01 (ref 1–1.03)
TROPONIN I SERPL HS-MCNC: <6 NG/L (ref 0–9)
UROBILINOGEN UR STRIP-ACNC: 0.2 EU/DL (ref 0–1)
WBC OTHER # BLD: 6.3 K/UL (ref 4.5–11.5)

## 2024-08-22 PROCEDURE — 96375 TX/PRO/DX INJ NEW DRUG ADDON: CPT

## 2024-08-22 PROCEDURE — G8420 CALC BMI NORM PARAMETERS: HCPCS | Performed by: NURSE PRACTITIONER

## 2024-08-22 PROCEDURE — G8399 PT W/DXA RESULTS DOCUMENT: HCPCS | Performed by: NURSE PRACTITIONER

## 2024-08-22 PROCEDURE — 3017F COLORECTAL CA SCREEN DOC REV: CPT | Performed by: NURSE PRACTITIONER

## 2024-08-22 PROCEDURE — 99285 EMERGENCY DEPT VISIT HI MDM: CPT

## 2024-08-22 PROCEDURE — 6360000002 HC RX W HCPCS: Performed by: NURSE PRACTITIONER

## 2024-08-22 PROCEDURE — 93005 ELECTROCARDIOGRAM TRACING: CPT | Performed by: NURSE PRACTITIONER

## 2024-08-22 PROCEDURE — 74177 CT ABD & PELVIS W/CONTRAST: CPT

## 2024-08-22 PROCEDURE — 71275 CT ANGIOGRAPHY CHEST: CPT

## 2024-08-22 PROCEDURE — 96374 THER/PROPH/DIAG INJ IV PUSH: CPT

## 2024-08-22 PROCEDURE — 85379 FIBRIN DEGRADATION QUANT: CPT

## 2024-08-22 PROCEDURE — 83605 ASSAY OF LACTIC ACID: CPT

## 2024-08-22 PROCEDURE — 1090F PRES/ABSN URINE INCON ASSESS: CPT | Performed by: NURSE PRACTITIONER

## 2024-08-22 PROCEDURE — 1036F TOBACCO NON-USER: CPT | Performed by: NURSE PRACTITIONER

## 2024-08-22 PROCEDURE — G8427 DOCREV CUR MEDS BY ELIG CLIN: HCPCS | Performed by: NURSE PRACTITIONER

## 2024-08-22 PROCEDURE — 3075F SYST BP GE 130 - 139MM HG: CPT | Performed by: NURSE PRACTITIONER

## 2024-08-22 PROCEDURE — 84484 ASSAY OF TROPONIN QUANT: CPT

## 2024-08-22 PROCEDURE — 85025 COMPLETE CBC W/AUTO DIFF WBC: CPT

## 2024-08-22 PROCEDURE — 99214 OFFICE O/P EST MOD 30 MIN: CPT | Performed by: NURSE PRACTITIONER

## 2024-08-22 PROCEDURE — 96372 THER/PROPH/DIAG INJ SC/IM: CPT

## 2024-08-22 PROCEDURE — 83690 ASSAY OF LIPASE: CPT

## 2024-08-22 PROCEDURE — 83735 ASSAY OF MAGNESIUM: CPT

## 2024-08-22 PROCEDURE — 6370000000 HC RX 637 (ALT 250 FOR IP): Performed by: NURSE PRACTITIONER

## 2024-08-22 PROCEDURE — 76705 ECHO EXAM OF ABDOMEN: CPT

## 2024-08-22 PROCEDURE — 71045 X-RAY EXAM CHEST 1 VIEW: CPT

## 2024-08-22 PROCEDURE — 81003 URINALYSIS AUTO W/O SCOPE: CPT

## 2024-08-22 PROCEDURE — 2500000003 HC RX 250 WO HCPCS: Performed by: NURSE PRACTITIONER

## 2024-08-22 PROCEDURE — 2580000003 HC RX 258: Performed by: NURSE PRACTITIONER

## 2024-08-22 PROCEDURE — 1123F ACP DISCUSS/DSCN MKR DOCD: CPT | Performed by: NURSE PRACTITIONER

## 2024-08-22 PROCEDURE — 6360000004 HC RX CONTRAST MEDICATION: Performed by: RADIOLOGY

## 2024-08-22 PROCEDURE — 80053 COMPREHEN METABOLIC PANEL: CPT

## 2024-08-22 PROCEDURE — 3080F DIAST BP >= 90 MM HG: CPT | Performed by: NURSE PRACTITIONER

## 2024-08-22 RX ORDER — DICYCLOMINE HYDROCHLORIDE 10 MG/ML
20 INJECTION INTRAMUSCULAR ONCE
Status: COMPLETED | OUTPATIENT
Start: 2024-08-22 | End: 2024-08-22

## 2024-08-22 RX ORDER — 0.9 % SODIUM CHLORIDE 0.9 %
1000 INTRAVENOUS SOLUTION INTRAVENOUS ONCE
Status: COMPLETED | OUTPATIENT
Start: 2024-08-22 | End: 2024-08-22

## 2024-08-22 RX ORDER — MORPHINE SULFATE 2 MG/ML
2 INJECTION, SOLUTION INTRAMUSCULAR; INTRAVENOUS ONCE
Status: COMPLETED | OUTPATIENT
Start: 2024-08-22 | End: 2024-08-22

## 2024-08-22 RX ORDER — ONDANSETRON 2 MG/ML
4 INJECTION INTRAMUSCULAR; INTRAVENOUS ONCE
Status: COMPLETED | OUTPATIENT
Start: 2024-08-22 | End: 2024-08-22

## 2024-08-22 RX ADMIN — MORPHINE SULFATE 2 MG: 2 INJECTION, SOLUTION INTRAMUSCULAR; INTRAVENOUS at 22:46

## 2024-08-22 RX ADMIN — IOPAMIDOL 75 ML: 755 INJECTION, SOLUTION INTRAVENOUS at 20:34

## 2024-08-22 RX ADMIN — ALUMINUM HYDROXIDE, MAGNESIUM HYDROXIDE, AND SIMETHICONE: 1200; 120; 1200 SUSPENSION ORAL at 18:41

## 2024-08-22 RX ADMIN — DICYCLOMINE HYDROCHLORIDE 20 MG: 10 INJECTION, SOLUTION INTRAMUSCULAR at 22:44

## 2024-08-22 RX ADMIN — SODIUM CHLORIDE 1000 ML: 9 INJECTION, SOLUTION INTRAVENOUS at 18:34

## 2024-08-22 RX ADMIN — FAMOTIDINE 20 MG: 10 INJECTION, SOLUTION INTRAVENOUS at 18:35

## 2024-08-22 RX ADMIN — ONDANSETRON 4 MG: 2 INJECTION INTRAMUSCULAR; INTRAVENOUS at 18:34

## 2024-08-22 ASSESSMENT — PAIN SCALES - GENERAL
PAINLEVEL_OUTOF10: 7
PAINLEVEL_OUTOF10: 7

## 2024-08-22 ASSESSMENT — PAIN DESCRIPTION - ORIENTATION
ORIENTATION: RIGHT
ORIENTATION: RIGHT

## 2024-08-22 ASSESSMENT — PAIN DESCRIPTION - LOCATION
LOCATION: BACK
LOCATION: BACK

## 2024-08-22 ASSESSMENT — PAIN DESCRIPTION - DESCRIPTORS: DESCRIPTORS: ACHING;BURNING

## 2024-08-22 NOTE — PROGRESS NOTES
Chief Complaint:       Abdominal Pain (Feels like liver and pancreas is swollen, constipated 3 weeks takes linzess and dicyclomine, osvaldo, and gas x vomitting also for 3 weeks, was given Zofran )    History of Present Illness   Source of history provided by:  patient.    Maru Doherty is a 68 y.o. old female who presents to walk-in for complaints of abdominal cramping for the last 3 weeks. Patient reports she has a history of constipation, states last BM was yesterday after magnesium citrate. She did go to the ER on 8/18, at that time she had a workup including labs and CT abdomen/pelvis which came back normal. She was discharged with Zofran and follow up with Gastroenterology, Dr. Pineda, which her appointment is 9/9/24. She has been taking Linzess, Senna Cot, Dicyclomine, MiraLAX, GasX, Milk of Magnesia, Nexium and Magnesium Citrate without much relief. She denies any chest pain or SOB. She reports she is having epigastric and RUQ abdominal pain, feels like her \"liver and pancreas are ready to explode.\"    ROS   Unless otherwise stated in this report or unable to obtain because of the patient's clinical or mental status as evidenced by the medical record, this patients's positive and negative responses for Review of Systems, constitutional, psych, eyes, ENT, cardiovascular, respiratory, gastrointestinal, neurological, genitourinary, musculoskeletal, integument systems and systems related to the presenting problem are either stated in the preceding or were not pertinent or were negative for the symptoms and/or complaints related to the medical problem.    Past Medical History:  has a past medical history of Arthritis, Asthma, Depression, Diabetes mellitus (HCC), Fibromyalgia, GERD (gastroesophageal reflux disease), Hepatitis C, Hyperlipidemia, Hypertension, IBS (irritable bowel syndrome), Overactive bladder, and Spleen injury.  Past Surgical History:  has a past surgical history that includes Ovary removal;

## 2024-08-22 NOTE — ED PROVIDER NOTES
ED Physician   HPI:  8/22/24, Time: 6:47 PM EDT         Maru Doherty is a 68 y.o. female presenting to the ED for continued midepigastric abdominal pain.  Patient presents to the emergency department states that she was seen here on August 18.  At that time she  had labs as well as imaging.  She was subsequently discharged home states that she did use mag citrate and has been having episodes of diarrhea since then.  She also reports feeling very nauseated but was provided with Zofran.  Patient reports that anytime she eats or drinks something her stomach will start to gurgle and feels like she is having cramping primarily to the right upper and mid epigastric region.  Patient denies fevers, she does report that the pain from the mid epigastric region will radiate into the chest.  She does have a history of IBS and reports that it could be that.  She otherwise denies any shortness of breath.  No blood noted in urine, emesis or stool.  States she is attempting to eat and drink but not having any success.  And states pain is not any better since her first initial visit from August 18, 2024.    Review of Systems:   A complete review of systems was performed and pertinent positives and negatives are stated within HPI, all other systems reviewed and are negative.          --------------------------------------------- PAST HISTORY ---------------------------------------------  Past Medical History:  has a past medical history of Arthritis, Asthma, Depression, Diabetes mellitus (HCC), Fibromyalgia, GERD (gastroesophageal reflux disease), Hepatitis C, Hyperlipidemia, Hypertension, IBS (irritable bowel syndrome), Overactive bladder, and Spleen injury.    Past Surgical History:  has a past surgical history that includes Ovary removal; Dilation & curettage; Shoulder arthroscopy (Right); Nerve Block (03/26/2019); Anesthesia Nerve Block (N/A, 03/26/2019); epidural steroid injection (N/A, 05/16/2019); Colonoscopy; Endoscopy,

## 2024-08-23 VITALS
HEART RATE: 71 BPM | TEMPERATURE: 98 F | SYSTOLIC BLOOD PRESSURE: 138 MMHG | OXYGEN SATURATION: 98 % | RESPIRATION RATE: 16 BRPM | DIASTOLIC BLOOD PRESSURE: 98 MMHG

## 2024-08-23 LAB
EKG ATRIAL RATE: 63 BPM
EKG P AXIS: 84 DEGREES
EKG P-R INTERVAL: 164 MS
EKG Q-T INTERVAL: 422 MS
EKG QRS DURATION: 80 MS
EKG QTC CALCULATION (BAZETT): 431 MS
EKG R AXIS: 20 DEGREES
EKG T AXIS: 80 DEGREES
EKG VENTRICULAR RATE: 63 BPM

## 2024-08-23 PROCEDURE — 93010 ELECTROCARDIOGRAM REPORT: CPT | Performed by: INTERNAL MEDICINE

## 2024-08-23 RX ORDER — ONDANSETRON 4 MG/1
4 TABLET, ORALLY DISINTEGRATING ORAL 3 TIMES DAILY PRN
Qty: 21 TABLET | Refills: 0 | Status: SHIPPED | OUTPATIENT
Start: 2024-08-23

## 2024-08-23 RX ORDER — METOCLOPRAMIDE 10 MG/1
10 TABLET ORAL 4 TIMES DAILY
Qty: 20 TABLET | Refills: 3 | Status: SHIPPED | OUTPATIENT
Start: 2024-08-23

## 2024-08-23 NOTE — DISCHARGE INSTRUCTIONS
Take either the Zofran or the Reglan, the Reglan you could take up to 4 times per day.  Do not take both of them together.  Take 1 or the other.  Clear liquid diet, advance slowly and avoid spicy, acidic and irritating foods.  Follow-up with gastroenterology

## 2024-08-30 LAB
EKG ATRIAL RATE: 53 BPM
EKG P AXIS: 89 DEGREES
EKG P-R INTERVAL: 174 MS
EKG Q-T INTERVAL: 446 MS
EKG QRS DURATION: 80 MS
EKG QTC CALCULATION (BAZETT): 418 MS
EKG R AXIS: 69 DEGREES
EKG T AXIS: 80 DEGREES
EKG VENTRICULAR RATE: 53 BPM

## 2024-09-12 ENCOUNTER — OFFICE VISIT (OUTPATIENT)
Dept: FAMILY MEDICINE CLINIC | Age: 69
End: 2024-09-12
Payer: MEDICARE

## 2024-09-12 VITALS
OXYGEN SATURATION: 99 % | HEART RATE: 79 BPM | BODY MASS INDEX: 18.43 KG/M2 | HEIGHT: 63 IN | TEMPERATURE: 98.4 F | DIASTOLIC BLOOD PRESSURE: 74 MMHG | WEIGHT: 104 LBS | SYSTOLIC BLOOD PRESSURE: 110 MMHG

## 2024-09-12 DIAGNOSIS — M05.741 RHEUMATOID ARTHRITIS INVOLVING BOTH HANDS WITH POSITIVE RHEUMATOID FACTOR (HCC): ICD-10-CM

## 2024-09-12 DIAGNOSIS — E11.65 TYPE 2 DIABETES MELLITUS WITH HYPERGLYCEMIA, WITHOUT LONG-TERM CURRENT USE OF INSULIN (HCC): ICD-10-CM

## 2024-09-12 DIAGNOSIS — E55.9 VITAMIN D DEFICIENCY: ICD-10-CM

## 2024-09-12 DIAGNOSIS — E78.2 MIXED HYPERLIPIDEMIA: ICD-10-CM

## 2024-09-12 DIAGNOSIS — G62.9 NEUROPATHY: ICD-10-CM

## 2024-09-12 DIAGNOSIS — I10 ESSENTIAL HYPERTENSION: ICD-10-CM

## 2024-09-12 DIAGNOSIS — M05.742 RHEUMATOID ARTHRITIS INVOLVING BOTH HANDS WITH POSITIVE RHEUMATOID FACTOR (HCC): ICD-10-CM

## 2024-09-12 DIAGNOSIS — J45.40 MODERATE PERSISTENT ASTHMA WITHOUT COMPLICATION: ICD-10-CM

## 2024-09-12 DIAGNOSIS — F41.1 GAD (GENERALIZED ANXIETY DISORDER): ICD-10-CM

## 2024-09-12 DIAGNOSIS — R63.4 WEIGHT LOSS: Primary | ICD-10-CM

## 2024-09-12 LAB
ALBUMIN: 4 G/DL (ref 3.5–5.2)
ALP BLD-CCNC: 94 U/L (ref 35–104)
ALT SERPL-CCNC: 9 U/L (ref 0–32)
ANION GAP SERPL CALCULATED.3IONS-SCNC: 14 MMOL/L (ref 7–16)
AST SERPL-CCNC: 14 U/L (ref 0–31)
BASOPHILS ABSOLUTE: 0.03 K/UL (ref 0–0.2)
BASOPHILS RELATIVE PERCENT: 1 % (ref 0–2)
BILIRUB SERPL-MCNC: 0.3 MG/DL (ref 0–1.2)
BUN BLDV-MCNC: 9 MG/DL (ref 6–23)
CALCIUM SERPL-MCNC: 9.5 MG/DL (ref 8.6–10.2)
CHLORIDE BLD-SCNC: 107 MMOL/L (ref 98–107)
CHOLESTEROL, TOTAL: 184 MG/DL
CO2: 23 MMOL/L (ref 22–29)
CREAT SERPL-MCNC: 0.6 MG/DL (ref 0.5–1)
EOSINOPHILS ABSOLUTE: 0.11 K/UL (ref 0.05–0.5)
EOSINOPHILS RELATIVE PERCENT: 2 % (ref 0–6)
GFR, ESTIMATED: >90 ML/MIN/1.73M2
GLUCOSE BLD-MCNC: 114 MG/DL (ref 74–99)
HBA1C MFR BLD: 6.6 % (ref 4–5.6)
HCT VFR BLD CALC: 46.4 % (ref 34–48)
HDLC SERPL-MCNC: 72 MG/DL
HEMOGLOBIN: 15 G/DL (ref 11.5–15.5)
IMMATURE GRANULOCYTES %: 0 % (ref 0–5)
IMMATURE GRANULOCYTES ABSOLUTE: <0.03 K/UL (ref 0–0.58)
LDL CHOLESTEROL: 96 MG/DL
LYMPHOCYTES ABSOLUTE: 2.38 K/UL (ref 1.5–4)
LYMPHOCYTES RELATIVE PERCENT: 41 % (ref 20–42)
MCH RBC QN AUTO: 30.1 PG (ref 26–35)
MCHC RBC AUTO-ENTMCNC: 32.3 G/DL (ref 32–34.5)
MCV RBC AUTO: 93 FL (ref 80–99.9)
MONOCYTES ABSOLUTE: 0.42 K/UL (ref 0.1–0.95)
MONOCYTES RELATIVE PERCENT: 7 % (ref 2–12)
NEUTROPHILS ABSOLUTE: 2.79 K/UL (ref 1.8–7.3)
NEUTROPHILS RELATIVE PERCENT: 49 % (ref 43–80)
PDW BLD-RTO: 15 % (ref 11.5–15)
PLATELET # BLD: 255 K/UL (ref 130–450)
PMV BLD AUTO: 11.2 FL (ref 7–12)
POTASSIUM SERPL-SCNC: 4.6 MMOL/L (ref 3.5–5)
RBC # BLD: 4.99 M/UL (ref 3.5–5.5)
SODIUM BLD-SCNC: 144 MMOL/L (ref 132–146)
TOTAL PROTEIN: 6.8 G/DL (ref 6.4–8.3)
TRIGL SERPL-MCNC: 82 MG/DL
TSH SERPL DL<=0.05 MIU/L-ACNC: 0.79 UIU/ML (ref 0.27–4.2)
VITAMIN D 25-HYDROXY: 21.2 NG/ML (ref 30–100)
VLDLC SERPL CALC-MCNC: 16 MG/DL
WBC # BLD: 5.8 K/UL (ref 4.5–11.5)

## 2024-09-12 PROCEDURE — 3074F SYST BP LT 130 MM HG: CPT | Performed by: STUDENT IN AN ORGANIZED HEALTH CARE EDUCATION/TRAINING PROGRAM

## 2024-09-12 PROCEDURE — 1123F ACP DISCUSS/DSCN MKR DOCD: CPT | Performed by: STUDENT IN AN ORGANIZED HEALTH CARE EDUCATION/TRAINING PROGRAM

## 2024-09-12 PROCEDURE — 3078F DIAST BP <80 MM HG: CPT | Performed by: STUDENT IN AN ORGANIZED HEALTH CARE EDUCATION/TRAINING PROGRAM

## 2024-09-12 PROCEDURE — 99214 OFFICE O/P EST MOD 30 MIN: CPT | Performed by: STUDENT IN AN ORGANIZED HEALTH CARE EDUCATION/TRAINING PROGRAM

## 2024-09-12 PROCEDURE — 36415 COLL VENOUS BLD VENIPUNCTURE: CPT | Performed by: STUDENT IN AN ORGANIZED HEALTH CARE EDUCATION/TRAINING PROGRAM

## 2024-09-12 RX ORDER — AMITRIPTYLINE HYDROCHLORIDE 50 MG/1
50 TABLET ORAL NIGHTLY
Qty: 90 TABLET | Refills: 1 | Status: SHIPPED | OUTPATIENT
Start: 2024-09-12

## 2024-09-12 ASSESSMENT — ENCOUNTER SYMPTOMS
EYE PAIN: 0
WHEEZING: 0
ABDOMINAL DISTENTION: 1
VOMITING: 0
ABDOMINAL PAIN: 1
NAUSEA: 1
SHORTNESS OF BREATH: 0
CONSTIPATION: 1
SORE THROAT: 0
DIARRHEA: 1
COUGH: 0
SINUS PAIN: 0

## 2024-09-12 ASSESSMENT — PATIENT HEALTH QUESTIONNAIRE - PHQ9
SUM OF ALL RESPONSES TO PHQ QUESTIONS 1-9: 0
2. FEELING DOWN, DEPRESSED OR HOPELESS: NOT AT ALL
SUM OF ALL RESPONSES TO PHQ9 QUESTIONS 1 & 2: 0
SUM OF ALL RESPONSES TO PHQ QUESTIONS 1-9: 0
1. LITTLE INTEREST OR PLEASURE IN DOING THINGS: NOT AT ALL

## 2024-09-13 ENCOUNTER — PATIENT MESSAGE (OUTPATIENT)
Dept: FAMILY MEDICINE CLINIC | Age: 69
End: 2024-09-13

## 2024-09-26 ENCOUNTER — TELEPHONE (OUTPATIENT)
Dept: FAMILY MEDICINE CLINIC | Age: 69
End: 2024-09-26

## 2024-09-26 DIAGNOSIS — F41.1 GAD (GENERALIZED ANXIETY DISORDER): ICD-10-CM

## 2024-09-26 RX ORDER — AMITRIPTYLINE HYDROCHLORIDE 50 MG/1
50 TABLET ORAL NIGHTLY
Qty: 90 TABLET | Refills: 1 | Status: SHIPPED | OUTPATIENT
Start: 2024-09-26

## 2024-10-11 ENCOUNTER — CLINICAL DOCUMENTATION (OUTPATIENT)
Dept: ONCOLOGY | Age: 69
End: 2024-10-11

## 2024-10-11 NOTE — PROGRESS NOTES
Pt referred to this oncology nutrition services for unintentional weight loss. Pt does not currently follow w/ [facility], unable to be followed by this clinician at this time. Referring physician notified via [employer] message that pt should be referred to general outpatient dietitian for assistance. Contact information provided. Referral closed at this time. Would be happy to assist pt if following w/ [facility] in future.   Electronically signed by Rina Kate, MS, RD, LD on 10/11/2024 at 2:04 PM

## 2024-10-22 ENCOUNTER — APPOINTMENT (OUTPATIENT)
Dept: CT IMAGING | Age: 69
End: 2024-10-22
Attending: EMERGENCY MEDICINE
Payer: MEDICARE

## 2024-10-22 ENCOUNTER — HOSPITAL ENCOUNTER (INPATIENT)
Age: 69
LOS: 3 days | Discharge: HOME HEALTH | End: 2024-10-25
Attending: EMERGENCY MEDICINE | Admitting: INTERNAL MEDICINE
Payer: MEDICARE

## 2024-10-22 ENCOUNTER — APPOINTMENT (OUTPATIENT)
Dept: GENERAL RADIOLOGY | Age: 69
End: 2024-10-22
Payer: MEDICARE

## 2024-10-22 DIAGNOSIS — J18.9 PNEUMONIA DUE TO INFECTIOUS ORGANISM, UNSPECIFIED LATERALITY, UNSPECIFIED PART OF LUNG: Primary | ICD-10-CM

## 2024-10-22 DIAGNOSIS — R06.02 SHORTNESS OF BREATH: ICD-10-CM

## 2024-10-22 DIAGNOSIS — J45.901 ASTHMA WITH ACUTE EXACERBATION, UNSPECIFIED ASTHMA SEVERITY, UNSPECIFIED WHETHER PERSISTENT: ICD-10-CM

## 2024-10-22 PROBLEM — R00.0 TACHYCARDIA: Status: ACTIVE | Noted: 2024-10-22

## 2024-10-22 PROBLEM — J96.01 ACUTE HYPOXIC RESPIRATORY FAILURE: Status: ACTIVE | Noted: 2024-10-22

## 2024-10-22 LAB
ALBUMIN SERPL-MCNC: 3.6 G/DL (ref 3.5–5.2)
ALP SERPL-CCNC: 104 U/L (ref 35–104)
ALT SERPL-CCNC: 9 U/L (ref 0–32)
ANION GAP SERPL CALCULATED.3IONS-SCNC: 20 MMOL/L (ref 7–16)
AST SERPL-CCNC: 14 U/L (ref 0–31)
B PARAP IS1001 DNA NPH QL NAA+NON-PROBE: NOT DETECTED
B PARAP IS1001 DNA NPH QL NAA+NON-PROBE: NOT DETECTED
B PERT DNA SPEC QL NAA+PROBE: NOT DETECTED
B PERT DNA SPEC QL NAA+PROBE: NOT DETECTED
BASOPHILS # BLD: 0.03 K/UL (ref 0–0.2)
BASOPHILS NFR BLD: 0 % (ref 0–2)
BILIRUB SERPL-MCNC: 0.9 MG/DL (ref 0–1.2)
BNP SERPL-MCNC: 399 PG/ML (ref 0–125)
BUN SERPL-MCNC: 12 MG/DL (ref 6–23)
C PNEUM DNA NPH QL NAA+NON-PROBE: NOT DETECTED
C PNEUM DNA NPH QL NAA+NON-PROBE: NOT DETECTED
CALCIUM SERPL-MCNC: 9.3 MG/DL (ref 8.6–10.2)
CHLORIDE SERPL-SCNC: 95 MMOL/L (ref 98–107)
CO2 SERPL-SCNC: 22 MMOL/L (ref 22–29)
CREAT SERPL-MCNC: 0.7 MG/DL (ref 0.5–1)
D-DIMER QUANTITATIVE: 850 NG/ML DDU (ref 0–230)
EKG ATRIAL RATE: 120 BPM
EKG P AXIS: 76 DEGREES
EKG P-R INTERVAL: 128 MS
EKG Q-T INTERVAL: 430 MS
EKG QRS DURATION: 78 MS
EKG QTC CALCULATION (BAZETT): 607 MS
EKG R AXIS: 33 DEGREES
EKG T AXIS: 78 DEGREES
EKG VENTRICULAR RATE: 120 BPM
EOSINOPHIL # BLD: 0 K/UL (ref 0.05–0.5)
EOSINOPHILS RELATIVE PERCENT: 0 % (ref 0–6)
ERYTHROCYTE [DISTWIDTH] IN BLOOD BY AUTOMATED COUNT: 13.7 % (ref 11.5–15)
FLUAV RNA NPH QL NAA+NON-PROBE: NOT DETECTED
FLUAV RNA NPH QL NAA+NON-PROBE: NOT DETECTED
FLUAV RNA RESP QL NAA+PROBE: NOT DETECTED
FLUBV RNA NPH QL NAA+NON-PROBE: NOT DETECTED
FLUBV RNA NPH QL NAA+NON-PROBE: NOT DETECTED
FLUBV RNA RESP QL NAA+PROBE: NOT DETECTED
GFR, ESTIMATED: >90 ML/MIN/1.73M2
GLUCOSE BLD-MCNC: 309 MG/DL (ref 74–99)
GLUCOSE SERPL-MCNC: 173 MG/DL (ref 74–99)
HADV DNA NPH QL NAA+NON-PROBE: NOT DETECTED
HADV DNA NPH QL NAA+NON-PROBE: NOT DETECTED
HCOV 229E RNA NPH QL NAA+NON-PROBE: NOT DETECTED
HCOV 229E RNA NPH QL NAA+NON-PROBE: NOT DETECTED
HCOV HKU1 RNA NPH QL NAA+NON-PROBE: NOT DETECTED
HCOV HKU1 RNA NPH QL NAA+NON-PROBE: NOT DETECTED
HCOV NL63 RNA NPH QL NAA+NON-PROBE: NOT DETECTED
HCOV NL63 RNA NPH QL NAA+NON-PROBE: NOT DETECTED
HCOV OC43 RNA NPH QL NAA+NON-PROBE: NOT DETECTED
HCOV OC43 RNA NPH QL NAA+NON-PROBE: NOT DETECTED
HCT VFR BLD AUTO: 41.8 % (ref 34–48)
HGB BLD-MCNC: 14 G/DL (ref 11.5–15.5)
HMPV RNA NPH QL NAA+NON-PROBE: NOT DETECTED
HMPV RNA NPH QL NAA+NON-PROBE: NOT DETECTED
HPIV1 RNA NPH QL NAA+NON-PROBE: NOT DETECTED
HPIV1 RNA NPH QL NAA+NON-PROBE: NOT DETECTED
HPIV2 RNA NPH QL NAA+NON-PROBE: NOT DETECTED
HPIV2 RNA NPH QL NAA+NON-PROBE: NOT DETECTED
HPIV3 RNA NPH QL NAA+NON-PROBE: NOT DETECTED
HPIV3 RNA NPH QL NAA+NON-PROBE: NOT DETECTED
HPIV4 RNA NPH QL NAA+NON-PROBE: NOT DETECTED
HPIV4 RNA NPH QL NAA+NON-PROBE: NOT DETECTED
IMM GRANULOCYTES # BLD AUTO: 0.07 K/UL (ref 0–0.58)
IMM GRANULOCYTES NFR BLD: 1 % (ref 0–5)
L PNEUMO1 AG UR QL IA.RAPID: NEGATIVE
LACTATE BLDV-SCNC: 2.2 MMOL/L (ref 0.5–1.9)
LYMPHOCYTES NFR BLD: 1.5 K/UL (ref 1.5–4)
LYMPHOCYTES RELATIVE PERCENT: 12 % (ref 20–42)
M PNEUMO DNA NPH QL NAA+NON-PROBE: NOT DETECTED
M PNEUMO DNA NPH QL NAA+NON-PROBE: NOT DETECTED
MAGNESIUM SERPL-MCNC: 2.1 MG/DL (ref 1.6–2.6)
MCH RBC QN AUTO: 30.3 PG (ref 26–35)
MCHC RBC AUTO-ENTMCNC: 33.5 G/DL (ref 32–34.5)
MCV RBC AUTO: 90.5 FL (ref 80–99.9)
MONOCYTES NFR BLD: 0.66 K/UL (ref 0.1–0.95)
MONOCYTES NFR BLD: 5 % (ref 2–12)
NEUTROPHILS NFR BLD: 83 % (ref 43–80)
NEUTS SEG NFR BLD: 10.69 K/UL (ref 1.8–7.3)
PLATELET # BLD AUTO: 326 K/UL (ref 130–450)
PMV BLD AUTO: 10.3 FL (ref 7–12)
POTASSIUM SERPL-SCNC: 3.6 MMOL/L (ref 3.5–5)
PROT SERPL-MCNC: 7.7 G/DL (ref 6.4–8.3)
RBC # BLD AUTO: 4.62 M/UL (ref 3.5–5.5)
RSV RNA NPH QL NAA+NON-PROBE: NOT DETECTED
RSV RNA NPH QL NAA+NON-PROBE: NOT DETECTED
RV+EV RNA NPH QL NAA+NON-PROBE: NOT DETECTED
RV+EV RNA NPH QL NAA+NON-PROBE: NOT DETECTED
S PNEUM AG SPEC QL: NEGATIVE
SARS-COV-2 RNA NPH QL NAA+NON-PROBE: NOT DETECTED
SARS-COV-2 RNA NPH QL NAA+NON-PROBE: NOT DETECTED
SARS-COV-2 RNA RESP QL NAA+PROBE: NOT DETECTED
SODIUM SERPL-SCNC: 137 MMOL/L (ref 132–146)
SOURCE: NORMAL
SPECIMEN DESCRIPTION: NORMAL
SPECIMEN SOURCE: NORMAL
T4 FREE SERPL-MCNC: 1.4 NG/DL (ref 0.9–1.7)
TROPONIN I SERPL HS-MCNC: 7 NG/L (ref 0–9)
TROPONIN I SERPL HS-MCNC: <6 NG/L (ref 0–9)
TSH SERPL DL<=0.05 MIU/L-ACNC: 0.64 UIU/ML (ref 0.27–4.2)
WBC OTHER # BLD: 13 K/UL (ref 4.5–11.5)

## 2024-10-22 PROCEDURE — 6370000000 HC RX 637 (ALT 250 FOR IP): Performed by: INTERNAL MEDICINE

## 2024-10-22 PROCEDURE — 85379 FIBRIN DEGRADATION QUANT: CPT

## 2024-10-22 PROCEDURE — 87449 NOS EACH ORGANISM AG IA: CPT

## 2024-10-22 PROCEDURE — 84439 ASSAY OF FREE THYROXINE: CPT

## 2024-10-22 PROCEDURE — 83880 ASSAY OF NATRIURETIC PEPTIDE: CPT

## 2024-10-22 PROCEDURE — 71045 X-RAY EXAM CHEST 1 VIEW: CPT

## 2024-10-22 PROCEDURE — 96365 THER/PROPH/DIAG IV INF INIT: CPT

## 2024-10-22 PROCEDURE — 6360000002 HC RX W HCPCS: Performed by: EMERGENCY MEDICINE

## 2024-10-22 PROCEDURE — 93005 ELECTROCARDIOGRAM TRACING: CPT | Performed by: EMERGENCY MEDICINE

## 2024-10-22 PROCEDURE — 2580000003 HC RX 258: Performed by: INTERNAL MEDICINE

## 2024-10-22 PROCEDURE — 87636 SARSCOV2 & INF A&B AMP PRB: CPT

## 2024-10-22 PROCEDURE — 85025 COMPLETE CBC W/AUTO DIFF WBC: CPT

## 2024-10-22 PROCEDURE — 99285 EMERGENCY DEPT VISIT HI MDM: CPT

## 2024-10-22 PROCEDURE — 82962 GLUCOSE BLOOD TEST: CPT

## 2024-10-22 PROCEDURE — 2580000003 HC RX 258: Performed by: EMERGENCY MEDICINE

## 2024-10-22 PROCEDURE — 83735 ASSAY OF MAGNESIUM: CPT

## 2024-10-22 PROCEDURE — 2140000000 HC CCU INTERMEDIATE R&B

## 2024-10-22 PROCEDURE — 2500000003 HC RX 250 WO HCPCS: Performed by: EMERGENCY MEDICINE

## 2024-10-22 PROCEDURE — 83605 ASSAY OF LACTIC ACID: CPT

## 2024-10-22 PROCEDURE — 6360000002 HC RX W HCPCS: Performed by: INTERNAL MEDICINE

## 2024-10-22 PROCEDURE — 94640 AIRWAY INHALATION TREATMENT: CPT

## 2024-10-22 PROCEDURE — 6370000000 HC RX 637 (ALT 250 FOR IP): Performed by: EMERGENCY MEDICINE

## 2024-10-22 PROCEDURE — 87899 AGENT NOS ASSAY W/OPTIC: CPT

## 2024-10-22 PROCEDURE — 6360000004 HC RX CONTRAST MEDICATION: Performed by: RADIOLOGY

## 2024-10-22 PROCEDURE — 0202U NFCT DS 22 TRGT SARS-COV-2: CPT

## 2024-10-22 PROCEDURE — 84443 ASSAY THYROID STIM HORMONE: CPT

## 2024-10-22 PROCEDURE — 71275 CT ANGIOGRAPHY CHEST: CPT

## 2024-10-22 PROCEDURE — 99222 1ST HOSP IP/OBS MODERATE 55: CPT | Performed by: INTERNAL MEDICINE

## 2024-10-22 PROCEDURE — 80053 COMPREHEN METABOLIC PANEL: CPT

## 2024-10-22 PROCEDURE — 96375 TX/PRO/DX INJ NEW DRUG ADDON: CPT

## 2024-10-22 PROCEDURE — 84484 ASSAY OF TROPONIN QUANT: CPT

## 2024-10-22 PROCEDURE — 93010 ELECTROCARDIOGRAM REPORT: CPT | Performed by: INTERNAL MEDICINE

## 2024-10-22 RX ORDER — ACETAMINOPHEN 325 MG/1
650 TABLET ORAL EVERY 6 HOURS PRN
Status: DISCONTINUED | OUTPATIENT
Start: 2024-10-22 | End: 2024-10-25 | Stop reason: HOSPADM

## 2024-10-22 RX ORDER — ASPIRIN 81 MG/1
324 TABLET, CHEWABLE ORAL ONCE
Status: COMPLETED | OUTPATIENT
Start: 2024-10-22 | End: 2024-10-22

## 2024-10-22 RX ORDER — AMLODIPINE BESYLATE 10 MG/1
10 TABLET ORAL DAILY
COMMUNITY

## 2024-10-22 RX ORDER — POTASSIUM CHLORIDE 1500 MG/1
40 TABLET, EXTENDED RELEASE ORAL PRN
Status: DISCONTINUED | OUTPATIENT
Start: 2024-10-22 | End: 2024-10-25 | Stop reason: HOSPADM

## 2024-10-22 RX ORDER — LABETALOL HYDROCHLORIDE 5 MG/ML
5 INJECTION, SOLUTION INTRAVENOUS EVERY 4 HOURS PRN
Status: DISCONTINUED | OUTPATIENT
Start: 2024-10-22 | End: 2024-10-25 | Stop reason: HOSPADM

## 2024-10-22 RX ORDER — POLYETHYLENE GLYCOL 3350 17 G/17G
17 POWDER, FOR SOLUTION ORAL DAILY PRN
Status: DISCONTINUED | OUTPATIENT
Start: 2024-10-22 | End: 2024-10-22

## 2024-10-22 RX ORDER — AZELASTINE 1 MG/ML
2 SPRAY, METERED NASAL 2 TIMES DAILY
COMMUNITY

## 2024-10-22 RX ORDER — POTASSIUM CHLORIDE 7.45 MG/ML
10 INJECTION INTRAVENOUS PRN
Status: DISCONTINUED | OUTPATIENT
Start: 2024-10-22 | End: 2024-10-25 | Stop reason: HOSPADM

## 2024-10-22 RX ORDER — PROCHLORPERAZINE EDISYLATE 5 MG/ML
10 INJECTION INTRAMUSCULAR; INTRAVENOUS EVERY 6 HOURS PRN
Status: DISCONTINUED | OUTPATIENT
Start: 2024-10-22 | End: 2024-10-25 | Stop reason: HOSPADM

## 2024-10-22 RX ORDER — ACETAMINOPHEN 650 MG/1
650 SUPPOSITORY RECTAL EVERY 6 HOURS PRN
Status: DISCONTINUED | OUTPATIENT
Start: 2024-10-22 | End: 2024-10-25 | Stop reason: HOSPADM

## 2024-10-22 RX ORDER — PANTOPRAZOLE SODIUM 40 MG/1
40 TABLET, DELAYED RELEASE ORAL DAILY
Status: DISCONTINUED | OUTPATIENT
Start: 2024-10-23 | End: 2024-10-25 | Stop reason: HOSPADM

## 2024-10-22 RX ORDER — GLUCAGON 1 MG/ML
1 KIT INJECTION PRN
Status: DISCONTINUED | OUTPATIENT
Start: 2024-10-22 | End: 2024-10-25 | Stop reason: HOSPADM

## 2024-10-22 RX ORDER — DEXTROSE MONOHYDRATE 100 MG/ML
INJECTION, SOLUTION INTRAVENOUS CONTINUOUS PRN
Status: DISCONTINUED | OUTPATIENT
Start: 2024-10-22 | End: 2024-10-25 | Stop reason: HOSPADM

## 2024-10-22 RX ORDER — ATORVASTATIN CALCIUM 40 MG/1
40 TABLET, FILM COATED ORAL DAILY
COMMUNITY

## 2024-10-22 RX ORDER — IPRATROPIUM BROMIDE AND ALBUTEROL SULFATE 2.5; .5 MG/3ML; MG/3ML
1 SOLUTION RESPIRATORY (INHALATION) EVERY 4 HOURS
Status: DISCONTINUED | OUTPATIENT
Start: 2024-10-22 | End: 2024-10-25 | Stop reason: HOSPADM

## 2024-10-22 RX ORDER — AMLODIPINE BESYLATE 10 MG/1
10 TABLET ORAL DAILY
Status: DISCONTINUED | OUTPATIENT
Start: 2024-10-23 | End: 2024-10-25 | Stop reason: HOSPADM

## 2024-10-22 RX ORDER — ATORVASTATIN CALCIUM 40 MG/1
40 TABLET, FILM COATED ORAL DAILY
Status: DISCONTINUED | OUTPATIENT
Start: 2024-10-23 | End: 2024-10-25 | Stop reason: HOSPADM

## 2024-10-22 RX ORDER — 0.9 % SODIUM CHLORIDE 0.9 %
500 INTRAVENOUS SOLUTION INTRAVENOUS ONCE
Status: COMPLETED | OUTPATIENT
Start: 2024-10-22 | End: 2024-10-22

## 2024-10-22 RX ORDER — POLYETHYLENE GLYCOL 3350 17 G/17G
17 POWDER, FOR SOLUTION ORAL DAILY
Status: DISCONTINUED | OUTPATIENT
Start: 2024-10-23 | End: 2024-10-25 | Stop reason: HOSPADM

## 2024-10-22 RX ORDER — CALCIUM CARBONATE 500 MG/1
500 TABLET, CHEWABLE ORAL 3 TIMES DAILY PRN
Status: DISCONTINUED | OUTPATIENT
Start: 2024-10-22 | End: 2024-10-25 | Stop reason: HOSPADM

## 2024-10-22 RX ORDER — ACETAMINOPHEN 500 MG
1000 TABLET ORAL 2 TIMES DAILY PRN
COMMUNITY

## 2024-10-22 RX ORDER — ENOXAPARIN SODIUM 100 MG/ML
30 INJECTION SUBCUTANEOUS DAILY
Status: DISCONTINUED | OUTPATIENT
Start: 2024-10-23 | End: 2024-10-25 | Stop reason: HOSPADM

## 2024-10-22 RX ORDER — MAGNESIUM SULFATE IN WATER 40 MG/ML
2000 INJECTION, SOLUTION INTRAVENOUS PRN
Status: DISCONTINUED | OUTPATIENT
Start: 2024-10-22 | End: 2024-10-25 | Stop reason: HOSPADM

## 2024-10-22 RX ORDER — MONTELUKAST SODIUM 10 MG/1
10 TABLET ORAL DAILY
Status: DISCONTINUED | OUTPATIENT
Start: 2024-10-23 | End: 2024-10-25 | Stop reason: HOSPADM

## 2024-10-22 RX ORDER — IOPAMIDOL 755 MG/ML
75 INJECTION, SOLUTION INTRAVASCULAR
Status: COMPLETED | OUTPATIENT
Start: 2024-10-22 | End: 2024-10-22

## 2024-10-22 RX ORDER — IPRATROPIUM BROMIDE AND ALBUTEROL SULFATE 2.5; .5 MG/3ML; MG/3ML
1 SOLUTION RESPIRATORY (INHALATION) ONCE
Status: COMPLETED | OUTPATIENT
Start: 2024-10-22 | End: 2024-10-22

## 2024-10-22 RX ORDER — LANOLIN ALCOHOL/MO/W.PET/CERES
3 CREAM (GRAM) TOPICAL NIGHTLY PRN
Status: DISCONTINUED | OUTPATIENT
Start: 2024-10-22 | End: 2024-10-25 | Stop reason: HOSPADM

## 2024-10-22 RX ORDER — SODIUM CHLORIDE 9 MG/ML
INJECTION, SOLUTION INTRAVENOUS PRN
Status: DISCONTINUED | OUTPATIENT
Start: 2024-10-22 | End: 2024-10-25 | Stop reason: HOSPADM

## 2024-10-22 RX ORDER — DIPHENHYDRAMINE HCL 25 MG
25 TABLET ORAL NIGHTLY
COMMUNITY

## 2024-10-22 RX ORDER — INSULIN LISPRO 100 [IU]/ML
0-4 INJECTION, SOLUTION INTRAVENOUS; SUBCUTANEOUS
Status: DISCONTINUED | OUTPATIENT
Start: 2024-10-22 | End: 2024-10-25 | Stop reason: HOSPADM

## 2024-10-22 RX ORDER — SODIUM CHLORIDE 0.9 % (FLUSH) 0.9 %
5-40 SYRINGE (ML) INJECTION EVERY 12 HOURS SCHEDULED
Status: DISCONTINUED | OUTPATIENT
Start: 2024-10-22 | End: 2024-10-25 | Stop reason: HOSPADM

## 2024-10-22 RX ORDER — SODIUM CHLORIDE 0.9 % (FLUSH) 0.9 %
5-40 SYRINGE (ML) INJECTION PRN
Status: DISCONTINUED | OUTPATIENT
Start: 2024-10-22 | End: 2024-10-25 | Stop reason: HOSPADM

## 2024-10-22 RX ORDER — BENZONATATE 100 MG/1
100 CAPSULE ORAL 3 TIMES DAILY PRN
Status: DISCONTINUED | OUTPATIENT
Start: 2024-10-22 | End: 2024-10-24

## 2024-10-22 RX ORDER — AZELASTINE 1 MG/ML
2 SPRAY, METERED NASAL 2 TIMES DAILY
Status: DISCONTINUED | OUTPATIENT
Start: 2024-10-22 | End: 2024-10-22 | Stop reason: CLARIF

## 2024-10-22 RX ADMIN — IOPAMIDOL 75 ML: 755 INJECTION, SOLUTION INTRAVENOUS at 16:23

## 2024-10-22 RX ADMIN — IPRATROPIUM BROMIDE AND ALBUTEROL SULFATE 1 DOSE: 2.5; .5 SOLUTION RESPIRATORY (INHALATION) at 20:16

## 2024-10-22 RX ADMIN — Medication 3 MG: at 19:52

## 2024-10-22 RX ADMIN — SODIUM CHLORIDE, PRESERVATIVE FREE 10 ML: 5 INJECTION INTRAVENOUS at 19:40

## 2024-10-22 RX ADMIN — AMITRIPTYLINE HYDROCHLORIDE 50 MG: 25 TABLET, FILM COATED ORAL at 22:35

## 2024-10-22 RX ADMIN — CEFTRIAXONE SODIUM 1000 MG: 1 INJECTION, POWDER, FOR SOLUTION INTRAMUSCULAR; INTRAVENOUS at 14:51

## 2024-10-22 RX ADMIN — ASPIRIN 81 MG 324 MG: 81 TABLET ORAL at 12:38

## 2024-10-22 RX ADMIN — DOXYCYCLINE 100 MG: 100 INJECTION, POWDER, LYOPHILIZED, FOR SOLUTION INTRAVENOUS at 14:54

## 2024-10-22 RX ADMIN — SODIUM CHLORIDE 500 ML: 9 INJECTION, SOLUTION INTRAVENOUS at 16:05

## 2024-10-22 RX ADMIN — IPRATROPIUM BROMIDE AND ALBUTEROL SULFATE 1 DOSE: 2.5; .5 SOLUTION RESPIRATORY (INHALATION) at 13:03

## 2024-10-22 RX ADMIN — METHYLPREDNISOLONE SODIUM SUCCINATE 125 MG: 125 INJECTION INTRAMUSCULAR; INTRAVENOUS at 12:38

## 2024-10-22 RX ADMIN — METHYLPREDNISOLONE SODIUM SUCCINATE 40 MG: 40 INJECTION INTRAMUSCULAR; INTRAVENOUS at 23:33

## 2024-10-22 RX ADMIN — INSULIN LISPRO 3 UNITS: 100 INJECTION, SOLUTION INTRAVENOUS; SUBCUTANEOUS at 19:52

## 2024-10-22 ASSESSMENT — PAIN SCALES - GENERAL: PAINLEVEL_OUTOF10: 0

## 2024-10-22 NOTE — H&P
Absolute 0.03 0.00 - 0.20 k/uL    Immature Granulocytes Absolute 0.07 0.00 - 0.58 k/uL   Comprehensive Metabolic Panel    Collection Time: 10/22/24 12:10 PM   Result Value Ref Range    Sodium 137 132 - 146 mmol/L    Potassium 3.6 3.5 - 5.0 mmol/L    Chloride 95 (L) 98 - 107 mmol/L    CO2 22 22 - 29 mmol/L    Anion Gap 20 (H) 7 - 16 mmol/L    Glucose 173 (H) 74 - 99 mg/dL    BUN 12 6 - 23 mg/dL    Creatinine 0.7 0.50 - 1.00 mg/dL    Est, Glom Filt Rate >90 >60 mL/min/1.73m2    Calcium 9.3 8.6 - 10.2 mg/dL    Total Protein 7.7 6.4 - 8.3 g/dL    Albumin 3.6 3.5 - 5.2 g/dL    Total Bilirubin 0.9 0.0 - 1.2 mg/dL    Alkaline Phosphatase 104 35 - 104 U/L    ALT 9 0 - 32 U/L    AST 14 0 - 31 U/L   D-Dimer, Quantitative    Collection Time: 10/22/24 12:10 PM   Result Value Ref Range    D-Dimer, Quant 850 (H) 0 - 230 ng/mL DDU   Troponin Now and Q 1 Hour    Collection Time: 10/22/24 12:10 PM   Result Value Ref Range    Troponin, High Sensitivity 7 0 - 9 ng/L   TSH    Collection Time: 10/22/24 12:10 PM   Result Value Ref Range    TSH 0.64 0.27 - 4.20 uIU/mL   T4, Free    Collection Time: 10/22/24 12:10 PM   Result Value Ref Range    T4 Free 1.4 0.9 - 1.7 ng/dL   Brain Natriuretic Peptide    Collection Time: 10/22/24 12:10 PM   Result Value Ref Range    NT Pro- (H) 0 - 125 pg/mL   Magnesium    Collection Time: 10/22/24 12:10 PM   Result Value Ref Range    Magnesium 2.1 1.6 - 2.6 mg/dL   COVID-19 & Influenza Combo    Collection Time: 10/22/24 12:15 PM    Specimen: Nasopharyngeal Swab   Result Value Ref Range    Specimen Description .NASOPHARYNGEAL SWAB     Source .NASOPHARYNGEAL SWAB     SARS-CoV-2 RNA, RT PCR Not Detected Not Detected    Influenza A Not Detected Not Detected    Influenza B Not Detected Not Detected   Troponin Now and Q 1 Hour    Collection Time: 10/22/24 12:35 PM   Result Value Ref Range    Troponin, High Sensitivity <6 0 - 9 ng/L       Imaging  XR CHEST PORTABLE    Result Date: 10/22/2024  EXAMINATION:  of patient care. Some medical issues are handled by other specialists. Additional work up and treatment should be done by my colleague hospitalist who assumes care.       Isabel Reed,     4:10 PM  10/22/2024

## 2024-10-22 NOTE — ED PROVIDER NOTES
[facility] EMERGENCY DEPARTMENT  EMERGENCY DEPARTMENT ENCOUNTER        Pt Name: Maru Doherty  MRN: [id number]  Birthdate 1955  Date of evaluation: 10/22/2024  Provider: Hever Cadena MD  PCP: Regino Pan DO  Note Started: 12:01 PM EDT 10/22/24    CHIEF COMPLAINT       Chief Complaint   Patient presents with    Palpitations     Pt states she feels like her heart is racing     Cough     3 weeks, went to Dr. Sent home on azithromycin and feels like she is getting worse.        HISTORY OF PRESENT ILLNESS: 1 or more Elements        Maru Doherty is a 69 y.o. female who presents for cough and palpitations.  Patient reports she has been sick for 3 weeks with nonproductive cough and shortness of breath.  She feels like her heart is been racing.  She reports that she went to the doctor who placed her on azithromycin but she is getting worse.  She reports that she feels like her heart rate goes up and down.  She reports that she feels like she has tightness in her lungs and tightness in her chest.  She has some trouble laying flat but denies peripheral edema.  She denies fever.  She denies trauma.  She denies sick contacts.  She denies any hemoptysis.  She denies any history of DVT or PE or aneurysm.  She denies any history of pneumothorax.  She denies any other complaints.    Nursing Notes were all reviewed and agreed with or any disagreements were addressed in the HPI.      REVIEW OF EXTERNAL NOTE :      9- outpatient medicine notes      Chart Review/External Note Review    Last Echo reviewed by Me:  No results found for: \"LVEF\", \"LVEFMODE\"          Controlled Substance Monitoring:    Acute and Chronic Pain Monitoring:   RX Monitoring Periodic Controlled Substance Monitoring   4/7/2021  10:48 AM No signs of potential drug abuse or diversion identified.           REVIEW OF SYSTEMS :      Positives and Pertinent negatives as per HPI.     SURGICAL HISTORY  and/or Complexity of Data Reviewed  External Data Reviewed: notes.  Labs: ordered. Decision-making details documented in ED Course.  Radiology: ordered and independent interpretation performed. Decision-making details documented in ED Course.  ECG/medicine tests: ordered and independent interpretation performed. Decision-making details documented in ED Course.    Risk  OTC drugs.  Prescription drug management.  Drug therapy requiring intensive monitoring for toxicity.  Decision regarding hospitalization.         CONSULTS:   IP CONSULT TO INTERNAL MEDICINE         PROCEDURES   Unless otherwise noted below, none       CRITICAL CARE TIME (.cct)          I, Hever Cadena MD, am the primary provider of record    FINAL IMPRESSION      1. Pneumonia due to infectious organism, unspecified laterality, unspecified part of lung    2. Asthma with acute exacerbation, unspecified asthma severity, unspecified whether persistent          DISPOSITION/PLAN     DISPOSITION Decision To Admit 10/22/2024 04:02:58 PM      PATIENT REFERRED TO:  No follow-up provider specified.    DISCHARGE MEDICATIONS:  New Prescriptions    No medications on file       DISCONTINUED MEDICATIONS:  Discontinued Medications    ONDANSETRON (ZOFRAN-ODT) 4 MG DISINTEGRATING TABLET    Take 1 tablet by mouth 3 times daily as needed for Nausea or Vomiting              (Please note that portions of this note were completed with a voice recognition program.  Efforts were made to edit the dictations but occasionally words are mis-transcribed.)    Hever Cadena MD (electronically signed)            Hever Cadena MD  10/22/24 [id number]

## 2024-10-23 ENCOUNTER — APPOINTMENT (OUTPATIENT)
Age: 69
End: 2024-10-23
Attending: INTERNAL MEDICINE
Payer: MEDICARE

## 2024-10-23 LAB
ALBUMIN SERPL-MCNC: 3.5 G/DL (ref 3.5–5.2)
ALP SERPL-CCNC: 99 U/L (ref 35–104)
ALT SERPL-CCNC: 10 U/L (ref 0–32)
ANION GAP SERPL CALCULATED.3IONS-SCNC: 23 MMOL/L (ref 7–16)
AST SERPL-CCNC: 16 U/L (ref 0–31)
BILIRUB SERPL-MCNC: 0.4 MG/DL (ref 0–1.2)
BUN SERPL-MCNC: 18 MG/DL (ref 6–23)
CALCIUM SERPL-MCNC: 9.7 MG/DL (ref 8.6–10.2)
CHLORIDE SERPL-SCNC: 99 MMOL/L (ref 98–107)
CHOLEST SERPL-MCNC: 119 MG/DL
CO2 SERPL-SCNC: 18 MMOL/L (ref 22–29)
CREAT SERPL-MCNC: 0.5 MG/DL (ref 0.5–1)
ECHO AO ROOT DIAM: 2.9 CM
ECHO AO ROOT INDEX: 2.03 CM/M2
ECHO AV AREA PEAK VELOCITY: 2.6 CM2
ECHO AV AREA VTI: 2.5 CM2
ECHO AV AREA/BSA PEAK VELOCITY: 1.8 CM2/M2
ECHO AV AREA/BSA VTI: 1.7 CM2/M2
ECHO AV CUSP MM: 1.6 CM
ECHO AV MEAN GRADIENT: 7 MMHG
ECHO AV MEAN VELOCITY: 1.2 M/S
ECHO AV PEAK GRADIENT: 11 MMHG
ECHO AV PEAK VELOCITY: 1.7 M/S
ECHO AV VELOCITY RATIO: 0.94
ECHO AV VTI: 28.8 CM
ECHO BSA: 1.42 M2
ECHO IVC PROX: 1.4 CM
ECHO LA DIAMETER INDEX: 1.82 CM/M2
ECHO LA DIAMETER: 2.6 CM
ECHO LA TO AORTIC ROOT RATIO: 0.9
ECHO LA VOL A-L A2C: 25 ML (ref 22–52)
ECHO LA VOL A-L A4C: 30 ML (ref 22–52)
ECHO LA VOL MOD A2C: 24 ML (ref 22–52)
ECHO LA VOL MOD A4C: 28 ML (ref 22–52)
ECHO LA VOLUME AREA LENGTH: 28 ML
ECHO LA VOLUME INDEX A-L A2C: 17 ML/M2 (ref 16–34)
ECHO LA VOLUME INDEX A-L A4C: 21 ML/M2 (ref 16–34)
ECHO LA VOLUME INDEX AREA LENGTH: 20 ML/M2 (ref 16–34)
ECHO LA VOLUME INDEX MOD A2C: 17 ML/M2 (ref 16–34)
ECHO LA VOLUME INDEX MOD A4C: 20 ML/M2 (ref 16–34)
ECHO LV E' LATERAL VELOCITY: 15 CM/S
ECHO LV E' SEPTAL VELOCITY: 11 CM/S
ECHO LV EF PHYSICIAN: 65 %
ECHO LV FRACTIONAL SHORTENING: 33 % (ref 28–44)
ECHO LV INTERNAL DIMENSION DIASTOLE INDEX: 2.8 CM/M2
ECHO LV INTERNAL DIMENSION DIASTOLIC: 4 CM (ref 3.9–5.3)
ECHO LV INTERNAL DIMENSION SYSTOLIC INDEX: 1.89 CM/M2
ECHO LV INTERNAL DIMENSION SYSTOLIC: 2.7 CM
ECHO LV IVSD: 0.7 CM (ref 0.6–0.9)
ECHO LV IVSS: 0.9 CM
ECHO LV MASS 2D: 71.2 G (ref 67–162)
ECHO LV MASS INDEX 2D: 49.8 G/M2 (ref 43–95)
ECHO LV POSTERIOR WALL DIASTOLIC: 0.6 CM (ref 0.6–0.9)
ECHO LV POSTERIOR WALL SYSTOLIC: 0.8 CM
ECHO LV RELATIVE WALL THICKNESS RATIO: 0.3
ECHO LVOT AREA: 2.8 CM2
ECHO LVOT AV VTI INDEX: 0.93
ECHO LVOT DIAM: 1.9 CM
ECHO LVOT MEAN GRADIENT: 6 MMHG
ECHO LVOT PEAK GRADIENT: 11 MMHG
ECHO LVOT PEAK VELOCITY: 1.6 M/S
ECHO LVOT STROKE VOLUME INDEX: 53.3 ML/M2
ECHO LVOT SV: 76.2 ML
ECHO LVOT VTI: 26.9 CM
ECHO MV AREA PHT: 3.2 CM2
ECHO MV AREA VTI: 3.3 CM2
ECHO MV LVOT VTI INDEX: 0.86
ECHO MV MAX VELOCITY: 1.7 M/S
ECHO MV MEAN GRADIENT: 6 MMHG
ECHO MV MEAN VELOCITY: 1.2 M/S
ECHO MV PEAK GRADIENT: 12 MMHG
ECHO MV PRESSURE HALF TIME (PHT): 68.6 MS
ECHO MV VTI: 23 CM
ECHO PVEIN A DURATION: 106.1 MS
ECHO PVEIN A VELOCITY: 0.6 M/S
ECHO PVEIN PEAK D VELOCITY: 0.6 M/S
ECHO PVEIN PEAK S VELOCITY: 0.6 M/S
ECHO PVEIN S/D RATIO: 1
ECHO RA AREA 4C: 9.2 CM2
ECHO RV BASAL DIMENSION: 2.8 CM
ECHO RV INTERNAL DIMENSION: 2.3 CM
ECHO RV LONGITUDINAL DIMENSION: 5.6 CM
ECHO RV MID DIMENSION: 1.7 CM
ECHO RV TAPSE: 2.2 CM (ref 1.7–?)
ECHO TV REGURGITANT MAX VELOCITY: 2.35 M/S
ECHO TV REGURGITANT PEAK GRADIENT: 22 MMHG
EKG ATRIAL RATE: 90 BPM
EKG P AXIS: 66 DEGREES
EKG P-R INTERVAL: 172 MS
EKG Q-T INTERVAL: 400 MS
EKG QRS DURATION: 84 MS
EKG QTC CALCULATION (BAZETT): 489 MS
EKG R AXIS: 36 DEGREES
EKG T AXIS: 55 DEGREES
EKG VENTRICULAR RATE: 90 BPM
ERYTHROCYTE [DISTWIDTH] IN BLOOD BY AUTOMATED COUNT: 13.7 % (ref 11.5–15)
GFR, ESTIMATED: >90 ML/MIN/1.73M2
GLUCOSE BLD-MCNC: 165 MG/DL (ref 74–99)
GLUCOSE BLD-MCNC: 264 MG/DL (ref 74–99)
GLUCOSE BLD-MCNC: 275 MG/DL (ref 74–99)
GLUCOSE BLD-MCNC: 333 MG/DL (ref 74–99)
GLUCOSE SERPL-MCNC: 168 MG/DL (ref 74–99)
HBA1C MFR BLD: 6.6 % (ref 4–5.6)
HCT VFR BLD AUTO: 39.9 % (ref 34–48)
HDLC SERPL-MCNC: 54 MG/DL
HGB BLD-MCNC: 13.2 G/DL (ref 11.5–15.5)
LDLC SERPL CALC-MCNC: 49 MG/DL
MAGNESIUM SERPL-MCNC: 2.5 MG/DL (ref 1.6–2.6)
MCH RBC QN AUTO: 30.3 PG (ref 26–35)
MCHC RBC AUTO-ENTMCNC: 33.1 G/DL (ref 32–34.5)
MCV RBC AUTO: 91.5 FL (ref 80–99.9)
PHOSPHATE SERPL-MCNC: 2.6 MG/DL (ref 2.5–4.5)
PLATELET # BLD AUTO: 322 K/UL (ref 130–450)
PMV BLD AUTO: 10.5 FL (ref 7–12)
POTASSIUM SERPL-SCNC: 4.2 MMOL/L (ref 3.5–5)
PROT SERPL-MCNC: 7.6 G/DL (ref 6.4–8.3)
RBC # BLD AUTO: 4.36 M/UL (ref 3.5–5.5)
SODIUM SERPL-SCNC: 140 MMOL/L (ref 132–146)
TRIGL SERPL-MCNC: 79 MG/DL
VLDLC SERPL CALC-MCNC: 16 MG/DL
WBC OTHER # BLD: 13.1 K/UL (ref 4.5–11.5)

## 2024-10-23 PROCEDURE — 93010 ELECTROCARDIOGRAM REPORT: CPT | Performed by: INTERNAL MEDICINE

## 2024-10-23 PROCEDURE — 6370000000 HC RX 637 (ALT 250 FOR IP): Performed by: INTERNAL MEDICINE

## 2024-10-23 PROCEDURE — 2140000000 HC CCU INTERMEDIATE R&B

## 2024-10-23 PROCEDURE — 2580000003 HC RX 258: Performed by: INTERNAL MEDICINE

## 2024-10-23 PROCEDURE — 93005 ELECTROCARDIOGRAM TRACING: CPT | Performed by: INTERNAL MEDICINE

## 2024-10-23 PROCEDURE — 93306 TTE W/DOPPLER COMPLETE: CPT

## 2024-10-23 PROCEDURE — 94640 AIRWAY INHALATION TREATMENT: CPT

## 2024-10-23 PROCEDURE — 84100 ASSAY OF PHOSPHORUS: CPT

## 2024-10-23 PROCEDURE — 83036 HEMOGLOBIN GLYCOSYLATED A1C: CPT

## 2024-10-23 PROCEDURE — 82962 GLUCOSE BLOOD TEST: CPT

## 2024-10-23 PROCEDURE — 93306 TTE W/DOPPLER COMPLETE: CPT | Performed by: INTERNAL MEDICINE

## 2024-10-23 PROCEDURE — 36415 COLL VENOUS BLD VENIPUNCTURE: CPT

## 2024-10-23 PROCEDURE — 83735 ASSAY OF MAGNESIUM: CPT

## 2024-10-23 PROCEDURE — 2500000003 HC RX 250 WO HCPCS: Performed by: INTERNAL MEDICINE

## 2024-10-23 PROCEDURE — 80061 LIPID PANEL: CPT

## 2024-10-23 PROCEDURE — 99232 SBSQ HOSP IP/OBS MODERATE 35: CPT | Performed by: INTERNAL MEDICINE

## 2024-10-23 PROCEDURE — 80053 COMPREHEN METABOLIC PANEL: CPT

## 2024-10-23 PROCEDURE — 6360000002 HC RX W HCPCS: Performed by: INTERNAL MEDICINE

## 2024-10-23 PROCEDURE — 85027 COMPLETE CBC AUTOMATED: CPT

## 2024-10-23 RX ADMIN — AMLODIPINE BESYLATE 10 MG: 10 TABLET ORAL at 10:34

## 2024-10-23 RX ADMIN — INSULIN LISPRO 2 UNITS: 100 INJECTION, SOLUTION INTRAVENOUS; SUBCUTANEOUS at 21:26

## 2024-10-23 RX ADMIN — ATORVASTATIN CALCIUM 40 MG: 40 TABLET, FILM COATED ORAL at 10:34

## 2024-10-23 RX ADMIN — MONTELUKAST 10 MG: 10 TABLET, FILM COATED ORAL at 10:34

## 2024-10-23 RX ADMIN — IPRATROPIUM BROMIDE AND ALBUTEROL SULFATE 1 DOSE: 2.5; .5 SOLUTION RESPIRATORY (INHALATION) at 16:05

## 2024-10-23 RX ADMIN — IPRATROPIUM BROMIDE AND ALBUTEROL SULFATE 1 DOSE: 2.5; .5 SOLUTION RESPIRATORY (INHALATION) at 20:06

## 2024-10-23 RX ADMIN — METHYLPREDNISOLONE SODIUM SUCCINATE 40 MG: 40 INJECTION INTRAMUSCULAR; INTRAVENOUS at 11:47

## 2024-10-23 RX ADMIN — INSULIN LISPRO 3 UNITS: 100 INJECTION, SOLUTION INTRAVENOUS; SUBCUTANEOUS at 18:10

## 2024-10-23 RX ADMIN — ENOXAPARIN SODIUM 30 MG: 100 INJECTION SUBCUTANEOUS at 10:34

## 2024-10-23 RX ADMIN — SODIUM CHLORIDE, PRESERVATIVE FREE 10 ML: 5 INJECTION INTRAVENOUS at 09:27

## 2024-10-23 RX ADMIN — IPRATROPIUM BROMIDE AND ALBUTEROL SULFATE 1 DOSE: 2.5; .5 SOLUTION RESPIRATORY (INHALATION) at 12:48

## 2024-10-23 RX ADMIN — DOXYCYCLINE 100 MG: 100 INJECTION, POWDER, LYOPHILIZED, FOR SOLUTION INTRAVENOUS at 20:06

## 2024-10-23 RX ADMIN — INSULIN LISPRO 2 UNITS: 100 INJECTION, SOLUTION INTRAVENOUS; SUBCUTANEOUS at 13:09

## 2024-10-23 RX ADMIN — PANTOPRAZOLE SODIUM 40 MG: 40 TABLET, DELAYED RELEASE ORAL at 10:34

## 2024-10-23 RX ADMIN — DOXYCYCLINE 100 MG: 100 INJECTION, POWDER, LYOPHILIZED, FOR SOLUTION INTRAVENOUS at 03:05

## 2024-10-23 RX ADMIN — POLYETHYLENE GLYCOL 3350 17 G: 17 POWDER, FOR SOLUTION ORAL at 09:27

## 2024-10-23 RX ADMIN — SODIUM CHLORIDE, PRESERVATIVE FREE 10 ML: 5 INJECTION INTRAVENOUS at 21:26

## 2024-10-23 RX ADMIN — IPRATROPIUM BROMIDE AND ALBUTEROL SULFATE 1 DOSE: 2.5; .5 SOLUTION RESPIRATORY (INHALATION) at 04:09

## 2024-10-23 RX ADMIN — AMITRIPTYLINE HYDROCHLORIDE 50 MG: 25 TABLET, FILM COATED ORAL at 21:26

## 2024-10-23 RX ADMIN — IPRATROPIUM BROMIDE AND ALBUTEROL SULFATE 1 DOSE: 2.5; .5 SOLUTION RESPIRATORY (INHALATION) at 02:04

## 2024-10-23 NOTE — PROGRESS NOTES
Messaged iv team in regards to placing a peripheral IV for antibiotics     Per IV team: only doing lines for discharge.     Call made to MICU to see if they are able to attempt IV.

## 2024-10-23 NOTE — ACP (ADVANCE CARE PLANNING)
Advance Care Planning   Healthcare Decision Maker:    Primary Decision Maker: Monster Olvera - Child - [phone]    Secondary Decision Maker: Malcolm Woodard - Other - [phone]    Click here to complete Healthcare Decision Makers including selection of the Healthcare Decision Maker Relationship (ie \"Primary\").         CM spoke with pt who is not  but would want her s/o to make her decisions.  Malcolm Woodard & then her children.  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  Electronically signed by Astrid Rossi RN on 10/23/2024 at 1:42 PM

## 2024-10-23 NOTE — PROGRESS NOTES
[facility] Hospitalist Progress Note    Admitting Date and Time: 10/22/2024 11:42 AM  Admit Dx: Pneumonia due to infectious organism, unspecified laterality, unspecified part of lung [J18.9]  Asthma with acute exacerbation, unspecified asthma severity, unspecified whether persistent [J45.901]  Acute hypoxic respiratory failure [J96.01]    Synopsis: Patient is a 69-year-old lady who presented to ED complaining of cough and palpitations.  Patient has a past medical history of asthma, hyperlipidemia, hypertension and RA.  She presents to ER with complaints of palpitations and cough, she states she has been feeling sick for the 3 weeks with worsening shortness of breath.  She has been treated as outpatient with azithromycin.  She also feels like tightness in her lungs.  In ED patient was tachycardic and tachypneic, imaging suggested pneumonia.  Admitted to hospitalist service for treatment of pneumonia on antibiotics.    Subjective:  Patient is being followed for Pneumonia due to infectious organism, unspecified laterality, unspecified part of lung [J18.9]  Asthma with acute exacerbation, unspecified asthma severity, unspecified whether persistent [J45.901]  Acute hypoxic respiratory failure [J96.01]     She is in shortness of breakfast.  She states she has only mild improvement in shortness of breath.  Denies any swelling.  She states she is coughing and producing some sputum.    ROS: denies fever, chills, cp,  n/v, HA unless stated above.      sodium chloride flush  5-40 mL IntraVENous 2 times per day    enoxaparin  30 mg SubCUTAneous Daily    ipratropium 0.5 mg-albuterol 2.5 mg  1 Dose Inhalation Q4H    polyethylene glycol  17 g Oral Daily    amitriptyline  50 mg Oral Nightly    amLODIPine  10 mg Oral Daily    atorvastatin  40 mg Oral Daily    linaclotide  290 mcg Oral QAM AC    montelukast  10 mg Oral Daily    pantoprazole  40 mg Oral Daily    cefTRIAXone (ROCEPHIN) IV  1,000 mg IntraVENous Q24H

## 2024-10-23 NOTE — PROGRESS NOTES
4 Eyes Skin Assessment     NAME:  Maru Doherty  YOB: 1955  MEDICAL RECORD NUMBER:  [id number]    The patient is being assessed for  Admission    I agree that at least one RN has performed a thorough Head to Toe Skin Assessment on the patient. ALL assessment sites listed below have been assessed.      Areas assessed by both nurses:    Head, Face, Ears, Shoulders, Back, Chest, Arms, Elbows, Hands, Sacrum. Buttock, Coccyx, Ischium, Legs. Feet and Heels, and Under Medical Devices         Does the Patient have a Wound? No noted wound(s)       Wilmer Prevention initiated by RN: No  Wound Care Orders initiated by RN: No    Pressure Injury (Stage 3,4, Unstageable, DTI, NWPT, and Complex wounds) if present, place Wound referral order by RN under : No    New Ostomies, if present place, Ostomy referral order under : No     Nurse 1 eSignature: Electronically signed by Debbi Gutierrez RN on 10/23/24 at 4:44 AM EDT    **SHARE this note so that the co-signing nurse can place an eSignature**    Nurse 2 eSignature: Electronically signed by Mayuri Heredia RN on 10/23/24 at 5:08 AM EDT

## 2024-10-23 NOTE — CARE COORDINATION
10/23:  Transition of care:  Pt presented to the ER for cough & palpitations from home.  Pt is on room air at 100%, Iv Solu-medrol, Iv Doxycycline til 10/30, Iv Rocephin til 10/30, Iv Labetalol PRN & Sq Lovenox.  TTE needed.  Cm spoke with pt bedside to discuss CM role & dc planning.  Pt's PCP is Dr Maria Antonia Duvall & uses [facility] on [address]  Pt lives with her family in a house with 2 steps to enter.  The bed/bathroom are on the 1 st floor.  PTA pt was independent with several new glucometers that she hasn't been able to use due to education.  Pt is independent in the room while here.  Pt has no hx of HHC/SNF.  Pt's dc plan is home with family to transport.  Pt is open to HHC & has no preferences & declined a list.  [facility]  Sw/CM will continue to follow.  Electronically signed by Astrid Rossi RN on 10/23/2024 at 1:33 PM        Case Management Assessment  Initial Evaluation    Date/Time of Evaluation: 10/23/2024 1:38 PM  Assessment Completed by: Astrid Rossi RN    If patient is discharged prior to next notation, then this note serves as note for discharge by case management.    Patient Name: Maru Doherty                   YOB: 1955  Diagnosis: Pneumonia due to infectious organism, unspecified laterality, unspecified part of lung [J18.9]  Asthma with acute exacerbation, unspecified asthma severity, unspecified whether persistent [J45.901]  Acute hypoxic respiratory failure [J96.01]                   Date / Time: 10/22/2024 11:42 AM    Patient Admission Status: Inpatient   Readmission Risk (Low < 19, Mod (19-27), High > 27): Readmission Risk Score: 14.1    Current PCP: Regino Pan, DO  PCP verified by CM? Yes    Chart Reviewed: Yes      History Provided by: Patient  Patient Orientation: Alert and Oriented, Person, Place, Situation    Patient Cognition: Alert    Hospitalization in the last 30 days (Readmission):  No    If yes,

## 2024-10-23 NOTE — PROGRESS NOTES
Comprehensive Nutrition Assessment    Type and Reason for Visit:  Initial, Positive Nutrition Screen    Nutrition Recommendations/Plan:   Recommend and start Glucerna supplement BID and Gelatein high protein supplement daily to help meet increased nutritional needs and d/t decreased po intake of meals.           Malnutrition Assessment:  Malnutrition Status:  Severe malnutrition (10/23/24 1313)    Context:  Chronic Illness     Findings of the 6 clinical characteristics of malnutrition:  Energy Intake:  75% or less estimated energy requirements for 1 month or longer  Weight Loss:  Unable to assess (d/t lack of recent actual weight history ; and d/t possible fluid shifts)     Body Fat Loss:  Severe body fat loss Orbital, Triceps   Muscle Mass Loss:  Severe muscle mass loss Temples (temporalis), Clavicles (pectoralis & deltoids)  Fluid Accumulation:  No significant fluid accumulation     Strength:  Not Performed    Nutrition Assessment:    Patient adm w/ SOB/cough/palpitations/tachycardia ; noted acute hypoxic respiratory failure ; also adm w/ pneumonia and asthma with acute exacerbation ; hx of DM and RA ; hx of IBS/asthma/hyperlipidemia/hypertension/fibromyalgia/hepatatis C ; noted decreased po intake/appetite PTA ; pt also meets criteria for severe malnutrition ; hx of recent subjective weight loss although unable to verify this ; will provide recommendations    Nutrition Related Findings:    I&Os WNL, no edema, active BS, muscle/fat wasting, decreased appetite ; Wound Type: None       Current Nutrition Intake & Therapies:    Average Meal Intake: 26-50%     ADULT ORAL NUTRITION SUPPLEMENT; Breakfast, Lunch, Dinner; Standard High Calorie/High Protein Oral Supplement  ADULT DIET; Regular; 4 carb choices (60 gm/meal)    Anthropometric Measures:  Height: 157.5 cm (5' 2\")  Ideal Body Weight (IBW): 110 lbs (50 kg)    Admission Body Weight: 45.8 kg (101 lb) (10/22, [facility])  Current Body Weight: 45.8 kg (101 lb)

## 2024-10-24 LAB
ANION GAP SERPL CALCULATED.3IONS-SCNC: 12 MMOL/L (ref 7–16)
BASOPHILS # BLD: 0 K/UL (ref 0–0.2)
BASOPHILS NFR BLD: 0 % (ref 0–2)
BUN SERPL-MCNC: 22 MG/DL (ref 6–23)
CALCIUM SERPL-MCNC: 9.6 MG/DL (ref 8.6–10.2)
CHLORIDE SERPL-SCNC: 102 MMOL/L (ref 98–107)
CO2 SERPL-SCNC: 26 MMOL/L (ref 22–29)
CREAT SERPL-MCNC: 0.5 MG/DL (ref 0.5–1)
EOSINOPHIL # BLD: 0 K/UL (ref 0.05–0.5)
EOSINOPHILS RELATIVE PERCENT: 0 % (ref 0–6)
ERYTHROCYTE [DISTWIDTH] IN BLOOD BY AUTOMATED COUNT: 13.5 % (ref 11.5–15)
GFR, ESTIMATED: >90 ML/MIN/1.73M2
GLUCOSE BLD-MCNC: 166 MG/DL (ref 74–99)
GLUCOSE BLD-MCNC: 247 MG/DL (ref 74–99)
GLUCOSE BLD-MCNC: 251 MG/DL (ref 74–99)
GLUCOSE BLD-MCNC: 318 MG/DL (ref 74–99)
GLUCOSE SERPL-MCNC: 176 MG/DL (ref 74–99)
HCT VFR BLD AUTO: 36.4 % (ref 34–48)
HGB BLD-MCNC: 12.2 G/DL (ref 11.5–15.5)
LYMPHOCYTES NFR BLD: 0.93 K/UL (ref 1.5–4)
LYMPHOCYTES RELATIVE PERCENT: 7 % (ref 20–42)
MCH RBC QN AUTO: 30.4 PG (ref 26–35)
MCHC RBC AUTO-ENTMCNC: 33.5 G/DL (ref 32–34.5)
MCV RBC AUTO: 90.8 FL (ref 80–99.9)
MONOCYTES NFR BLD: 0.7 K/UL (ref 0.1–0.95)
MONOCYTES NFR BLD: 5 % (ref 2–12)
NEUTROPHILS NFR BLD: 88 % (ref 43–80)
NEUTS SEG NFR BLD: 11.77 K/UL (ref 1.8–7.3)
PLATELET # BLD AUTO: 346 K/UL (ref 130–450)
PMV BLD AUTO: 10.1 FL (ref 7–12)
POTASSIUM SERPL-SCNC: 3.7 MMOL/L (ref 3.5–5)
RBC # BLD AUTO: 4.01 M/UL (ref 3.5–5.5)
RBC # BLD: ABNORMAL 10*6/UL
SODIUM SERPL-SCNC: 140 MMOL/L (ref 132–146)
WBC OTHER # BLD: 13.4 K/UL (ref 4.5–11.5)

## 2024-10-24 PROCEDURE — 85025 COMPLETE CBC W/AUTO DIFF WBC: CPT

## 2024-10-24 PROCEDURE — 82962 GLUCOSE BLOOD TEST: CPT

## 2024-10-24 PROCEDURE — 6370000000 HC RX 637 (ALT 250 FOR IP): Performed by: INTERNAL MEDICINE

## 2024-10-24 PROCEDURE — 80048 BASIC METABOLIC PNL TOTAL CA: CPT

## 2024-10-24 PROCEDURE — 2580000003 HC RX 258: Performed by: INTERNAL MEDICINE

## 2024-10-24 PROCEDURE — 2500000003 HC RX 250 WO HCPCS: Performed by: INTERNAL MEDICINE

## 2024-10-24 PROCEDURE — 99232 SBSQ HOSP IP/OBS MODERATE 35: CPT | Performed by: INTERNAL MEDICINE

## 2024-10-24 PROCEDURE — 6360000002 HC RX W HCPCS: Performed by: INTERNAL MEDICINE

## 2024-10-24 PROCEDURE — 94640 AIRWAY INHALATION TREATMENT: CPT

## 2024-10-24 PROCEDURE — 2140000000 HC CCU INTERMEDIATE R&B

## 2024-10-24 PROCEDURE — 36415 COLL VENOUS BLD VENIPUNCTURE: CPT

## 2024-10-24 RX ORDER — GUAIFENESIN/DEXTROMETHORPHAN 100-10MG/5
5 SYRUP ORAL EVERY 6 HOURS
Status: DISCONTINUED | OUTPATIENT
Start: 2024-10-24 | End: 2024-10-25 | Stop reason: HOSPADM

## 2024-10-24 RX ORDER — BENZONATATE 100 MG/1
100 CAPSULE ORAL EVERY 6 HOURS
Status: DISCONTINUED | OUTPATIENT
Start: 2024-10-24 | End: 2024-10-25 | Stop reason: HOSPADM

## 2024-10-24 RX ADMIN — INSULIN LISPRO 2 UNITS: 100 INJECTION, SOLUTION INTRAVENOUS; SUBCUTANEOUS at 12:29

## 2024-10-24 RX ADMIN — ENOXAPARIN SODIUM 30 MG: 100 INJECTION SUBCUTANEOUS at 08:55

## 2024-10-24 RX ADMIN — AMLODIPINE BESYLATE 10 MG: 10 TABLET ORAL at 08:51

## 2024-10-24 RX ADMIN — PANTOPRAZOLE SODIUM 40 MG: 40 TABLET, DELAYED RELEASE ORAL at 08:51

## 2024-10-24 RX ADMIN — MONTELUKAST 10 MG: 10 TABLET, FILM COATED ORAL at 08:51

## 2024-10-24 RX ADMIN — IPRATROPIUM BROMIDE AND ALBUTEROL SULFATE 1 DOSE: 2.5; .5 SOLUTION RESPIRATORY (INHALATION) at 23:57

## 2024-10-24 RX ADMIN — CEFTRIAXONE SODIUM 1000 MG: 1 INJECTION, POWDER, FOR SOLUTION INTRAMUSCULAR; INTRAVENOUS at 15:07

## 2024-10-24 RX ADMIN — SODIUM CHLORIDE, PRESERVATIVE FREE 10 ML: 5 INJECTION INTRAVENOUS at 08:55

## 2024-10-24 RX ADMIN — ATORVASTATIN CALCIUM 40 MG: 40 TABLET, FILM COATED ORAL at 08:51

## 2024-10-24 RX ADMIN — METHYLPREDNISOLONE SODIUM SUCCINATE 40 MG: 40 INJECTION INTRAMUSCULAR; INTRAVENOUS at 08:51

## 2024-10-24 RX ADMIN — INSULIN LISPRO 3 UNITS: 100 INJECTION, SOLUTION INTRAVENOUS; SUBCUTANEOUS at 21:19

## 2024-10-24 RX ADMIN — BENZONATATE 100 MG: 100 CAPSULE ORAL at 21:19

## 2024-10-24 RX ADMIN — IPRATROPIUM BROMIDE AND ALBUTEROL SULFATE 1 DOSE: 2.5; .5 SOLUTION RESPIRATORY (INHALATION) at 12:41

## 2024-10-24 RX ADMIN — GUAIFENESIN SYRUP AND DEXTROMETHORPHAN 5 ML: 100; 10 SYRUP ORAL at 10:38

## 2024-10-24 RX ADMIN — GUAIFENESIN SYRUP AND DEXTROMETHORPHAN 5 ML: 100; 10 SYRUP ORAL at 21:20

## 2024-10-24 RX ADMIN — DOXYCYCLINE 100 MG: 100 INJECTION, POWDER, LYOPHILIZED, FOR SOLUTION INTRAVENOUS at 09:02

## 2024-10-24 RX ADMIN — IPRATROPIUM BROMIDE AND ALBUTEROL SULFATE 1 DOSE: 2.5; .5 SOLUTION RESPIRATORY (INHALATION) at 20:50

## 2024-10-24 RX ADMIN — SODIUM CHLORIDE, PRESERVATIVE FREE 10 ML: 5 INJECTION INTRAVENOUS at 21:22

## 2024-10-24 RX ADMIN — IPRATROPIUM BROMIDE AND ALBUTEROL SULFATE 1 DOSE: 2.5; .5 SOLUTION RESPIRATORY (INHALATION) at 05:47

## 2024-10-24 RX ADMIN — BENZONATATE 100 MG: 100 CAPSULE ORAL at 10:38

## 2024-10-24 RX ADMIN — GUAIFENESIN SYRUP AND DEXTROMETHORPHAN 5 ML: 100; 10 SYRUP ORAL at 15:09

## 2024-10-24 RX ADMIN — DOXYCYCLINE 100 MG: 100 INJECTION, POWDER, LYOPHILIZED, FOR SOLUTION INTRAVENOUS at 20:32

## 2024-10-24 RX ADMIN — IPRATROPIUM BROMIDE AND ALBUTEROL SULFATE 1 DOSE: 2.5; .5 SOLUTION RESPIRATORY (INHALATION) at 16:39

## 2024-10-24 RX ADMIN — AMITRIPTYLINE HYDROCHLORIDE 50 MG: 25 TABLET, FILM COATED ORAL at 21:14

## 2024-10-24 RX ADMIN — INSULIN LISPRO 1 UNITS: 100 INJECTION, SOLUTION INTRAVENOUS; SUBCUTANEOUS at 17:31

## 2024-10-24 RX ADMIN — BENZONATATE 100 MG: 100 CAPSULE ORAL at 15:09

## 2024-10-24 NOTE — PROGRESS NOTES
[facility] Hospitalist Progress Note    Admitting Date and Time: 10/22/2024 11:42 AM  Admit Dx: Pneumonia due to infectious organism, unspecified laterality, unspecified part of lung [J18.9]  Asthma with acute exacerbation, unspecified asthma severity, unspecified whether persistent [J45.901]  Acute hypoxic respiratory failure [J96.01]    Synopsis: Patient is a 69-year-old lady who presented to ED complaining of cough and palpitations.  Patient has a past medical history of asthma, hyperlipidemia, hypertension and RA.  She presents to ER with complaints of palpitations and cough, she states she has been feeling sick for the 3 weeks with worsening shortness of breath.  She has been treated as outpatient with azithromycin.  She also feels like tightness in her lungs.  In ED patient was tachycardic and tachypneic, imaging suggested pneumonia.  Admitted to hospitalist service for treatment of pneumonia on antibiotics.    Subjective:  Patient is being followed for Pneumonia due to infectious organism, unspecified laterality, unspecified part of lung [J18.9]  Asthma with acute exacerbation, unspecified asthma severity, unspecified whether persistent [J45.901]  Acute hypoxic respiratory failure [J96.01]     Not needing supplemental oxygen.  She complains of increasing cough, states she was unable to sleep all night due to increased coughing.  Reports difficulty walking when she has bouts of cough, she reports some lightheadedness with coughing as well.   She states she overall feels very weak.  Complaining of pain in lungs due to coughing.    ROS: denies fever, chills, cp,  n/v, HA unless stated above.      benzonatate  100 mg Oral Q6H    guaiFENesin-dextromethorphan  5 mL Oral Q6H    methylPREDNISolone  40 mg IntraVENous Daily    sodium chloride flush  5-40 mL IntraVENous 2 times per day    enoxaparin  30 mg SubCUTAneous Daily    ipratropium 0.5 mg-albuterol 2.5 mg  1 Dose Inhalation Q4H    polyethylene glycol

## 2024-10-24 NOTE — CARE COORDINATION
10/24:  Update CM Note:  Pt presented to the ER for cough & palpitations from home.  Pt is on room air at 97%, Iv Solu-medrol, Iv Doxycycline til 10/30, Iv Rocephin til 10/30, Iv Labetalol PRN & Sq Lovenox.  Pt's dc plan is home with family to transport.  Pt is open to HHC & has no preferences & declined a list.  CM sent referral to [facility] & they can accept.  HHC order placed.  Sw/NOEL will continue to follow.  Electronically signed by Astrid Rossi RN on 10/24/2024 at 4:10 PM

## 2024-10-24 NOTE — PROGRESS NOTES
Messaged IV team in regards to patient needing IV after multiple nurses attempting and were unsuccessful.

## 2024-10-25 VITALS
HEIGHT: 62 IN | RESPIRATION RATE: 20 BRPM | BODY MASS INDEX: 18.66 KG/M2 | HEART RATE: 99 BPM | DIASTOLIC BLOOD PRESSURE: 85 MMHG | OXYGEN SATURATION: 98 % | TEMPERATURE: 98 F | SYSTOLIC BLOOD PRESSURE: 111 MMHG | WEIGHT: 101.41 LBS

## 2024-10-25 LAB
ANION GAP SERPL CALCULATED.3IONS-SCNC: 11 MMOL/L (ref 7–16)
BASOPHILS # BLD: 0 K/UL (ref 0–0.2)
BASOPHILS NFR BLD: 0 % (ref 0–2)
BUN SERPL-MCNC: 16 MG/DL (ref 6–23)
CALCIUM SERPL-MCNC: 9.6 MG/DL (ref 8.6–10.2)
CHLORIDE SERPL-SCNC: 100 MMOL/L (ref 98–107)
CO2 SERPL-SCNC: 27 MMOL/L (ref 22–29)
CREAT SERPL-MCNC: 0.5 MG/DL (ref 0.5–1)
EOSINOPHIL # BLD: 0 K/UL (ref 0.05–0.5)
EOSINOPHILS RELATIVE PERCENT: 0 % (ref 0–6)
ERYTHROCYTE [DISTWIDTH] IN BLOOD BY AUTOMATED COUNT: 13.8 % (ref 11.5–15)
GFR, ESTIMATED: >90 ML/MIN/1.73M2
GLUCOSE BLD-MCNC: 148 MG/DL (ref 74–99)
GLUCOSE BLD-MCNC: 279 MG/DL (ref 74–99)
GLUCOSE SERPL-MCNC: 158 MG/DL (ref 74–99)
HCT VFR BLD AUTO: 39.2 % (ref 34–48)
HGB BLD-MCNC: 13 G/DL (ref 11.5–15.5)
LYMPHOCYTES NFR BLD: 1.77 K/UL (ref 1.5–4)
LYMPHOCYTES RELATIVE PERCENT: 15 % (ref 20–42)
MCH RBC QN AUTO: 30.3 PG (ref 26–35)
MCHC RBC AUTO-ENTMCNC: 33.2 G/DL (ref 32–34.5)
MCV RBC AUTO: 91.4 FL (ref 80–99.9)
METAMYELOCYTES ABSOLUTE COUNT: 0.42 K/UL (ref 0–0.12)
METAMYELOCYTES: 4 % (ref 0–1)
MONOCYTES NFR BLD: 0.42 K/UL (ref 0.1–0.95)
MONOCYTES NFR BLD: 4 % (ref 2–12)
MYELOCYTES ABSOLUTE COUNT: 0.31 K/UL
MYELOCYTES: 3 %
NEUTROPHILS NFR BLD: 76 % (ref 43–80)
NEUTS SEG NFR BLD: 8.98 K/UL (ref 1.8–7.3)
NUCLEATED RED BLOOD CELLS: 1 PER 100 WBC
PLATELET # BLD AUTO: 377 K/UL (ref 130–450)
PMV BLD AUTO: 10.2 FL (ref 7–12)
POTASSIUM SERPL-SCNC: 3.5 MMOL/L (ref 3.5–5)
RBC # BLD AUTO: 4.29 M/UL (ref 3.5–5.5)
RBC # BLD: ABNORMAL 10*6/UL
SODIUM SERPL-SCNC: 138 MMOL/L (ref 132–146)
WBC OTHER # BLD: 11.9 K/UL (ref 4.5–11.5)

## 2024-10-25 PROCEDURE — 6360000002 HC RX W HCPCS: Performed by: INTERNAL MEDICINE

## 2024-10-25 PROCEDURE — 6370000000 HC RX 637 (ALT 250 FOR IP): Performed by: INTERNAL MEDICINE

## 2024-10-25 PROCEDURE — 2500000003 HC RX 250 WO HCPCS: Performed by: INTERNAL MEDICINE

## 2024-10-25 PROCEDURE — 82962 GLUCOSE BLOOD TEST: CPT

## 2024-10-25 PROCEDURE — 2580000003 HC RX 258: Performed by: INTERNAL MEDICINE

## 2024-10-25 PROCEDURE — 85025 COMPLETE CBC W/AUTO DIFF WBC: CPT

## 2024-10-25 PROCEDURE — 99239 HOSP IP/OBS DSCHRG MGMT >30: CPT | Performed by: INTERNAL MEDICINE

## 2024-10-25 PROCEDURE — 94640 AIRWAY INHALATION TREATMENT: CPT

## 2024-10-25 PROCEDURE — 80048 BASIC METABOLIC PNL TOTAL CA: CPT

## 2024-10-25 PROCEDURE — 36415 COLL VENOUS BLD VENIPUNCTURE: CPT

## 2024-10-25 RX ORDER — PREDNISONE 20 MG/1
40 TABLET ORAL DAILY
Qty: 6 TABLET | Refills: 0 | Status: SHIPPED | OUTPATIENT
Start: 2024-10-25 | End: 2024-10-28

## 2024-10-25 RX ORDER — DOXYCYCLINE 100 MG/1
100 CAPSULE ORAL EVERY 12 HOURS SCHEDULED
Status: DISCONTINUED | OUTPATIENT
Start: 2024-10-25 | End: 2024-10-25 | Stop reason: HOSPADM

## 2024-10-25 RX ORDER — CEPHALEXIN 500 MG/1
500 CAPSULE ORAL 2 TIMES DAILY
Qty: 10 CAPSULE | Refills: 0 | Status: SHIPPED | OUTPATIENT
Start: 2024-10-25 | End: 2024-10-30

## 2024-10-25 RX ORDER — DOXYCYCLINE HYCLATE 100 MG
100 TABLET ORAL 2 TIMES DAILY
Qty: 10 TABLET | Refills: 0 | Status: SHIPPED | OUTPATIENT
Start: 2024-10-25 | End: 2024-10-30

## 2024-10-25 RX ORDER — GUAIFENESIN/DEXTROMETHORPHAN 100-10MG/5
5 SYRUP ORAL EVERY 6 HOURS
Qty: 140 ML | Refills: 0 | Status: SHIPPED | OUTPATIENT
Start: 2024-10-25 | End: 2024-11-01

## 2024-10-25 RX ORDER — BENZONATATE 100 MG/1
100 CAPSULE ORAL EVERY 6 HOURS
Qty: 28 CAPSULE | Refills: 0 | Status: SHIPPED | OUTPATIENT
Start: 2024-10-25 | End: 2024-11-01

## 2024-10-25 RX ADMIN — AMLODIPINE BESYLATE 10 MG: 10 TABLET ORAL at 10:16

## 2024-10-25 RX ADMIN — GUAIFENESIN SYRUP AND DEXTROMETHORPHAN 5 ML: 100; 10 SYRUP ORAL at 10:22

## 2024-10-25 RX ADMIN — IPRATROPIUM BROMIDE AND ALBUTEROL SULFATE 1 DOSE: 2.5; .5 SOLUTION RESPIRATORY (INHALATION) at 05:06

## 2024-10-25 RX ADMIN — ATORVASTATIN CALCIUM 40 MG: 40 TABLET, FILM COATED ORAL at 10:16

## 2024-10-25 RX ADMIN — GUAIFENESIN SYRUP AND DEXTROMETHORPHAN 5 ML: 100; 10 SYRUP ORAL at 04:32

## 2024-10-25 RX ADMIN — METHYLPREDNISOLONE SODIUM SUCCINATE 40 MG: 40 INJECTION INTRAMUSCULAR; INTRAVENOUS at 10:17

## 2024-10-25 RX ADMIN — ACETAMINOPHEN 650 MG: 325 TABLET ORAL at 10:40

## 2024-10-25 RX ADMIN — IPRATROPIUM BROMIDE AND ALBUTEROL SULFATE 1 DOSE: 2.5; .5 SOLUTION RESPIRATORY (INHALATION) at 12:46

## 2024-10-25 RX ADMIN — BENZONATATE 100 MG: 100 CAPSULE ORAL at 10:22

## 2024-10-25 RX ADMIN — BENZONATATE 100 MG: 100 CAPSULE ORAL at 04:31

## 2024-10-25 RX ADMIN — PANTOPRAZOLE SODIUM 40 MG: 40 TABLET, DELAYED RELEASE ORAL at 10:16

## 2024-10-25 RX ADMIN — SODIUM CHLORIDE, PRESERVATIVE FREE 10 ML: 5 INJECTION INTRAVENOUS at 10:18

## 2024-10-25 RX ADMIN — DOXYCYCLINE 100 MG: 100 INJECTION, POWDER, LYOPHILIZED, FOR SOLUTION INTRAVENOUS at 10:37

## 2024-10-25 RX ADMIN — INSULIN LISPRO 2 UNITS: 100 INJECTION, SOLUTION INTRAVENOUS; SUBCUTANEOUS at 11:56

## 2024-10-25 RX ADMIN — CEFTRIAXONE SODIUM 1000 MG: 1 INJECTION, POWDER, FOR SOLUTION INTRAMUSCULAR; INTRAVENOUS at 14:19

## 2024-10-25 RX ADMIN — POLYETHYLENE GLYCOL 3350 17 G: 17 POWDER, FOR SOLUTION ORAL at 10:17

## 2024-10-25 RX ADMIN — IPRATROPIUM BROMIDE AND ALBUTEROL SULFATE 1 DOSE: 2.5; .5 SOLUTION RESPIRATORY (INHALATION) at 09:01

## 2024-10-25 RX ADMIN — ENOXAPARIN SODIUM 30 MG: 100 INJECTION SUBCUTANEOUS at 10:17

## 2024-10-25 RX ADMIN — MONTELUKAST 10 MG: 10 TABLET, FILM COATED ORAL at 10:17

## 2024-10-25 ASSESSMENT — PAIN SCALES - GENERAL
PAINLEVEL_OUTOF10: 0
PAINLEVEL_OUTOF10: 0
PAINLEVEL_OUTOF10: 3

## 2024-10-25 ASSESSMENT — PAIN DESCRIPTION - LOCATION: LOCATION: HEAD

## 2024-10-25 ASSESSMENT — PAIN DESCRIPTION - DESCRIPTORS: DESCRIPTORS: ACHING;DISCOMFORT;THROBBING

## 2024-10-25 ASSESSMENT — PAIN DESCRIPTION - ORIENTATION: ORIENTATION: MID

## 2024-10-25 NOTE — CARE COORDINATION
reached out to patients PCP office Dr Regino Duvall at [phone] to schedule follow up D/C appointment.  spoke to office staff and scheduled an appointment for 11/11 at 10:20 AM in office.     Information added to D/C summary.

## 2024-10-25 NOTE — CARE COORDINATION
10/25:  Update CM Note:  Pt presented to the ER for cough & palpitations from home.  Pt is dc today.  Pt's dc plan is home with family to transport.  [facility] has accepted & order placed.  Janay/NOEL will continue to follow.   Electronically signed by Astrid Rossi RN on 10/25/2024 at 10:32 AM

## 2024-10-25 NOTE — PROGRESS NOTES
IV to PO Conversion Policy     Notification of IV to PO conversion:    This patient's order for Doxycycline 100 mg BID IV has been changed to Doxycycline 100 mg BID PO as approved by the [facility] INTRAVENOUS TO ORAL Policy.    If the patient should become strict NPO while on this therapy, contact the prescriber for further orders.    Myranda Staton RPH  10/25/2024  10:49 AM

## 2024-10-25 NOTE — PROGRESS NOTES
Physician Progress Note      PATIENT:               DARRON NARANJO  CSN #:                  [id number]  :                       1955  ADMIT DATE:       10/22/2024 11:42 AM  DISCH DATE:  RESPONDING  PROVIDER #:        Malcolm Corral MD          QUERY TEXT:    Patient admitted with pneumonia, asthma exacerbation. Noted documentation of   acute hypoxic respiratory failure in H&P and PN's. In order to support the   diagnosis of acute ypoxic respiratory failure, please include additional   clinical indicators in your documentation.  Or please document if the   diagnosis of acute hypoxic respiratory failure has been ruled out after   further study.    The medical record reflects the following:  Risk Factors: pneumonia, asthma exacerbation  Clinical Indicators: No documentation of respiratory distress, accessory   muscle usage, or conversational dyspnea. No oxygen supplementation. RA 94-99%.  Treatment: IV steroids, nebs  Options provided:  -- Acute Hypoxic Respiratory Failure ruled out after study  -- Acute Hypoxic Respiratory Failure as evidenced by, Please document   evidence.  -- Other - I will add my own diagnosis  -- Disagree - Not applicable / Not valid  -- Disagree - Clinically unable to determine / Unknown  -- Refer to Clinical Documentation Reviewer    PROVIDER RESPONSE TEXT:    Acute Hypoxic Respiratory Failure has been ruled out after study.    Query created by: Desirae Mariee on 10/24/2024 4:49 PM      QUERY TEXT:    Pt admitted with pneumonia. Pt noted to have , WBC 13.0, lactic sepsis   2.2. If possible, please document in the progress notes and discharge summary   if you are evaluating and /or treating any of the following:  The medical record reflects the following:    Risk Factors: pneumonia  Clinical Indicators: , WBC 13.0, lactic sepsis 2.2  Treatment: Ceftriaxone, Doxycycline  Options provided:  -- Sepsis, present on admission  -- Sepsis, present on admission, now resolved  --

## 2024-10-25 NOTE — PLAN OF CARE
Problem: Discharge Planning  Goal: Discharge to home or other facility with appropriate resources  10/25/2024 1523 by Christiana Delacruz RN  Outcome: Progressing  10/25/2024 0207 by Debbi Gutierrez RN  Outcome: Progressing     Problem: Skin/Tissue Integrity  Goal: Absence of new skin breakdown  Description: 1.  Monitor for areas of redness and/or skin breakdown  2.  Assess vascular access sites hourly  3.  Every 4-6 hours minimum:  Change oxygen saturation probe site  4.  Every 4-6 hours:  If on nasal continuous positive airway pressure, respiratory therapy assess nares and determine need for appliance change or resting period.  10/25/2024 1523 by Christiana Delacruz RN  Outcome: Progressing  10/25/2024 0207 by Debbi Gutierrez RN  Outcome: Progressing     Problem: Safety - Adult  Goal: Free from fall injury  10/25/2024 1523 by Christiana Delacruz RN  Outcome: Progressing  10/25/2024 0207 by Debbi Gutierrez RN  Outcome: Progressing     Problem: ABCDS Injury Assessment  Goal: Absence of physical injury  10/25/2024 1523 by Christiana Delacruz RN  Outcome: Progressing  10/25/2024 0207 by Debbi Gutierrez RN  Outcome: Progressing     Problem: Nutrition Deficit:  Goal: Optimize nutritional status  10/25/2024 1523 by Christiana Delacruz RN  Outcome: Progressing  10/25/2024 0207 by Debbi Gutierrez RN  Outcome: Progressing     
  Problem: Discharge Planning  Goal: Discharge to home or other facility with appropriate resources  Outcome: Progressing     Problem: Skin/Tissue Integrity  Goal: Absence of new skin breakdown  Description: 1.  Monitor for areas of redness and/or skin breakdown  2.  Assess vascular access sites hourly  3.  Every 4-6 hours minimum:  Change oxygen saturation probe site  4.  Every 4-6 hours:  If on nasal continuous positive airway pressure, respiratory therapy assess nares and determine need for appliance change or resting period.  Outcome: Progressing     Problem: Safety - Adult  Goal: Free from fall injury  Outcome: Progressing     Problem: ABCDS Injury Assessment  Goal: Absence of physical injury  Outcome: Progressing     
  Problem: Discharge Planning  Goal: Discharge to home or other facility with appropriate resources  Outcome: Progressing     Problem: Skin/Tissue Integrity  Goal: Absence of new skin breakdown  Description: 1.  Monitor for areas of redness and/or skin breakdown  2.  Assess vascular access sites hourly  3.  Every 4-6 hours minimum:  Change oxygen saturation probe site  4.  Every 4-6 hours:  If on nasal continuous positive airway pressure, respiratory therapy assess nares and determine need for appliance change or resting period.  Outcome: Progressing     Problem: Safety - Adult  Goal: Free from fall injury  Outcome: Progressing     Problem: ABCDS Injury Assessment  Goal: Absence of physical injury  Outcome: Progressing     Problem: Nutrition Deficit:  Goal: Optimize nutritional status  Outcome: Progressing     
  Problem: Discharge Planning  Goal: Discharge to home or other facility with appropriate resources  Outcome: Progressing     Problem: Skin/Tissue Integrity  Goal: Absence of new skin breakdown  Description: 1.  Monitor for areas of redness and/or skin breakdown  2.  Assess vascular access sites hourly  3.  Every 4-6 hours minimum:  Change oxygen saturation probe site  4.  Every 4-6 hours:  If on nasal continuous positive airway pressure, respiratory therapy assess nares and determine need for appliance change or resting period.  Outcome: Progressing     Problem: Safety - Adult  Goal: Free from fall injury  Outcome: Progressing     Problem: ABCDS Injury Assessment  Goal: Absence of physical injury  Outcome: Progressing     Problem: Nutrition Deficit:  Goal: Optimize nutritional status  Outcome: Progressing     
Yes

## 2024-10-25 NOTE — DISCHARGE SUMMARY
[facility] Hospitalist Physician Discharge Summary     Patient ID:  Maru Doherty  [id number]  69 y.o.  1955    Admit date: 10/22/2024    Discharge date and time:  10/25/2024  9:44 AM    Hospital Course:   Patient Maru Doherty is a 69 y.o. presented with Pneumonia due to infectious organism, unspecified laterality, unspecified part of lung [J18.9]  Asthma with acute exacerbation, unspecified asthma severity, unspecified whether persistent [J45.901]  Acute hypoxic respiratory failure [J96.01]    Patient is a 69-year-old lady who presented to ED complaining of cough and palpitations.  Patient has a past medical history of asthma, hyperlipidemia, hypertension and RA.  She presents to ER with complaints of palpitations and cough, she states she has been feeling sick for the 3 weeks with worsening shortness of breath.  She has been treated as outpatient with azithromycin.  She also feels like tightness in her lungs.  In ED patient was tachycardic and tachypneic, imaging suggested pneumonia.  Admitted to hospitalist service for treatment of pneumonia on antibiotics.    10/25: Reports significantly improved symptoms.  States she still has a mild cough but has good p.o. intake.  Weaned off O2.  Breath sounds normal.  Discharge home to complete oral antibiotics as an outpatient with prednisone taper.  As needed cough medications also prescribed.      Follow Up:    [facility]  [address]  [place] [address]  [phone]            Disposition:    Activity level: As tolerated     Dispo: Home      Condition on discharge: Stable       Discharge Exam:    General Appearance: alert and oriented to person, place and time and in no acute distress  Skin: warm and dry  Head: normocephalic and atraumatic  Eyes: pupils equal, round, and reactive to light, extraocular eye movements intact, conjunctivae normal  Neck: neck supple and non tender without mass   Pulmonary/Chest: clear to

## 2024-10-28 RX ORDER — ATORVASTATIN CALCIUM 40 MG/1
40 TABLET, FILM COATED ORAL DAILY
Qty: 30 TABLET | Refills: 3 | Status: SHIPPED | OUTPATIENT
Start: 2024-10-28

## 2024-10-28 NOTE — TELEPHONE ENCOUNTER
Name of Medication(s) Requested:  Requested Prescriptions     Pending Prescriptions Disp Refills    atorvastatin (LIPITOR) 40 MG tablet 30 tablet      Sig: Take 1 tablet by mouth daily       Medication is on current medication list Yes    Dosage and directions were verified? Yes    Quantity verified: 90 day supply     Pharmacy Verified?  Yes    Last Appointment:  9/12/2024    Future appts:  Future Appointments   Date Time Provider Department Center   11/11/2024 10:20 AM Regino Pan DO [facility] DEP        (If no appt send self scheduling link. .REFILLAPPT)  Scheduling request sent?     [] Yes  [] No    Does patient need updated?  [] Yes  [] No

## 2024-10-29 ENCOUNTER — TELEPHONE (OUTPATIENT)
Dept: FAMILY MEDICINE CLINIC | Age: 69
End: 2024-10-29

## 2024-10-29 NOTE — TELEPHONE ENCOUNTER
Care Transitions Initial Follow Up Call    Outreach made within 2 business days of discharge: Yes    Patient: Maru Doherty Patient : 1955   MRN: [id number]  Reason for Admission: Pneumonia  Discharge Date: 10/25/24       Spoke with: Maru Garcia department/facility: [facility] Interactive Patient Contact:  Was patient able to fill all prescriptions: Yes  Was patient instructed to bring all medications to the follow-up visit: Yes  Is patient taking all medications as directed in the discharge summary? Yes  Does patient understand their discharge instructions: Yes  Does patient have questions or concerns that need addressed prior to 7-14 day follow up office visit: No    Additional needs identified to be addressed with provider  No needs identified             Scheduled appointment with PCP within 7-14 days    Follow Up  Future Appointments   Date Time Provider Department Center   2024  9:10 AM Regino Pan DO [facility] [facility]   2024 10:30 AM [facility] DIAB ED CLASSROOM [address]

## 2024-11-05 ENCOUNTER — OFFICE VISIT (OUTPATIENT)
Dept: FAMILY MEDICINE CLINIC | Age: 69
End: 2024-11-05

## 2024-11-05 VITALS
DIASTOLIC BLOOD PRESSURE: 70 MMHG | HEART RATE: 95 BPM | HEIGHT: 62 IN | WEIGHT: 103.4 LBS | BODY MASS INDEX: 19.03 KG/M2 | TEMPERATURE: 98.2 F | OXYGEN SATURATION: 98 % | SYSTOLIC BLOOD PRESSURE: 100 MMHG

## 2024-11-05 DIAGNOSIS — B37.9 YEAST INFECTION: ICD-10-CM

## 2024-11-05 DIAGNOSIS — M54.16 LUMBAR RADICULOPATHY: ICD-10-CM

## 2024-11-05 DIAGNOSIS — M51.9 LUMBAR DISC DISORDER: ICD-10-CM

## 2024-11-05 DIAGNOSIS — Z09 HOSPITAL DISCHARGE FOLLOW-UP: ICD-10-CM

## 2024-11-05 DIAGNOSIS — J18.9 PNEUMONIA DUE TO INFECTIOUS ORGANISM, UNSPECIFIED LATERALITY, UNSPECIFIED PART OF LUNG: Primary | ICD-10-CM

## 2024-11-05 RX ORDER — BROMPHENIRAMINE MALEATE, PSEUDOEPHEDRINE HYDROCHLORIDE, AND DEXTROMETHORPHAN HYDROBROMIDE 2; 30; 10 MG/5ML; MG/5ML; MG/5ML
5 SYRUP ORAL 4 TIMES DAILY PRN
Qty: 473 ML | Refills: 0 | Status: SHIPPED | OUTPATIENT
Start: 2024-11-05

## 2024-11-05 RX ORDER — FLUCONAZOLE 150 MG/1
150 TABLET ORAL
Qty: 2 TABLET | Refills: 0 | Status: SHIPPED | OUTPATIENT
Start: 2024-11-05 | End: 2024-11-11

## 2024-11-05 SDOH — ECONOMIC STABILITY: FOOD INSECURITY: WITHIN THE PAST 12 MONTHS, THE FOOD YOU BOUGHT JUST DIDN'T LAST AND YOU DIDN'T HAVE MONEY TO GET MORE.: NEVER TRUE

## 2024-11-05 SDOH — ECONOMIC STABILITY: FOOD INSECURITY: WITHIN THE PAST 12 MONTHS, YOU WORRIED THAT YOUR FOOD WOULD RUN OUT BEFORE YOU GOT MONEY TO BUY MORE.: NEVER TRUE

## 2024-11-05 SDOH — ECONOMIC STABILITY: INCOME INSECURITY: HOW HARD IS IT FOR YOU TO PAY FOR THE VERY BASICS LIKE FOOD, HOUSING, MEDICAL CARE, AND HEATING?: NOT HARD AT ALL

## 2024-11-05 ASSESSMENT — ENCOUNTER SYMPTOMS
SHORTNESS OF BREATH: 0
SORE THROAT: 0
NAUSEA: 0
EYE PAIN: 0
BACK PAIN: 1
ABDOMINAL PAIN: 0
SINUS PAIN: 0
WHEEZING: 0
VOMITING: 0
COUGH: 1

## 2024-11-05 NOTE — PROGRESS NOTES
Rhythm: Normal rate and regular rhythm.      Heart sounds: No murmur heard.     No friction rub.   Pulmonary:      Effort: No respiratory distress.   Chest:      Chest wall: No tenderness.   Abdominal:      General: Abdomen is flat.      Palpations: Abdomen is soft. There is no mass.      Tenderness: There is no abdominal tenderness.   Musculoskeletal:      Cervical back: Neck supple. No tenderness.      Right lower leg: No edema.      Left lower leg: No edema.   Lymphadenopathy:      Cervical: No cervical adenopathy.   Skin:     Findings: No erythema or rash.   Neurological:      General: No focal deficit present.      Mental Status: She is alert. Mental status is at baseline.   Psychiatric:         Mood and Affect: Mood normal.         Thought Content: Thought content normal.         An electronic signature was used to authenticate this note.  --Regino Duvall, DO

## 2024-11-06 ENCOUNTER — HOSPITAL ENCOUNTER (OUTPATIENT)
Dept: DIABETES SERVICES | Age: 69
Setting detail: THERAPIES SERIES
Discharge: HOME | End: 2024-11-06
Payer: MEDICARE

## 2024-11-06 PROCEDURE — 97802 MEDICAL NUTRITION INDIV IN: CPT

## 2024-11-06 NOTE — PROGRESS NOTES
Admission medications    Medication Sig Start Date End Date Taking? Authorizing Provider   fluconazole (DIFLUCAN) 150 MG tablet Take 1 tablet by mouth every 72 hours for 6 days 11/5/24 11/11/24  Regino Pan DO   brompheniramine-pseudoephedrine-DM 2-30-10 MG/5ML syrup Take 5 mLs by mouth 4 times daily as needed for Congestion or Cough 11/5/24   Regino Pan DO   atorvastatin (LIPITOR) 40 MG tablet Take 1 tablet by mouth daily 10/28/24   Regino Pan DO   acetaminophen (TYLENOL) 500 MG tablet Take 2 tablets by mouth 2 times daily as needed for Pain    Odilia Lopez MD   amLODIPine (NORVASC) 10 MG tablet Take 1 tablet by mouth daily    Odilia Lopez MD   azelastine (ASTELIN) 0.1 % nasal spray 2 sprays by Nasal route 2 times daily    Odilia Lopez MD   EPINEPHrine (SYMJEPI) 0.3 MG/0.3ML SOSY injection Inject 0.3 mLs into the muscle as needed for Anaphylaxis    ProviderOdilia MD   diphenhydrAMINE (BENADRYL) 25 MG tablet Take 1 tablet by mouth nightly    Odilia Lopez MD   amitriptyline (ELAVIL) 50 MG tablet Take 1 tablet by mouth nightly 9/26/24   Regino Pan DO   FARXIGA 5 MG tablet Take 1 tablet by mouth every morning    Odilia Lopez MD   LINZESS 290 MCG CAPS capsule Take 1 capsule by mouth every morning (before breakfast)    Odilia Lopez MD   pantoprazole (PROTONIX) 40 MG tablet Take 1 tablet by mouth daily    Odilia Lopez MD   montelukast (SINGULAIR) 10 MG tablet Take 1 tablet by mouth daily 2/6/23   Matilde Denny DO   polyethylene glycol (GLYCOLAX) powder Take 17 g by mouth daily as needed (constipation)    ProviderOdilia MD       Supplements, OTC:      Labs:  Lab Results   Component Value Date     10/25/2024    K 3.5 10/25/2024     10/25/2024    CO2 27 10/25/2024    BUN 16 10/25/2024    CREATININE 0.5 10/25/2024    GLUCOSE 158 (H) 10/25/2024    CALCIUM 9.6 10/25/2024    BILITOT

## 2024-11-08 ENCOUNTER — TELEPHONE (OUTPATIENT)
Dept: FAMILY MEDICINE CLINIC | Age: 69
End: 2024-11-08

## 2024-11-08 DIAGNOSIS — J18.9 UNRESOLVED PNEUMONIA: Primary | ICD-10-CM

## 2024-12-03 RX ORDER — AMLODIPINE BESYLATE 10 MG/1
10 TABLET ORAL DAILY
Qty: 90 TABLET | Refills: 2 | Status: SHIPPED | OUTPATIENT
Start: 2024-12-03

## 2024-12-03 NOTE — TELEPHONE ENCOUNTER
Name of Medication(s) Requested:  Requested Prescriptions     Pending Prescriptions Disp Refills    amLODIPine (NORVASC) 10 MG tablet 90 tablet      Sig: Take 1 tablet by mouth daily       Medication is on current medication list Yes    Dosage and directions were verified? Yes    Quantity verified: 90 day supply     Pharmacy Verified?  Yes    Last Appointment:  11/5/2024    Future appts:  No future appointments.     (If no appt send self scheduling link. .REFILLAPPT)  Scheduling request sent?     [] Yes  [] No    Does patient need updated?  [] Yes  [] No

## 2025-02-04 DIAGNOSIS — F41.1 GAD (GENERALIZED ANXIETY DISORDER): ICD-10-CM

## 2025-02-04 DIAGNOSIS — E78.2 MIXED HYPERLIPIDEMIA: Primary | ICD-10-CM

## 2025-02-04 RX ORDER — ATORVASTATIN CALCIUM 40 MG/1
40 TABLET, FILM COATED ORAL DAILY
Qty: 90 TABLET | Refills: 3 | Status: SHIPPED | OUTPATIENT
Start: 2025-02-04

## 2025-02-04 RX ORDER — AMITRIPTYLINE HYDROCHLORIDE 50 MG/1
50 TABLET ORAL NIGHTLY
Qty: 90 TABLET | Refills: 3 | Status: SHIPPED | OUTPATIENT
Start: 2025-02-04

## 2025-02-17 NOTE — PROGRESS NOTES
[facility]      Department of Family Medicine  Phone: [phone]   Fax: [phone]    02/18/25    Maru Doherty is a 69 y.o. female with PMHx of T2DM, RA, moderate persistent asthma, neuropathy, chronic pain, HTN, HLD  presenting to the outpatient clinic for:  Chief Complaint   Patient presents with    Follow-up     Patient states she was in the hospital awhile and she had pneumonia  and she never followed up. Patients states she needs to be checked for lung cancer.        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 helps with housekeeping     Mediastinal Lymphadenopathy  Found on CTA while admitted for pneumonia  Due for repeat scan     BP Readings from Last 3 Encounters:   02/18/25 128/64   11/05/24 100/70   10/25/24 111/85        Allergies   Allergen Reactions    Cat Dander Other (See Comments)    Dust Mite Extract      Other Reaction(s): Not available    Grass Pollen(K-O-R-T-Swt Rayo) Other (See Comments)    Metformin And Related      Yeast infection    Molds & Smuts      Other Reaction(s): Not available    Pollen Extract Other (See Comments)    Adhesive Tape Rash     bruising    Nsaids      Irritates IBS    Penicillins Hives         Review of Systems:  Review of Systems   Constitutional:  Negative for chills, fatigue

## 2025-02-18 ENCOUNTER — OFFICE VISIT (OUTPATIENT)
Dept: FAMILY MEDICINE CLINIC | Age: 70
End: 2025-02-18
Payer: MEDICARE

## 2025-02-18 VITALS
TEMPERATURE: 97 F | HEART RATE: 64 BPM | SYSTOLIC BLOOD PRESSURE: 128 MMHG | BODY MASS INDEX: 20.67 KG/M2 | DIASTOLIC BLOOD PRESSURE: 64 MMHG | WEIGHT: 113 LBS | OXYGEN SATURATION: 99 %

## 2025-02-18 DIAGNOSIS — E11.65 TYPE 2 DIABETES MELLITUS WITH HYPERGLYCEMIA, WITHOUT LONG-TERM CURRENT USE OF INSULIN (HCC): Primary | ICD-10-CM

## 2025-02-18 DIAGNOSIS — J18.9 PNEUMONIA DUE TO INFECTIOUS ORGANISM, UNSPECIFIED LATERALITY, UNSPECIFIED PART OF LUNG: ICD-10-CM

## 2025-02-18 DIAGNOSIS — E78.2 MIXED HYPERLIPIDEMIA: ICD-10-CM

## 2025-02-18 DIAGNOSIS — R59.0 MEDIASTINAL LYMPHADENOPATHY: ICD-10-CM

## 2025-02-18 DIAGNOSIS — K21.9 GASTROESOPHAGEAL REFLUX DISEASE, UNSPECIFIED WHETHER ESOPHAGITIS PRESENT: ICD-10-CM

## 2025-02-18 DIAGNOSIS — F41.1 GAD (GENERALIZED ANXIETY DISORDER): ICD-10-CM

## 2025-02-18 LAB
CREATININE URINE: 47.9 MG/DL (ref 29–226)
HBA1C MFR BLD: 8.3 %
MICROALBUMIN/CREAT 24H UR: <12 MG/L (ref 0–19)
MICROALBUMIN/CREAT UR-RTO: NORMAL MCG/MG CREAT (ref 0–30)

## 2025-02-18 PROCEDURE — 1123F ACP DISCUSS/DSCN MKR DOCD: CPT | Performed by: STUDENT IN AN ORGANIZED HEALTH CARE EDUCATION/TRAINING PROGRAM

## 2025-02-18 PROCEDURE — 99214 OFFICE O/P EST MOD 30 MIN: CPT | Performed by: STUDENT IN AN ORGANIZED HEALTH CARE EDUCATION/TRAINING PROGRAM

## 2025-02-18 PROCEDURE — G8427 DOCREV CUR MEDS BY ELIG CLIN: HCPCS | Performed by: STUDENT IN AN ORGANIZED HEALTH CARE EDUCATION/TRAINING PROGRAM

## 2025-02-18 PROCEDURE — 1036F TOBACCO NON-USER: CPT | Performed by: STUDENT IN AN ORGANIZED HEALTH CARE EDUCATION/TRAINING PROGRAM

## 2025-02-18 PROCEDURE — 83036 HEMOGLOBIN GLYCOSYLATED A1C: CPT | Performed by: STUDENT IN AN ORGANIZED HEALTH CARE EDUCATION/TRAINING PROGRAM

## 2025-02-18 PROCEDURE — 3017F COLORECTAL CA SCREEN DOC REV: CPT | Performed by: STUDENT IN AN ORGANIZED HEALTH CARE EDUCATION/TRAINING PROGRAM

## 2025-02-18 PROCEDURE — 1159F MED LIST DOCD IN RCRD: CPT | Performed by: STUDENT IN AN ORGANIZED HEALTH CARE EDUCATION/TRAINING PROGRAM

## 2025-02-18 PROCEDURE — G8399 PT W/DXA RESULTS DOCUMENT: HCPCS | Performed by: STUDENT IN AN ORGANIZED HEALTH CARE EDUCATION/TRAINING PROGRAM

## 2025-02-18 PROCEDURE — 3052F HG A1C>EQUAL 8.0%<EQUAL 9.0%: CPT | Performed by: STUDENT IN AN ORGANIZED HEALTH CARE EDUCATION/TRAINING PROGRAM

## 2025-02-18 PROCEDURE — 3074F SYST BP LT 130 MM HG: CPT | Performed by: STUDENT IN AN ORGANIZED HEALTH CARE EDUCATION/TRAINING PROGRAM

## 2025-02-18 PROCEDURE — 1090F PRES/ABSN URINE INCON ASSESS: CPT | Performed by: STUDENT IN AN ORGANIZED HEALTH CARE EDUCATION/TRAINING PROGRAM

## 2025-02-18 PROCEDURE — G8420 CALC BMI NORM PARAMETERS: HCPCS | Performed by: STUDENT IN AN ORGANIZED HEALTH CARE EDUCATION/TRAINING PROGRAM

## 2025-02-18 PROCEDURE — 3078F DIAST BP <80 MM HG: CPT | Performed by: STUDENT IN AN ORGANIZED HEALTH CARE EDUCATION/TRAINING PROGRAM

## 2025-02-18 PROCEDURE — 2022F DILAT RTA XM EVC RTNOPTHY: CPT | Performed by: STUDENT IN AN ORGANIZED HEALTH CARE EDUCATION/TRAINING PROGRAM

## 2025-02-18 RX ORDER — BROMPHENIRAMINE MALEATE, PSEUDOEPHEDRINE HYDROCHLORIDE, AND DEXTROMETHORPHAN HYDROBROMIDE 2; 30; 10 MG/5ML; MG/5ML; MG/5ML
5 SYRUP ORAL 4 TIMES DAILY PRN
Qty: 473 ML | Refills: 0 | Status: CANCELLED | OUTPATIENT
Start: 2025-02-18

## 2025-02-18 RX ORDER — PANTOPRAZOLE SODIUM 40 MG/1
40 TABLET, DELAYED RELEASE ORAL DAILY
Qty: 90 TABLET | Refills: 3 | Status: SHIPPED | OUTPATIENT
Start: 2025-02-18

## 2025-02-18 RX ORDER — ATORVASTATIN CALCIUM 40 MG/1
40 TABLET, FILM COATED ORAL DAILY
Qty: 90 TABLET | Refills: 3 | Status: SHIPPED | OUTPATIENT
Start: 2025-02-18

## 2025-02-18 RX ORDER — AMITRIPTYLINE HYDROCHLORIDE 50 MG/1
50 TABLET ORAL NIGHTLY
Qty: 90 TABLET | Refills: 3 | Status: SHIPPED | OUTPATIENT
Start: 2025-02-18

## 2025-02-18 ASSESSMENT — ENCOUNTER SYMPTOMS
SINUS PAIN: 0
ABDOMINAL PAIN: 1
WHEEZING: 0
SHORTNESS OF BREATH: 0
BACK PAIN: 1
SORE THROAT: 0
VOMITING: 0
EYE PAIN: 0
NAUSEA: 1

## 2025-02-18 ASSESSMENT — PATIENT HEALTH QUESTIONNAIRE - PHQ9
SUM OF ALL RESPONSES TO PHQ QUESTIONS 1-9: 0
1. LITTLE INTEREST OR PLEASURE IN DOING THINGS: NOT AT ALL
SUM OF ALL RESPONSES TO PHQ QUESTIONS 1-9: 0
SUM OF ALL RESPONSES TO PHQ QUESTIONS 1-9: 0
2. FEELING DOWN, DEPRESSED OR HOPELESS: NOT AT ALL
SUM OF ALL RESPONSES TO PHQ9 QUESTIONS 1 & 2: 0
SUM OF ALL RESPONSES TO PHQ QUESTIONS 1-9: 0

## 2025-03-05 DIAGNOSIS — J18.9 PNEUMONIA DUE TO INFECTIOUS ORGANISM, UNSPECIFIED LATERALITY, UNSPECIFIED PART OF LUNG: ICD-10-CM

## 2025-03-05 RX ORDER — BROMPHENIRAMINE MALEATE, PSEUDOEPHEDRINE HYDROCHLORIDE, AND DEXTROMETHORPHAN HYDROBROMIDE 2; 30; 10 MG/5ML; MG/5ML; MG/5ML
5 SYRUP ORAL 4 TIMES DAILY PRN
Qty: 473 ML | Refills: 0 | Status: SHIPPED | OUTPATIENT
Start: 2025-03-05

## 2025-03-10 ENCOUNTER — RESULTS FOLLOW-UP (OUTPATIENT)
Dept: CT IMAGING | Age: 70
End: 2025-03-10

## 2025-03-10 ENCOUNTER — HOSPITAL ENCOUNTER (OUTPATIENT)
Dept: CT IMAGING | Age: 70
Discharge: HOME | End: 2025-03-12
Attending: STUDENT IN AN ORGANIZED HEALTH CARE EDUCATION/TRAINING PROGRAM
Payer: MEDICARE

## 2025-03-10 DIAGNOSIS — R59.0 MEDIASTINAL LYMPHADENOPATHY: ICD-10-CM

## 2025-03-10 PROCEDURE — 71250 CT THORAX DX C-: CPT

## 2025-03-10 NOTE — RESULT ENCOUNTER NOTE
Scan of lungs seemed stable compared to previous. However, would feel comfortable sending patient to pulmonology to get their OK as well given the chronic nodule demonstrated.

## 2025-05-07 ENCOUNTER — HOSPITAL ENCOUNTER (OUTPATIENT)
Dept: MAMMOGRAPHY | Age: 70
Discharge: HOME | End: 2025-05-09
Attending: OBSTETRICS & GYNECOLOGY
Payer: MEDICARE

## 2025-05-07 VITALS — BODY MASS INDEX: 20.61 KG/M2 | HEIGHT: 62 IN | WEIGHT: 112 LBS

## 2025-05-07 DIAGNOSIS — Z12.31 SCREENING MAMMOGRAM, ENCOUNTER FOR: ICD-10-CM

## 2025-05-07 PROCEDURE — 77063 BREAST TOMOSYNTHESIS BI: CPT

## 2025-05-19 ENCOUNTER — OFFICE VISIT (OUTPATIENT)
Dept: FAMILY MEDICINE CLINIC | Age: 70
End: 2025-05-19
Payer: MEDICARE

## 2025-05-19 VITALS
DIASTOLIC BLOOD PRESSURE: 68 MMHG | SYSTOLIC BLOOD PRESSURE: 124 MMHG | HEART RATE: 80 BPM | BODY MASS INDEX: 21.27 KG/M2 | TEMPERATURE: 97.4 F | HEIGHT: 62 IN | OXYGEN SATURATION: 98 % | WEIGHT: 115.6 LBS

## 2025-05-19 DIAGNOSIS — M54.12 CERVICAL RADICULOPATHY: ICD-10-CM

## 2025-05-19 DIAGNOSIS — R59.0 MEDIASTINAL LYMPHADENOPATHY: ICD-10-CM

## 2025-05-19 DIAGNOSIS — E11.65 TYPE 2 DIABETES MELLITUS WITH HYPERGLYCEMIA, WITHOUT LONG-TERM CURRENT USE OF INSULIN (HCC): Primary | ICD-10-CM

## 2025-05-19 DIAGNOSIS — J45.40 MODERATE PERSISTENT ASTHMA WITHOUT COMPLICATION: ICD-10-CM

## 2025-05-19 PROBLEM — K02.9 DENTAL CARIES: Status: RESOLVED | Noted: 2019-04-25 | Resolved: 2025-05-19

## 2025-05-19 PROBLEM — J96.01 ACUTE HYPOXIC RESPIRATORY FAILURE (HCC): Status: RESOLVED | Noted: 2024-10-22 | Resolved: 2025-05-19

## 2025-05-19 PROBLEM — M51.9 LUMBAR DISC DISORDER: Status: RESOLVED | Noted: 2019-02-27 | Resolved: 2025-05-19

## 2025-05-19 LAB — HBA1C MFR BLD: 7.7 %

## 2025-05-19 PROCEDURE — G2211 COMPLEX E/M VISIT ADD ON: HCPCS | Performed by: STUDENT IN AN ORGANIZED HEALTH CARE EDUCATION/TRAINING PROGRAM

## 2025-05-19 PROCEDURE — G8427 DOCREV CUR MEDS BY ELIG CLIN: HCPCS | Performed by: STUDENT IN AN ORGANIZED HEALTH CARE EDUCATION/TRAINING PROGRAM

## 2025-05-19 PROCEDURE — 99214 OFFICE O/P EST MOD 30 MIN: CPT | Performed by: STUDENT IN AN ORGANIZED HEALTH CARE EDUCATION/TRAINING PROGRAM

## 2025-05-19 PROCEDURE — 83036 HEMOGLOBIN GLYCOSYLATED A1C: CPT | Performed by: STUDENT IN AN ORGANIZED HEALTH CARE EDUCATION/TRAINING PROGRAM

## 2025-05-19 PROCEDURE — 3074F SYST BP LT 130 MM HG: CPT | Performed by: STUDENT IN AN ORGANIZED HEALTH CARE EDUCATION/TRAINING PROGRAM

## 2025-05-19 PROCEDURE — 1123F ACP DISCUSS/DSCN MKR DOCD: CPT | Performed by: STUDENT IN AN ORGANIZED HEALTH CARE EDUCATION/TRAINING PROGRAM

## 2025-05-19 PROCEDURE — 3051F HG A1C>EQUAL 7.0%<8.0%: CPT | Performed by: STUDENT IN AN ORGANIZED HEALTH CARE EDUCATION/TRAINING PROGRAM

## 2025-05-19 PROCEDURE — G8399 PT W/DXA RESULTS DOCUMENT: HCPCS | Performed by: STUDENT IN AN ORGANIZED HEALTH CARE EDUCATION/TRAINING PROGRAM

## 2025-05-19 PROCEDURE — 1090F PRES/ABSN URINE INCON ASSESS: CPT | Performed by: STUDENT IN AN ORGANIZED HEALTH CARE EDUCATION/TRAINING PROGRAM

## 2025-05-19 PROCEDURE — 3078F DIAST BP <80 MM HG: CPT | Performed by: STUDENT IN AN ORGANIZED HEALTH CARE EDUCATION/TRAINING PROGRAM

## 2025-05-19 PROCEDURE — 2022F DILAT RTA XM EVC RTNOPTHY: CPT | Performed by: STUDENT IN AN ORGANIZED HEALTH CARE EDUCATION/TRAINING PROGRAM

## 2025-05-19 PROCEDURE — 1159F MED LIST DOCD IN RCRD: CPT | Performed by: STUDENT IN AN ORGANIZED HEALTH CARE EDUCATION/TRAINING PROGRAM

## 2025-05-19 PROCEDURE — G8420 CALC BMI NORM PARAMETERS: HCPCS | Performed by: STUDENT IN AN ORGANIZED HEALTH CARE EDUCATION/TRAINING PROGRAM

## 2025-05-19 PROCEDURE — 1036F TOBACCO NON-USER: CPT | Performed by: STUDENT IN AN ORGANIZED HEALTH CARE EDUCATION/TRAINING PROGRAM

## 2025-05-19 PROCEDURE — 3017F COLORECTAL CA SCREEN DOC REV: CPT | Performed by: STUDENT IN AN ORGANIZED HEALTH CARE EDUCATION/TRAINING PROGRAM

## 2025-05-19 RX ORDER — DAPAGLIFLOZIN 10 MG/1
10 TABLET, FILM COATED ORAL EVERY MORNING
Qty: 30 TABLET | Refills: 0 | Status: SHIPPED | OUTPATIENT
Start: 2025-05-19

## 2025-05-19 RX ORDER — ALBUTEROL SULFATE 90 UG/1
2 INHALANT RESPIRATORY (INHALATION) 4 TIMES DAILY PRN
Qty: 18 G | Refills: 5 | Status: SHIPPED | OUTPATIENT
Start: 2025-05-19
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
## 2025-06-04 LAB — DIABETIC RETINOPATHY: NEGATIVE

## 2025-06-06 DIAGNOSIS — E78.2 MIXED HYPERLIPIDEMIA: ICD-10-CM

## 2025-06-06 RX ORDER — ATORVASTATIN CALCIUM 40 MG/1
40 TABLET, FILM COATED ORAL DAILY
Qty: 90 TABLET | Refills: 3 | Status: SHIPPED | OUTPATIENT
Start: 2025-06-06

## 2025-06-26 ENCOUNTER — HOSPITAL ENCOUNTER (OUTPATIENT)
Dept: MRI IMAGING | Age: 70
Discharge: HOME | End: 2025-06-28
Attending: STUDENT IN AN ORGANIZED HEALTH CARE EDUCATION/TRAINING PROGRAM
Payer: MEDICARE

## 2025-06-26 DIAGNOSIS — M54.12 CERVICAL RADICULOPATHY: ICD-10-CM

## 2025-06-26 PROCEDURE — 72141 MRI NECK SPINE W/O DYE: CPT

## 2025-06-27 ENCOUNTER — RESULTS FOLLOW-UP (OUTPATIENT)
Dept: FAMILY MEDICINE CLINIC | Age: 70
End: 2025-06-27

## 2025-06-30 ENCOUNTER — TELEPHONE (OUTPATIENT)
Age: 70
End: 2025-06-30

## 2025-06-30 NOTE — TELEPHONE ENCOUNTER
Attempted to call patient to schedule referral. Mailbox full. Can be scheduled with any of the providers

## 2025-07-03 ENCOUNTER — OFFICE VISIT (OUTPATIENT)
Dept: FAMILY MEDICINE CLINIC | Age: 70
End: 2025-07-03
Payer: MEDICARE

## 2025-07-03 VITALS
OXYGEN SATURATION: 99 % | DIASTOLIC BLOOD PRESSURE: 64 MMHG | SYSTOLIC BLOOD PRESSURE: 112 MMHG | HEART RATE: 61 BPM | BODY MASS INDEX: 21.03 KG/M2 | WEIGHT: 115 LBS | TEMPERATURE: 97.3 F

## 2025-07-03 DIAGNOSIS — S46.819A STRAIN OF TRAPEZIUS MUSCLE, UNSPECIFIED LATERALITY, INITIAL ENCOUNTER: Primary | ICD-10-CM

## 2025-07-03 PROCEDURE — G8399 PT W/DXA RESULTS DOCUMENT: HCPCS

## 2025-07-03 PROCEDURE — 3078F DIAST BP <80 MM HG: CPT

## 2025-07-03 PROCEDURE — 1036F TOBACCO NON-USER: CPT

## 2025-07-03 PROCEDURE — 99213 OFFICE O/P EST LOW 20 MIN: CPT

## 2025-07-03 PROCEDURE — 3017F COLORECTAL CA SCREEN DOC REV: CPT

## 2025-07-03 PROCEDURE — 1159F MED LIST DOCD IN RCRD: CPT

## 2025-07-03 PROCEDURE — G8427 DOCREV CUR MEDS BY ELIG CLIN: HCPCS

## 2025-07-03 PROCEDURE — 3074F SYST BP LT 130 MM HG: CPT

## 2025-07-03 PROCEDURE — 1160F RVW MEDS BY RX/DR IN RCRD: CPT

## 2025-07-03 PROCEDURE — 1090F PRES/ABSN URINE INCON ASSESS: CPT

## 2025-07-03 PROCEDURE — G8420 CALC BMI NORM PARAMETERS: HCPCS

## 2025-07-03 PROCEDURE — 1123F ACP DISCUSS/DSCN MKR DOCD: CPT

## 2025-07-03 RX ORDER — METHYLPREDNISOLONE 4 MG/1
TABLET ORAL
Qty: 1 KIT | Refills: 0 | Status: SHIPPED | OUTPATIENT
Start: 2025-07-03

## 2025-07-03 RX ORDER — TIZANIDINE 2 MG/1
2 TABLET ORAL EVERY 8 HOURS PRN
Qty: 30 TABLET | Refills: 0 | Status: SHIPPED | OUTPATIENT
Start: 2025-07-03 | End: 2025-07-13

## 2025-07-03 ASSESSMENT — ENCOUNTER SYMPTOMS
VOMITING: 0
ABDOMINAL PAIN: 0
APNEA: 0
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
## 2025-07-23 ENCOUNTER — OFFICE VISIT (OUTPATIENT)
Dept: OBGYN | Age: 70
End: 2025-07-23
Payer: MEDICARE

## 2025-07-23 VITALS
HEART RATE: 55 BPM | WEIGHT: 111 LBS | DIASTOLIC BLOOD PRESSURE: 72 MMHG | SYSTOLIC BLOOD PRESSURE: 132 MMHG | BODY MASS INDEX: 20.3 KG/M2

## 2025-07-23 DIAGNOSIS — Z01.419 ENCOUNTER FOR GYNECOLOGICAL EXAMINATION: Primary | ICD-10-CM

## 2025-07-23 DIAGNOSIS — Z12.31 SCREENING MAMMOGRAM, ENCOUNTER FOR: ICD-10-CM

## 2025-07-23 DIAGNOSIS — N76.1 SUBACUTE VAGINITIS: ICD-10-CM

## 2025-07-23 PROCEDURE — G0101 CA SCREEN;PELVIC/BREAST EXAM: HCPCS | Performed by: OBSTETRICS & GYNECOLOGY

## 2025-07-23 PROCEDURE — 3078F DIAST BP <80 MM HG: CPT | Performed by: OBSTETRICS & GYNECOLOGY

## 2025-07-23 PROCEDURE — 1159F MED LIST DOCD IN RCRD: CPT | Performed by: OBSTETRICS & GYNECOLOGY

## 2025-07-23 PROCEDURE — 3075F SYST BP GE 130 - 139MM HG: CPT | Performed by: OBSTETRICS & GYNECOLOGY

## 2025-07-23 RX ORDER — ACYCLOVIR 400 MG/1
400 TABLET ORAL 3 TIMES DAILY
Qty: 30 TABLET | Refills: 3 | Status: SHIPPED | OUTPATIENT
Start: 2025-07-23 | End: 2025-08-02

## 2025-07-23 NOTE — PROGRESS NOTES
Patient alert and pleasant with no complaints  Here today for annual GYN visit.  Pelvic exam completed, [employer] specimen obtained, labeled and sent via [employer].  Breast exam completed.  Discharge instructions have been discussed with the patient. Patient advised to call our office with any questions or concerns.   Voiced understanding.    [Joint Pain] : joint pain [Negative] : Heme/Lymph [FreeTextEntry9] : L shoulder

## 2025-07-23 NOTE — PROGRESS NOTES
HISTORY OF PRESENT ILLNESS:    69 y.o. female   presents for her annual exam.     No LMP recorded (lmp unknown). Patient is postmenopausal.  Periods are: n/a menopausal at age 53  Contraception: n/a  Most recent pap smear:  normal cytology, neg HPV  History of abnormal pap smears: pt reports multiple abnormal pap smears, denies procedures to cervix.  Most recent mammogram:  normal  History of abnormal mammogram: none  Most recent colonoscopy:  normal  Dexa scan:  normal   Changes to health since last visit: none  Complaints: thinks she has a yeast infection.    Wants acyclovir refill.         Past Medical History:   Past Medical History:   Diagnosis Date    Arthritis     Asthma     controlled     Depression     Diabetes mellitus (HCC)     Drug effect     Bad bruising and rashes    Fibromyalgia     GERD (gastroesophageal reflux disease)     Hepatitis C 2016    treated     Hyperlipidemia     Hypertension     IBS (irritable bowel syndrome)     Overactive bladder     Spleen injury     hospitalized 2019 - resolved as of 20                                              OB History    Para Term  AB Living   7 5 5  2 5   SAB IAB Ectopic Molar Multiple Live Births   2     5      # Outcome Date GA Lbr Israel/2nd Weight Sex Type Anes PTL Lv   7 Term            6 Term            5 Term            4 Term            3 Term            2 SAB            1 SAB               Obstetric Comments   5 live births   2 miscarriages          Past Surgical History:   Past Surgical History:   Procedure Laterality Date    ANESTHESIA NERVE BLOCK N/A 2019    L4-5 EPIDURAL STEROID INJECTION performed by Lizzy Jesus DO at [facility] OR    CARPAL TUNNEL RELEASE Right 2019    RIGHT ENDOSCOPIC CARPAL TUNNEL RELEASE performed by Kulwinder Anthony MD at [facility] OR    COLONOSCOPY      DILATION AND CURETTAGE      ENDOSCOPY, COLON, DIAGNOSTIC      EPIDURAL STEROID INJECTION N/A 2019    L4-5

## 2025-07-24 DIAGNOSIS — N76.1 SUBACUTE VAGINITIS: ICD-10-CM

## (undated) DEVICE — PADDING,UNDERCAST,COTTON, 3X4YD STERILE: Brand: MEDLINE

## (undated) DEVICE — GAUZE,SPONGE,4"X4",12PLY,STERILE,LF,2'S: Brand: MEDLINE

## (undated) DEVICE — GOWN,SIRUS,FABRNF,XL,20/CS: Brand: MEDLINE

## (undated) DEVICE — ZIMMER® STERILE DISPOSABLE TOURNIQUET CUFF WITH PLC, DUAL PORT, SINGLE BLADDER, 18 IN. (46 CM)

## (undated) DEVICE — PADDING,UNDERCAST,COTTON, 4"X4YD STERILE: Brand: MEDLINE

## (undated) DEVICE — 6 ML SYRINGE LUER-LOCK TIP: Brand: MONOJECT

## (undated) DEVICE — Z DISCONTINUED APPLICATOR SURG PREP 0.35OZ 2% CHG 70% ISO ALC W/ HI LT

## (undated) DEVICE — NON-DEHP CATHETER EXTENSION SET, MALE LUER LOCK ADAPTER

## (undated) DEVICE — 3M™ RED DOT™ MONITORING ELECTRODE WITH FOAM TAPE AND STICKY GEL 2560, 50/BAG, 20/CASE, 72/PLT: Brand: RED DOT™

## (undated) DEVICE — 12 ML SYRINGE,LUER-LOCK TIP: Brand: MONOJECT

## (undated) DEVICE — INTENDED FOR TISSUE SEPARATION, AND OTHER PROCEDURES THAT REQUIRE A SHARP SURGICAL BLADE TO PUNCTURE OR CUT.: Brand: BARD-PARKER ® STAINLESS STEEL BLADES

## (undated) DEVICE — BANDAGE ADH W1XL3IN NAT FAB WVN FLX DURABLE N ADH PD SEAL

## (undated) DEVICE — MASTISOL ADHESIVE LIQ 2/3ML

## (undated) DEVICE — BNDG,ELSTC,MATRIX,STRL,3"X5YD,LF,HOOK&LP: Brand: MEDLINE

## (undated) DEVICE — GAUZE 2X2IN ST BORDERED

## (undated) DEVICE — NEEDLE HYPO 25GA L1.5IN BLU POLYPR HUB S STL REG BVL STR

## (undated) DEVICE — DOUBLE BASIN SET: Brand: MEDLINE INDUSTRIES, INC.

## (undated) DEVICE — STANDARD (U) BLADE ASSEMBLY 6PK: Brand: MICROAIRE®

## (undated) DEVICE — TOWEL,OR,DSP,ST,BLUE,STD,6/PK,12PK/CS: Brand: MEDLINE

## (undated) DEVICE — NEEDLE HYPO 18GA L1.5IN PNK POLYPR HUB S STL THN WALL FILL

## (undated) DEVICE — DRAPE,U/ SHT,SPLIT,PLAS,STERIL: Brand: MEDLINE

## (undated) DEVICE — TRAY EPI SGL DOSE 18GA NDL CUST AULTMAN HOSP

## (undated) DEVICE — Device

## (undated) DEVICE — SURGICAL PROCEDURE PACK HND

## (undated) DEVICE — 4-PORT MANIFOLD: Brand: NEPTUNE 2

## (undated) DEVICE — STRIP,CLOSURE,WOUND,MEDI-STRIP,1/4X3: Brand: MEDLINE

## (undated) DEVICE — GLOVE SURG SZ 65 THK91MIL LTX FREE SYN POLYISOPRENE

## (undated) DEVICE — 3 ML SYRINGE LUER-LOCK TIP: Brand: MONOJECT